# Patient Record
Sex: MALE | Race: WHITE | Employment: OTHER | URBAN - METROPOLITAN AREA
[De-identification: names, ages, dates, MRNs, and addresses within clinical notes are randomized per-mention and may not be internally consistent; named-entity substitution may affect disease eponyms.]

---

## 2018-06-01 ENCOUNTER — HOSPITAL ENCOUNTER (OUTPATIENT)
Dept: PHARMACY | Age: 83
Setting detail: THERAPIES SERIES
Discharge: HOME | End: 2018-06-01
Payer: MEDICARE

## 2018-06-01 DIAGNOSIS — I48.91 ATRIAL FIBRILLATION, UNSPECIFIED TYPE (HCC): ICD-10-CM

## 2018-06-01 LAB
INR BLD: 2.3
PROTIME: 27.7 SECONDS

## 2018-06-01 PROCEDURE — 99211 OFF/OP EST MAY X REQ PHY/QHP: CPT | Performed by: PHARMACIST

## 2018-06-01 PROCEDURE — 85610 PROTHROMBIN TIME: CPT | Performed by: PHARMACIST

## 2018-06-01 RX ORDER — DILTIAZEM HYDROCHLORIDE 120 MG/1
120 CAPSULE, COATED, EXTENDED RELEASE ORAL DAILY
Status: ON HOLD | COMMUNITY
End: 2019-06-28 | Stop reason: HOSPADM

## 2018-06-05 ENCOUNTER — TELEPHONE (OUTPATIENT)
Dept: PHARMACY | Age: 83
End: 2018-06-05

## 2018-06-15 RX ORDER — WARFARIN SODIUM 4 MG/1
TABLET ORAL
Qty: 120 TABLET | Refills: 1 | Status: SHIPPED | OUTPATIENT
Start: 2018-06-15 | End: 2018-12-31 | Stop reason: SDUPTHER

## 2018-07-13 ENCOUNTER — HOSPITAL ENCOUNTER (OUTPATIENT)
Dept: PHARMACY | Age: 83
Setting detail: THERAPIES SERIES
Discharge: HOME | End: 2018-07-13
Payer: MEDICARE

## 2018-07-13 DIAGNOSIS — I48.91 ATRIAL FIBRILLATION, UNSPECIFIED TYPE (HCC): ICD-10-CM

## 2018-07-13 LAB
INR BLD: 2.4
PROTIME: 28.6 SECONDS

## 2018-07-13 PROCEDURE — 85610 PROTHROMBIN TIME: CPT

## 2018-07-13 PROCEDURE — 99211 OFF/OP EST MAY X REQ PHY/QHP: CPT

## 2018-08-24 ENCOUNTER — HOSPITAL ENCOUNTER (OUTPATIENT)
Dept: PHARMACY | Age: 83
Setting detail: THERAPIES SERIES
Discharge: HOME | End: 2018-08-24
Payer: MEDICARE

## 2018-08-24 DIAGNOSIS — I48.91 ATRIAL FIBRILLATION, UNSPECIFIED TYPE (HCC): ICD-10-CM

## 2018-08-24 LAB
INR BLD: 2.5
PROTIME: 30.3 SECONDS

## 2018-08-24 PROCEDURE — 99211 OFF/OP EST MAY X REQ PHY/QHP: CPT

## 2018-08-24 PROCEDURE — 85610 PROTHROMBIN TIME: CPT

## 2018-10-03 ENCOUNTER — HOSPITAL ENCOUNTER (EMERGENCY)
Age: 83
Discharge: HOME | End: 2018-10-03
Attending: STUDENT IN AN ORGANIZED HEALTH CARE EDUCATION/TRAINING PROGRAM
Payer: MEDICARE

## 2018-10-03 ENCOUNTER — APPOINTMENT (OUTPATIENT)
Dept: CT IMAGING | Age: 83
End: 2018-10-03
Payer: MEDICARE

## 2018-10-03 ENCOUNTER — APPOINTMENT (OUTPATIENT)
Dept: GENERAL RADIOLOGY | Age: 83
End: 2018-10-03
Payer: MEDICARE

## 2018-10-03 VITALS
HEART RATE: 62 BPM | TEMPERATURE: 98.3 F | DIASTOLIC BLOOD PRESSURE: 73 MMHG | RESPIRATION RATE: 16 BRPM | OXYGEN SATURATION: 95 % | HEIGHT: 67 IN | BODY MASS INDEX: 31.39 KG/M2 | SYSTOLIC BLOOD PRESSURE: 163 MMHG | WEIGHT: 200 LBS

## 2018-10-03 DIAGNOSIS — R79.1 SUBTHERAPEUTIC INTERNATIONAL NORMALIZED RATIO (INR): ICD-10-CM

## 2018-10-03 DIAGNOSIS — S39.91XA BLUNT ABDOMINAL TRAUMA, INITIAL ENCOUNTER: ICD-10-CM

## 2018-10-03 DIAGNOSIS — S32.010A CLOSED COMPRESSION FRACTURE OF L1 LUMBAR VERTEBRA, INITIAL ENCOUNTER (HCC): Primary | ICD-10-CM
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
## 2018-10-03 LAB
ALBUMIN SERPL-MCNC: 4 G/DL (ref 3.9–4.9)
ALP BLD-CCNC: 49 U/L (ref 35–104)
ALT SERPL-CCNC: 45 U/L (ref 0–41)
ANION GAP SERPL CALCULATED.3IONS-SCNC: 13 MEQ/L (ref 7–13)
AST SERPL-CCNC: 47 U/L (ref 0–40)
BASOPHILS ABSOLUTE: 0.1 K/UL (ref 0–0.2)
BASOPHILS RELATIVE PERCENT: 0.4 %
BILIRUB SERPL-MCNC: <0.2 MG/DL (ref 0–1.2)
BILIRUBIN URINE: NEGATIVE
BLOOD, URINE: NEGATIVE
BUN BLDV-MCNC: 32 MG/DL (ref 8–23)
CALCIUM SERPL-MCNC: 9 MG/DL (ref 8.6–10.2)
CHLORIDE BLD-SCNC: 105 MEQ/L (ref 98–107)
CK MB: 13.8 NG/ML (ref 0–6.7)
CLARITY: CLEAR
CO2: 22 MEQ/L (ref 22–29)
COLOR: YELLOW
CREAT SERPL-MCNC: 0.88 MG/DL (ref 0.7–1.2)
CREATINE KINASE-MB INDEX: 2.3 % (ref 0–3.5)
EKG ATRIAL RATE: 61 BPM
EKG P AXIS: 53 DEGREES
EKG P-R INTERVAL: 160 MS
EKG Q-T INTERVAL: 410 MS
EKG QRS DURATION: 96 MS
EKG QTC CALCULATION (BAZETT): 412 MS
EKG R AXIS: 2 DEGREES
EKG T AXIS: 80 DEGREES
EKG VENTRICULAR RATE: 61 BPM
EOSINOPHILS ABSOLUTE: 0.1 K/UL (ref 0–0.7)
EOSINOPHILS RELATIVE PERCENT: 0.8 %
GFR AFRICAN AMERICAN: >60
GFR NON-AFRICAN AMERICAN: >60
GLOBULIN: 3 G/DL (ref 2.3–3.5)
GLUCOSE BLD-MCNC: 143 MG/DL (ref 74–109)
GLUCOSE URINE: NEGATIVE MG/DL
HCT VFR BLD CALC: 45.3 % (ref 42–52)
HEMOGLOBIN: 15.1 G/DL (ref 14–18)
INR BLD: 1.7
KETONES, URINE: NEGATIVE MG/DL
LACTIC ACID: 1 MMOL/L (ref 0.5–2.2)
LEUKOCYTE ESTERASE, URINE: NEGATIVE
LIPASE: 46 U/L (ref 13–60)
LYMPHOCYTES ABSOLUTE: 1.2 K/UL (ref 1–4.8)
LYMPHOCYTES RELATIVE PERCENT: 9.1 %
MCH RBC QN AUTO: 30 PG (ref 27–31.3)
MCHC RBC AUTO-ENTMCNC: 33.2 % (ref 33–37)
MCV RBC AUTO: 90.1 FL (ref 80–100)
MONOCYTES ABSOLUTE: 0.9 K/UL (ref 0.2–0.8)
MONOCYTES RELATIVE PERCENT: 6.8 %
NEUTROPHILS ABSOLUTE: 10.7 K/UL (ref 1.4–6.5)
NEUTROPHILS RELATIVE PERCENT: 82.9 %
NITRITE, URINE: NEGATIVE
PDW BLD-RTO: 14.1 % (ref 11.5–14.5)
PH UA: 6.5 (ref 5–9)
PLATELET # BLD: 196 K/UL (ref 130–400)
POTASSIUM SERPL-SCNC: 5 MEQ/L (ref 3.5–5.1)
PROTEIN UA: NEGATIVE MG/DL
PROTHROMBIN TIME: 17.4 SEC (ref 9.6–12.3)
RBC # BLD: 5.03 M/UL (ref 4.7–6.1)
SODIUM BLD-SCNC: 140 MEQ/L (ref 132–144)
SPECIFIC GRAVITY UA: 1.02 (ref 1–1.03)
TOTAL CK: 612 U/L (ref 0–190)
TOTAL PROTEIN: 7 G/DL (ref 6.4–8.1)
URINE REFLEX TO CULTURE: NORMAL
UROBILINOGEN, URINE: 1 E.U./DL
WBC # BLD: 12.9 K/UL (ref 4.8–10.8)

## 2018-10-03 PROCEDURE — 6360000002 HC RX W HCPCS: Performed by: STUDENT IN AN ORGANIZED HEALTH CARE EDUCATION/TRAINING PROGRAM

## 2018-10-03 PROCEDURE — 83605 ASSAY OF LACTIC ACID: CPT

## 2018-10-03 PROCEDURE — 96374 THER/PROPH/DIAG INJ IV PUSH: CPT

## 2018-10-03 PROCEDURE — 6370000000 HC RX 637 (ALT 250 FOR IP): Performed by: STUDENT IN AN ORGANIZED HEALTH CARE EDUCATION/TRAINING PROGRAM

## 2018-10-03 PROCEDURE — 72110 X-RAY EXAM L-2 SPINE 4/>VWS: CPT

## 2018-10-03 PROCEDURE — 82550 ASSAY OF CK (CPK): CPT

## 2018-10-03 PROCEDURE — 80053 COMPREHEN METABOLIC PANEL: CPT

## 2018-10-03 PROCEDURE — 93005 ELECTROCARDIOGRAM TRACING: CPT

## 2018-10-03 PROCEDURE — 85610 PROTHROMBIN TIME: CPT

## 2018-10-03 PROCEDURE — 83690 ASSAY OF LIPASE: CPT

## 2018-10-03 PROCEDURE — 74177 CT ABD & PELVIS W/CONTRAST: CPT

## 2018-10-03 PROCEDURE — 82553 CREATINE MB FRACTION: CPT

## 2018-10-03 PROCEDURE — 81003 URINALYSIS AUTO W/O SCOPE: CPT

## 2018-10-03 PROCEDURE — 99284 EMERGENCY DEPT VISIT MOD MDM: CPT

## 2018-10-03 PROCEDURE — 85025 COMPLETE CBC W/AUTO DIFF WBC: CPT

## 2018-10-03 PROCEDURE — 72074 X-RAY EXAM THORAC SPINE4/>VW: CPT

## 2018-10-03 PROCEDURE — 6360000004 HC RX CONTRAST MEDICATION: Performed by: STUDENT IN AN ORGANIZED HEALTH CARE EDUCATION/TRAINING PROGRAM

## 2018-10-03 PROCEDURE — 72170 X-RAY EXAM OF PELVIS: CPT

## 2018-10-03 PROCEDURE — 36415 COLL VENOUS BLD VENIPUNCTURE: CPT

## 2018-10-03 RX ORDER — OXYCODONE HYDROCHLORIDE AND ACETAMINOPHEN 5; 325 MG/1; MG/1
2 TABLET ORAL ONCE
Status: COMPLETED | OUTPATIENT
Start: 2018-10-03 | End: 2018-10-03

## 2018-10-03 RX ORDER — OXYCODONE HYDROCHLORIDE AND ACETAMINOPHEN 5; 325 MG/1; MG/1
1-2 TABLET ORAL EVERY 6 HOURS PRN
Qty: 20 TABLET | Refills: 0 | Status: SHIPPED | OUTPATIENT
Start: 2018-10-03 | End: 2018-10-10

## 2018-10-03 RX ORDER — HYDROCODONE BITARTRATE AND ACETAMINOPHEN 5; 325 MG/1; MG/1
1 TABLET ORAL ONCE
Status: COMPLETED | OUTPATIENT
Start: 2018-10-03 | End: 2018-10-03

## 2018-10-03 RX ADMIN — OXYCODONE HYDROCHLORIDE AND ACETAMINOPHEN 2 TABLET: 5; 325 TABLET ORAL at 22:03

## 2018-10-03 RX ADMIN — IOPAMIDOL 100 ML: 755 INJECTION, SOLUTION INTRAVENOUS at 20:38

## 2018-10-03 RX ADMIN — HYDROMORPHONE HYDROCHLORIDE 0.5 MG: 1 INJECTION, SOLUTION INTRAMUSCULAR; INTRAVENOUS; SUBCUTANEOUS at 18:34

## 2018-10-03 RX ADMIN — HYDROCODONE BITARTRATE AND ACETAMINOPHEN 1 TABLET: 5; 325 TABLET ORAL at 16:59

## 2018-10-03 ASSESSMENT — PAIN SCALES - GENERAL
PAINLEVEL_OUTOF10: 10
PAINLEVEL_OUTOF10: 0
PAINLEVEL_OUTOF10: 8
PAINLEVEL_OUTOF10: 8
PAINLEVEL_OUTOF10: 10
PAINLEVEL_OUTOF10: 10

## 2018-10-03 ASSESSMENT — ENCOUNTER SYMPTOMS
SINUS PRESSURE: 0
VOMITING: 0
CHEST TIGHTNESS: 0
SHORTNESS OF BREATH: 0
TROUBLE SWALLOWING: 0
COUGH: 0
BACK PAIN: 1
DIARRHEA: 0
ABDOMINAL PAIN: 0

## 2018-10-03 ASSESSMENT — PAIN DESCRIPTION - ORIENTATION
ORIENTATION: LOWER

## 2018-10-03 ASSESSMENT — PAIN DESCRIPTION - PAIN TYPE
TYPE: ACUTE PAIN

## 2018-10-03 ASSESSMENT — PAIN DESCRIPTION - LOCATION
LOCATION: BACK

## 2018-10-03 NOTE — ED TRIAGE NOTES
Patient from home with wife. Brought to the ER via squad for a fall at home. Patient was on ladder outside changing a light bulb and fell onto the grass. Patient is on coumadin.   A&Ox4

## 2018-10-03 NOTE — ED NOTES
Bed: 18  Expected date: 10/3/18  Expected time:   Means of arrival:   Comments:  80 male fell 5 feet from a ladder. C/o lower to mid back pain. Not inmobilized.  Pt is unable to speak in full sentence, his normal. 75-/60     Joan Wallace RN  10/03/18 7005

## 2018-10-04 PROCEDURE — 93010 ELECTROCARDIOGRAM REPORT: CPT | Performed by: INTERNAL MEDICINE

## 2018-10-04 NOTE — ED NOTES
Call placed to cat scan. They are ready for pt to come down for testing. Pt and wife updated.       Rehana Wayne, RODOLFO  10/03/18 203

## 2018-10-04 NOTE — ED NOTES
Pt resting in bed. Pt states he has no pain at this time. Wife at bedside. Will continue to monitor.       Felicia Kwan RN  10/03/18 2038

## 2018-10-04 NOTE — ED PROVIDER NOTES
and rash. Neurological: Negative for syncope, weakness and headaches. Hematological: Does not bruise/bleed easily. All other systems reviewed and are negative. Except as noted above the remainder of the review of systems was reviewed and negative. PAST MEDICAL HISTORY     Past Medical History:   Diagnosis Date    Chronic back pain     Hyperlipidemia     Hypertension          SURGICAL HISTORY       Past Surgical History:   Procedure Laterality Date    CARDIAC CATHETERIZATION      3 stents    CORONARY ARTERY BYPASS GRAFT           CURRENT MEDICATIONS       Previous Medications    ASPIRIN 81 MG TABLET    Take 81 mg by mouth daily    CHOLECALCIFEROL (VITAMIN D-3 PO)    Take by mouth    DILTIAZEM (CARDIZEM CD) 120 MG EXTENDED RELEASE CAPSULE    Take 120 mg by mouth daily    LISINOPRIL (PRINIVIL;ZESTRIL) 10 MG TABLET        METOPROLOL (TOPROL-XL) 25 MG XL TABLET        NITROGLYCERIN (NITROSTAT) 0.4 MG SL TABLET    Place 0.4 mg under the tongue every 5 minutes as needed for Chest pain    PRAVASTATIN (PRAVACHOL) 40 MG TABLET        WARFARIN (COUMADIN) 4 MG TABLET    Take as directed by [address] [place] [address] Anticoagulation Management Service. Quantity equals 90 day supply. (currently 34 mg TWD)       ALLERGIES     Honey bee venom [bee venom] and Penicillins    FAMILY HISTORY     History reviewed. No pertinent family history.        SOCIAL HISTORY       Social History     Social History    Marital status: Single     Spouse name: N/A    Number of children: N/A    Years of education: N/A     Social History Main Topics    Smoking status: Former Smoker    Smokeless tobacco: Never Used    Alcohol use 0.0 oz/week      Comment: social    Drug use: No    Sexual activity: Not Currently     Other Topics Concern    None     Social History Narrative    None       SCREENINGS    Kaylynn Coma Scale  Eye Opening: Spontaneous  Best Verbal Response: Oriented  Best Motor Response: Obeys commands  [place] Coma Scale Score: discussed the findings with the patient and his significant other. They verbalize understanding and have no further questions. CONSULTS:  None    PROCEDURES:  Unless otherwise noted below, none     Procedures    FINAL IMPRESSION      1. Closed compression fracture of L1 lumbar vertebra, initial encounter ([facility] [address]    2. Blunt abdominal trauma, initial encounter    3. Fall from ladder, initial encounter    4. Subtherapeutic international normalized ratio (INR)          DISPOSITION/PLAN   DISPOSITION Discharge - Pending Orders Complete 10/03/2018 09:41:38 PM      PATIENT REFERRED TO:  MD Oj Franklin [place] [phone]    Call in 1 day      [place]anda Zarate [address] [facility],[address] [facility]  [phone]    Schedule an appointment as soon as possible for a visit in 1 day        DISCHARGE MEDICATIONS:  New Prescriptions    OXYCODONE-ACETAMINOPHEN (PERCOCET) 5-325 MG PER TABLET    Take 1-2 tablets by mouth every 6 hours as needed for Pain for up to 7 days. WARNING:  May cause drowsiness. May impair ability to operate vehicles or machinery. Do not use in combination with alcohol. .          (Please note that portions of this note were completed with a voice recognition program.  Efforts were made to edit the dictations but occasionally words are mis-transcribed.)    52 Irene Mishra DO (electronically signed)  Attending Emergency Physician          52 Irene Mishra DO  10/03/18 9661

## 2018-10-04 NOTE — ED NOTES
Pt resting in bed. Pt denies any pain at this time. Pt given ice water and tolerated well.       Ana Lilia Moore RN  10/03/18 3264

## 2018-10-05 LAB
GFR AFRICAN AMERICAN: >60
GFR NON-AFRICAN AMERICAN: >60
PERFORMED ON: NORMAL
POC CREATININE: 0.9 MG/DL (ref 0.8–1.3)
POC SAMPLE TYPE: NORMAL

## 2018-10-08 ENCOUNTER — HOSPITAL ENCOUNTER (OUTPATIENT)
Dept: PHARMACY | Age: 83
Setting detail: THERAPIES SERIES
Discharge: HOME | End: 2018-10-08
Payer: MEDICARE

## 2018-10-08 DIAGNOSIS — I48.91 ATRIAL FIBRILLATION, UNSPECIFIED TYPE (HCC): ICD-10-CM

## 2018-10-08 LAB
INR BLD: 2
PROTIME: 24.1 SECONDS

## 2018-10-08 PROCEDURE — 99211 OFF/OP EST MAY X REQ PHY/QHP: CPT

## 2018-10-08 PROCEDURE — 85610 PROTHROMBIN TIME: CPT

## 2018-11-19 ENCOUNTER — HOSPITAL ENCOUNTER (OUTPATIENT)
Dept: PHARMACY | Age: 83
Setting detail: THERAPIES SERIES
Discharge: HOME | End: 2018-11-19
Payer: MEDICARE

## 2018-11-19 DIAGNOSIS — I48.91 ATRIAL FIBRILLATION, UNSPECIFIED TYPE (HCC): ICD-10-CM

## 2018-11-19 PROCEDURE — 85610 PROTHROMBIN TIME: CPT

## 2018-11-19 PROCEDURE — 99211 OFF/OP EST MAY X REQ PHY/QHP: CPT

## 2018-12-31 ENCOUNTER — HOSPITAL ENCOUNTER (OUTPATIENT)
Dept: PHARMACY | Age: 83
Setting detail: THERAPIES SERIES
Discharge: HOME | End: 2018-12-31
Payer: MEDICARE

## 2018-12-31 DIAGNOSIS — I48.91 ATRIAL FIBRILLATION, UNSPECIFIED TYPE (HCC): ICD-10-CM

## 2018-12-31 LAB — INTERNATIONAL NORMALIZATION RATIO, POC: 2.5

## 2018-12-31 PROCEDURE — 85610 PROTHROMBIN TIME: CPT

## 2018-12-31 PROCEDURE — 99211 OFF/OP EST MAY X REQ PHY/QHP: CPT

## 2018-12-31 RX ORDER — WARFARIN SODIUM 4 MG/1
TABLET ORAL
Qty: 125 TABLET | Refills: 1 | Status: ON HOLD | OUTPATIENT
Start: 2018-12-31 | End: 2019-05-28 | Stop reason: HOSPADM

## 2019-02-11 ENCOUNTER — HOSPITAL ENCOUNTER (OUTPATIENT)
Dept: PHARMACY | Age: 84
Setting detail: THERAPIES SERIES
Discharge: HOME | End: 2019-02-11
Payer: MEDICARE

## 2019-02-11 DIAGNOSIS — I48.91 ATRIAL FIBRILLATION, UNSPECIFIED TYPE (HCC): ICD-10-CM

## 2019-02-11 LAB — INTERNATIONAL NORMALIZATION RATIO, POC: 2.2

## 2019-02-11 PROCEDURE — 85610 PROTHROMBIN TIME: CPT | Performed by: PHARMACIST

## 2019-02-11 PROCEDURE — 99211 OFF/OP EST MAY X REQ PHY/QHP: CPT | Performed by: PHARMACIST

## 2019-03-26 ENCOUNTER — HOSPITAL ENCOUNTER (OUTPATIENT)
Dept: PHARMACY | Age: 84
Setting detail: THERAPIES SERIES
Discharge: HOME | End: 2019-03-26
Payer: MEDICARE

## 2019-03-26 DIAGNOSIS — I48.91 ATRIAL FIBRILLATION, UNSPECIFIED TYPE (HCC): ICD-10-CM

## 2019-03-26 LAB — INTERNATIONAL NORMALIZATION RATIO, POC: 1.9

## 2019-03-26 PROCEDURE — 85610 PROTHROMBIN TIME: CPT | Performed by: PHARMACIST

## 2019-03-26 PROCEDURE — 99211 OFF/OP EST MAY X REQ PHY/QHP: CPT | Performed by: PHARMACIST

## 2019-05-07 ENCOUNTER — HOSPITAL ENCOUNTER (OUTPATIENT)
Dept: PHARMACY | Age: 84
Setting detail: THERAPIES SERIES
Discharge: HOME | End: 2019-05-07
Payer: MEDICARE

## 2019-05-07 DIAGNOSIS — I48.91 ATRIAL FIBRILLATION, UNSPECIFIED TYPE (HCC): ICD-10-CM

## 2019-05-07 PROCEDURE — 85610 PROTHROMBIN TIME: CPT

## 2019-05-07 PROCEDURE — 99211 OFF/OP EST MAY X REQ PHY/QHP: CPT

## 2019-05-24 ENCOUNTER — APPOINTMENT (OUTPATIENT)
Dept: CT IMAGING | Age: 84
DRG: 026 | End: 2019-05-24
Payer: MEDICARE

## 2019-05-24 ENCOUNTER — APPOINTMENT (OUTPATIENT)
Dept: GENERAL RADIOLOGY | Age: 84
DRG: 026 | End: 2019-05-24
Payer: MEDICARE

## 2019-05-24 ENCOUNTER — HOSPITAL ENCOUNTER (INPATIENT)
Age: 84
LOS: 7 days | Discharge: NURSING, REHAB OR LONG-TERM CARE | DRG: 026 | End: 2019-05-31
Attending: EMERGENCY MEDICINE | Admitting: NEUROLOGICAL SURGERY
Payer: MEDICARE

## 2019-05-24 DIAGNOSIS — S06.5XAA SUBDURAL HEMATOMA: Primary | ICD-10-CM

## 2019-05-24 LAB
ABO/RH: NORMAL
ALBUMIN SERPL-MCNC: 3.3 G/DL (ref 3.5–4.6)
ALP BLD-CCNC: 53 U/L (ref 35–104)
ALT SERPL-CCNC: 17 U/L (ref 0–41)
ANION GAP SERPL CALCULATED.3IONS-SCNC: 11 MEQ/L (ref 9–15)
ANTIBODY SCREEN: NORMAL
APTT: 30.6 SEC (ref 21.6–35.4)
AST SERPL-CCNC: 24 U/L (ref 0–40)
BASOPHILS ABSOLUTE: 0 K/UL (ref 0–0.2)
BASOPHILS RELATIVE PERCENT: 0.2 %
BILIRUB SERPL-MCNC: 0.7 MG/DL (ref 0.2–0.7)
BUN BLDV-MCNC: 29 MG/DL (ref 8–23)
CALCIUM SERPL-MCNC: 9.3 MG/DL (ref 8.5–9.9)
CHLORIDE BLD-SCNC: 108 MEQ/L (ref 95–107)
CO2: 21 MEQ/L (ref 20–31)
CREAT SERPL-MCNC: 0.69 MG/DL (ref 0.7–1.2)
EOSINOPHILS ABSOLUTE: 0.1 K/UL (ref 0–0.7)
EOSINOPHILS RELATIVE PERCENT: 0.7 %
GFR AFRICAN AMERICAN: >60
GFR NON-AFRICAN AMERICAN: >60
GLOBULIN: 2.9 G/DL (ref 2.3–3.5)
GLUCOSE BLD-MCNC: 184 MG/DL (ref 70–99)
HCT VFR BLD CALC: 42.2 % (ref 42–52)
HEMOGLOBIN: 14.3 G/DL (ref 14–18)
INR BLD: 2.4
LYMPHOCYTES ABSOLUTE: 0.7 K/UL (ref 1–4.8)
LYMPHOCYTES RELATIVE PERCENT: 6.7 %
MAGNESIUM: 2.3 MG/DL (ref 1.7–2.4)
MCH RBC QN AUTO: 31.2 PG (ref 27–31.3)
MCHC RBC AUTO-ENTMCNC: 33.8 % (ref 33–37)
MCV RBC AUTO: 92.2 FL (ref 80–100)
MONOCYTES ABSOLUTE: 0.8 K/UL (ref 0.2–0.8)
MONOCYTES RELATIVE PERCENT: 8.2 %
NEUTROPHILS ABSOLUTE: 8.2 K/UL (ref 1.4–6.5)
NEUTROPHILS RELATIVE PERCENT: 84.2 %
PDW BLD-RTO: 14 % (ref 11.5–14.5)
PLATELET # BLD: 178 K/UL (ref 130–400)
POTASSIUM SERPL-SCNC: 4.2 MEQ/L (ref 3.4–4.9)
PROTHROMBIN TIME: 23.2 SEC (ref 9–11.5)
RBC # BLD: 4.58 M/UL (ref 4.7–6.1)
SODIUM BLD-SCNC: 140 MEQ/L (ref 135–144)
TOTAL PROTEIN: 6.2 G/DL (ref 6.3–8)
TROPONIN: <0.01 NG/ML (ref 0–0.01)
WBC # BLD: 9.8 K/UL (ref 4.8–10.8)

## 2019-05-24 PROCEDURE — 86900 BLOOD TYPING SEROLOGIC ABO: CPT

## 2019-05-24 PROCEDURE — 85610 PROTHROMBIN TIME: CPT

## 2019-05-24 PROCEDURE — 84484 ASSAY OF TROPONIN QUANT: CPT

## 2019-05-24 PROCEDURE — 80053 COMPREHEN METABOLIC PANEL: CPT

## 2019-05-24 PROCEDURE — 99285 EMERGENCY DEPT VISIT HI MDM: CPT

## 2019-05-24 PROCEDURE — 2500000003 HC RX 250 WO HCPCS: Performed by: EMERGENCY MEDICINE

## 2019-05-24 PROCEDURE — 85025 COMPLETE CBC W/AUTO DIFF WBC: CPT

## 2019-05-24 PROCEDURE — 70450 CT HEAD/BRAIN W/O DYE: CPT

## 2019-05-24 PROCEDURE — 72125 CT NECK SPINE W/O DYE: CPT

## 2019-05-24 PROCEDURE — 85730 THROMBOPLASTIN TIME PARTIAL: CPT

## 2019-05-24 PROCEDURE — 2580000003 HC RX 258: Performed by: NEUROLOGICAL SURGERY

## 2019-05-24 PROCEDURE — 6360000002 HC RX W HCPCS: Performed by: EMERGENCY MEDICINE

## 2019-05-24 PROCEDURE — 83735 ASSAY OF MAGNESIUM: CPT

## 2019-05-24 PROCEDURE — 72131 CT LUMBAR SPINE W/O DYE: CPT

## 2019-05-24 PROCEDURE — 86850 RBC ANTIBODY SCREEN: CPT

## 2019-05-24 PROCEDURE — 51702 INSERT TEMP BLADDER CATH: CPT

## 2019-05-24 PROCEDURE — 36430 TRANSFUSION BLD/BLD COMPNT: CPT

## 2019-05-24 PROCEDURE — 86901 BLOOD TYPING SEROLOGIC RH(D): CPT

## 2019-05-24 PROCEDURE — 36415 COLL VENOUS BLD VENIPUNCTURE: CPT

## 2019-05-24 PROCEDURE — 6370000000 HC RX 637 (ALT 250 FOR IP): Performed by: NEUROLOGICAL SURGERY

## 2019-05-24 PROCEDURE — 6360000002 HC RX W HCPCS: Performed by: NEUROLOGICAL SURGERY

## 2019-05-24 PROCEDURE — 2000000000 HC ICU R&B

## 2019-05-24 PROCEDURE — P9059 PLASMA, FRZ BETWEEN 8-24HOUR: HCPCS

## 2019-05-24 PROCEDURE — 71045 X-RAY EXAM CHEST 1 VIEW: CPT

## 2019-05-24 PROCEDURE — 2580000003 HC RX 258: Performed by: EMERGENCY MEDICINE

## 2019-05-24 RX ORDER — DILTIAZEM HYDROCHLORIDE 120 MG/1
120 CAPSULE, COATED, EXTENDED RELEASE ORAL DAILY
Status: DISCONTINUED | OUTPATIENT
Start: 2019-05-24 | End: 2019-05-31 | Stop reason: HOSPADM

## 2019-05-24 RX ORDER — 0.9 % SODIUM CHLORIDE 0.9 %
250 INTRAVENOUS SOLUTION INTRAVENOUS ONCE
Status: COMPLETED | OUTPATIENT
Start: 2019-05-24 | End: 2019-05-25

## 2019-05-24 RX ORDER — ONDANSETRON 2 MG/ML
4 INJECTION INTRAMUSCULAR; INTRAVENOUS ONCE
Status: COMPLETED | OUTPATIENT
Start: 2019-05-24 | End: 2019-05-24

## 2019-05-24 RX ORDER — LABETALOL 20 MG/4 ML (5 MG/ML) INTRAVENOUS SYRINGE
10 ONCE
Status: COMPLETED | OUTPATIENT
Start: 2019-05-24 | End: 2019-05-24

## 2019-05-24 RX ORDER — LISINOPRIL 10 MG/1
10 TABLET ORAL DAILY
Status: DISCONTINUED | OUTPATIENT
Start: 2019-05-24 | End: 2019-05-26

## 2019-05-24 RX ORDER — DEXTROSE, SODIUM CHLORIDE, AND POTASSIUM CHLORIDE 5; .9; .15 G/100ML; G/100ML; G/100ML
INJECTION INTRAVENOUS CONTINUOUS
Status: DISCONTINUED | OUTPATIENT
Start: 2019-05-25 | End: 2019-05-24

## 2019-05-24 RX ORDER — HYDRALAZINE HYDROCHLORIDE 20 MG/ML
10 INJECTION INTRAMUSCULAR; INTRAVENOUS
Status: DISCONTINUED | OUTPATIENT
Start: 2019-05-24 | End: 2019-05-31 | Stop reason: HOSPADM

## 2019-05-24 RX ORDER — MORPHINE SULFATE 4 MG/ML
4 INJECTION, SOLUTION INTRAMUSCULAR; INTRAVENOUS EVERY 4 HOURS PRN
Status: DISCONTINUED | OUTPATIENT
Start: 2019-05-24 | End: 2019-05-25

## 2019-05-24 RX ORDER — PRAVASTATIN SODIUM 40 MG
40 TABLET ORAL NIGHTLY
Status: DISCONTINUED | OUTPATIENT
Start: 2019-05-24 | End: 2019-05-31 | Stop reason: HOSPADM

## 2019-05-24 RX ORDER — 0.9 % SODIUM CHLORIDE 0.9 %
1000 INTRAVENOUS SOLUTION INTRAVENOUS ONCE
Status: COMPLETED | OUTPATIENT
Start: 2019-05-24 | End: 2019-05-24

## 2019-05-24 RX ORDER — LABETALOL 20 MG/4 ML (5 MG/ML) INTRAVENOUS SYRINGE
10
Status: DISCONTINUED | OUTPATIENT
Start: 2019-05-24 | End: 2019-05-31 | Stop reason: HOSPADM

## 2019-05-24 RX ORDER — PHYTONADIONE 10 MG/ML
10 INJECTION, EMULSION INTRAMUSCULAR; INTRAVENOUS; SUBCUTANEOUS DAILY
Status: DISCONTINUED | OUTPATIENT
Start: 2019-05-24 | End: 2019-05-31

## 2019-05-24 RX ORDER — PHYTONADIONE 10 MG/ML
10 INJECTION, EMULSION INTRAMUSCULAR; INTRAVENOUS; SUBCUTANEOUS ONCE
Status: COMPLETED | OUTPATIENT
Start: 2019-05-24 | End: 2019-05-24

## 2019-05-24 RX ADMIN — SODIUM CHLORIDE 250 ML: 9 INJECTION, SOLUTION INTRAVENOUS at 18:00

## 2019-05-24 RX ADMIN — POTASSIUM CHLORIDE: 2 INJECTION, SOLUTION, CONCENTRATE INTRAVENOUS at 22:09

## 2019-05-24 RX ADMIN — DILTIAZEM HYDROCHLORIDE 120 MG: 120 CAPSULE, COATED, EXTENDED RELEASE ORAL at 22:08

## 2019-05-24 RX ADMIN — PRAVASTATIN SODIUM 40 MG: 40 TABLET ORAL at 20:57

## 2019-05-24 RX ADMIN — LABETALOL 20 MG/4 ML (5 MG/ML) INTRAVENOUS SYRINGE 10 MG: at 21:04

## 2019-05-24 RX ADMIN — OYSTER SHELL CALCIUM WITH VITAMIN D 1 TABLET: 500; 200 TABLET, FILM COATED ORAL at 22:09

## 2019-05-24 RX ADMIN — HYDROMORPHONE HYDROCHLORIDE 1 MG: 1 INJECTION, SOLUTION INTRAMUSCULAR; INTRAVENOUS; SUBCUTANEOUS at 17:59

## 2019-05-24 RX ADMIN — PHYTONADIONE 10 MG: 10 INJECTION, EMULSION INTRAMUSCULAR; INTRAVENOUS; SUBCUTANEOUS at 17:57

## 2019-05-24 RX ADMIN — ONDANSETRON 4 MG: 2 INJECTION INTRAMUSCULAR; INTRAVENOUS at 17:57

## 2019-05-24 RX ADMIN — LISINOPRIL 10 MG: 10 TABLET ORAL at 22:08

## 2019-05-24 RX ADMIN — HYDRALAZINE HYDROCHLORIDE 10 MG: 20 INJECTION INTRAMUSCULAR; INTRAVENOUS at 22:08

## 2019-05-24 RX ADMIN — SODIUM CHLORIDE 1000 ML: 9 INJECTION, SOLUTION INTRAVENOUS at 16:47

## 2019-05-24 RX ADMIN — PHYTONADIONE 10 MG: 10 INJECTION, EMULSION INTRAMUSCULAR; INTRAVENOUS; SUBCUTANEOUS at 22:08

## 2019-05-24 ASSESSMENT — PAIN SCALES - GENERAL
PAINLEVEL_OUTOF10: 0
PAINLEVEL_OUTOF10: 0
PAINLEVEL_OUTOF10: 7
PAINLEVEL_OUTOF10: 0

## 2019-05-24 ASSESSMENT — ENCOUNTER SYMPTOMS
DIARRHEA: 0
SORE THROAT: 0
NAUSEA: 0
COUGH: 0
ABDOMINAL PAIN: 0
VOMITING: 0
SHORTNESS OF BREATH: 0
BACK PAIN: 0

## 2019-05-24 NOTE — ED TRIAGE NOTES
PT to ed from home via ems, had fall, unwitnessed in the bathroom. Wife at home with pt, states pt much have fallen and hit head on the toilet getting out of the shower. Per ems, pt was on the ground. Laceration to posterior scalp, bleeding controlled by ems. Pt is alert and oriented to person and place, but not date. Pt sleech is slow and pt only speaks in short phrases. Per with, this is pt's baseline speech pattern. Pt has swelling and bruising noted to right forearm and elbow. Per wife, injury is from a previous fall. Pt resps are even and unlabored. PERRLA. Pt in collar and on a backboard.  Per Ems, , on coumadin for afib, pt also has cardia chx with stents and nuero history with Dr Yo Buckley

## 2019-05-24 NOTE — ED NOTES
Per Dr Darrel Auguste, hold labetolol prn for increased systolic     Kate Guy, [facility]  05/24/19 5199

## 2019-05-24 NOTE — CONSULTS
Consults   Consult Note    Reason for Consult:  Management of subdural hematoma, chronic back pain, HLD, and HTN    Requesting Physician:  Jorge Luis Blancas MD    HISTORY OF PRESENT ILLNESS:    The patient is a 80 y.o. male who is being admitted by Dr. Roxann Verduzco after falling in his bathroom and sustaining a subdural hematoma. Multiple falls in the past      Past Medical History:   Diagnosis Date    Chronic back pain     Hyperlipidemia     Hypertension        Past Surgical History:   Procedure Laterality Date    CARDIAC CATHETERIZATION      3 stents    CORONARY ARTERY BYPASS GRAFT         Prior to Admission medications    Medication Sig Start Date End Date Taking? Authorizing Provider   warfarin (COUMADIN) 4 MG tablet Take as directed by [facility] AT [place] Anticoagulation Management Service. Quantity equals 90 day supply.  (currently 34 mg TWD) 12/31/18  Yes Seth Rose MD   diltiazem (CARDIZEM CD) 120 MG extended release capsule Take 120 mg by mouth daily   Yes Historical Provider, MD   lisinopril (PRINIVIL;ZESTRIL) 10 MG tablet  10/5/15  Yes Historical Provider, MD   pravastatin (PRAVACHOL) 40 MG tablet  9/14/15  Yes Historical Provider, MD   aspirin 81 MG tablet Take 81 mg by mouth daily   Yes Historical Provider, MD   Cholecalciferol (VITAMIN D-3 PO) Take by mouth   Yes Historical Provider, MD   metoprolol (TOPROL-XL) 25 MG XL tablet  9/8/15   Historical Provider, MD   nitroGLYCERIN (NITROSTAT) 0.4 MG SL tablet Place 0.4 mg under the tongue every 5 minutes as needed for Chest pain    Historical Provider, MD       Scheduled Meds:   sodium chloride  1,000 mL Intravenous Once    sodium chloride  250 mL Intravenous Once    labetalol  10 mg Intravenous Once     Continuous Infusions:  PRN Meds:HYDROmorphone    Allergies   Allergen Reactions    Honey Bee Venom [Bee Venom] Swelling    Penicillins Swelling       Social History     Socioeconomic History    Marital status: Single     Spouse name: Not on file    Number of sleep    Physical Exam:  Vitals:    05/24/19 1600 05/24/19 1801 05/24/19 1803 05/24/19 1813   BP: 134/66 (!) 150/100 (!) 150/100 131/61   Pulse:  61 59 63   Resp:  28 23 18   Temp:   97.4 °F (36.3 °C) 97.4 °F (36.3 °C)   TempSrc:  Oral     SpO2:  96%     Weight:       Height:           CONSTITUTIONAL:  awake, alert, cooperative, no apparent distress, and appears stated age  NECK:  Supple, symmetrical, trachea midline, no adenopathy, thyroid symmetric, not enlarged and no tenderness, skin normal  BACK:  Symmetric, no curvature, spinous processes are non-tender on palpation, paraspinous muscles are non-tender on palpation, no costal vertebral tenderness  LUNGS:  No increased work of breathing, good air exchange, clear to auscultation bilaterally, no crackles or wheezing  CARDIOVASCULAR:  Normal apical impulse, regular rate and rhythm, normal S1 and S2, no S3 or S4, and no murmur noted  ABDOMEN:  No scars, normal bowel sounds, soft, non-distended, non-tender, no masses palpated, no hepatosplenomegally  MUSCULOSKELETAL:  There is no redness, warmth, or swelling of the joints. Full range of motion noted. Motor strength is 5 out of 5 all extremities bilaterally.   Tone is normal.  NEUROLOGIC:  Awake, alert, oriented to name, intermittent periods of confusion and not following commands  SKIN:  no bruising or bleeding, no lesions and no jaundice    Labs:  Recent Results (from the past 24 hour(s))   Comprehensive Metabolic Panel    Collection Time: 05/24/19  4:22 PM   Result Value Ref Range    Sodium 140 135 - 144 mEq/L    Potassium 4.2 3.4 - 4.9 mEq/L    Chloride 108 (H) 95 - 107 mEq/L    CO2 21 20 - 31 mEq/L    Anion Gap 11 9 - 15 mEq/L    Glucose 184 (H) 70 - 99 mg/dL    BUN 29 (H) 8 - 23 mg/dL    CREATININE 0.69 (L) 0.70 - 1.20 mg/dL    GFR Non-African American >60.0 >60    GFR  >60.0 >60    Calcium 9.3 8.5 - 9.9 mg/dL    Total Protein 6.2 (L) 6.3 - 8.0 g/dL    Alb 3.3 (L) 3.5 - 4.6 g/dL    Total Bank [id number]     Description Blood Bank Plasma, 5 Day, Thawed     Unit Number [id number]     Dispense Status Blood Bank issued      Assessment/Plan:  1. Subdural hematoma-due to due trauma  S/p vitamin k and FFP  CT back  Admitted under neurosurgery  Admit to ICU  2. HTN-continue antihypertensive meds  3. HLD  4. Chronic back pain  I saw and evaluated the pt.  I personally obtained the key and critical portions of the history and physical exam. I reviewed the mild level documentation and agree with assessment and plan that we come up togethe  Electronically signed by Yaima Wahl MD on 5/24/19 at 6:45 PM      Electronically signed by LUIS M Cárdenas on 5/24/19 at 6:33 PM

## 2019-05-24 NOTE — ED PROVIDER NOTES
[facility] ED  eMERGENCYdEPARTMENT eNCOUnter      Pt Name: David Ding  MRN: [id number]  [place] 1934  Date of evaluation: 5/24/2019  Gustavo Ellis MD    CHIEF COMPLAINT           HPI  David Ding is a 80 y.o. male per chart review has a h/o afib on Coumadin. Pt presents to ED today after a fall in the bathroom while getting out of the shower. Unknown LOC. Pt has laceration to the back of his head. Per wife, pt has had an increasing number of frequent falls x 3 months. Pt denies neck pain. Pt notes moderate, constant, aching, nonradiating, low back pain. ROS  Review of Systems   Constitutional: Negative for activity change, chills and fever. HENT: Negative for ear pain and sore throat. Eyes: Negative for visual disturbance. Respiratory: Negative for cough and shortness of breath. Cardiovascular: Negative for chest pain, palpitations and leg swelling. Gastrointestinal: Negative for abdominal pain, diarrhea, nausea and vomiting. Genitourinary: Negative for dysuria. Musculoskeletal: Negative for back pain. Skin: Positive for wound. Negative for rash. Neurological: Negative for dizziness and weakness. Except as noted above the remainder of the review of systems was reviewed and negative.        PAST MEDICAL HISTORY     Past Medical History:   Diagnosis Date    Chronic back pain     Hyperlipidemia     Hypertension          SURGICAL HISTORY       Past Surgical History:   Procedure Laterality Date    CARDIAC CATHETERIZATION      3 stents    CORONARY ARTERY BYPASS GRAFT           CURRENTMEDICATIONS       Previous Medications    ASPIRIN 81 MG TABLET    Take 81 mg by mouth daily    CHOLECALCIFEROL (VITAMIN D-3 PO)    Take by mouth    DILTIAZEM (CARDIZEM CD) 120 MG EXTENDED RELEASE CAPSULE    Take 120 mg by mouth daily    LISINOPRIL (PRINIVIL;ZESTRIL) 10 MG TABLET        METOPROLOL (TOPROL-XL) 25 MG XL TABLET        NITROGLYCERIN (NITROSTAT) 0.4 MG SL TABLET Place 0.4 mg under the tongue every 5 minutes as needed for Chest pain    PRAVASTATIN (PRAVACHOL) 40 MG TABLET        WARFARIN (COUMADIN) 4 MG TABLET    Take as directed by [facility] EMERGENCY MEDICAL [place] AT [place] Anticoagulation Management Service. Quantity equals 90 day supply. (currently 34 mg TWD)       ALLERGIES     Honey bee venom [bee venom] and Penicillins    FAMILY HISTORY     History reviewed. No pertinent family history. SOCIAL HISTORY       Social History     Socioeconomic History    Marital status: Single     Spouse name: None    Number of children: None    Years of education: None    Highest education level: None   Occupational History    None   Social Needs    Financial resource strain: None    Food insecurity:     Worry: None     Inability: None    Transportation needs:     Medical: None     Non-medical: None   Tobacco Use    Smoking status: Former Smoker    Smokeless tobacco: Never Used   Substance and Sexual Activity    Alcohol use:  Yes     Alcohol/week: 0.0 oz     Comment: social    Drug use: No    Sexual activity: Not Currently   Lifestyle    Physical activity:     Days per week: None     Minutes per session: None    Stress: None   Relationships    Social connections:     Talks on phone: None     Gets together: None     Attends Taoism service: None     Active member of club or organization: None     Attends meetings of clubs or organizations: None     Relationship status: None    Intimate partner violence:     Fear of current or ex partner: None     Emotionally abused: None     Physically abused: None     Forced sexual activity: None   Other Topics Concern    None   Social History Narrative    None         PHYSICAL EXAM       ED Triage Vitals   BP Temp Temp Source Pulse Resp SpO2 Height Weight   05/24/19 1542 05/24/19 1542 05/24/19 1543 05/24/19 1542 05/24/19 1542 05/24/19 1542 05/24/19 1546 05/24/19 1546   (!) 148/88 97.2 °F (36.2 °C) Oral 60 28 94 % 5' 8\" (1.727 m) 211 lb (95.7 kg) Physical Exam   Constitutional: He appears well-developed. HENT:   Head: Normocephalic. Right Ear: External ear normal.   Left Ear: External ear normal.   Mouth/Throat: Oropharynx is clear and moist.   0.5 cm laceration noted on posterior occiput. Eyes: Pupils are equal, round, and reactive to light. Conjunctivae are normal.   Neck: Normal range of motion. Neck supple. No cspine tenderness   Cardiovascular: Intact distal pulses. Pulmonary/Chest: Effort normal and breath sounds normal.   Abdominal: Soft. Bowel sounds are normal. He exhibits no distension. There is no tenderness. Musculoskeletal: Normal range of motion. +L spine tenderness   Neurological: He is alert. Skin: Skin is warm and dry. Psychiatric: He has a normal mood and affect. Nursing note and vitals reviewed. MDM  81 yo male presents to the ED s/p mechanical fall in the shower. Wife states that pt has been falling more frequently for the past 3 months. Pt is afebrile, hemodynamically stable. Pt given 1 L NS, IV dilaudid, IV zofran with moderate relief. EKG shows sinus valeria with HR 57, left axis, RBBB, normal intervals, no ST changes. Labs remarkable for glucose 184. 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 CT cspine negative. Pt's cspine cleared radiographically. CXR negative. Given ICH and pt being on coumadin, case discussed with Dr. Freddie Nolasco who recommended FFP and vitamin K. Pt given 4 units of FFP and subQ vitamin K. Given ataxia, frequent falls, bilateral acute on chronic subdural hematomas, case discussed with Dr. Freddie Nolasco and pt admitted to ICU in serious condition. Pt and wife understand plan. Critical care time: 62 min excluding procedures            FINAL IMPRESSION      1.  Subdural hematoma [facility])          DISPOSITION/PLAN   DISPOSITION Admitted 05/24/2019 05:44:56 PM        DISCHARGE MEDICATIONS:  [unfilled]         Sherri Gonzalez MD(electronically signed)  Attending Emergency Physician            Rosanna Hui MD  05/24/19 7957

## 2019-05-24 NOTE — ED NOTES
Bed: 19  Expected date: 5/24/19  Expected time: 3:16 PM  Means of arrival:   Comments:  85 fall on coumadin     Radha Carvajal RN  05/24/19 5587

## 2019-05-25 ENCOUNTER — APPOINTMENT (OUTPATIENT)
Dept: CT IMAGING | Age: 84
DRG: 026 | End: 2019-05-25
Payer: MEDICARE

## 2019-05-25 ENCOUNTER — ANESTHESIA EVENT (OUTPATIENT)
Dept: OPERATING ROOM | Age: 84
DRG: 026 | End: 2019-05-25
Payer: MEDICARE

## 2019-05-25 ENCOUNTER — ANESTHESIA (OUTPATIENT)
Dept: OPERATING ROOM | Age: 84
DRG: 026 | End: 2019-05-25
Payer: MEDICARE

## 2019-05-25 VITALS — SYSTOLIC BLOOD PRESSURE: 121 MMHG | OXYGEN SATURATION: 96 % | DIASTOLIC BLOOD PRESSURE: 66 MMHG

## 2019-05-25 LAB
ANION GAP SERPL CALCULATED.3IONS-SCNC: 11 MEQ/L (ref 9–15)
APTT: 28.3 SEC (ref 21.6–35.4)
APTT: 29.5 SEC (ref 21.6–35.4)
APTT: 29.8 SEC (ref 21.6–35.4)
BACTERIA: NEGATIVE /HPF
BASOPHILS ABSOLUTE: 0 K/UL (ref 0–0.2)
BASOPHILS RELATIVE PERCENT: 0.3 %
BILIRUBIN URINE: NEGATIVE
BLOOD BANK DISPENSE STATUS: NORMAL
BLOOD BANK PRODUCT CODE: NORMAL
BLOOD, URINE: ABNORMAL
BPU ID: NORMAL
BUN BLDV-MCNC: 21 MG/DL (ref 8–23)
CALCIUM SERPL-MCNC: 9.5 MG/DL (ref 8.5–9.9)
CHLORIDE BLD-SCNC: 107 MEQ/L (ref 95–107)
CLARITY: CLEAR
CO2: 25 MEQ/L (ref 20–31)
COLOR: YELLOW
CREAT SERPL-MCNC: 0.75 MG/DL (ref 0.7–1.2)
DESCRIPTION BLOOD BANK: NORMAL
EOSINOPHILS ABSOLUTE: 0.1 K/UL (ref 0–0.7)
EOSINOPHILS RELATIVE PERCENT: 1.6 %
EPITHELIAL CELLS, UA: ABNORMAL /HPF (ref 0–5)
GFR AFRICAN AMERICAN: >60
GFR NON-AFRICAN AMERICAN: >60
GLUCOSE BLD-MCNC: 107 MG/DL (ref 70–99)
GLUCOSE URINE: NEGATIVE MG/DL
HCT VFR BLD CALC: 33.5 % (ref 42–52)
HEMOGLOBIN: 11.2 G/DL (ref 14–18)
HYALINE CASTS: ABNORMAL /HPF (ref 0–5)
INR BLD: 1.2
INR BLD: 1.3
INR BLD: 1.5
KETONES, URINE: NEGATIVE MG/DL
LEUKOCYTE ESTERASE, URINE: NEGATIVE
LYMPHOCYTES ABSOLUTE: 0.9 K/UL (ref 1–4.8)
LYMPHOCYTES RELATIVE PERCENT: 13.8 %
MCH RBC QN AUTO: 30.7 PG (ref 27–31.3)
MCHC RBC AUTO-ENTMCNC: 33.5 % (ref 33–37)
MCV RBC AUTO: 91.6 FL (ref 80–100)
MONOCYTES ABSOLUTE: 0.8 K/UL (ref 0.2–0.8)
MONOCYTES RELATIVE PERCENT: 11.9 %
NEUTROPHILS ABSOLUTE: 4.8 K/UL (ref 1.4–6.5)
NEUTROPHILS RELATIVE PERCENT: 72.4 %
NITRITE, URINE: NEGATIVE
PDW BLD-RTO: 14 % (ref 11.5–14.5)
PH UA: 7.5 (ref 5–9)
PLATELET # BLD: 143 K/UL (ref 130–400)
POTASSIUM REFLEX MAGNESIUM: 3.9 MEQ/L (ref 3.4–4.9)
PROTEIN UA: ABNORMAL MG/DL
PROTHROMBIN TIME: 12.3 SEC (ref 9–11.5)
PROTHROMBIN TIME: 13.2 SEC (ref 9–11.5)
PROTHROMBIN TIME: 15.1 SEC (ref 9–11.5)
RBC # BLD: 3.66 M/UL (ref 4.7–6.1)
RBC UA: ABNORMAL /HPF (ref 0–5)
SODIUM BLD-SCNC: 143 MEQ/L (ref 135–144)
SPECIFIC GRAVITY UA: 1.02 (ref 1–1.03)
UROBILINOGEN, URINE: 1 E.U./DL
WBC # BLD: 6.7 K/UL (ref 4.8–10.8)
WBC UA: ABNORMAL /HPF (ref 0–5)

## 2019-05-25 PROCEDURE — 2500000003 HC RX 250 WO HCPCS: Performed by: ANESTHESIOLOGY

## 2019-05-25 PROCEDURE — P9059 PLASMA, FRZ BETWEEN 8-24HOUR: HCPCS

## 2019-05-25 PROCEDURE — 36415 COLL VENOUS BLD VENIPUNCTURE: CPT

## 2019-05-25 PROCEDURE — 2580000003 HC RX 258: Performed by: NEUROLOGICAL SURGERY

## 2019-05-25 PROCEDURE — 6360000002 HC RX W HCPCS: Performed by: INTERNAL MEDICINE

## 2019-05-25 PROCEDURE — 3700000000 HC ANESTHESIA ATTENDED CARE: Performed by: NEUROLOGICAL SURGERY

## 2019-05-25 PROCEDURE — C1729 CATH, DRAINAGE: HCPCS | Performed by: NEUROLOGICAL SURGERY

## 2019-05-25 PROCEDURE — 6360000002 HC RX W HCPCS: Performed by: ANESTHESIOLOGY

## 2019-05-25 PROCEDURE — 80048 BASIC METABOLIC PNL TOTAL CA: CPT

## 2019-05-25 PROCEDURE — 2709999900 HC NON-CHARGEABLE SUPPLY: Performed by: NEUROLOGICAL SURGERY

## 2019-05-25 PROCEDURE — 70450 CT HEAD/BRAIN W/O DYE: CPT

## 2019-05-25 PROCEDURE — 6360000002 HC RX W HCPCS: Performed by: PSYCHIATRY & NEUROLOGY

## 2019-05-25 PROCEDURE — 6370000000 HC RX 637 (ALT 250 FOR IP): Performed by: NEUROLOGICAL SURGERY

## 2019-05-25 PROCEDURE — 2580000003 HC RX 258: Performed by: PSYCHIATRY & NEUROLOGY

## 2019-05-25 PROCEDURE — 2720000010 HC SURG SUPPLY STERILE: Performed by: NEUROLOGICAL SURGERY

## 2019-05-25 PROCEDURE — 00C40ZZ EXTIRPATION OF MATTER FROM INTRACRANIAL SUBDURAL SPACE, OPEN APPROACH: ICD-10-PCS | Performed by: NEUROLOGICAL SURGERY

## 2019-05-25 PROCEDURE — 6360000002 HC RX W HCPCS: Performed by: NEUROLOGICAL SURGERY

## 2019-05-25 PROCEDURE — 85610 PROTHROMBIN TIME: CPT

## 2019-05-25 PROCEDURE — 3700000001 HC ADD 15 MINUTES (ANESTHESIA): Performed by: NEUROLOGICAL SURGERY

## 2019-05-25 PROCEDURE — 85025 COMPLETE CBC W/AUTO DIFF WBC: CPT

## 2019-05-25 PROCEDURE — 2500000003 HC RX 250 WO HCPCS: Performed by: NEUROLOGICAL SURGERY

## 2019-05-25 PROCEDURE — 81001 URINALYSIS AUTO W/SCOPE: CPT

## 2019-05-25 PROCEDURE — 3600000004 HC SURGERY LEVEL 4 BASE: Performed by: NEUROLOGICAL SURGERY

## 2019-05-25 PROCEDURE — 2580000003 HC RX 258: Performed by: ANESTHESIOLOGY

## 2019-05-25 PROCEDURE — 2000000000 HC ICU R&B

## 2019-05-25 PROCEDURE — 3600000014 HC SURGERY LEVEL 4 ADDTL 15MIN: Performed by: NEUROLOGICAL SURGERY

## 2019-05-25 PROCEDURE — 85730 THROMBOPLASTIN TIME PARTIAL: CPT

## 2019-05-25 PROCEDURE — 36430 TRANSFUSION BLD/BLD COMPNT: CPT

## 2019-05-25 PROCEDURE — 88304 TISSUE EXAM BY PATHOLOGIST: CPT

## 2019-05-25 PROCEDURE — 6370000000 HC RX 637 (ALT 250 FOR IP)

## 2019-05-25 RX ORDER — DEXMEDETOMIDINE HYDROCHLORIDE 100 UG/ML
INJECTION, SOLUTION INTRAVENOUS PRN
Status: DISCONTINUED | OUTPATIENT
Start: 2019-05-25 | End: 2019-05-25 | Stop reason: SDUPTHER

## 2019-05-25 RX ORDER — METOPROLOL SUCCINATE 25 MG/1
25 TABLET, EXTENDED RELEASE ORAL DAILY
Status: DISCONTINUED | OUTPATIENT
Start: 2019-05-25 | End: 2019-05-26

## 2019-05-25 RX ORDER — MORPHINE SULFATE 4 MG/ML
4 INJECTION, SOLUTION INTRAMUSCULAR; INTRAVENOUS EVERY 4 HOURS PRN
Status: DISCONTINUED | OUTPATIENT
Start: 2019-05-25 | End: 2019-05-25

## 2019-05-25 RX ORDER — FUROSEMIDE 10 MG/ML
20 INJECTION INTRAMUSCULAR; INTRAVENOUS ONCE
Status: COMPLETED | OUTPATIENT
Start: 2019-05-25 | End: 2019-05-25

## 2019-05-25 RX ORDER — FENTANYL CITRATE 50 UG/ML
50 INJECTION, SOLUTION INTRAMUSCULAR; INTRAVENOUS EVERY 10 MIN PRN
Status: DISCONTINUED | OUTPATIENT
Start: 2019-05-25 | End: 2019-05-25

## 2019-05-25 RX ORDER — METOCLOPRAMIDE HYDROCHLORIDE 5 MG/ML
10 INJECTION INTRAMUSCULAR; INTRAVENOUS
Status: DISCONTINUED | OUTPATIENT
Start: 2019-05-25 | End: 2019-05-25

## 2019-05-25 RX ORDER — LIDOCAINE HYDROCHLORIDE 10 MG/ML
INJECTION, SOLUTION EPIDURAL; INFILTRATION; INTRACAUDAL; PERINEURAL PRN
Status: DISCONTINUED | OUTPATIENT
Start: 2019-05-25 | End: 2019-05-25 | Stop reason: ALTCHOICE

## 2019-05-25 RX ORDER — MEPERIDINE HYDROCHLORIDE 25 MG/ML
12.5 INJECTION INTRAMUSCULAR; INTRAVENOUS; SUBCUTANEOUS EVERY 5 MIN PRN
Status: DISCONTINUED | OUTPATIENT
Start: 2019-05-25 | End: 2019-05-25

## 2019-05-25 RX ORDER — DIPHENHYDRAMINE HYDROCHLORIDE 50 MG/ML
12.5 INJECTION INTRAMUSCULAR; INTRAVENOUS
Status: DISCONTINUED | OUTPATIENT
Start: 2019-05-25 | End: 2019-05-25

## 2019-05-25 RX ORDER — HYDROCODONE BITARTRATE AND ACETAMINOPHEN 5; 325 MG/1; MG/1
2 TABLET ORAL PRN
Status: ACTIVE | OUTPATIENT
Start: 2019-05-25 | End: 2019-05-25

## 2019-05-25 RX ORDER — DEXTROSE, SODIUM CHLORIDE, AND POTASSIUM CHLORIDE 5; .45; .15 G/100ML; G/100ML; G/100ML
INJECTION INTRAVENOUS CONTINUOUS
Status: DISCONTINUED | OUTPATIENT
Start: 2019-05-25 | End: 2019-05-28

## 2019-05-25 RX ORDER — NITROGLYCERIN 0.4 MG/1
0.4 TABLET SUBLINGUAL EVERY 5 MIN PRN
Status: DISCONTINUED | OUTPATIENT
Start: 2019-05-25 | End: 2019-05-31 | Stop reason: HOSPADM

## 2019-05-25 RX ORDER — FENTANYL CITRATE 50 UG/ML
INJECTION, SOLUTION INTRAMUSCULAR; INTRAVENOUS PRN
Status: DISCONTINUED | OUTPATIENT
Start: 2019-05-25 | End: 2019-05-25 | Stop reason: SDUPTHER

## 2019-05-25 RX ORDER — SODIUM CHLORIDE, SODIUM LACTATE, POTASSIUM CHLORIDE, CALCIUM CHLORIDE 600; 310; 30; 20 MG/100ML; MG/100ML; MG/100ML; MG/100ML
INJECTION, SOLUTION INTRAVENOUS CONTINUOUS PRN
Status: DISCONTINUED | OUTPATIENT
Start: 2019-05-25 | End: 2019-05-25 | Stop reason: SDUPTHER

## 2019-05-25 RX ORDER — HYDROCODONE BITARTRATE AND ACETAMINOPHEN 5; 325 MG/1; MG/1
1 TABLET ORAL PRN
Status: ACTIVE | OUTPATIENT
Start: 2019-05-25 | End: 2019-05-25

## 2019-05-25 RX ORDER — ONDANSETRON 2 MG/ML
4 INJECTION INTRAMUSCULAR; INTRAVENOUS
Status: DISCONTINUED | OUTPATIENT
Start: 2019-05-25 | End: 2019-05-25

## 2019-05-25 RX ORDER — MORPHINE SULFATE 4 MG/ML
4 INJECTION, SOLUTION INTRAMUSCULAR; INTRAVENOUS
Status: DISCONTINUED | OUTPATIENT
Start: 2019-05-25 | End: 2019-05-31

## 2019-05-25 RX ORDER — VANCOMYCIN HYDROCHLORIDE 500 MG/100ML
500 INJECTION, SOLUTION INTRAVENOUS
Status: COMPLETED | OUTPATIENT
Start: 2019-05-25 | End: 2019-05-25

## 2019-05-25 RX ORDER — 0.9 % SODIUM CHLORIDE 0.9 %
250 INTRAVENOUS SOLUTION INTRAVENOUS ONCE
Status: COMPLETED | OUTPATIENT
Start: 2019-05-25 | End: 2019-05-25

## 2019-05-25 RX ORDER — ONDANSETRON 2 MG/ML
4 INJECTION INTRAMUSCULAR; INTRAVENOUS EVERY 6 HOURS PRN
Status: DISCONTINUED | OUTPATIENT
Start: 2019-05-25 | End: 2019-05-31 | Stop reason: HOSPADM

## 2019-05-25 RX ORDER — MAGNESIUM HYDROXIDE 1200 MG/15ML
LIQUID ORAL CONTINUOUS PRN
Status: COMPLETED | OUTPATIENT
Start: 2019-05-25 | End: 2019-05-25

## 2019-05-25 RX ADMIN — HYDRALAZINE HYDROCHLORIDE 10 MG: 20 INJECTION INTRAMUSCULAR; INTRAVENOUS at 14:49

## 2019-05-25 RX ADMIN — VANCOMYCIN HYDROCHLORIDE 1000 MG: 1 INJECTION, POWDER, LYOPHILIZED, FOR SOLUTION INTRAVENOUS at 10:26

## 2019-05-25 RX ADMIN — FENTANYL CITRATE 25 MCG: 50 INJECTION, SOLUTION INTRAMUSCULAR; INTRAVENOUS at 13:47

## 2019-05-25 RX ADMIN — PHYTONADIONE 10 MG: 10 INJECTION, EMULSION INTRAMUSCULAR; INTRAVENOUS; SUBCUTANEOUS at 08:41

## 2019-05-25 RX ADMIN — SODIUM CHLORIDE, POTASSIUM CHLORIDE, SODIUM LACTATE AND CALCIUM CHLORIDE: 600; 310; 30; 20 INJECTION, SOLUTION INTRAVENOUS at 12:18

## 2019-05-25 RX ADMIN — LEVETIRACETAM 500 MG: 100 INJECTION, SOLUTION INTRAVENOUS at 12:06

## 2019-05-25 RX ADMIN — METOPROLOL SUCCINATE 25 MG: 25 TABLET, EXTENDED RELEASE ORAL at 21:33

## 2019-05-25 RX ADMIN — HYDRALAZINE HYDROCHLORIDE: 20 INJECTION INTRAMUSCULAR; INTRAVENOUS at 11:58

## 2019-05-25 RX ADMIN — PRAVASTATIN SODIUM 40 MG: 40 TABLET ORAL at 21:32

## 2019-05-25 RX ADMIN — SODIUM CHLORIDE 250 ML: 9 INJECTION, SOLUTION INTRAVENOUS at 03:00

## 2019-05-25 RX ADMIN — FENTANYL CITRATE 25 MCG: 50 INJECTION, SOLUTION INTRAMUSCULAR; INTRAVENOUS at 13:41

## 2019-05-25 RX ADMIN — SODIUM CHLORIDE 250 ML: 9 INJECTION, SOLUTION INTRAVENOUS at 16:49

## 2019-05-25 RX ADMIN — SODIUM CHLORIDE 250 ML: 9 INJECTION, SOLUTION INTRAVENOUS at 01:53

## 2019-05-25 RX ADMIN — LISINOPRIL 10 MG: 10 TABLET ORAL at 08:41

## 2019-05-25 RX ADMIN — SODIUM CHLORIDE 0.5 MCG/KG/HR: 9 INJECTION, SOLUTION INTRAVENOUS at 12:34

## 2019-05-25 RX ADMIN — Medication 4 MG: at 18:13

## 2019-05-25 RX ADMIN — HYDRALAZINE HYDROCHLORIDE 10 MG: 20 INJECTION INTRAMUSCULAR; INTRAVENOUS at 08:40

## 2019-05-25 RX ADMIN — HYDRALAZINE HYDROCHLORIDE 10 MG: 20 INJECTION INTRAMUSCULAR; INTRAVENOUS at 23:54

## 2019-05-25 RX ADMIN — HYDRALAZINE HYDROCHLORIDE 10 MG: 20 INJECTION INTRAMUSCULAR; INTRAVENOUS at 18:13

## 2019-05-25 RX ADMIN — FAMOTIDINE 20 MG: 10 INJECTION, SOLUTION INTRAVENOUS at 21:29

## 2019-05-25 RX ADMIN — SODIUM CHLORIDE 250 ML: 9 INJECTION, SOLUTION INTRAVENOUS at 09:52

## 2019-05-25 RX ADMIN — POTASSIUM CHLORIDE, DEXTROSE MONOHYDRATE AND SODIUM CHLORIDE: 150; 5; 450 INJECTION, SOLUTION INTRAVENOUS at 15:16

## 2019-05-25 RX ADMIN — MORPHINE SULFATE 4 MG: 4 INJECTION INTRAVENOUS at 14:49

## 2019-05-25 RX ADMIN — DEXMEDETOMIDINE HCL 80 MCG: 100 INJECTION INTRAVENOUS at 12:20

## 2019-05-25 RX ADMIN — FUROSEMIDE 20 MG: 10 INJECTION, SOLUTION INTRAVENOUS at 10:27

## 2019-05-25 RX ADMIN — VANCOMYCIN HYDROCHLORIDE 500 MG: 500 INJECTION, SOLUTION INTRAVENOUS at 12:04

## 2019-05-25 ASSESSMENT — PAIN SCALES - GENERAL
PAINLEVEL_OUTOF10: 7
PAINLEVEL_OUTOF10: 6
PAINLEVEL_OUTOF10: 0

## 2019-05-25 ASSESSMENT — PULMONARY FUNCTION TESTS
PIF_VALUE: 1
PIF_VALUE: 0
PIF_VALUE: 1
PIF_VALUE: 1
PIF_VALUE: 0
PIF_VALUE: 0
PIF_VALUE: 2
PIF_VALUE: 0
PIF_VALUE: 44
PIF_VALUE: 1
PIF_VALUE: 0
PIF_VALUE: 1
PIF_VALUE: 1
PIF_VALUE: 0
PIF_VALUE: 0
PIF_VALUE: 1
PIF_VALUE: 0
PIF_VALUE: 0
PIF_VALUE: 1
PIF_VALUE: 0
PIF_VALUE: 1
PIF_VALUE: 0
PIF_VALUE: 0
PIF_VALUE: 1
PIF_VALUE: 0
PIF_VALUE: 1
PIF_VALUE: 0
PIF_VALUE: 1
PIF_VALUE: 0
PIF_VALUE: 1
PIF_VALUE: 0
PIF_VALUE: 1
PIF_VALUE: 0
PIF_VALUE: 1
PIF_VALUE: 0
PIF_VALUE: 0
PIF_VALUE: 1
PIF_VALUE: 0
PIF_VALUE: 0
PIF_VALUE: 1
PIF_VALUE: 0
PIF_VALUE: 33
PIF_VALUE: 0
PIF_VALUE: 1
PIF_VALUE: 0
PIF_VALUE: 1
PIF_VALUE: 0
PIF_VALUE: 1
PIF_VALUE: 1
PIF_VALUE: 0
PIF_VALUE: 1
PIF_VALUE: 1
PIF_VALUE: 0
PIF_VALUE: 1
PIF_VALUE: 0
PIF_VALUE: 1
PIF_VALUE: 0
PIF_VALUE: 1
PIF_VALUE: 0
PIF_VALUE: 0
PIF_VALUE: 1
PIF_VALUE: 0
PIF_VALUE: 0
PIF_VALUE: 1
PIF_VALUE: 0
PIF_VALUE: 1
PIF_VALUE: 0
PIF_VALUE: 1
PIF_VALUE: 0
PIF_VALUE: 0
PIF_VALUE: 1
PIF_VALUE: 0
PIF_VALUE: 1
PIF_VALUE: 1
PIF_VALUE: 0
PIF_VALUE: 0

## 2019-05-25 NOTE — CONSULTS
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 [facility]

## 2019-05-25 NOTE — PROGRESS NOTES
Physical Therapy   Referral received. Pt s/p craniotomy this date with bedrest orders in place.  No eval warranted this date

## 2019-05-25 NOTE — ANESTHESIA PRE PROCEDURE
Department of Anesthesiology  Preprocedure Note       Name:  Dayanna Headley   Age:  80 y.o.  :  1934                                          MRN:  [id number]         Date:  2019      Surgeon: Sarita Mcdonald):  Mauricio Cobb MD    Procedure: CRANIOTOMY HEMATOMA EVACUATION (Bilateral )    Medications prior to admission:   Prior to Admission medications    Medication Sig Start Date End Date Taking? Authorizing Provider   warfarin (COUMADIN) 4 MG tablet Take as directed by [facility] AT [place] Anticoagulation Management Service. Quantity equals 90 day supply.  (currently 34 mg TWD) 18  Yes Caroleen Blizzard, MD   diltiazem (CARDIZEM CD) 120 MG extended release capsule Take 120 mg by mouth daily   Yes Historical Provider, MD   lisinopril (PRINIVIL;ZESTRIL) 10 MG tablet  10/5/15  Yes Historical Provider, MD   pravastatin (PRAVACHOL) 40 MG tablet  9/14/15  Yes Historical Provider, MD   aspirin 81 MG tablet Take 81 mg by mouth daily   Yes Historical Provider, MD   Cholecalciferol (VITAMIN D-3 PO) Take by mouth   Yes Historical Provider, MD   metoprolol (TOPROL-XL) 25 MG XL tablet  9/8/15   Historical Provider, MD   nitroGLYCERIN (NITROSTAT) 0.4 MG SL tablet Place 0.4 mg under the tongue every 5 minutes as needed for Chest pain    Historical Provider, MD       Current medications:    Current Facility-Administered Medications   Medication Dose Route Frequency Provider Last Rate Last Dose    phytonadione ADULT (VITAMIN K) inj 10 mg/mL  10 mg Subcutaneous Daily Mauricio Cobb MD   10 mg at 19 0841    pravastatin (PRAVACHOL) tablet 40 mg  40 mg Oral Nightly Mauricio Cobb MD   40 mg at 19    lisinopril (PRINIVIL;ZESTRIL) tablet 10 mg  10 mg Oral Daily Mauricio Cobb MD   10 mg at 19 5918    diltiazem (CARDIZEM CD) extended release capsule 120 mg  120 mg Oral Daily Mauricio Cobb MD   120 mg at 19    calcium-vitamin D 500-200 MG-UNIT per tablet   Oral Daily Mauricio Cobb MD   1 tablet at 19    hydrALAZINE (APRESOLINE) injection 10 mg  10 mg Intravenous Q1H PRN Bhavana Marks MD   10 mg at 05/25/19 0840    labetalol (NORMODYNE;TRANDATE) injection syringe 10 mg  10 mg Intravenous Q1H PRN Bhavana Marks MD        0.9 % sodium chloride bolus  250 mL Intravenous Once Bhavana Marks MD 20 mL/hr at 05/25/19 0300 250 mL at 05/25/19 0300    0.9 % sodium chloride bolus  250 mL Intravenous Once Bhavana Marks MD 20 mL/hr at 05/25/19 0153 250 mL at 05/25/19 0153       Allergies: Allergies   Allergen Reactions    Honey Bee Venom [Bee Venom] Swelling    Penicillins Swelling       Problem List:    Patient Active Problem List   Diagnosis Code    [facility]    Subdural hematoma ([facility] S63.9D3V       Past Medical History:        Diagnosis Date    Chronic back pain     Hyperlipidemia     Hypertension        Past Surgical History:        Procedure Laterality Date    CARDIAC CATHETERIZATION      3 stents    CORONARY ARTERY BYPASS GRAFT         Social History:    Social History     Tobacco Use    Smoking status: Former Smoker    Smokeless tobacco: Never Used   Substance Use Topics    Alcohol use:  Yes     Alcohol/week: 0.0 oz     Comment: social                                Counseling given: Not Answered      Vital Signs (Current):   Vitals:    05/25/19 0957 05/25/19 1012 05/25/19 1052 05/25/19 1055   BP: 121/66 (!) 140/74 (!) 117/58    Pulse: 62 68 65 66   Resp: 23 23 17 27   Temp: 99.9 °F (37.7 °C) 99.8 °F (37.7 °C) 98.9 °F (37.2 °C)    TempSrc: Tympanic Tympanic Tympanic    SpO2: 97% 95%  98%   Weight:       Height:                                                  BP Readings from Last 3 Encounters:   05/25/19 (!) 117/58   10/03/18 (!) 163/73   10/08/15 138/86       NPO Status:                                                                                 BMI:   Wt Readings from Last 3 Encounters:   05/24/19 191 lb 12.8 oz (87 kg)   10/03/18 200 lb (90.7 kg)   10/08/15 201 lb (91.2 kg)     Body mass index is 26.01 kg/m².    CBC:   Lab Results   Component Value Date    WBC 6.7 05/25/2019    RBC 3.66 05/25/2019    HGB 11.2 05/25/2019    HCT 33.5 05/25/2019    MCV 91.6 05/25/2019    RDW 14.0 05/25/2019     05/25/2019       CMP:   Lab Results   Component Value Date     05/25/2019    K 3.9 05/25/2019     05/25/2019    CO2 25 05/25/2019    BUN 21 05/25/2019    CREATININE 0.75 05/25/2019    GFRAA >60.0 05/25/2019    LABGLOM >60.0 05/25/2019    GLUCOSE 107 05/25/2019    PROT 6.2 05/24/2019    CALCIUM 9.5 05/25/2019    BILITOT 0.7 05/24/2019    ALKPHOS 53 05/24/2019    AST 24 05/24/2019    ALT 17 05/24/2019       POC Tests: No results for input(s): POCGLU, POCNA, POCK, POCCL, POCBUN, POCHEMO, POCHCT in the last 72 hours. Coags:   Lab Results   Component Value Date    PROTIME 13.2 05/25/2019    PROTIME 24.1 10/08/2018    INR 1.3 05/25/2019    APTT 29.8 05/25/2019       HCG (If Applicable): No results found for: PREGTESTUR, PREGSERUM, HCG, HCGQUANT     ABGs: No results found for: PHART, PO2ART, GDG6QLT, XIJ6DGS, BEART, B4VEIDPN     Type & Screen (If Applicable):  No results found for: LABABO, LABRH    Anesthesia Evaluation    Airway: Mallampati: II  TM distance: >3 FB   Neck ROM: full  Mouth opening: > = 3 FB Dental:    (+) upper dentures and lower dentures      Pulmonary:Negative Pulmonary ROS breath sounds clear to auscultation                             Cardiovascular:    (+) hypertension:, CAD: no interval change, CABG/stent: no interval change, dysrhythmias: atrial fibrillation,       ECG reviewed  Rhythm: regular                      Neuro/Psych:                ROS comment: Subdural hematoma  Dementia GI/Hepatic/Renal: Neg GI/Hepatic/Renal ROS            Endo/Other:    (+) blood dyscrasia: anticoagulation therapy:., .                  ROS comment: Received FFP and vit K ; INR 1.3 Abdominal:           Vascular: negative vascular ROS.                                      Anesthesia Plan      MAC     ASA 3 -

## 2019-05-25 NOTE — H&P
Patient Name: Kitty Lara Date: 2019  3:39 PM  MR #: [id number]  : 1934    Attending Physician: Paco Yoder MD  Reason for admission: Bilateral subdural hematoma    History of Presenting Illness and Review of Systems     Pedro Edge is a 80 y.o. male on hospital day 1 . History Obtained From:  patient, domestic partner, electronic medical record  Patient is a 80-year-old gentleman whom I was asked to see from ER with a CAT scan finding of bilateral subdural hematoma. Past medical history significant for CAD and CABG on Coumadin with 3 months history of fall and increasing speech problems. Because of his speech problems, history is obtained mostly from domestic partner along without EMR. Reportedly ambulating fine up until 3 months ago. And has fallen and since then there has been gradually increasing of falls including on the day of admission when he fell in the bathtub and hitting back of the head. Unclear events of loss of consciousness. Patient was subsequently brought to the ER on an ambulance and after CAT scan findings neurosurgery at was called. Patient's partner states of his increasing weakness in lower extremities with the balance issues again worse in the lower 3 months. With increasing frequency. There is a associated worsening speech problems over last 3 months as well. He was noted to have some speech difficulty over 6-8 months which was being evaluated by a neurologist.    When he was seen by me, again minimal complaints. Patient denies any complaints of pain in the head or neck at this point. Again because of his significant problem with speech no further complaints can be obtained. No bowel bladder incontinence no seizure-like activities the best partners last 3 months. Because of his cardiac history on Coumadin, patient has been given vitamin K as well as fresh frozen plasma as throughout the day and last night.                       History:      Past Medical History:   Diagnosis Date    Chronic back pain     Hyperlipidemia     Hypertension      Past Surgical History:   Procedure Laterality Date    CARDIAC CATHETERIZATION      3 stents    CORONARY ARTERY BYPASS GRAFT         Family History  History reviewed. No pertinent family history. [] Unable to obtain due to ventilated and/ or neurologic status    Social History     Socioeconomic History    Marital status: Single     Spouse name: Not on file    Number of children: Not on file    Years of education: Not on file    Highest education level: Not on file   Occupational History    Not on file   Social Needs    Financial resource strain: Not on file    Food insecurity:     Worry: Not on file     Inability: Not on file    Transportation needs:     Medical: Not on file     Non-medical: Not on file   Tobacco Use    Smoking status: Former Smoker    Smokeless tobacco: Never Used   Substance and Sexual Activity    Alcohol use:  Yes     Alcohol/week: 0.0 oz     Comment: social    Drug use: No    Sexual activity: Not Currently   Lifestyle    Physical activity:     Days per week: Not on file     Minutes per session: Not on file    Stress: Not on file   Relationships    Social connections:     Talks on phone: Not on file     Gets together: Not on file     Attends Buddhism service: Not on file     Active member of club or organization: Not on file     Attends meetings of clubs or organizations: Not on file     Relationship status: Not on file    Intimate partner violence:     Fear of current or ex partner: Not on file     Emotionally abused: Not on file     Physically abused: Not on file     Forced sexual activity: Not on file   Other Topics Concern    Not on file   Social History Narrative    Not on file      [] Unable to obtain due to ventilated and/ or neurologic status      Home Medications:      Medications Prior to Admission: warfarin (COUMADIN) 4 MG tablet, Take as directed by [facility] [place] [facility]. Quantity equals 90 day supply. (currently 34 mg TWD)  diltiazem (CARDIZEM CD) 120 MG extended release capsule, Take 120 mg by mouth daily  lisinopril (PRINIVIL;ZESTRIL) 10 MG tablet,   pravastatin (PRAVACHOL) 40 MG tablet,   aspirin 81 MG tablet, Take 81 mg by mouth daily  Cholecalciferol (VITAMIN D-3 PO), Take by mouth  metoprolol (TOPROL-XL) 25 MG XL tablet,   nitroGLYCERIN (NITROSTAT) 0.4 MG SL tablet, Place 0.4 mg under the tongue every 5 minutes as needed for Chest pain    Current Hospital Medications:     Scheduled Meds:   phytonadione (ADULT)  10 mg Subcutaneous Daily    pravastatin  40 mg Oral Nightly    lisinopril  10 mg Oral Daily    diltiazem  120 mg Oral Daily    calcium-vitamin D   Oral Daily    sodium chloride  250 mL Intravenous Once    sodium chloride  250 mL Intravenous Once     Continuous Infusions:  PRN Meds:.hydrALAZINE, labetalol  . Allergies: Allergies   Allergen Reactions    Honey Bee Venom [Bee Venom] Swelling    Penicillins Swelling        Physical Exam     Clinically appears to be his stated age. Accompanied by his partner. Patient is afebrile vital signs stable blood pressure control with hydralazine/labetalol. Normocephalic atraumatic other than scalp laceration the back of the head. No raccoon eye or almendarez sign is noted. Patient is awake alert attentive positive tracking. Neck shows decreased range of motion but nonfocal nontender no step-off. Lungs clear. Heart regular rate and rhythm. Abdomen soft nontender extremities no clubbing cyanosis or edema. Neuro exam awake alert attentive. Not oriented. Following simple commands with both upper and lower extremities showing up thumbs and 2 fingers are moving toes. Not able to follow 2 step commands. Intact light touch and pinprick. Decreased reflex throughout positive Babinski bilaterally.     Speech shows mild dysarthria slow in speech answer simple questions. Shows both expressive and receptive aphasia. Able to repeat words. Again able to follow simple commands with repeated questions and visual cues    GCS 4+3+6 total of 13    CAT scans were reviewed. Objective Findings:     Vitals:   Vitals:    05/25/19 0700 05/25/19 0953 05/25/19 0957 05/25/19 1012   BP:  (!) 112/54 121/66 (!) 140/74   Pulse: 50 62 62 68   Resp:  20 23 23   Temp:  99.7 °F (37.6 °C) 99.9 °F (37.7 °C) 99.8 °F (37.7 °C)   TempSrc:   Tympanic Tympanic   SpO2:  98% 97% 95%   Weight:       Height:              Laboratory, Microbiology, Pathology, Radiology, Cardiology, Medications and Transcriptions reviewed  Scheduled Meds:   phytonadione (ADULT)  10 mg Subcutaneous Daily    pravastatin  40 mg Oral Nightly    lisinopril  10 mg Oral Daily    diltiazem  120 mg Oral Daily    calcium-vitamin D   Oral Daily    sodium chloride  250 mL Intravenous Once    sodium chloride  250 mL Intravenous Once     Continuous Infusions:    Recent Labs     05/24/19  1622 05/25/19  0711   WBC 9.8 6.7   HGB 14.3 11.2*   HCT 42.2 33.5*   MCV 92.2 91.6    143     Recent Labs     05/24/19  1622 05/25/19  0711    143   K 4.2 3.9   * 107   CO2 21 25   BUN 29* 21   CREATININE 0.69* 0.75     Recent Labs     05/24/19  1622   AST 24   ALT 17   BILITOT 0.7   ALKPHOS 53     No results for input(s): LIPASE, AMYLASE in the last 72 hours. Recent Labs     05/24/19  1622 05/25/19  0048 05/25/19  0711   PROT 6.2*  --   --    INR 2.4 1.5 1.3     Ct Head Wo Contrast    Result Date: 5/25/2019  CT HEAD WO CONTRAST : 5/25/2019 CLINICAL HISTORY: CEREBRAL HEMORRHAGE. COMPARISON: 5/24/2019. TECHNIQUE: ROUTINE. All CT scans at this facility use dose modulation, iterative reconstruction, and/or weight based dosing when appropriate to reduce radiation dose to as low as reasonably achievable. FINDINGS: Motion artifact is present on the initial scan, which was repeated.  Acute on chronic subdural hematomas overlying both cerebral convexities appear stable, measuring approximately 2.2 cm in thickness on the right, and 1.8 cm in maximum thickness, on the coronal reconstructions. There is no significant midline shift, herniation, or other significant changes identified. STABLE APPROXIMATELY 2.2 CM RIGHT AND 1.8 CM LEFT SUBDURAL HEMATOMA. NO OTHER SIGNIFICANT CHANGE FROM EARLIER YESTERDAY IDENTIFIED. Ct Head Wo Contrast    Result Date: 5/25/2019  CT HEAD WO CONTRAST : 5/24/2019 9:15 PM CLINICAL HISTORY: CEREBRAL HEMORRHAGE. COMPARISON: Earlier 5/24/2019. TECHNIQUE: ROUTINE. All CT scans at this facility use dose modulation, iterative reconstruction, and/or weight based dosing when appropriate to reduce radiation dose to as low as reasonably achievable. FINDINGS: Motion artifact is present on the initial scan, which was repeated. 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 The left subdural hematoma again measures approximately 1.8 cm in maximum thickness, on the coronal reconstructions. There is no significant midline shift, herniation, or other significant changes identified. SLIGHTLY ENLARGING APPROXIMATELY 2.2 CM RIGHT SUBDURAL HEMATOMA AND STABLE APPROXIMATELY 1.8 CM LEFT SUBDURAL HEMATOMA. NO OTHER SIGNIFICANT CHANGE FROM EARLIER 5/24/2019 IDENTIFIED. Ct Head Wo Contrast    Result Date: 5/24/2019  CT HEAD WO CONTRAST : 5/24/2019 CLINICAL HISTORY: head injury s/p fall . COMPARISON: None available. TECHNIQUE: ROUTINE. All CT scans at this facility use dose modulation, iterative reconstruction, and/or weight based dosing when appropriate to reduce radiation dose to as low as reasonably achievable.  FINDINGS: Heterogeneously dense subdural collections overlying the superior aspects of both cerebral hemispheres measuring up to 1.8 cm in 602, image 39). Anterior osteophytes T10 and  T11. Diffuse facet hypertrophy identified. At the L3-L4 level, mild facet hypertrophy, ligamentum flavum hypertrophy, and a disc bulge, causes anterior flattening of thecal sac with possible mild central stenosis. Acute on chronic fracture, L1, with avulsion of the anterior portion of the L1 vertebral body. Findings suspicious for mild to moderate central stenosis at the L3-L4 level. All CT scans at this facility use dose modulation, iterative reconstruction, and/or weight based dosing when appropriate to reduce radiation dose to as low as reasonably achievable. Xr Chest Portable    Result Date: 5/25/2019  XR CHEST PORTABLE : 5/24/2019 CLINICAL HISTORY:  Syncope . COMPARISON: CT abdomen pelvis 10/3/2018. A portable upright AP radiograph of the chest was obtained. FINDINGS: A poor inspiratory volume is present with mild probable bibasilar atelectasis. There is no cardiomegaly, significant pleural effusion, vascular congestion, pneumothorax, or displaced fractures identified. 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 POOR INSPIRATION WITH MILD PROBABLE BIBASILAR ATELECTASIS. Impression:   Status post multiple falls with evidence of bilateral subdural hematoma with severe mass effect from acute and chronic subdural hematoma causing weakness and speech difficulties   Plan:   Continued to reverse coagulopathy with vitamin K significant piece. Discussed with the patient domestic partner in detail findings. Given the CAT scan findings and clinical conditions, patient is to be taken to the OR for bilateral craniotomy for evacuation subdural hematoma. Risk and benefits surgery including bleeding collection of subdural hematoma infection bleeding was also discussed.     Because of family issues with patient not communicating with his children over 30 years and the fact there is no legal documentation stating his domestic partner as of legal power of , legal service was Contacted as well. Efforts to get ahold of these children is being made through his power of  at this point. We'll have a hospitalist along with the cardiology consult as well. Comments:     Patient was seen in ICU 12 time spent over 60 minutes evaluating discussing and taking care of the patient's.     Electronically signed by Nadine Watson MD on 5/25/2019 at 10:36 AM

## 2019-05-25 NOTE — ANESTHESIA POSTPROCEDURE EVALUATION
Department of Anesthesiology  Postprocedure Note    Patient: Frank Rogers  MRN: [id number]  YOB: 1934  Date of evaluation: 5/25/2019  Time:  2:28 PM     Procedure Summary     Date:  05/25/19 Room / Location:  [address] OR    Anesthesia Start:  1218 Anesthesia Stop:  8897    Procedure:  CRANIOTOMY HEMATOMA EVACUATION (Bilateral Head) Diagnosis:  (Subdural Hematoma)    Surgeon:  Boogie Berry MD Responsible Provider:  Omer Palencia MD    Anesthesia Type:  MAC ASA Status:  3 - Emergent          Anesthesia Type: No value filed. Denae Phase I:      Denae Phase II:      Last vitals: Reviewed and per EMR flowsheets.        Anesthesia Post Evaluation    Patient location during evaluation: ICU  Patient participation: complete - patient participated  Level of consciousness: awake  Pain score: 0  Airway patency: patent  Nausea & Vomiting: no vomiting and no nausea  Complications: no  Cardiovascular status: hemodynamically stable  Respiratory status: nasal cannula  Hydration status: stable

## 2019-05-25 NOTE — BRIEF OP NOTE
Brief Postoperative Note  ______________________________________________________________    Patient: Evelyn Calix  YOB: 1934  MRN: [id number]  Date of Procedure: 5/25/2019    Pre-Op Diagnosis: Subdural Hematoma    Post-Op Diagnosis: Same       Procedure(s):  CRANIOTOMY HEMATOMA EVACUATION  bilateral    Anesthesia: Anesthesia type not filed in the log.     Surgeon(s):  Terence Tinajero MD        Estimated Blood Loss (mL): 664     Complications: None    Specimens:   ID Type Source Tests Collected by Time Destination   A : Subdural hematoma Tissue Head SURGICAL PATHOLOGY Terence Tinajero MD 5/25/2019 1256        Implants:  * No implants in log *      Drains:   Closed/Suction Drain Right Scalp Other (Comment) 10 Estonian (Active)       Closed/Suction Drain Left Scalp Other (Comment) 10 Estonian (Active)       Urethral Catheter Non-latex 16 fr (Active)   $ Urethral catheter insertion $ Not inserted for procedure 5/24/2019 10:27 PM   Catheter Indications Need for fluid management in critically ill patients in a critical care setting not able to be managed by other means such as BSC with hat, bedpan, urinal, condom catheter, or short term intermittent urethral catherization 5/25/2019 12:00 PM   Securement Device Date Changed 05/24/19 5/25/2019  4:00 AM   Site Assessment No urethral drainage 5/25/2019 12:00 PM   Urine Color Mee 5/25/2019 12:00 PM   Urine Appearance Clear 5/25/2019 12:00 PM   Output (mL) 900 mL 5/25/2019  5:58 AM         Lissette Helm MD  Date: 5/25/2019  Time: 2:06 PM

## 2019-05-25 NOTE — CONSULTS
Consults                                                                         [facility] HEART CARDIOLOGY CONSULTATION NOTE    PATIENT NAME: Jean Hair  PATIENT MRN: [id number]  SERVICE DATE:  5/25/2019  SERVICE TIME: 9:19 AM    PRIMARY CARE PHYSICIAN: James Holly MD  CONSULTING CARDIOLOGIST : Cipriano Mccray MD [facility]  PRIMARY CARDIOLOGIST: Abril Hong MD [facility]  ================================================================    REASON FOR CONSULTATION:    Dr. Gera Fabian has requested that we see toxicity regarding preoperative cardiac risk assessment prior to craniotomy for evacuation of subdural hematoma. Most of the information is from chart review, patient is difficult historian with expressive dysphasia. HPI:  Per ER notes 5/24/19   Jean Hair is a 80 y.o. male who presented after a fall in the bathroom while getting out of the shower. Unknown LOC. Pt has laceration to the back of his head. Per wife, pt has had an increasing number of frequent falls x 3 months. Pt denies neck pain. Pt notes moderate, constant, aching, nonradiating, low back pain. CARDIAC HISTORY:     Per last OV 11/18 by Jatin Sinha:    PCP: Primary Care Physician is Dr. Banerjee Roselle: Jean Hair return today for cardiac follow-up. He is accompanied by his wife. He has atherosclerotic heart disease with previous coronary artery bypass grafting surgery by Dr. Ruth Tesfaye at [facility] in [address] [place] [address]. Surgery consisted of LIMA to the LAD, saphenous vein graft to the circumflex, and saphenous vein graft to the right coronary artery. He develop recurrent anginal symptoms in June 2012. He underwent repeat cardiac catheterization followed by angioplasty and stenting of the mid and distal portions of the saphenous vein graft to the right coronary artery. He had presented with a mildly elevated troponin level as well as atrial fibrillation with rapid response. He was cardioverted in the emergency department.  He is chronically Once Mauricio Cobb MD        HYDROmorphone (DILAUDID) injection 1 mg  1 mg Intravenous Q15 Min PRN Angela Barron MD   1 mg at 05/24/19 1759    phytonadione ADULT (VITAMIN K) inj 10 mg/mL  10 mg Subcutaneous Daily Mauricio Cobb MD   10 mg at 05/25/19 0841    pravastatin (PRAVACHOL) tablet 40 mg  40 mg Oral Nightly Mauricio Cobb MD   40 mg at 05/24/19 2057    lisinopril (PRINIVIL;ZESTRIL) tablet 10 mg  10 mg Oral Daily Mauricio Cobb MD   10 mg at 05/25/19 5311    diltiazem (CARDIZEM CD) extended release capsule 120 mg  120 mg Oral Daily Mauricio Cobb MD   120 mg at 05/24/19 2208    calcium-vitamin D 500-200 MG-UNIT per tablet   Oral Daily Mauricio Cobb MD   1 tablet at 05/24/19 2209    hydrALAZINE (APRESOLINE) injection 10 mg  10 mg Intravenous Q1H PRN Mauricio Cobb MD   10 mg at 05/25/19 0840    labetalol (NORMODYNE;TRANDATE) injection syringe 10 mg  10 mg Intravenous Q1H PRN Mauricio Cobb MD        morphine injection 4 mg  4 mg Intravenous Q4H PRN Mauricio Cobb MD        0.9 % sodium chloride bolus  250 mL Intravenous Once Mauricio Cobb MD 20 mL/hr at 05/25/19 0300 250 mL at 05/25/19 0300    0.9 % sodium chloride bolus  250 mL Intravenous Once Mauricio Cobb MD 20 mL/hr at 05/25/19 0153 250 mL at 05/25/19 0153         ALLERGIES AND DRUG INTOLERANCES:   Allergies   Allergen Reactions    Honey Bee Venom [Bee Venom] Swelling    Penicillins Swelling       Review of Systems    Cardiovascular: Negative for chest pain, palpitations and leg swelling. Gastrointestinal: Negative for abdominal pain, diarrhea, nausea and vomiting. Frequent falls due to generalized weakness per chart review. Prior stroke with expressive dysphasia. Very limited ROS due to expressive dysphasia,but no active CP/SOB/Cough   Except as noted above the remainder of the review of systems to the best of my ability is negative.       /64   Pulse 50   Temp 98.3 °F (36.8 °C) (Temporal)   Resp 27   Ht 6' (1.829 m)   Wt 191 lb 12.8 oz (87 kg)   SpO2 94%   BMI 26.01 kg/m²     Physical Exam  Constitutional: He appears to be in no distress. HENT:   Head: Normocephalic. Mouth/Throat: Oropharynx is clear and moist.     Neck: Normal range of motion. Neck supple. Cardiovascular: RRR,without gallop or murmur,rub or sternal tenderness. Well healed sternal scar of prior CABG. Pulmonary/Chest: Effort normal and breath sounds normal.   Abdominal: Soft. Bowel sounds are normal. He exhibits no distension. There is no tenderness. Musculoskeletal: Normal range of motion. Neurological: He is alert. Answers simple questions and follows simple commands. Expressive dysphasia. Moves all extremities. Skin: Skin is warm and dry. Psychiatric: Affect appears appropriate. Nursing note and vitals reviewed.           EKG:  EKG: normal sinus rhythm. Intermittent RBBB pattern with new inferior q waves ,last EKG 11/4/18 1451 [place] NSR 62 q in V1  ,no RBBB. ? ? ?ventricular preexcitation,inferior T abnormality     Imaging results:    Ct Head Wo Contrast    Result Date: 5/25/2019  CT HEAD WO CONTRAST : 5/25/2019 CLINICAL HISTORY: CEREBRAL HEMORRHAGE. COMPARISON: 5/24/2019. TECHNIQUE: ROUTINE. All CT scans at this facility use dose modulation, iterative reconstruction, and/or weight based dosing when appropriate to reduce radiation dose to as low as reasonably achievable. FINDINGS: Motion artifact is present on the initial scan, which was repeated. Acute on chronic subdural hematomas overlying both cerebral convexities appear stable, measuring approximately 2.2 cm in thickness on the right, and 1.8 cm in maximum thickness, on the coronal reconstructions. There is no significant midline shift, herniation, or other significant changes identified. STABLE APPROXIMATELY 2.2 CM RIGHT AND 1.8 CM LEFT SUBDURAL HEMATOMA. NO OTHER SIGNIFICANT CHANGE FROM EARLIER YESTERDAY IDENTIFIED.      Ct Head Wo Contrast    Result Date: 5/25/2019  CT HEAD WO CONTRAST : 5/24/2019 9:15 PM CLINICAL HISTORY: CEREBRAL HEMORRHAGE. COMPARISON: Earlier 5/24/2019. TECHNIQUE: ROUTINE. All CT scans at this facility use dose modulation, iterative reconstruction, and/or weight based dosing when appropriate to reduce radiation dose to as low as reasonably achievable. FINDINGS: Motion artifact is present on the initial scan, which was repeated. 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 The left subdural hematoma again measures approximately 1.8 cm in maximum thickness, on the coronal reconstructions. There is no significant midline shift, herniation, or other significant changes identified. SLIGHTLY ENLARGING APPROXIMATELY 2.2 CM RIGHT SUBDURAL HEMATOMA AND STABLE APPROXIMATELY 1.8 CM LEFT SUBDURAL HEMATOMA. NO OTHER SIGNIFICANT CHANGE FROM EARLIER 5/24/2019 IDENTIFIED. Ct Head Wo Contrast    Result Date: 5/24/2019  CT HEAD WO CONTRAST : 5/24/2019 CLINICAL HISTORY: head injury s/p fall . COMPARISON: None available. TECHNIQUE: ROUTINE. All CT scans at this facility use dose modulation, iterative reconstruction, and/or weight based dosing when appropriate to reduce radiation dose to as low as reasonably achievable. FINDINGS: Heterogeneously dense subdural collections overlying the superior aspects of both cerebral hemispheres measuring up to 1.8 cm in thickness, are consistent with acute on chronic subdural hematomas. There is mass effect and mild crowding of the sulci, without midline shift, hydrocephalus or herniation. There is no parenchymal, subarachnoid, or intraventricular hemorrhage, evidence of a recent ischemic cortical infarct, skull fracture, or other complication identified. UP TO 1.8 CM ACUTE CHRONIC SUBDURAL HEMATOMAS OVER BOTH CEREBRAL CONVEXITIES, AS DESCRIBED. The findings were discussed with Dr. Siddhartha Roche shortly following completion of the study. weight based dosing when appropriate to reduce radiation dose to as low as reasonably achievable. LABS:  Recent Labs     05/24/19 1622 05/25/19 0048 05/25/19  0711   WBC 9.8  --  6.7   HCT 42.2  --  33.5*     --  143   INR 2.4 1.5 1.3     --  143   K 4.2  --  3.9   CO2 21  --  25   BUN 29*  --  21   MG 2.3  --   --      CBC:   Recent Labs     05/24/19 1622 05/25/19  0711   WBC 9.8 6.7   HGB 14.3 11.2*   HCT 42.2 33.5*    143     BMP:  Recent Labs     05/24/19 1622 05/25/19 0711    143   K 4.2 3.9   * 107   CO2 21 25   BUN 29* 21   CREATININE 0.69* 0.75   LABGLOM >60.0 >60.0     ABGs: No results found for: PH, PO2, PCO2  INR:   Recent Labs     05/24/19 1622 05/25/19  0048 05/25/19  0711   INR 2.4 1.5 1.3     PRO-BNP: No results found for: PROBNP   TSH: No results found for: TSH   Cardiac Injury Profile:   Recent Labs     05/24/19 1622   TROPONINI <0.010      Lipid Profile: No results found for: TRIG, HDL, LDLCALC, CHOL   Hemoglobin A1C: No components found for: HGBA1C       Intake/Output Summary (Last 24 hours) at 5/25/2019 0919  Last data filed at 5/25/2019 7891  Gross per 24 hour   Intake 3426 ml   Output 1200 ml   Net 2226 ml        IMPRESSIONS:  1. Subacute subdural hematoma related to multiple falls. Dr. jones would like patient patient to go to surgery today. 2. Coronary artery disease with preserved LV systolic function most recent perfusion imaging study November 2017 felt to be low risk, with less than 10% ischemia in the LAD territory unchanged from March 2016, fixed defect in the circumflex distribution. 3. History of remote coronary artery bypass grafting. 4. History of atrial fibrillation, currently sinus rhythm. 5. Fluctuating right bundle branch block pattern and QRS widening and axis change on 12-lead EKG, probable ventricular  preexcitation/WPW? .  6. Expressive dysphasia from prior stroke  7.  Left atrial size 4.7 cm based on echocardiogram in 2017.  8. High  risk medication-warfarin, patient is obviously not a candidate for warfarin given his history. 9. Patient has received more than 2 L of fluid in the last 18-24 hours. RECOMMENDATIONS:  1. Risk-benefit ratio favors proceeding with surgery. 2. Lasix 20 mg IV now, follow electrolytes  3. I may ask for EP consult,but surgery does not need to be held due to that  4. Discussed with  and nursing       ================================================================      Thank you for your consideration for this consultation.     SIGNATURE: Electronically signed by Cipriano Mccray MD on 5/25/2019 at 9:19 AM

## 2019-05-25 NOTE — PROGRESS NOTES
191 lb 12.8 oz (87 kg), SpO2 92 %. Subjective  Objective:  General Appearance:  Ill-appearing and in no acute distress. Vital signs: (most recent): Blood pressure (!) 149/64, pulse 66, temperature 100.1 °F (37.8 °C), temperature source Tympanic, resp. rate 21, height 6' (1.829 m), weight 191 lb 12.8 oz (87 kg), SpO2 92 %. HEENT: Normal HEENT exam.    Lungs:  Normal effort and normal respiratory rate. Breath sounds clear to auscultation. He is not in respiratory distress. Heart: Normal rate. Regular rhythm. S1 normal and S2 normal.    Chest: Symmetric chest wall expansion. No chest wall tenderness. Abdomen: Abdomen is soft. Bowel sounds are normal.   There is no epigastric area or suprapubic area tenderness. Extremities: There is no deformity. Pulses: Distal pulses are intact. Skin:  Warm and dry. No rash, ecchymosis or cyanosis. Ralene Moose last  Assessment & Plan  1) subdural hematoma with shift bc of trama  Needing emergent surgry as per neuro  2) HTn stable  3) DVT ppx  4) AMS    neurosurgery  Michael Souza MD  5/25/2019

## 2019-05-25 NOTE — CONSULTS
[address]                      [address], [address] [facility]                                  CONSULTATION    PATIENT NAME: Aubrye Hoover                        :        1934  MED REC NO:   [id number]                            ROOM:       03  ACCOUNT NO:   [de-identified]                           ADMIT DATE: 2019  PROVIDER:     Angelic Richardson MD    CONSULT DATE:  2019    ATTENDING:  Darvin Pierre M.D. HISTORY OF PRESENT ILLNESS:  This is an 41-year-old right-handed  gentleman well known to me for previous history of primary progressive  aphasia. The patient has been falling more. He fell and hit his head  and has bilateral subdural hematomas. He has difficulty walking now. He is becoming more stiffer. His speech is becoming ____, very  difficult to understand. He is having memory issues as well. This is  part of primary progressive aphasia. He is not able to answer many of  my questions today. He does complain of headache. He nods to that. He  denies any chest pain or shortness of breath. He is not complaining of  any neck difficulty. 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 He has  previous history of coronary artery stents and coronary artery bypass  graft operation. He is on Coumadin for atrial fibrillation. His other  history is not listed. PAST SURGICAL HISTORY:  He has no surgical history listed. MEDICATIONS:  Are those of diltiazem, hydralazine, lisinopril, and  pravastatin. At home, he was on the same medications. PHYSICAL EXAMINATION:  GENERAL:  He is in no acute distress. He smiles. He follows all  commands. He has difficulty with some commands. NECK:  Supple. There are no meningeal signs. CARDIOVASCULAR SYSTEM:  S1 and S2 are normal.  No murmurs appreciated. No carotid bruits.   RESPIRATORY SYSTEM:  Clear to auscultation. CNS EXAMINATION:  He is awake and alert and oriented to self. He is  completely aphasic. He has significant expressive aphasia. He is also  developing some global aphasia. EXTREMITIES:  He has mild tremor with increased tone on the right upper  extremity. This is the first time I have seen the tremor. Strength is  4+/5. Reflexes are 2+. He is hyperreflexic on the right compared to  the left. I did not attempt to walk him. Sensory levels are very  difficult to ascertain. Lab exam is therefore reviewed. His sodium is 143, potassium 3.9, BUN  of 21, creatinine 0.79. WBC count of 6.7, hemoglobin 11.2, hematocrit  33.5, platelets of 735,280. CT scan of the head was reviewed and shows  bilateral hematomas. On the right, there is subdural hematoma, 2.2 cm  and a stable appearance of 1.8 cm left subdural hematoma. IMPRESSION:  Bilateral subdural hematomas with traumatic brain injury  with a fall. The patient was on Coumadin for atrial fibrillation. 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 This is a very ill-defined syndrome that I have been following  for few years. He is still able to eat and has not developed dysphagia,  but memory deficits are reported. The patient is a very high risk for seizures given the bilateral  subdural hematoma and craniotomies. We will empirically start him on  Keppra for now till his surgery is pursued and keep him on this if  needed. We will follow him with you. Thank you, Dr. Erika Miranda, for asking us to assist in the care of the  patient.         Kinjal Leong MD    D: 05/25/2019 11:51:19       T: 05/25/2019 12:26:49     [email or web address]  Job#: [id number]     Doc#: [id number]    CC:

## 2019-05-25 NOTE — PROGRESS NOTES
1910 arrived from ER via cart. Alert expressive aphasia. Hank times 4 moved to bed with multiple assists. flowtrons applied and mepelex to buttock. Patient able to state name and that he is in hospital. One to two word answers. 1945 Dr Eddie Fox called orders received. Significant other at bedside. Consent of ffp received. 2000 lab called to get unit of ffp. Needs to thaw at this time    2015 Dr. Eddie Fox called for additional orders patients bp up, pain meds and order for CT of brain. 2115 to CT scan v ia bed accompanied by 2 transporters an myself     2130 tolerated CT scan well back in bed and backed to ICU room 3.     2200 bp up labetalol 10 mg given     2208 bp remains up hydralzine 10 mg given. Patients po meds given with sips of water. Drinking well took pills whole with no problems. Patient is npo except for meds. 2210 3rd unit of ffp started    2240 Dr. Eddie Fox here. Orders received. Dr not happy ffp is only on 3rd unit. Told lab could only see 2 unit of ffp 4 were ordered. Dr Eddie Fox called Nursing Supervisor and . Wanted head of lab to call him. [address] [facility] Dr Eddie Fox called Patients Significant other Chasidy Arita to give her update. She is not legal medical POA. Krystyna Stephen is to try and get phone number of patients children. Patient has not seen or spoke to children in many years. 0 Dr Eddie Fox wants Risk management notified of what we can do in this matter if we can not get next of kin for consent for surgery. [address] [place] [address] will notify Yaa David if need. Per Angelica Pollock if dr feel surgery is absolutely necessary and emergent  can do surgery without consent. 0000 continue to give ffp as ordered. 0045 lab samuel APTT and pt and INR  0130 pt remains elevated 4 more units of ffp to be given. 0430 3rd unit of ffp going one more to give after this unit. Tolerated well. 2215 late entry patient unable to urinate. Case cath inserted with difficulty. Return mac urine.

## 2019-05-25 NOTE — PROGRESS NOTES
Jean Hair is a 80 y.o. male patient. Pt was seen and evaluated, no overnight events, s/p surgery , on restraints bc pulling strains, aphasic  ROS: could not be obtained bc of acuity of conditions    Current Facility-Administered Medications   Medication Dose Route Frequency Provider Last Rate Last Dose    vancomycin (VANCOCIN) 500 mg in dextrose 5% 100 mL IVPB  500 mg Intravenous On Call to [address],  mL/hr at 05/25/19 1204 500 mg at 05/25/19 1204    levETIRAcetam (KEPPRA) 500 mg in sodium chloride 0.9 % 100 mL IVPB  500 mg Intravenous Q12H Divya Damon MD   500 mg at 05/25/19 1206    phytonadione ADULT (VITAMIN K) inj 10 mg/mL  10 mg Subcutaneous Daily Trina Hickey MD   10 mg at 05/25/19 0841    pravastatin (PRAVACHOL) tablet 40 mg  40 mg Oral Nightly Trina Hickey MD   40 mg at 05/24/19 2057    lisinopril (PRINIVIL;ZESTRIL) tablet 10 mg  10 mg Oral Daily Trina Hickey MD   10 mg at 05/25/19 6433    diltiazem (CARDIZEM CD) extended release capsule 120 mg  120 mg Oral Daily Trina Hickey MD   120 mg at 05/24/19 2208    calcium-vitamin D 500-200 MG-UNIT per tablet   Oral Daily Trina Hickey MD   1 tablet at 05/24/19 2209    hydrALAZINE (APRESOLINE) injection 10 mg  10 mg Intravenous Q1H PRN Trina Hickey MD        labetalol (NORMODYNE;TRANDATE) injection syringe 10 mg  10 mg Intravenous Q1H PRN Trina Hickey MD        0.9 % sodium chloride bolus  250 mL Intravenous Once Trina Hickey MD 20 mL/hr at 05/25/19 0300 250 mL at 05/25/19 0300    0.9 % sodium chloride bolus  250 mL Intravenous Once Trina Hickey MD 20 mL/hr at 05/25/19 0153 250 mL at 05/25/19 0153     Allergies   Allergen Reactions    Honey Bee Venom [Bee Venom] Swelling    Penicillins Swelling     Active Problems:    Subdural hematoma ([address]  Resolved Problems:    * No resolved hospital problems. *    Blood pressure (!) 149/64, pulse 66, temperature 100.1 °F (37.8 °C), temperature source Tympanic, resp.  rate 21, height 6' (1.829 m), weight 191 lb 12.8 oz

## 2019-05-25 NOTE — OP NOTE
Operative Note  ______________________________________________________________    Patient: Luzmaria Saucedo  YOB: 1934  MRN: [id number]  Date of Procedure: 5/25/2019    Pre-Op Diagnosis: Subdural Hematoma bilateral with weakness speech difficulty coagulopathy    Post-Op Diagnosis: Same       Procedure(s):  CRANIOTOMY HEMATOMA EVACUATION  #1 bilateral craniotomy for evacuation of subdural hematoma, #2 placement of bilateral subdural drain    Findings: Chronic and acute subdural hematoma worse on the right than the left    Anesthesia: mac    Surgeon(s):  Jeromy Atkins MD    Staff:    First Assistant: Danial Cosby RN  Scrub Person First: Whit Paniagua    Estimated Blood Loss: Less than 20 mL  Specimens:   ID Type Source Tests Collected by Time Destination   A : Subdural hematoma Tissue Head SURGICAL PATHOLOGY Jeromy Atkins MD 5/25/2019 1256          Drains:   Closed/Suction Drain Right Scalp Other (Comment) 10 [address]ari (Active)       Closed/Suction Drain Left Scalp Other (Comment) 10 [address]ari (Active)       Urethral Catheter Non-latex 16 fr (Active)   $ Urethral catheter insertion $ Not inserted for procedure 5/24/2019 10:27 PM   Catheter Indications Need for fluid management in critically ill patients in a critical care setting not able to be managed by other means such as BSC with hat, bedpan, urinal, condom catheter, or short term intermittent urethral catherization 5/25/2019 12:00 PM   Securement Device Date Changed 05/24/19 5/25/2019  4:00 AM   Site Assessment No urethral drainage 5/25/2019 12:00 PM   Urine Color Mee 5/25/2019 12:00 PM   Urine Appearance Clear 5/25/2019 12:00 PM   Output (mL) 900 mL 5/25/2019  5:58 AM       Procedure:  Patient is a 80-year-old gentleman with bilateral subdural hematoma the significant mass effect causing weakness and frequent falls along with speech difficulties for which patient's taken to the OR after correcting coagulopathy from Coumadin with vitamin K and fresh frozen plasma. Patient was given antibiotics preop. He was positioned supine. Back and hip flexion was a made in an astronaut position. Hair was then clipped and then the head was then cleaned with ChloraPrep and draped sterilely. Local with Mac anesthesia was performed. Initially a 1% lidocaine without epinephrine was used for local injection. A linear incision was made bilaterally along the frontoparietal junction laterally. Heather clips and bipolar was used for scalp hemostasis. Using the pneumatic drills, bilateral craniotomy flap was turned in routine fashion. Significant tension was noted in the dura. At this point the cruciate-type durotomy was then performed and dura was then tacked with 4-0 Nurolon in routine fashion. Multiple and thickened subdural membrane was seen at this point. #15 blades were used for incision of the membrane. Subdural hematoma under pressure was seen shooting out through the incision. More of chronic on the left along with more mixture of acute with chronic subdural hematoma on the right side. Multiple membrane was then carefully ligated. Edges of the membrane was coagulated for hemostasis. Frequent non-antibiotics irrigation was applied into the subdural space was further evacuation of multiple blood clots until fluid become no longer bloody with more of a clear fluid. At this point, subdural drain was placed bilaterally. Dura was then reapproximated with a 4-0 Nurolon. Gelfoam was applied over the operative field. Craniotomy flap was then replaced with  plate and screw systems. Central tack suture was applied to prevent postoperative epidural hematoma. Drain was then exited through separate stab incision. After frequent abutting irrigations, galea was closed with 2-0 Vicryl's with the staples skin. Dressing was applied routinely. Less than 200 mL of EBL was noted.     Patient was taken to the CAT scan in stable condition.     Kin Ormond, MD   on 5/25/19 at 2:27 PM

## 2019-05-26 ENCOUNTER — APPOINTMENT (OUTPATIENT)
Dept: CT IMAGING | Age: 84
DRG: 026 | End: 2019-05-26
Payer: MEDICARE

## 2019-05-26 LAB
ANION GAP SERPL CALCULATED.3IONS-SCNC: 11 MEQ/L (ref 9–15)
APTT: 28.9 SEC (ref 21.6–35.4)
BUN BLDV-MCNC: 23 MG/DL (ref 8–23)
CALCIUM SERPL-MCNC: 9.1 MG/DL (ref 8.5–9.9)
CHLORIDE BLD-SCNC: 105 MEQ/L (ref 95–107)
CO2: 26 MEQ/L (ref 20–31)
CREAT SERPL-MCNC: 0.89 MG/DL (ref 0.7–1.2)
GFR AFRICAN AMERICAN: >60
GFR NON-AFRICAN AMERICAN: >60
GLUCOSE BLD-MCNC: 148 MG/DL (ref 70–99)
INR BLD: 1.1
POTASSIUM REFLEX MAGNESIUM: 3.9 MEQ/L (ref 3.4–4.9)
PROTHROMBIN TIME: 11.4 SEC (ref 9–11.5)
SODIUM BLD-SCNC: 142 MEQ/L (ref 135–144)

## 2019-05-26 PROCEDURE — 2000000000 HC ICU R&B

## 2019-05-26 PROCEDURE — 70450 CT HEAD/BRAIN W/O DYE: CPT

## 2019-05-26 PROCEDURE — 6370000000 HC RX 637 (ALT 250 FOR IP): Performed by: NEUROLOGICAL SURGERY

## 2019-05-26 PROCEDURE — 2500000003 HC RX 250 WO HCPCS: Performed by: NEUROLOGICAL SURGERY

## 2019-05-26 PROCEDURE — 85610 PROTHROMBIN TIME: CPT

## 2019-05-26 PROCEDURE — 85730 THROMBOPLASTIN TIME PARTIAL: CPT

## 2019-05-26 PROCEDURE — 2580000003 HC RX 258: Performed by: NEUROLOGICAL SURGERY

## 2019-05-26 PROCEDURE — 6360000002 HC RX W HCPCS: Performed by: NEUROLOGICAL SURGERY

## 2019-05-26 PROCEDURE — 92610 EVALUATE SWALLOWING FUNCTION: CPT

## 2019-05-26 PROCEDURE — 36415 COLL VENOUS BLD VENIPUNCTURE: CPT

## 2019-05-26 PROCEDURE — 80048 BASIC METABOLIC PNL TOTAL CA: CPT

## 2019-05-26 RX ORDER — LEVETIRACETAM 500 MG/1
500 TABLET ORAL 2 TIMES DAILY
Status: DISCONTINUED | OUTPATIENT
Start: 2019-05-26 | End: 2019-05-31 | Stop reason: HOSPADM

## 2019-05-26 RX ORDER — LISINOPRIL 5 MG/1
5 TABLET ORAL DAILY
Status: DISCONTINUED | OUTPATIENT
Start: 2019-05-27 | End: 2019-05-31

## 2019-05-26 RX ORDER — FAMOTIDINE 20 MG/1
20 TABLET, FILM COATED ORAL 2 TIMES DAILY
Status: DISCONTINUED | OUTPATIENT
Start: 2019-05-26 | End: 2019-05-31 | Stop reason: HOSPADM

## 2019-05-26 RX ADMIN — LEVETIRACETAM 500 MG: 500 TABLET ORAL at 20:24

## 2019-05-26 RX ADMIN — METOPROLOL SUCCINATE 25 MG: 25 TABLET, EXTENDED RELEASE ORAL at 12:50

## 2019-05-26 RX ADMIN — HYDRALAZINE HYDROCHLORIDE 10 MG: 20 INJECTION INTRAMUSCULAR; INTRAVENOUS at 10:19

## 2019-05-26 RX ADMIN — PHYTONADIONE 10 MG: 10 INJECTION, EMULSION INTRAMUSCULAR; INTRAVENOUS; SUBCUTANEOUS at 08:58

## 2019-05-26 RX ADMIN — DILTIAZEM HYDROCHLORIDE 120 MG: 120 CAPSULE, COATED, EXTENDED RELEASE ORAL at 20:24

## 2019-05-26 RX ADMIN — POTASSIUM CHLORIDE, DEXTROSE MONOHYDRATE AND SODIUM CHLORIDE: 150; 5; 450 INJECTION, SOLUTION INTRAVENOUS at 09:22

## 2019-05-26 RX ADMIN — LEVETIRACETAM 500 MG: 500 TABLET ORAL at 08:58

## 2019-05-26 RX ADMIN — FAMOTIDINE 20 MG: 20 TABLET ORAL at 08:58

## 2019-05-26 RX ADMIN — FAMOTIDINE 20 MG: 20 TABLET ORAL at 20:23

## 2019-05-26 RX ADMIN — Medication 4 MG: at 18:54

## 2019-05-26 RX ADMIN — OYSTER SHELL CALCIUM WITH VITAMIN D 1 TABLET: 500; 200 TABLET, FILM COATED ORAL at 08:58

## 2019-05-26 RX ADMIN — PRAVASTATIN SODIUM 40 MG: 40 TABLET ORAL at 20:23

## 2019-05-26 RX ADMIN — VANCOMYCIN HYDROCHLORIDE 1000 MG: 1 INJECTION, POWDER, LYOPHILIZED, FOR SOLUTION INTRAVENOUS at 20:20

## 2019-05-26 RX ADMIN — VANCOMYCIN HYDROCHLORIDE 1000 MG: 1 INJECTION, POWDER, LYOPHILIZED, FOR SOLUTION INTRAVENOUS at 08:49

## 2019-05-26 RX ADMIN — DILTIAZEM HYDROCHLORIDE 120 MG: 120 CAPSULE, COATED, EXTENDED RELEASE ORAL at 00:05

## 2019-05-26 RX ADMIN — Medication 4 MG: at 22:17

## 2019-05-26 RX ADMIN — LEVETIRACETAM 500 MG: 100 INJECTION, SOLUTION INTRAVENOUS at 00:49

## 2019-05-26 RX ADMIN — LISINOPRIL 10 MG: 10 TABLET ORAL at 08:58

## 2019-05-26 ASSESSMENT — PAIN SCALES - GENERAL
PAINLEVEL_OUTOF10: 0
PAINLEVEL_OUTOF10: 5
PAINLEVEL_OUTOF10: 8
PAINLEVEL_OUTOF10: 8
PAINLEVEL_OUTOF10: 0

## 2019-05-26 NOTE — PROGRESS NOTES
Physical Therapy Missed Treatment   Facility/Department: [facility] MED SURG IC03/IC03-01    NAME: Leigh Garay    : 1934 (80 y.o.)  MRN: [id number]    Account: [de-identified]  Gender: male      []  Pt cancelled by nursing for PT due to:  []  Pt refused PT this date due to:  [x]  Pt unavailable at this time due to: continued bedrest orders. Will monitor for lifted restrictions. Will attempt later today or tomorrow, as time allows.      Electronically signed by Janis Mondragon PT on 2019 at 7:31 AM

## 2019-05-26 NOTE — CARE COORDINATION
Call from [facility] that Dr. Jagruti Jaffe and Dr. Freddie Pascal had discussed Makenzie Acunalashay as discharge plan. PMNR consult ordered. Bedrest order now cancelled. PT/OT pending evals pending. Patient on NRB and sleeping when C3 entered room. Spoke to patient's girlfriend @ bedside. Informed I would be calling son as discussed relating to discharge plans. Call to son Ramesh Hines. Ramesh Hines reports that he and his father have been estranged and haven't spoken for a long time and relates to his patient's relationship with Yazmin Vela but also due to patient's multiple marriages and lifestyle. Son expressing frustrations on not being informed of patient's admission and condition until yesterday. States he does not even know where his dad lives other than that he lives in [place]. 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 Ramesh Hines aware that we do not call for decisions that would make Ramesh Hines financially responsible. Ramesh Hines stating that Yazmin Vela can make decisions on discharge planning but wants to be informed of any changes in status or plans. While Ramesh Hines telling about his father's past he stated when the patient left his mother that he was leaving 4 children and one on the way. At the time I did not think to ask about the siblings but will need to clarify if other children are living and if there were any other children from other marriages.

## 2019-05-26 NOTE — PROGRESS NOTES
1100  Hand off of care from [facility]. Repositioned to right side. Girlfriend at bedside. Updated. 1400  Repositioned to back. Restraints removed. Patient is alert and says he is OK. Bilateral ventricular drain present. 1730 Awake and moving in the bed. Repositioned to back for comfort. 1820  Restless, pulling at gown and catheter. Repositioned. Bilateral wrist restraints applied. 1854  Patient remains restless. I asked if he was in pain, and he said pain yes. Medicated with Morphine 4 mg for comfort. No drainage noted from head drains in a few hours.

## 2019-05-26 NOTE — PROGRESS NOTES
Neuro assessment unchanged overnight. Pt to CT at 0500 for ordered CT of head. Pt. More alert this AM. Continues to follow commands. Slight shadowing noted on R side of dressing on head. R side drain draining more than Left.

## 2019-05-26 NOTE — PROGRESS NOTES
[place] [facility] [facility]   CLINICAL BEDSIDE SWALLOW EVALUATION    NAME: Sabina Mclaughlin  : 1934  MRN: [id number]    ADMISSION DATE: 2019  ADMITTING DIAGNOSIS: has Afib ([address] and Subdural hematoma ([facility] [address] on their problem list.    ONSET DATE: 2019    Date of Eval: 2019  Evaluating Therapist: Christoph Chin MA CCC-SLP    Recommended Diet and Intervention  Diet Solids Recommendation: Dysphagia I Pureed  Liquid Consistency Recommendation: Nectar  Recommended Form of Meds: Crushed in puree as able  Recommendations: Dysphagia treatment; Total feed  Therapeutic Interventions: Diet tolerance monitoring, Therapeutic PO trials with SLP, Patient/Family education, Thermal gustatory stimulation, Oral motor exercises, Tongue base strengthening    Compensatory Swallowing Strategies  Compensatory Swallowing Strategies: Small bites/sips; Total feed;Upright as possible for all oral intake;Swallow 2 times per bite/sip;Eat/Feed slowly    Reason for Referral  Sabina Mclaughlin was referred for a bedside swallow evaluation to assess the efficiency of his swallow function, identify signs and symptoms of aspiration and make recommendations regarding safe dietary consistencies, effective compensatory strategies, and safe eating environment. General  Chart Reviewed: Yes  Behavior/Cognition: Lethargic;Requires cueing;Doesn't follow directions  O2 Device: Non-rebreather mask  Communication Observation: Aphasia; Dysarthria(Echolalic with repeating words and phrases instead of providing answers; initiated some spontaneous speech however non-sense words)  Follows Directions: None  Dentition: Dentures bottom; Dentures top(did not place in dentures at this time)  Patient Positioning: Upright in bed  Baseline Vocal Quality: Weak  Prior Dysphagia History: Unknown at this time  Consistencies Administered: Puree;Nectar - straw; Ice Chips    Current Diet level:  Current Diet : NPO  Current Liquid Diet : NPO    Oral Motor Deficits  Oral/Motor  Oral Motor: Exceptions to WFL(did open mouth after tactile and visual cues, however jaw opening limited. No other directions were followed)  Labial ROM: Reduced left; Reduced right  Labial Strength: Reduced  Lingual ROM: Reduced left; Reduced right  Lingual Strength: Reduced    Oral Phase Dysfunction  Oral Phase  Oral Phase: Exceptions  Oral Phase Dysfunction  Decreased Anterior to Posterior Transit: All  Oral Phase  Oral Phase - Comment: Moderate oral dysphagia characterized by slow bolus acceptance from spoon and straw but appropriate with delay. Pt demonstrated impaired bolus formation and propulsion with increased A-P transit. No oral residue post swallow. Indicators of Pharyngeal Phase Dysfunction   Pharyngeal Phase  Pharyngeal Phase: Exceptions  Indicators of Pharyngeal Phase Dysfunction  Delayed Swallow: All  Pharyngeal Phase   Pharyngeal: Suspect pharyngeal dysphagia characterized by moderate swallow delay, however good laryngeal elevation. Suspect pharyngeal weakness due to demonstrating 2 swallows with each trial.  Second swallow was delayed and pt required extra time to perform. No overt s/s of aspiration occurred. Impression  Dysphagia Diagnosis: Moderate oral stage dysphagia;Mild pharyngeal stage dysphagia  Dysphagia Outcome Severity Scale: Level 3: Moderate dysphagia- Total assisstance, supervision or strategies. Two or more diet consistencies restricted     Treatment Plan  Requires SLP Intervention: Yes  Duration/Frequency of Treatment: 2-3x/week during LOS          Treatment/Goals  Short-term Goals  Timeframe for Short-term Goals: 1 week  Goal 1: Pt will tolerate puree diet with nectar thick liquids with no overt s/s of aspiration. Goal 2: Pt will perform oral motor ROM/strength ex with max cues 5x/each. Goal 3: Pt will perform base of tongue ex with max cues 10x/each.   Goal 4: Pt will tolerate therapeutic trials of mechanical soft/thin as able, with no overt s/s of aspiration. Long-term Goals  Timeframe for Long-term Goals: 1 week  Goal 1: Pt will tolerate least restrictive diet with no overt s/s of aspiration. Prognosis  Prognosis  Prognosis for safe diet advancement: good  Individuals consulted  Consulted and agree with results and recommendations: Patient;RN(Mariela REYNOLDS)    Education  Patient Education: Educated pt on results. Patient Education Response: No evidence of learning  Safety Devices in place: Yes  Type of devices: Bed alarm in place;Call light within reach    Pain:  Pain Assessment  Patient Currently in Pain: Denies  Pain Assessment: 0-10  Pain Level: 0       Therapy Time  SLP Individual Minutes  Time In: 0950  Time Out: [facility]  Minutes: 15              Miguel Rubi, Date: 5/26/2019, Time: 10:19 AM

## 2019-05-26 NOTE — PROGRESS NOTES
[facility] Note      Patient: Jean Hair  Unit/Bed: IC03/IC03-01  YOB: 1934  MRN: [id number]  Admit Date:  5/24/2019  HOSPITAL day # 22      Subjective Complaint:   Voices no complaints. Physical Examination:     /73   Pulse 78   Temp 97.4 °F (36.3 °C) (Tympanic)   Resp 18   Ht 6' (1.829 m)   Wt 191 lb 12.8 oz (87 kg)   SpO2 100%   BMI 26.01 kg/m²         Intake/Output Summary (Last 24 hours) at 5/26/2019 1928  Last data filed at 5/26/2019 1815  Gross per 24 hour   Intake 2626 ml   Output 1361 ml   Net 1265 ml     Weights  Wt Readings from Last 3 Encounters:   05/24/19 191 lb 12.8 oz (87 kg)   10/03/18 200 lb (90.7 kg)   10/08/15 201 lb (91.2 kg)       General:  Awake  Heart:  Regular rate Regular rhythm, S1> S2 no murmurs, gallops, or rubs. Lungs: CTA bilaterally, bilat symmetrical expansion, no wheeze, rales, or rhonchi. Abdomen: Bowel sounds present, soft, nontender,  no peritoneal signs  Extremities:  No oedema. Skin: Warm and dry  Mood and affect: Not agitated     Telemetry:      normal sinus          LABS:   CBC:   Recent Labs     05/24/19  1622 05/25/19  0711   WBC 9.8 6.7   HGB 14.3 11.2*    143      BMP:    Recent Labs     05/24/19  1622 05/25/19  0711 05/26/19  0447    143 142   K 4.2 3.9 3.9   * 107 105   CO2 21 25 26   BUN 29* 21 23   CREATININE 0.69* 0.75 0.89   GLUCOSE 184* 107* 148*              Troponin: No results for input(s): TROPONINT in the last 72 hours. BNP: No results for input(s): PROBNP in the last 72 hours. INR:   Recent Labs     05/25/19  0711 05/25/19  1517 05/26/19  0448   INR 1.3 1.2 1.1      Mg:   Recent Labs     05/24/19  1622   MG 2.3       Cardiac Testing:    none    Assessment:  1. S/P drainage of  subdural hematoma related to multiple falls.    2. Coronary artery disease with preserved LV systolic function most recent perfusion imaging study November 2017 felt to be low risk, with less than 10% ischemia in the LAD territory unchanged from March 2016, fixed defect in the circumflex distribution. 3. History of remote coronary artery bypass grafting. 4. History of atrial fibrillation, currently sinus rhythm. 5. Fluctuating right bundle branch block pattern and QRS widening and axis change on 12-lead EKG, probable ventricular  preexcitation/WPW? .  6. Expressive dysphasia from prior stroke  7. Left atrial size 4.7 cm based on echocardiogram in 2017.  8. High  risk medication-warfarin, patient is obviously not a candidate for warfarin given his history. Plan:   Pt on Cardizem,Toprol and Lisinopril  BP borderline,will DC Toprol and decrease Lisinopril   I requested   (EP) to review pts EKG . June Feng He said WPW is possible but not sure   Electronically signed by Goran Valerio MD on 5/26/2019 at 7:28 PM

## 2019-05-26 NOTE — PROGRESS NOTES
reconstruction, and/or weight based dosing when appropriate to reduce radiation dose to as low as reasonably achievable. FINDINGS: Since the prior study, bilateral superior parietal craniotomies and subdural drainage catheters have been placed, which are otherwise unremarkable. There is pneumocephaly and significantly decreasing-sized heterogeneous subdural hematomas bilaterally, which are overall mildly decreased in size and mass effect from the earlier study. There is approximately 2 to 3 mm of left-to-right midline shift, without herniation, hydrocephalus, or other significant changes identified. INTERVAL BILATERAL CRANIOTOMIES AND SUBDURAL HEMATOMA DRAINAGE CATHETER PLACEMENT SURGERY. PNEUMOCEPHALY AND SIGNIFICANTLY DECREASING-SIZED HETEROGENEOUS SUBDURAL HEMATOMAS. APPROXIMATELY 2 TO 3 MM OF LEFT-TO-RIGHT MIDLINE SHIFT WITHOUT HERNIATION, HYDROCEPHALUS, OR OTHER SIGNIFICANT CHANGES IDENTIFIED. Ct Head Wo Contrast    Result Date: 5/25/2019  CT HEAD WO CONTRAST : 5/25/2019 CLINICAL HISTORY: CEREBRAL HEMORRHAGE. COMPARISON: 5/24/2019. TECHNIQUE: ROUTINE. All CT scans at this facility use dose modulation, iterative reconstruction, and/or weight based dosing when appropriate to reduce radiation dose to as low as reasonably achievable. FINDINGS: Motion artifact is present on the initial scan, which was repeated. Acute on chronic subdural hematomas overlying both cerebral convexities appear stable, measuring approximately 2.2 cm in thickness on the right, and 1.8 cm in maximum thickness, on the coronal reconstructions. There is no significant midline shift, herniation, or other significant changes identified. STABLE APPROXIMATELY 2.2 CM RIGHT AND 1.8 CM LEFT SUBDURAL HEMATOMA. NO OTHER SIGNIFICANT CHANGE FROM EARLIER YESTERDAY IDENTIFIED. Ct Head Wo Contrast    Result Date: 5/25/2019  CT HEAD WO CONTRAST : 5/24/2019 9:15 PM CLINICAL HISTORY: CEREBRAL HEMORRHAGE. COMPARISON: Earlier 5/24/2019. TECHNIQUE: ROUTINE. All CT scans at this facility use dose modulation, iterative reconstruction, and/or weight based dosing when appropriate to reduce radiation dose to as low as reasonably achievable. FINDINGS: Motion artifact is present on the initial scan, which was repeated. 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 The left subdural hematoma again measures approximately 1.8 cm in maximum thickness, on the coronal reconstructions. There is no significant midline shift, herniation, or other significant changes identified. SLIGHTLY ENLARGING APPROXIMATELY 2.2 CM RIGHT SUBDURAL HEMATOMA AND STABLE APPROXIMATELY 1.8 CM LEFT SUBDURAL HEMATOMA. NO OTHER SIGNIFICANT CHANGE FROM EARLIER 5/24/2019 IDENTIFIED. Ct Head Wo Contrast    Result Date: 5/24/2019  CT HEAD WO CONTRAST : 5/24/2019 CLINICAL HISTORY: head injury s/p fall . COMPARISON: None available. TECHNIQUE: ROUTINE. All CT scans at this facility use dose modulation, iterative reconstruction, and/or weight based dosing when appropriate to reduce radiation dose to as low as reasonably achievable. FINDINGS: Heterogeneously dense subdural collections overlying the superior aspects of both cerebral hemispheres measuring up to 1.8 cm in thickness, are consistent with acute on chronic subdural hematomas. There is mass effect and mild crowding of the sulci, without midline shift, hydrocephalus or herniation. There is no parenchymal, subarachnoid, or intraventricular hemorrhage, evidence of a recent ischemic cortical infarct, skull fracture, or other complication identified. UP TO 1.8 CM ACUTE CHRONIC SUBDURAL HEMATOMAS OVER BOTH CEREBRAL CONVEXITIES, AS DESCRIBED. The findings were discussed with Dr. Brittany Willson shortly following completion of the study.      Ct Cervical Spine Wo Contrast    Result Date: 5/24/2019  CT CERVICAL SPINE WO CONTRAST: 5/24/2019 CLINICAL HISTORY:  Fall, neck pain . COMPARISON: None available. TECHNIQUE: ROUTINE All CT scans at this facility use dose modulation, iterative reconstruction, and/or weight based dosing when appropriate to reduce radiation dose to as low as reasonably achievable. FINDINGS: The spine is visualized from the craniovertebral junction through the T1-2 level. Mild to moderate hypertrophic facet changes are present, predominantly on the right at C3-4, and on the left at C2-C3 and C5-6. There is no fracture, significant subluxation, or acute paraspinous soft tissue abnormalities identified. NO FRACTURE OR EVIDENCE OF CERVICAL SPINE INJURY IDENTIFIED. CERVICAL SPONDYLOSIS. Ct Lumbar Spine Wo Contrast    Result Date: 5/24/2019  CT lumbar spine without intravenous contrast medium. HISTORY: Back pain. Technical factors: CT imaging lumbar spine obtained and formatted as 2.5 mm contiguous axial images. Sagittal and coronal reconstruction obtained during postprocessing. No intravenous contrast medium utilized. Study done in comparison with plain film imaging lumbar spine, October 3, 2018. FINDINGS: Compression fracture L1 is again identified, and is progressed since the prior study. Addition, multiple the anterior margin of L1 and fracture of the superior articular surface of L1 is now identified (series 602, image 39). Anterior osteophytes T10 and  T11. Diffuse facet hypertrophy identified. At the L3-L4 level, mild facet hypertrophy, ligamentum flavum hypertrophy, and a disc bulge, causes anterior flattening of thecal sac with possible mild central stenosis. Acute on chronic fracture, L1, with avulsion of the anterior portion of the L1 vertebral body. Findings suspicious for mild to moderate central stenosis at the L3-L4 level.  All CT scans at this facility use dose modulation, iterative reconstruction, and/or weight based dosing when appropriate to reduce radiation dose to as low as reasonably achievable. Xr Chest Portable    Result Date: 5/25/2019  XR CHEST PORTABLE : 5/24/2019 CLINICAL HISTORY:  Syncope . COMPARISON: CT abdomen pelvis 10/3/2018. A portable upright AP radiograph of the chest was obtained. FINDINGS: A poor inspiratory volume is present with mild probable bibasilar atelectasis. There is no cardiomegaly, significant pleural effusion, vascular congestion, pneumothorax, or displaced fractures identified. 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 POOR INSPIRATION WITH MILD PROBABLE BIBASILAR ATELECTASIS. Recent Labs     05/24/19  1622 05/25/19  0711   WBC 9.8 6.7   HGB 14.3 11.2*    143     Recent Labs     05/24/19  1622 05/25/19  0711 05/26/19  0447    143 142   K 4.2 3.9 3.9   * 107 105   CO2 21 25 26   BUN 29* 21 23   CREATININE 0.69* 0.75 0.89   GLUCOSE 184* 107* 148*     Recent Labs     05/24/19  1622   BILITOT 0.7   ALKPHOS 53   AST 24   ALT 17     Lab Results   Component Value Date    PROTIME 11.4 05/26/2019    PROTIME 24.1 10/08/2018    INR 1.1 05/26/2019     No results found for: LITHIUM, DILFRTOT, VALPROATE    ASSESSMENT AND PLAN    Bilateral Subdural hematoma,  Evacuated  Awake,  All commands  Non focal  No seizures  Keep on keppra  Draining bilateral, right more the left    Capo DEAN Rosa MD, Angela Whittaker, American Board of Psychiatry & Neurology  Board Certified in Vascular Neurology  Board Certified in Neuromuscular Medicine  Certified in . [place] [address]

## 2019-05-26 NOTE — PROGRESS NOTES
[facility] Occupational Therapy      Date: 2019  Patient Name: Liliana Pitts        MRN: [id number]  Account: [de-identified]   : 1934  (80 y.o.)  Room: [place] [id number]    Chart reviewed, attempted OT at  for OT eval. Patient not seen 2° to:    [x] Hold per nsg request- bed rest orders continue    [] Pt declined     [] Pt. off floor for test/procedure. Spoke to [place]. Will attempt again when able.     Electronically signed by GAVIN Wheatley on 2019 at 2:41 PM

## 2019-05-27 PROBLEM — M43.16 SPONDYLOLISTHESIS OF LUMBAR REGION: Status: ACTIVE | Noted: 2018-11-01

## 2019-05-27 PROBLEM — N40.1 BENIGN PROSTATIC HYPERPLASIA WITH URINARY FREQUENCY: Status: ACTIVE | Noted: 2017-03-07

## 2019-05-27 PROBLEM — R47.01 APHASIA: Status: ACTIVE | Noted: 2019-05-27

## 2019-05-27 PROBLEM — S32.010A CLOSED COMPRESSION FRACTURE OF L1 LUMBAR VERTEBRA: Status: ACTIVE | Noted: 2018-11-01

## 2019-05-27 PROBLEM — R41.3 MEMORY LOSS: Status: ACTIVE | Noted: 2018-06-26

## 2019-05-27 PROBLEM — R13.12 DYSPHAGIA, OROPHARYNGEAL PHASE: Status: ACTIVE | Noted: 2019-05-27

## 2019-05-27 PROBLEM — I45.10 RBBB: Status: ACTIVE | Noted: 2019-05-27

## 2019-05-27 PROBLEM — R35.0 BENIGN PROSTATIC HYPERPLASIA WITH URINARY FREQUENCY: Status: ACTIVE | Noted: 2017-03-07

## 2019-05-27 PROBLEM — Z79.82 LONG-TERM USE OF ASPIRIN THERAPY: Status: ACTIVE | Noted: 2017-09-22

## 2019-05-27 PROBLEM — R73.03 PREDIABETES: Status: ACTIVE | Noted: 2018-04-12

## 2019-05-27 PROBLEM — C61 PROSTATE CANCER (HCC): Status: ACTIVE | Noted: 2019-05-27

## 2019-05-27 PROBLEM — N52.9 VASCULOGENIC ERECTILE DYSFUNCTION: Status: ACTIVE | Noted: 2017-03-03

## 2019-05-27 PROBLEM — H40.033 ANATOMICAL NARROW ANGLE, BILATERAL: Status: ACTIVE | Noted: 2019-03-05

## 2019-05-27 PROBLEM — I20.9 ANGINA PECTORIS (HCC): Status: ACTIVE | Noted: 2017-09-14

## 2019-05-27 PROBLEM — R31.0 GROSS HEMATURIA: Status: ACTIVE | Noted: 2017-03-07

## 2019-05-27 PROBLEM — M47.817 FACET ARTHROPATHY, LUMBOSACRAL: Status: ACTIVE | Noted: 2018-11-01

## 2019-05-27 PROBLEM — R26.9 GAIT ABNORMALITY: Status: ACTIVE | Noted: 2019-05-27

## 2019-05-27 LAB
ANION GAP SERPL CALCULATED.3IONS-SCNC: 9 MEQ/L (ref 9–15)
APTT: 31.6 SEC (ref 21.6–35.4)
BUN BLDV-MCNC: 25 MG/DL (ref 8–23)
CALCIUM SERPL-MCNC: 9 MG/DL (ref 8.5–9.9)
CHLORIDE BLD-SCNC: 104 MEQ/L (ref 95–107)
CO2: 26 MEQ/L (ref 20–31)
CREAT SERPL-MCNC: 0.78 MG/DL (ref 0.7–1.2)
GFR AFRICAN AMERICAN: >60
GFR NON-AFRICAN AMERICAN: >60
GLUCOSE BLD-MCNC: 120 MG/DL (ref 70–99)
INR BLD: 1.2
POTASSIUM REFLEX MAGNESIUM: 4.1 MEQ/L (ref 3.4–4.9)
PROTHROMBIN TIME: 12.2 SEC (ref 9–11.5)
SODIUM BLD-SCNC: 139 MEQ/L (ref 135–144)

## 2019-05-27 PROCEDURE — 2000000000 HC ICU R&B

## 2019-05-27 PROCEDURE — 97163 PT EVAL HIGH COMPLEX 45 MIN: CPT

## 2019-05-27 PROCEDURE — 6370000000 HC RX 637 (ALT 250 FOR IP): Performed by: NEUROLOGICAL SURGERY

## 2019-05-27 PROCEDURE — 36415 COLL VENOUS BLD VENIPUNCTURE: CPT

## 2019-05-27 PROCEDURE — 85610 PROTHROMBIN TIME: CPT

## 2019-05-27 PROCEDURE — 6370000000 HC RX 637 (ALT 250 FOR IP): Performed by: INTERNAL MEDICINE

## 2019-05-27 PROCEDURE — 85730 THROMBOPLASTIN TIME PARTIAL: CPT

## 2019-05-27 PROCEDURE — 6360000002 HC RX W HCPCS: Performed by: NEUROLOGICAL SURGERY

## 2019-05-27 PROCEDURE — 2500000003 HC RX 250 WO HCPCS: Performed by: NEUROLOGICAL SURGERY

## 2019-05-27 PROCEDURE — 99222 1ST HOSP IP/OBS MODERATE 55: CPT | Performed by: PHYSICAL MEDICINE & REHABILITATION

## 2019-05-27 PROCEDURE — 80048 BASIC METABOLIC PNL TOTAL CA: CPT

## 2019-05-27 PROCEDURE — 2700000000 HC OXYGEN THERAPY PER DAY

## 2019-05-27 PROCEDURE — 2580000003 HC RX 258: Performed by: NEUROLOGICAL SURGERY

## 2019-05-27 RX ADMIN — VANCOMYCIN HYDROCHLORIDE 1000 MG: 1 INJECTION, POWDER, LYOPHILIZED, FOR SOLUTION INTRAVENOUS at 08:42

## 2019-05-27 RX ADMIN — PRAVASTATIN SODIUM 40 MG: 40 TABLET ORAL at 22:12

## 2019-05-27 RX ADMIN — Medication 4 MG: at 18:28

## 2019-05-27 RX ADMIN — VANCOMYCIN HYDROCHLORIDE 1000 MG: 1 INJECTION, POWDER, LYOPHILIZED, FOR SOLUTION INTRAVENOUS at 20:19

## 2019-05-27 RX ADMIN — Medication 4 MG: at 23:19

## 2019-05-27 RX ADMIN — LABETALOL 20 MG/4 ML (5 MG/ML) INTRAVENOUS SYRINGE 10 MG: at 18:31

## 2019-05-27 RX ADMIN — LISINOPRIL 5 MG: 5 TABLET ORAL at 08:41

## 2019-05-27 RX ADMIN — Medication 4 MG: at 08:41

## 2019-05-27 RX ADMIN — POTASSIUM CHLORIDE, DEXTROSE MONOHYDRATE AND SODIUM CHLORIDE: 150; 5; 450 INJECTION, SOLUTION INTRAVENOUS at 12:56

## 2019-05-27 RX ADMIN — LEVETIRACETAM 500 MG: 500 TABLET ORAL at 22:11

## 2019-05-27 RX ADMIN — FAMOTIDINE 20 MG: 20 TABLET ORAL at 08:41

## 2019-05-27 RX ADMIN — OYSTER SHELL CALCIUM WITH VITAMIN D 1 TABLET: 500; 200 TABLET, FILM COATED ORAL at 08:41

## 2019-05-27 RX ADMIN — FAMOTIDINE 20 MG: 20 TABLET ORAL at 22:11

## 2019-05-27 RX ADMIN — LEVETIRACETAM 500 MG: 500 TABLET ORAL at 08:41

## 2019-05-27 RX ADMIN — DILTIAZEM HYDROCHLORIDE 120 MG: 120 CAPSULE, COATED, EXTENDED RELEASE ORAL at 22:11

## 2019-05-27 RX ADMIN — PHYTONADIONE 10 MG: 10 INJECTION, EMULSION INTRAMUSCULAR; INTRAVENOUS; SUBCUTANEOUS at 08:42

## 2019-05-27 SDOH — SOCIAL STABILITY: SOCIAL NETWORK
IN A TYPICAL WEEK, HOW MANY TIMES DO YOU TALK ON THE PHONE WITH FAMILY, FRIENDS, OR NEIGHBORS?: MORE THAN THREE TIMES A WEEK

## 2019-05-27 SDOH — SOCIAL STABILITY: SOCIAL NETWORK: HOW OFTEN DO YOU GET TOGETHER WITH FRIENDS OR RELATIVES?: NEVER

## 2019-05-27 SDOH — SOCIAL STABILITY: SOCIAL NETWORK: HOW OFTEN DO YOU ATTENT MEETINGS OF THE CLUB OR ORGANIZATION YOU BELONG TO?: NEVER

## 2019-05-27 SDOH — SOCIAL STABILITY: SOCIAL NETWORK
DO YOU BELONG TO ANY CLUBS OR ORGANIZATIONS SUCH AS CHURCH GROUPS UNIONS, FRATERNAL OR ATHLETIC GROUPS, OR SCHOOL GROUPS?: NO

## 2019-05-27 SDOH — SOCIAL STABILITY: SOCIAL INSECURITY
WITHIN THE LAST YEAR, HAVE YOU BEEN KICKED, HIT, SLAPPED, OR OTHERWISE PHYSICALLY HURT BY YOUR PARTNER OR EX-PARTNER?: NO

## 2019-05-27 SDOH — SOCIAL STABILITY: SOCIAL INSECURITY: WITHIN THE LAST YEAR, HAVE YOU BEEN HUMILIATED OR EMOTIONALLY ABUSED IN OTHER WAYS BY YOUR PARTNER OR EX-PARTNER?: NO

## 2019-05-27 SDOH — SOCIAL STABILITY: SOCIAL NETWORK: HOW OFTEN DO YOU ATTEND CHURCH OR RELIGIOUS SERVICES?: NEVER

## 2019-05-27 SDOH — SOCIAL STABILITY: SOCIAL INSECURITY
WITHIN THE LAST YEAR, HAVE TO BEEN RAPED OR FORCED TO HAVE ANY KIND OF SEXUAL ACTIVITY BY YOUR PARTNER OR EX-PARTNER?: NO

## 2019-05-27 SDOH — SOCIAL STABILITY: SOCIAL INSECURITY: WITHIN THE LAST YEAR, HAVE YOU BEEN AFRAID OF YOUR PARTNER OR EX-PARTNER?: NO

## 2019-05-27 SDOH — SOCIAL STABILITY: SOCIAL NETWORK: ARE YOU MARRIED, WIDOWED, DIVORCED, SEPARATED, NEVER MARRIED, OR LIVING WITH A PARTNER?: DIVORCED

## 2019-05-27 ASSESSMENT — PAIN SCALES - GENERAL
PAINLEVEL_OUTOF10: 0
PAINLEVEL_OUTOF10: 4
PAINLEVEL_OUTOF10: 7
PAINLEVEL_OUTOF10: 0
PAINLEVEL_OUTOF10: 5
PAINLEVEL_OUTOF10: 0
PAINLEVEL_OUTOF10: 3
PAINLEVEL_OUTOF10: 0
PAINLEVEL_OUTOF10: 6

## 2019-05-27 ASSESSMENT — ENCOUNTER SYMPTOMS
DIARRHEA: 0
NAUSEA: 0
PHOTOPHOBIA: 0
SORE THROAT: 0
BACK PAIN: 1
VOMITING: 0
EYE PAIN: 0
CONSTIPATION: 1
WHEEZING: 0
EYE REDNESS: 0
SHORTNESS OF BREATH: 1
BLOOD IN STOOL: 0
STRIDOR: 0
COUGH: 0
ABDOMINAL PAIN: 0

## 2019-05-27 NOTE — CONSULTS
is unchanged. Associated symptoms include back pain, confusion, dizziness, fatigue, headaches, light-headedness, palpitations and shortness of breath. Pertinent negatives include no abdominal pain, chest pain, diaphoresis, fever, nausea, neck pain or vomiting. Treatments tried: SDH evacuation. The treatment provided moderate relief. His past medical history is significant for a bleeding disorder and a clotting disorder. There is no history of a CVA, dementia, head trauma or liver disease. (Coumadin for a fib)           Their inpatient work up has included:    Imaging:    Imaging and other studies reviewed and discussed with patient and staff    Ct Head Wo   5/26/2019  EXAMINATION: CT BRAIN WITHOUT CONTRAST 5/26/2019 at 0511 hours CLINICAL HISTORY:  postop  S06. [id number] Subdural hematoma (HCC) ICD10 COMPARISONS: 5/25/2019 at 1407 hours TECHNIQUE: Spiral scans without contrast. Multiplanar 2-D reconstructions. All CT scans at this facility use dose modulation, iterative reconstruction, and/or weight based dosing when appropriate to reduce radiation dose to as low as reasonably achievable. FINDINGS: Since the prior postoperative examination, the volume of gas and blood in the bilateral subdural collections have decreased. The right subdural collection has a maximal width of 10 mm. The left subdural collection has a maximal width of 18 mm. There is still a considerable amount of gas in the left subdural collection. There is only trace residual gas in the right subdural collection. There is 4 mm left to right midline shift. There is no transtentorial herniation. Bilateral craniotomies and subdural drains are again noted. POSTOPERATIVE BILATERAL GAS-CONTAINING SUBDURAL HEMATOMAS, LEFT LARGER THAN RIGHT, WITH LEFT TO RIGHT MIDLINE SHIFT OF 4 MM, IMPROVED FROM PRIOR EXAMINATION OF 5/25/19 AT 1417 HOURS. Ct Head Wo  5/25/2019  CT HEAD WO CONTRAST : 5/25/2019 CLINICAL HISTORY:  INTRACRANIAL HEMORRHAGE .  Recent subdural hematoma drainage surgery. COMPARISON: Earlier 5/25/2019. TECHNIQUE: Spiral unenhanced images were obtained of the head, with routine reconstructions performed. All CT scans at this facility use dose modulation, iterative reconstruction, and/or weight based dosing when appropriate to reduce radiation dose to as low as reasonably achievable. FINDINGS: Since the prior study, bilateral superior parietal craniotomies and subdural drainage catheters have been placed, which are otherwise unremarkable. There is pneumocephaly and significantly decreasing-sized heterogeneous subdural hematomas bilaterally, which are overall mildly decreased in size and mass effect from the earlier study. There is approximately 2 to 3 mm of left-to-right midline shift, without herniation, hydrocephalus, or other significant changes identified. INTERVAL BILATERAL CRANIOTOMIES AND SUBDURAL HEMATOMA DRAINAGE CATHETER PLACEMENT SURGERY. PNEUMOCEPHALY AND SIGNIFICANTLY DECREASING-SIZED HETEROGENEOUS SUBDURAL HEMATOMAS. APPROXIMATELY 2 TO 3 MM OF LEFT-TO-RIGHT MIDLINE SHIFT WITHOUT HERNIATION, HYDROCEPHALUS, OR OTHER SIGNIFICANT CHANGES IDENTIFIED. Ct Head Wo Contrast    Result Date: 5/25/2019  CT HEAD WO CONTRAST : 5/25/2019 CLINICAL HISTORY: CEREBRAL HEMORRHAGE. COMPARISON: 5/24/2019. TECHNIQUE: ROUTINE. All CT scans at this facility use dose modulation, iterative reconstruction, and/or weight based dosing when appropriate to reduce radiation dose to as low as reasonably achievable. FINDINGS: Motion artifact is present on the initial scan, which was repeated. Acute on chronic subdural hematomas overlying both cerebral convexities appear stable, measuring approximately 2.2 cm in thickness on the right, and 1.8 cm in maximum thickness, on the coronal reconstructions. There is no significant midline shift, herniation, or other significant changes identified. STABLE APPROXIMATELY 2.2 CM RIGHT AND 1.8 CM LEFT SUBDURAL HEMATOMA.  NO OTHER SIGNIFICANT CHANGE FROM EARLIER YESTERDAY IDENTIFIED. Ct Head Wo 5/25/2019  CT HEAD WO CONTRAST : 5/24/2019 9:15 PM CLINICAL HISTORY: CEREBRAL HEMORRHAGE. COMPARISON: Earlier 5/24/2019. TECHNIQUE: ROUTINE. All CT scans at this facility use dose modulation, iterative reconstruction, and/or weight based dosing when appropriate to reduce radiation dose to as low as reasonably achievable. FINDINGS: Motion artifact is present on the initial scan, which was repeated. 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 The left subdural hematoma again measures approximately 1.8 cm in maximum thickness, on the coronal reconstructions. There is no significant midline shift, herniation, or other significant changes identified. SLIGHTLY ENLARGING APPROXIMATELY 2.2 CM RIGHT SUBDURAL HEMATOMA AND STABLE APPROXIMATELY 1.8 CM LEFT SUBDURAL HEMATOMA. NO OTHER SIGNIFICANT CHANGE FROM EARLIER 5/24/2019 IDENTIFIED. Ct Head Wo 5/24/2019  CT HEAD WO CONTRAST : 5/24/2019 CLINICAL HISTORY: head injury s/p fall . COMPARISON: None available. TECHNIQUE: ROUTINE. All CT scans at this facility use dose modulation, iterative reconstruction, and/or weight based dosing when appropriate to reduce radiation dose to as low as reasonably achievable. FINDINGS: Heterogeneously dense subdural collections overlying the superior aspects of both cerebral hemispheres measuring up to 1.8 cm in thickness, are consistent with acute on chronic subdural hematomas. There is mass effect and mild crowding of the sulci, without midline shift, hydrocephalus or herniation. There is no parenchymal, subarachnoid, or intraventricular hemorrhage, evidence of a recent ischemic cortical infarct, skull fracture, or other complication identified.      UP TO 1.8 CM ACUTE CHRONIC SUBDURAL HEMATOMAS OVER BOTH CEREBRAL CONVEXITIES, AS DESCRIBED. The findings were discussed with Dr. Bains  shortly following completion of the study. Ct Cervical Spine Wo Contrast    Result Date: 5/24/2019  CT CERVICAL SPINE WO CONTRAST: 5/24/2019 CLINICAL HISTORY:  Fall, neck pain . COMPARISON: None available. TECHNIQUE: ROUTINE All CT scans at this facility use dose modulation, iterative reconstruction, and/or weight based dosing when appropriate to reduce radiation dose to as low as reasonably achievable. FINDINGS: The spine is visualized from the craniovertebral junction through the T1-2 level. Mild to moderate hypertrophic facet changes are present, predominantly on the right at C3-4, and on the left at C2-C3 and C5-6. There is no fracture, significant subluxation, or acute paraspinous soft tissue abnormalities identified. NO FRACTURE OR EVIDENCE OF CERVICAL SPINE INJURY IDENTIFIED. CERVICAL SPONDYLOSIS. Ct Lumbar Spine   5/24/2019  CT lumbar spine without intravenous contrast medium. HISTORY: Back pain. Technical factors: CT imaging lumbar spine obtained and formatted as 2.5 mm contiguous axial images. Sagittal and coronal reconstruction obtained during postprocessing. No intravenous contrast medium utilized. Study done in comparison with plain film imaging lumbar spine, October 3, 2018. FINDINGS: Compression fracture L1 is again identified, and is progressed since the prior study. Addition, multiple the anterior margin of L1 and fracture of the superior articular surface of L1 is now identified (series 602, image 39). Anterior osteophytes T10 and  T11. Diffuse facet hypertrophy identified. At the L3-L4 level, mild facet hypertrophy, ligamentum flavum hypertrophy, and a disc bulge, causes anterior flattening of thecal sac with possible mild central stenosis. Acute on chronic fracture, L1, with avulsion of the anterior portion of the L1 vertebral body.  Findings suspicious for mild to moderate central stenosis at the L3-L4 level. All CT scans at this facility use dose modulation, iterative reconstruction, and/or weight based dosing when appropriate to reduce radiation dose to as low as reasonably achievable. Xr Chest   5/25/2019  XR CHEST PORTABLE : 5/24/2019 CLINICAL HISTORY:  Syncope . COMPARISON: CT abdomen pelvis 10/3/2018. A portable upright AP radiograph of the chest was obtained. FINDINGS: A poor inspiratory volume is present with mild probable bibasilar atelectasis. There is no cardiomegaly, significant pleural effusion, vascular congestion, pneumothorax, or displaced fractures identified. 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 POOR INSPIRATION WITH MILD PROBABLE BIBASILAR ATELECTASIS. Labs:     labs reviewed and discussed with patient and staff    No results found for: POCGLU  Lab Results   Component Value Date     05/27/2019    K 4.1 05/27/2019     05/27/2019    CO2 26 05/27/2019    BUN 25 05/27/2019    CREATININE 0.78 05/27/2019    CALCIUM 9.0 05/27/2019    LABALBU 3.3 05/24/2019    BILITOT 0.7 05/24/2019    ALKPHOS 53 05/24/2019    AST 24 05/24/2019    ALT 17 05/24/2019     Lab Results   Component Value Date    WBC 6.7 05/25/2019    RBC 3.66 05/25/2019    HGB 11.2 05/25/2019    HCT 33.5 05/25/2019    MCV 91.6 05/25/2019    MCH 30.7 05/25/2019    MCHC 33.5 05/25/2019    RDW 14.0 05/25/2019     05/25/2019     No results found for: VITD25  Lab Results   Component Value Date    COLORU Yellow 05/25/2019    NITRU Negative 05/25/2019    GLUCOSEU Negative 05/25/2019    KETUA Negative 05/25/2019    UROBILINOGEN 1.0 05/25/2019    BILIRUBINUR Negative 05/25/2019    BILIRUBINUR neg 10/08/2015     Lab Results   Component Value Date    PROTIME 12.2 05/27/2019    PROTIME 24.1 10/08/2018     Lab Results   Component Value Date    INR 1.2 05/27/2019         I discussed results with patient.     Current Rehabilitation Assessments:    Rehabilitation:  Physical therapy: FIMS:  BedMobility:      Transfers:  ,  ,      FIMS: ,  ,        Occupational therapy: FIMS:   ,  ,             LTG:                 Speech therapy: FIMS:          Past Medical History:          Diagnosis Date    Chronic back pain     Hyperlipidemia     Hypertension          PastSurgical History:          Procedure Laterality Date    CARDIAC CATHETERIZATION      3 stents    CORONARY ARTERY BYPASS GRAFT           Allergies:      Allergies   Allergen Reactions    Honey Bee Venom [Bee Venom] Swelling    Penicillins Swelling        CurrentMedications:     Current Facility-Administered Medications: levETIRAcetam (KEPPRA) tablet 500 mg, 500 mg, Oral, BID  famotidine (PEPCID) tablet 20 mg, 20 mg, Oral, BID  vancomycin 1000 mg IVPB in 250 mL D5W addavial, 1,000 mg, Intravenous, Q12H  lisinopril (PRINIVIL;ZESTRIL) tablet 5 mg, 5 mg, Oral, Daily  nitroGLYCERIN (NITROSTAT) SL tablet 0.4 mg, 0.4 mg, Sublingual, Q5 Min PRN  dextrose 5 % and 0.45 % NaCl with KCl 20 mEq infusion, , Intravenous, Continuous  ondansetron (ZOFRAN) injection 4 mg, 4 mg, Intravenous, Q6H PRN  morphine (PF) injection 4 mg, 4 mg, Intravenous, Q3H PRN  phytonadione ADULT (VITAMIN K) inj 10 mg/mL, 10 mg, Subcutaneous, Daily  pravastatin (PRAVACHOL) tablet 40 mg, 40 mg, Oral, Nightly  diltiazem (CARDIZEM CD) extended release capsule 120 mg, 120 mg, Oral, Daily  calcium-vitamin D 500-200 MG-UNIT per tablet, , Oral, Daily  hydrALAZINE (APRESOLINE) injection 10 mg, 10 mg, Intravenous, Q1H PRN  labetalol (NORMODYNE;TRANDATE) injection syringe 10 mg, 10 mg, Intravenous, Q1H PRN      Social History:    Social History     Socioeconomic History    Marital status: Single     Spouse name: Not on file    Number of children: Not on file    Years of education: Not on file    Highest education level: Not on file   Occupational History    Not on file   Social Needs    Financial resource strain: Not on file    Food insecurity:     Worry: Not on file Inability: Not on file    Transportation needs:     Medical: Not on file     Non-medical: Not on file   Tobacco Use    Smoking status: Former Smoker    Smokeless tobacco: Never Used   Substance and Sexual Activity    Alcohol use: Yes     Alcohol/week: 0.0 oz     Comment: social    Drug use: No    Sexual activity: Not Currently   Lifestyle    Physical activity:     Days per week: Not on file     Minutes per session: Not on file    Stress: Not on file   Relationships    Social connections:     Talks on phone: More than three times a week     Gets together: Never     Attends Scientologist service: Never     Active member of club or organization: No     Attends meetings of clubs or organizations: Never     Relationship status:     Intimate partner violence:     Fear of current or ex partner: No     Emotionally abused: No     Physically abused: No     Forced sexual activity: No   Other Topics Concern    Not on file   Social History Narrative    Social/Functional History     girlfriend Kamilah    Multiple prior marriages-multiple prior children but does not have a relationship with them. Has a son Elliot--Elliot reports that he and his father have been estranged and haven't spoken for a long time            Family History:     History reviewed. No pertinent family history. Review of Systems:    Review of Systems   Constitutional: Positive for fatigue. Negative for chills, diaphoresis and fever. HENT: Negative for congestion, ear discharge, ear pain, hearing loss, nosebleeds, sore throat and tinnitus. Eyes: Negative for photophobia, pain and redness. Respiratory: Positive for shortness of breath. Negative for cough, wheezing and stridor. Shortness of breath on exertion   Cardiovascular: Positive for palpitations. Negative for chest pain and leg swelling. Gastrointestinal: Positive for constipation. Negative for abdominal pain, blood in stool, diarrhea, nausea and vomiting. Endocrine: Negative for polydipsia. Genitourinary: Negative for dysuria, flank pain, frequency, hematuria and urgency. Musculoskeletal: Positive for back pain and myalgias. Negative for neck pain. Skin: Positive for wound. Negative for rash. Allergic/Immunologic: Negative for environmental allergies. Neurological: Positive for dizziness, weakness, light-headedness, headaches and loss of balance. Negative for tremors and seizures. Hematological: Does not bruise/bleed easily. Psychiatric/Behavioral: Positive for confusion, decreased concentration, dysphoric mood and memory loss. Negative for hallucinations and suicidal ideas. The patient is not nervous/anxious. Physical Exam:    /73   Pulse 67   Temp 99 °F (37.2 °C) (Tympanic)   Resp 17   Ht 6' (1.829 m)   Wt 191 lb 12.8 oz (87 kg)   SpO2 100%   BMI 26.01 kg/m²      Physical Exam   Constitutional: He appears well-developed and well-nourished. He appears listless. Non-toxic appearance. He does not have a sickly appearance. He does not appear ill. No distress. HENT:   Head: Normocephalic. Not macrocephalic and not microcephalic. Head is without raccoon's eyes and without Santos's sign. Mouth/Throat: Oropharyngeal exudate present. Eyes: Pupils are equal, round, and reactive to light. Conjunctivae and EOM are normal. Right eye exhibits no discharge. Left eye exhibits no discharge. No scleral icterus. Neck: Normal range of motion. Normal carotid pulses, no hepatojugular reflux and no JVD present. Spinous process tenderness and muscular tenderness present. Carotid bruit is not present. No tracheal deviation present. No thyromegaly present. Cardiovascular: Exam reveals distant heart sounds. Pulmonary/Chest: Effort normal. No stridor. He has decreased breath sounds. Abdominal: Soft. He exhibits no distension. Bowel sounds are decreased. There is no tenderness. There is no rebound and no guarding.    Musculoskeletal: Right elbow: He exhibits decreased range of motion, swelling and effusion. Cervical back: He exhibits decreased range of motion and tenderness. Thoracic back: He exhibits decreased range of motion and tenderness. Lumbar back: He exhibits decreased range of motion and tenderness. Lymphadenopathy:        Head (right side): No submental and no submandibular adenopathy present. Head (left side): No submental and no submandibular adenopathy present. He has no cervical adenopathy. He has no axillary adenopathy. Neurological: He appears listless. A sensory deficit is present. Coordination and gait abnormal.   Reflex Scores:       Tricep reflexes are 1+ on the right side and 1+ on the left side. Bicep reflexes are 1+ on the right side and 1+ on the left side. Brachioradialis reflexes are 1+ on the right side and 1+ on the left side. Patellar reflexes are 1+ on the right side. Achilles reflexes are 0 on the right side and 0 on the left side. Skin: Skin is warm and dry. He is not diaphoretic. There is pallor. Psychiatric: He has a normal mood and affect. Thought content normal. His mood appears not anxious. His affect is not angry. His speech is delayed and slurred. His speech is not rapid and/or pressured. He is slowed. He is not withdrawn and not actively hallucinating. Cognition and memory are not impaired. He does not express impulsivity or inappropriate judgment. He does not exhibit a depressed mood. He exhibits abnormal recent memory. He exhibits normal remote memory. He is attentive. Back Exam     Muscle Strength   Right Quadriceps:  4/5   Left Quadriceps:  4/5   Right Hamstrings:  4/5   Left Hamstrings:  4/5           Neurologic Exam     Mental Status   Attention: decreased. Concentration: decreased. Speech: slurred   Unable to name object. Unable to read. Able to repeat. Unable to write. Abnormal comprehension.      Cranial Nerves     CN III, IV, VI   Pupils are equal, round, and reactive to light.   Extraocular motions are normal.     Motor Exam   Muscle bulk: decreased  Overall muscle tone: normal    Strength   Right neck flexion: 4/5  Left neck flexion: 4/5  Right neck extension: 4/5  Left neck extension: 4/5  Right deltoid: 4/5  Left deltoid: 4/5  Right biceps: 4/5  Left biceps: 4/5  Right triceps: 4/5  Left triceps: 4/5  Right wrist flexion: 4/5  Left wrist flexion: 4/5  Right wrist extension: 4/5  Left wrist extension: 4/5  Right interossei: 4/5  Left interossei: 4/5  Right abdominals: 4/5  Left abdominals: 4/5  Right iliopsoas: 4/5  Left iliopsoas: 4/5  Right quadriceps: 4/5  Left quadriceps: 4/5  Right hamstrin/5  Left hamstrin/5  Right glutei: 4/5  Left glutei: 4/5  Right anterior tibial: 4/5  Left anterior tibial: 4/5  Right posterior tibial: 4/5  Left posterior tibial: 4/5  Right peroneal: 4/5  Left peroneal: 4/5  Right gastroc: 4/5  Left gastroc: 4/5    Sensory Exam   Right arm light touch: decreased from fingers  Left arm light touch: decreased from fingers  Right leg light touch: decreased from knee  Left leg light touch: decreased from knee    Gait, Coordination, and Reflexes     Reflexes   Right brachioradialis: 1+  Left brachioradialis: 1+  Right biceps: 1+  Left biceps: 1+  Right triceps: 1+  Left triceps: 1+  Right patellar: 1+  Right achilles: 0  Left achilles: 0            Diagnostics:    Recent Results (from the past 24 hour(s))   Protime-INR    Collection Time: 19  4:56 AM   Result Value Ref Range    Protime 12.2 (H) 9.0 - 11.5 sec    INR 1.2    APTT    Collection Time: 19  4:56 AM   Result Value Ref Range    aPTT 31.6 21.6 - 35.4 sec   Basic Metabolic Panel w/ Reflex to MG    Collection Time: 19  4:56 AM   Result Value Ref Range    Sodium 139 135 - 144 mEq/L    Potassium reflex Magnesium 4.1 3.4 - 4.9 mEq/L    Chloride 104 95 - 107 mEq/L    CO2 26 20 - 31 mEq/L    Anion Gap 9 9 - 15 mEq/L    Glucose 120 (H) 70 - 99 mg/dL    BUN 25 (H) 8 - 23 mg/dL    CREATININE 0.78 0.70 - 1.20 mg/dL    GFR Non-African American >60.0 >60    GFR  >60.0 >60    Calcium 9.0 8.5 - 9.9 mg/dL              Impression:    1. Impaired mobility and ADLs due to acute brain injury subdural hematoma  2. Severe Fatigue and Malaise  3. Therapeutic eval's pending once his drains are removed from his subdural sites. 4. Severe distal Right upper extremity swelling likely secondary to the tourniquet effect of his blood pressure cuff which I have discussed with nursing and moved to his left lower extremity. 5. Complex social scenario that he seems to be estranged from his children- but he has a devoted girlfriend name Guera who is present at his bedside      Complex Active General Medical Issues thatcomplicate care Assess & Plan:     1. Active Problems:    Afib (HCC)    Subdural hematoma (HCC)    Coronary atherosclerosis    Essential hypertension    Hyperlipidemia    Long term current use of anticoagulant    SHAYY on CPAP    Spondylolisthesis of lumbar region    Gait abnormality    Dysphagia, oropharyngeal phase    RBBB    Aphasia  Resolved Problems:    * No resolved hospital problems. *            Recommendations:    1. Considering all of the factors aboveincluding the patient's current medical status, social status/home envirnment, their functional needs and their ability to participate in a therapy program, I feel that they would best be served at a acute subdural hematoma   Rehabilitationlevel of care. I reviewed the varous local options re these levels of care with the patient and family. 2. Vitamin B12 shot times one  3. Improve hydration and Nutrition by adding Proteinex and push PO fluids, continue nectar thick liquids add speech-language pathology consult for cognition and swallow and aphasia  4.  Continue to monitor swelling left upper extremity I feel that it will resolve his blood pressure cuff was removed and placed him on his left leg. It was my pleasure toevaluate Ressie Herbie today. Please call [phone] with questions.     Kevin Castaneda, DO

## 2019-05-27 NOTE — PROGRESS NOTES
Patient: Eduardo [facility]  Unit/Bed: IC03/IC03-01  YOB: 1934  MRN: [id number] Acct: [de-identified]   Admitting Diagnosis: Subdural hematoma ([address] [Z85.1A4F]  Admit Date:  5/24/2019  Hospital Day: 3    Current Medications:    Scheduled Meds:   levETIRAcetam  500 mg Oral BID    famotidine  20 mg Oral BID    vancomycin  1,000 mg Intravenous Q12H    lisinopril  5 mg Oral Daily    phytonadione (ADULT)  10 mg Subcutaneous Daily    pravastatin  40 mg Oral Nightly    diltiazem  120 mg Oral Daily    calcium-vitamin D   Oral Daily     Continuous Infusions:   dextrose 5% and 0.45% NaCl with KCl 20 mEq 75 mL/hr at 05/26/19 0922     PRN Meds:.nitroGLYCERIN, ondansetron, morphine (PF), hydrALAZINE, labetalol  .  dextrose 5% and 0.45% NaCl with KCl 20 mEq 75 mL/hr at 05/26/19 0922        Recent Labs     05/24/19  1622 05/25/19  0711   WBC 9.8 6.7   HGB 14.3 11.2*   HCT 42.2 33.5*   MCV 92.2 91.6    143     Recent Labs     05/25/19  0711 05/26/19  0447 05/27/19  0456    142 139   K 3.9 3.9 4.1    105 104   CO2 25 26 26   BUN 21 23 25*   CREATININE 0.75 0.89 0.78     Recent Labs     05/24/19  1622   AST 24   ALT 17   BILITOT 0.7   ALKPHOS 53     No results for input(s): LIPASE, AMYLASE in the last 72 hours. Recent Labs     05/24/19  1622  05/25/19  1517 05/26/19  0448 05/27/19  0456   PROT 6.2*  --   --   --   --    INR 2.4   < > 1.2 1.1 1.2    < > = values in this interval not displayed. Imaging Results:    Ct Head Wo Contrast    Result Date: 5/26/2019  EXAMINATION: CT BRAIN WITHOUT CONTRAST 5/26/2019 at 0511 hours CLINICAL HISTORY:  postop  S06. 5X9A Subdural hematoma (HCC) ICD10 COMPARISONS: 5/25/2019 at 1407 hours TECHNIQUE: Spiral scans without contrast. Multiplanar 2-D reconstructions. All CT scans at this facility use dose modulation, iterative reconstruction, and/or weight based dosing when appropriate to reduce radiation dose to as low as reasonably achievable.  FINDINGS: Since the prior postoperative examination, the volume of gas and blood in the bilateral subdural collections have decreased. The right subdural collection has a maximal width of 10 mm. The left subdural collection has a maximal width of 18 mm. There is still a considerable amount of gas in the left subdural collection. There is only trace residual gas in the right subdural collection. There is 4 mm left to right midline shift. There is no transtentorial herniation. Bilateral craniotomies and subdural drains are again noted. POSTOPERATIVE BILATERAL GAS-CONTAINING SUBDURAL HEMATOMAS, LEFT LARGER THAN RIGHT, WITH LEFT TO RIGHT MIDLINE SHIFT OF 4 MM, IMPROVED FROM PRIOR EXAMINATION OF 5/25/19 AT 1417 HOURS. Ct Head Wo Contrast    Result Date: 5/25/2019  CT HEAD WO CONTRAST : 5/25/2019 CLINICAL HISTORY:  INTRACRANIAL HEMORRHAGE . Recent subdural hematoma drainage surgery. COMPARISON: Earlier 5/25/2019. TECHNIQUE: Spiral unenhanced images were obtained of the head, with routine reconstructions performed. All CT scans at this facility use dose modulation, iterative reconstruction, and/or weight based dosing when appropriate to reduce radiation dose to as low as reasonably achievable. FINDINGS: Since the prior study, bilateral superior parietal craniotomies and subdural drainage catheters have been placed, which are otherwise unremarkable. There is pneumocephaly and significantly decreasing-sized heterogeneous subdural hematomas bilaterally, which are overall mildly decreased in size and mass effect from the earlier study. There is approximately 2 to 3 mm of left-to-right midline shift, without herniation, hydrocephalus, or other significant changes identified. INTERVAL BILATERAL CRANIOTOMIES AND SUBDURAL HEMATOMA DRAINAGE CATHETER PLACEMENT SURGERY. PNEUMOCEPHALY AND SIGNIFICANTLY DECREASING-SIZED HETEROGENEOUS SUBDURAL HEMATOMAS.  APPROXIMATELY 2 TO 3 MM OF LEFT-TO-RIGHT MIDLINE SHIFT WITHOUT HERNIATION, HYDROCEPHALUS, OR OTHER SIGNIFICANT CHANGES IDENTIFIED. Ct Head Wo Contrast    Result Date: 5/25/2019  CT HEAD WO CONTRAST : 5/25/2019 CLINICAL HISTORY: CEREBRAL HEMORRHAGE. COMPARISON: 5/24/2019. TECHNIQUE: ROUTINE. All CT scans at this facility use dose modulation, iterative reconstruction, and/or weight based dosing when appropriate to reduce radiation dose to as low as reasonably achievable. FINDINGS: Motion artifact is present on the initial scan, which was repeated. Acute on chronic subdural hematomas overlying both cerebral convexities appear stable, measuring approximately 2.2 cm in thickness on the right, and 1.8 cm in maximum thickness, on the coronal reconstructions. There is no significant midline shift, herniation, or other significant changes identified. STABLE APPROXIMATELY 2.2 CM RIGHT AND 1.8 CM LEFT SUBDURAL HEMATOMA. NO OTHER SIGNIFICANT CHANGE FROM EARLIER YESTERDAY IDENTIFIED. Ct Head Wo Contrast    Result Date: 5/25/2019  CT HEAD WO CONTRAST : 5/24/2019 9:15 PM CLINICAL HISTORY: CEREBRAL HEMORRHAGE. COMPARISON: Earlier 5/24/2019. TECHNIQUE: ROUTINE. All CT scans at this facility use dose modulation, iterative reconstruction, and/or weight based dosing when appropriate to reduce radiation dose to as low as reasonably achievable. FINDINGS: Motion artifact is present on the initial scan, which was repeated. 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 The left subdural hematoma again measures approximately 1.8 cm in maximum thickness, on the coronal reconstructions. There is no significant midline shift, herniation, or other significant changes identified. SLIGHTLY ENLARGING APPROXIMATELY 2.2 CM RIGHT SUBDURAL HEMATOMA AND STABLE APPROXIMATELY 1.8 CM LEFT SUBDURAL HEMATOMA.  NO OTHER SIGNIFICANT CHANGE FROM EARLIER 5/24/2019 reviewed. CAT scan was stable. Status post bilateral subdural hematoma evacuation. Patient is to continue with drain out of bed PTOT and ambulate.   Swallowing studies for diet continue with antibiotics

## 2019-05-27 NOTE — CARE COORDINATION
Dr. Mathis Sunni in briefly to see pt and girlfriend. Drowsy, difficulty understanding pt. awake. Girlfriend states \"we are\" leaning toward [facility]. Subdural is strill draining to gravity, PT/OT on hold ofr getting pt up, they report they are performing passive ROM. Will reassess tommorrow.   Electronically signed by Kalpana Ferro RN on 5/27/2019 at 5:32 PM

## 2019-05-27 NOTE — PROGRESS NOTES
Patient: Frank Rogers  Unit/Bed: IC03/IC03-01  YOB: 1934  MRN: [id number] Acct: [de-identified]   Admitting Diagnosis: Subdural hematoma ([address] [I53.0Z7L]  Admit Date:  5/24/2019  Hospital Day: 3    Current Medications:    Scheduled Meds:   levETIRAcetam  500 mg Oral BID    famotidine  20 mg Oral BID    vancomycin  1,000 mg Intravenous Q12H    lisinopril  5 mg Oral Daily    phytonadione (ADULT)  10 mg Subcutaneous Daily    pravastatin  40 mg Oral Nightly    diltiazem  120 mg Oral Daily    calcium-vitamin D   Oral Daily     Continuous Infusions:   dextrose 5% and 0.45% NaCl with KCl 20 mEq 75 mL/hr at 05/26/19 0922     PRN Meds:.nitroGLYCERIN, ondansetron, morphine (PF), hydrALAZINE, labetalol  .  dextrose 5% and 0.45% NaCl with KCl 20 mEq 75 mL/hr at 05/26/19 0922        Recent Labs     05/24/19  1622 05/25/19  0711   WBC 9.8 6.7   HGB 14.3 11.2*   HCT 42.2 33.5*   MCV 92.2 91.6    143     Recent Labs     05/25/19  0711 05/26/19  0447 05/27/19  0456    142 139   K 3.9 3.9 4.1    105 104   CO2 25 26 26   BUN 21 23 25*   CREATININE 0.75 0.89 0.78     Recent Labs     05/24/19  1622   AST 24   ALT 17   BILITOT 0.7   ALKPHOS 53     No results for input(s): LIPASE, AMYLASE in the last 72 hours. Recent Labs     05/24/19  1622  05/25/19  1517 05/26/19  0448 05/27/19  0456   PROT 6.2*  --   --   --   --    INR 2.4   < > 1.2 1.1 1.2    < > = values in this interval not displayed. Imaging Results:    Ct Head Wo Contrast    Result Date: 5/26/2019  EXAMINATION: CT BRAIN WITHOUT CONTRAST 5/26/2019 at 0511 hours CLINICAL HISTORY:  postop  S06. 5X9A Subdural hematoma (HCC) ICD10 COMPARISONS: 5/25/2019 at 1407 hours TECHNIQUE: Spiral scans without contrast. Multiplanar 2-D reconstructions. All CT scans at this facility use dose modulation, iterative reconstruction, and/or weight based dosing when appropriate to reduce radiation dose to as low as reasonably achievable.  FINDINGS: Since the prior postoperative examination, the volume of gas and blood in the bilateral subdural collections have decreased. The right subdural collection has a maximal width of 10 mm. The left subdural collection has a maximal width of 18 mm. There is still a considerable amount of gas in the left subdural collection. There is only trace residual gas in the right subdural collection. There is 4 mm left to right midline shift. There is no transtentorial herniation. Bilateral craniotomies and subdural drains are again noted. POSTOPERATIVE BILATERAL GAS-CONTAINING SUBDURAL HEMATOMAS, LEFT LARGER THAN RIGHT, WITH LEFT TO RIGHT MIDLINE SHIFT OF 4 MM, IMPROVED FROM PRIOR EXAMINATION OF 5/25/19 AT 1417 HOURS. Ct Head Wo Contrast    Result Date: 5/25/2019  CT HEAD WO CONTRAST : 5/25/2019 CLINICAL HISTORY:  INTRACRANIAL HEMORRHAGE . Recent subdural hematoma drainage surgery. COMPARISON: Earlier 5/25/2019. TECHNIQUE: Spiral unenhanced images were obtained of the head, with routine reconstructions performed. All CT scans at this facility use dose modulation, iterative reconstruction, and/or weight based dosing when appropriate to reduce radiation dose to as low as reasonably achievable. FINDINGS: Since the prior study, bilateral superior parietal craniotomies and subdural drainage catheters have been placed, which are otherwise unremarkable. There is pneumocephaly and significantly decreasing-sized heterogeneous subdural hematomas bilaterally, which are overall mildly decreased in size and mass effect from the earlier study. There is approximately 2 to 3 mm of left-to-right midline shift, without herniation, hydrocephalus, or other significant changes identified. INTERVAL BILATERAL CRANIOTOMIES AND SUBDURAL HEMATOMA DRAINAGE CATHETER PLACEMENT SURGERY. PNEUMOCEPHALY AND SIGNIFICANTLY DECREASING-SIZED HETEROGENEOUS SUBDURAL HEMATOMAS.  APPROXIMATELY 2 TO 3 MM OF LEFT-TO-RIGHT MIDLINE SHIFT WITHOUT HERNIATION, HYDROCEPHALUS, OR OTHER SIGNIFICANT CHANGES IDENTIFIED. Ct Head Wo Contrast    Result Date: 5/25/2019  CT HEAD WO CONTRAST : 5/25/2019 CLINICAL HISTORY: CEREBRAL HEMORRHAGE. COMPARISON: 5/24/2019. TECHNIQUE: ROUTINE. All CT scans at this facility use dose modulation, iterative reconstruction, and/or weight based dosing when appropriate to reduce radiation dose to as low as reasonably achievable. FINDINGS: Motion artifact is present on the initial scan, which was repeated. Acute on chronic subdural hematomas overlying both cerebral convexities appear stable, measuring approximately 2.2 cm in thickness on the right, and 1.8 cm in maximum thickness, on the coronal reconstructions. There is no significant midline shift, herniation, or other significant changes identified. STABLE APPROXIMATELY 2.2 CM RIGHT AND 1.8 CM LEFT SUBDURAL HEMATOMA. NO OTHER SIGNIFICANT CHANGE FROM EARLIER YESTERDAY IDENTIFIED. Ct Head Wo Contrast    Result Date: 5/25/2019  CT HEAD WO CONTRAST : 5/24/2019 9:15 PM CLINICAL HISTORY: CEREBRAL HEMORRHAGE. COMPARISON: Earlier 5/24/2019. TECHNIQUE: ROUTINE. All CT scans at this facility use dose modulation, iterative reconstruction, and/or weight based dosing when appropriate to reduce radiation dose to as low as reasonably achievable. FINDINGS: Motion artifact is present on the initial scan, which was repeated. 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 The left subdural hematoma again measures approximately 1.8 cm in maximum thickness, on the coronal reconstructions. There is no significant midline shift, herniation, or other significant changes identified. SLIGHTLY ENLARGING APPROXIMATELY 2.2 CM RIGHT SUBDURAL HEMATOMA AND STABLE APPROXIMATELY 1.8 CM LEFT SUBDURAL HEMATOMA.  NO OTHER SIGNIFICANT CHANGE FROM EARLIER 5/24/2019 IDENTIFIED. Ct Head Wo Contrast    Result Date: 5/24/2019  CT HEAD WO CONTRAST : 5/24/2019 CLINICAL HISTORY: head injury s/p fall . COMPARISON: None available. TECHNIQUE: ROUTINE. All CT scans at this facility use dose modulation, iterative reconstruction, and/or weight based dosing when appropriate to reduce radiation dose to as low as reasonably achievable. FINDINGS: Heterogeneously dense subdural collections overlying the superior aspects of both cerebral hemispheres measuring up to 1.8 cm in thickness, are consistent with acute on chronic subdural hematomas. There is mass effect and mild crowding of the sulci, without midline shift, hydrocephalus or herniation. There is no parenchymal, subarachnoid, or intraventricular hemorrhage, evidence of a recent ischemic cortical infarct, skull fracture, or other complication identified. UP TO 1.8 CM ACUTE CHRONIC SUBDURAL HEMATOMAS OVER BOTH CEREBRAL CONVEXITIES, AS DESCRIBED. The findings were discussed with Dr. Laisha Saba shortly following completion of the study. Ct Cervical Spine Wo Contrast    Result Date: 5/24/2019  CT CERVICAL SPINE WO CONTRAST: 5/24/2019 CLINICAL HISTORY:  Fall, neck pain . COMPARISON: None available. TECHNIQUE: ROUTINE All CT scans at this facility use dose modulation, iterative reconstruction, and/or weight based dosing when appropriate to reduce radiation dose to as low as reasonably achievable. FINDINGS: The spine is visualized from the craniovertebral junction through the T1-2 level. Mild to moderate hypertrophic facet changes are present, predominantly on the right at C3-4, and on the left at C2-C3 and C5-6. There is no fracture, significant subluxation, or acute paraspinous soft tissue abnormalities identified. NO FRACTURE OR EVIDENCE OF CERVICAL SPINE INJURY IDENTIFIED. CERVICAL SPONDYLOSIS. Ct Lumbar Spine Wo Contrast    Result Date: 5/24/2019  CT lumbar spine without intravenous contrast medium. HISTORY: Back pain. post bilateral craniotomy with subdural hematoma evacuation in subdural drain placement. Drains are removed. Dressing was applied.     Encourage diet    CT in a.m. DC antibiotics

## 2019-05-27 NOTE — PROGRESS NOTES
Subdural hematoma (HCC)    Coronary atherosclerosis    Essential hypertension    Hyperlipidemia    Long term current use of anticoagulant    SHAYY on CPAP    Spondylolisthesis of lumbar region    Gait abnormality    Dysphagia, oropharyngeal phase    RBBB    Aphasia  Resolved Problems:    * No resolved hospital problems. *    Blood pressure 131/63, pulse 61, temperature 98.1 °F (36.7 °C), temperature source Oral, resp. rate 19, height 6' (1.829 m), weight 191 lb 12.8 oz (87 kg), SpO2 100 %. Subjective  Objective:  General Appearance:  Ill-appearing. Vital signs: (most recent): Blood pressure 131/63, pulse 61, temperature 98.1 °F (36.7 °C), temperature source Oral, resp. rate 19, height 6' (1.829 m), weight 191 lb 12.8 oz (87 kg), SpO2 100 %. HEENT: Normal HEENT exam.    Lungs:  Normal effort and normal respiratory rate. Breath sounds clear to auscultation. Heart: Normal rate. Regular rhythm. S1 normal.    Abdomen: Abdomen is soft. Bowel sounds are normal.   There is no mass. Pulses: Distal pulses are intact. Pupils:  Pupils are equal, round, and reactive to light. Skin:  Warm and dry.       Lab Results   Component Value Date    WBC 6.7 05/25/2019    HGB 11.2 (L) 05/25/2019    HCT 33.5 (L) 05/25/2019    MCV 91.6 05/25/2019     05/25/2019     Lab Results   Component Value Date     05/27/2019    K 4.1 05/27/2019     05/27/2019    CO2 26 05/27/2019    BUN 25 05/27/2019    CREATININE 0.78 05/27/2019    GLUCOSE 120 05/27/2019    CALCIUM 9.0 05/27/2019        Assessment & Plan  1) 1 bilateral craniotomy for evacuation of subdural hematoma, #2 placement of bilateral subdural drain  FU surgery  2) AMS secondary to above  Low threshold for seizures  On keppra  3) HTN stable  4) DVT ppx  Will follow the pt with you      Lyudmila Zapata MD  5/27/2019

## 2019-05-27 NOTE — PROGRESS NOTES
[facility] OF THE [facility] [place] Note      Patient: Liliana Pitts  Unit/Bed: IC03/IC03-01  YOB: 1934  MRN: [id number]  Admit Date:  5/24/2019        Subjective Complaint:   Voices no complaints. Sleepy    Physical Examination:     /73   Pulse 67   Temp 99 °F (37.2 °C) (Tympanic)   Resp 17   Ht 6' (1.829 m)   Wt 191 lb 12.8 oz (87 kg)   SpO2 100%   BMI 26.01 kg/m²         Intake/Output Summary (Last 24 hours) at 5/27/2019 1046  Last data filed at 5/27/2019 0529  Gross per 24 hour   Intake 2451 ml   Output 1170 ml   Net 1281 ml     Weights  Wt Readings from Last 3 Encounters:   05/24/19 191 lb 12.8 oz (87 kg)   10/03/18 200 lb (90.7 kg)   10/08/15 201 lb (91.2 kg)       General:  Awake  Heart:  Regular rate Regular rhythm, S1> S2 no murmurs, gallops, or rubs. Lungs: CTA bilaterally, bilat symmetrical expansion, no wheeze, rales, or rhonchi. Abdomen: Bowel sounds present, soft, nontender,  no peritoneal signs  Extremities:  No oedema. Skin: Warm and dry  Mood and affect: Not agitated     Telemetry:      normal sinus      Frequent SVPBs and VPBs ,occasional couplets,??abberancy. LABS:   CBC:   Recent Labs     05/24/19  1622 05/25/19  0711   WBC 9.8 6.7   HGB 14.3 11.2*    143      BMP:    Recent Labs     05/25/19  0711 05/26/19  0447 05/27/19  0456    142 139   K 3.9 3.9 4.1    105 104   CO2 25 26 26   BUN 21 23 25*   CREATININE 0.75 0.89 0.78   GLUCOSE 107* 148* 120*              Troponin: No results for input(s): TROPONINT in the last 72 hours. BNP: No results for input(s): PROBNP in the last 72 hours. INR:   Recent Labs     05/25/19  1517 05/26/19  0448 05/27/19  0456   INR 1.2 1.1 1.2      Mg:   Recent Labs     05/24/19  1622   MG 2.3       Cardiac Testing:    none    Assessment:  1. S/P drainage of  subdural hematoma related to multiple falls.    2. Coronary artery disease with preserved LV systolic function most recent perfusion imaging study November 2017 felt to be low risk, with less than 10% ischemia in the LAD territory unchanged from March 2016, fixed defect in the circumflex distribution. 3. History of remote coronary artery bypass grafting. 4. History of atrial fibrillation, currently sinus rhythm. 5. Fluctuating right bundle branch block pattern and QRS widening and axis change on 12-lead EKG, probable ventricular  preexcitation/WPW? .  6. Expressive dysphasia from prior stroke  7. Left atrial size 4.7 cm based on echocardiogram in 2017.  8. High  risk medication-warfarin, patient is obviously not a candidate for warfarin given his history. Plan:  BP has increased  Continue current Rx. I requested   (EP) to review pts EKG . Citlalli Carrizales He said WPW is possible but not sure   Electronically signed by Florence Jerez MD on 5/27/2019 at 10:46 AM

## 2019-05-27 NOTE — PROGRESS NOTES
2843  Medicated with Morphine 4 mg for complaints of pain. Patient restless and pulling at the gown and amaro. Repositioned for comfort. 0910  Pain better and resting comfortable. 1110 Bilateral ventricular Drains removed by Dr. Roxann Verduzco. He appled 2 staples to area then dry dressing. Incision to top of head is open to the air  Well approximated with staples present. Restraints removed  1700  Ate 75% of dinner, he was feed and swallowed well. 1828  Attempting to sit up. Oxygen removed. Pulling at amaro catheter. Complaints of pain Medicated with Morphine 4 mg IV  1831   with frequent pac and pvc's. /80  Medicated with Labetelol IV.

## 2019-05-28 ENCOUNTER — APPOINTMENT (OUTPATIENT)
Dept: CT IMAGING | Age: 84
DRG: 026 | End: 2019-05-28
Payer: MEDICARE

## 2019-05-28 ENCOUNTER — APPOINTMENT (OUTPATIENT)
Dept: GENERAL RADIOLOGY | Age: 84
DRG: 026 | End: 2019-05-28
Payer: MEDICARE

## 2019-05-28 LAB
ANION GAP SERPL CALCULATED.3IONS-SCNC: 10 MEQ/L (ref 9–15)
APTT: 30.1 SEC (ref 21.6–35.4)
BACTERIA: NEGATIVE /HPF
BASE EXCESS ARTERIAL: 2 (ref -3–3)
BILIRUBIN URINE: NEGATIVE
BLOOD, URINE: ABNORMAL
BUN BLDV-MCNC: 29 MG/DL (ref 8–23)
CALCIUM IONIZED: 1.28 MMOL/L (ref 1.12–1.32)
CALCIUM SERPL-MCNC: 9.1 MG/DL (ref 8.5–9.9)
CHLORIDE BLD-SCNC: 104 MEQ/L (ref 95–107)
CLARITY: ABNORMAL
CO2: 22 MEQ/L (ref 20–31)
COLOR: ABNORMAL
CREAT SERPL-MCNC: 0.79 MG/DL (ref 0.7–1.2)
EKG ATRIAL RATE: 57 BPM
EKG ATRIAL RATE: 85 BPM
EKG P AXIS: 33 DEGREES
EKG P AXIS: 71 DEGREES
EKG P-R INTERVAL: 146 MS
EKG P-R INTERVAL: 158 MS
EKG Q-T INTERVAL: 394 MS
EKG Q-T INTERVAL: 460 MS
EKG QRS DURATION: 142 MS
EKG QRS DURATION: 146 MS
EKG QTC CALCULATION (BAZETT): 447 MS
EKG QTC CALCULATION (BAZETT): 468 MS
EKG R AXIS: -19 DEGREES
EKG R AXIS: -49 DEGREES
EKG T AXIS: -41 DEGREES
EKG T AXIS: 4 DEGREES
EKG VENTRICULAR RATE: 57 BPM
EKG VENTRICULAR RATE: 85 BPM
EPITHELIAL CELLS, UA: ABNORMAL /HPF (ref 0–5)
GFR AFRICAN AMERICAN: >60
GFR AFRICAN AMERICAN: >60
GFR NON-AFRICAN AMERICAN: >60
GFR NON-AFRICAN AMERICAN: >60
GLUCOSE BLD-MCNC: 147 MG/DL (ref 70–99)
GLUCOSE BLD-MCNC: 148 MG/DL (ref 60–115)
GLUCOSE URINE: NEGATIVE MG/DL
HCO3 ARTERIAL: 25.4 MMOL/L (ref 21–29)
HCT VFR BLD CALC: 32.2 % (ref 42–52)
HEMOGLOBIN: 10.8 G/DL (ref 14–18)
HEMOGLOBIN: 11 GM/DL (ref 13.5–17.5)
INR BLD: 1.1
KETONES, URINE: NEGATIVE MG/DL
LACTATE: 0.78 MMOL/L (ref 0.4–2)
LEUKOCYTE ESTERASE, URINE: ABNORMAL
MCH RBC QN AUTO: 30.5 PG (ref 27–31.3)
MCHC RBC AUTO-ENTMCNC: 33.6 % (ref 33–37)
MCV RBC AUTO: 90.7 FL (ref 80–100)
NITRITE, URINE: NEGATIVE
O2 SAT, ARTERIAL: 88 % (ref 93–100)
PCO2 ARTERIAL: 36 MM HG (ref 35–45)
PDW BLD-RTO: 13.6 % (ref 11.5–14.5)
PERFORMED ON: ABNORMAL
PH ARTERIAL: 7.46 (ref 7.35–7.45)
PH UA: 7 (ref 5–9)
PLATELET # BLD: 172 K/UL (ref 130–400)
PO2 ARTERIAL: 51 MM HG (ref 75–108)
POC CHLORIDE: 105 MEQ/L (ref 99–110)
POC CREATININE: 1 MG/DL (ref 0.8–1.3)
POC FIO2: 3
POC HEMATOCRIT: 32 % (ref 41–53)
POC POTASSIUM: 3.8 MEQ/L (ref 3.5–5.1)
POC SAMPLE TYPE: ABNORMAL
POC SODIUM: 141 MEQ/L (ref 136–145)
POTASSIUM SERPL-SCNC: 3.9 MEQ/L (ref 3.4–4.9)
PROTEIN UA: 100 MG/DL
PROTHROMBIN TIME: 11.5 SEC (ref 9–11.5)
RBC # BLD: 3.55 M/UL (ref 4.7–6.1)
RBC UA: >100 /HPF (ref 0–5)
RENAL EPITHELIAL, UA: ABNORMAL /HPF
SODIUM BLD-SCNC: 136 MEQ/L (ref 135–144)
SPECIFIC GRAVITY UA: 1.02 (ref 1–1.03)
TCO2 ARTERIAL: 27 (ref 22–29)
UROBILINOGEN, URINE: 1 E.U./DL
WBC # BLD: 8.4 K/UL (ref 4.8–10.8)
WBC UA: ABNORMAL /HPF (ref 0–5)

## 2019-05-28 PROCEDURE — 6360000002 HC RX W HCPCS: Performed by: NEUROLOGICAL SURGERY

## 2019-05-28 PROCEDURE — 80048 BASIC METABOLIC PNL TOTAL CA: CPT

## 2019-05-28 PROCEDURE — 99231 SBSQ HOSP IP/OBS SF/LOW 25: CPT | Performed by: PHYSICAL MEDICINE & REHABILITATION

## 2019-05-28 PROCEDURE — 2700000000 HC OXYGEN THERAPY PER DAY

## 2019-05-28 PROCEDURE — 6370000000 HC RX 637 (ALT 250 FOR IP): Performed by: NEUROLOGICAL SURGERY

## 2019-05-28 PROCEDURE — 82435 ASSAY OF BLOOD CHLORIDE: CPT

## 2019-05-28 PROCEDURE — 92523 SPEECH SOUND LANG COMPREHEN: CPT

## 2019-05-28 PROCEDURE — 94640 AIRWAY INHALATION TREATMENT: CPT

## 2019-05-28 PROCEDURE — 97112 NEUROMUSCULAR REEDUCATION: CPT

## 2019-05-28 PROCEDURE — 84295 ASSAY OF SERUM SODIUM: CPT

## 2019-05-28 PROCEDURE — 6370000000 HC RX 637 (ALT 250 FOR IP): Performed by: INTERNAL MEDICINE

## 2019-05-28 PROCEDURE — 87086 URINE CULTURE/COLONY COUNT: CPT

## 2019-05-28 PROCEDURE — 82330 ASSAY OF CALCIUM: CPT

## 2019-05-28 PROCEDURE — 6360000002 HC RX W HCPCS

## 2019-05-28 PROCEDURE — 70450 CT HEAD/BRAIN W/O DYE: CPT

## 2019-05-28 PROCEDURE — 82803 BLOOD GASES ANY COMBINATION: CPT

## 2019-05-28 PROCEDURE — 85014 HEMATOCRIT: CPT

## 2019-05-28 PROCEDURE — 85610 PROTHROMBIN TIME: CPT

## 2019-05-28 PROCEDURE — 97167 OT EVAL HIGH COMPLEX 60 MIN: CPT

## 2019-05-28 PROCEDURE — 2580000003 HC RX 258: Performed by: NEUROLOGICAL SURGERY

## 2019-05-28 PROCEDURE — 93005 ELECTROCARDIOGRAM TRACING: CPT | Performed by: INTERNAL MEDICINE

## 2019-05-28 PROCEDURE — 85730 THROMBOPLASTIN TIME PARTIAL: CPT

## 2019-05-28 PROCEDURE — 92526 ORAL FUNCTION THERAPY: CPT

## 2019-05-28 PROCEDURE — 81001 URINALYSIS AUTO W/SCOPE: CPT

## 2019-05-28 PROCEDURE — 84132 ASSAY OF SERUM POTASSIUM: CPT

## 2019-05-28 PROCEDURE — 71045 X-RAY EXAM CHEST 1 VIEW: CPT

## 2019-05-28 PROCEDURE — 36600 WITHDRAWAL OF ARTERIAL BLOOD: CPT

## 2019-05-28 PROCEDURE — 2500000003 HC RX 250 WO HCPCS: Performed by: NEUROLOGICAL SURGERY

## 2019-05-28 PROCEDURE — 2000000000 HC ICU R&B

## 2019-05-28 PROCEDURE — 82565 ASSAY OF CREATININE: CPT

## 2019-05-28 PROCEDURE — 36415 COLL VENOUS BLD VENIPUNCTURE: CPT

## 2019-05-28 PROCEDURE — 85027 COMPLETE CBC AUTOMATED: CPT

## 2019-05-28 PROCEDURE — 83605 ASSAY OF LACTIC ACID: CPT

## 2019-05-28 PROCEDURE — 97535 SELF CARE MNGMENT TRAINING: CPT

## 2019-05-28 PROCEDURE — 81003 URINALYSIS AUTO W/O SCOPE: CPT

## 2019-05-28 RX ORDER — IPRATROPIUM BROMIDE AND ALBUTEROL SULFATE 2.5; .5 MG/3ML; MG/3ML
1 SOLUTION RESPIRATORY (INHALATION) 4 TIMES DAILY
Status: DISCONTINUED | OUTPATIENT
Start: 2019-05-28 | End: 2019-05-29

## 2019-05-28 RX ORDER — PRAVASTATIN SODIUM 40 MG
40 TABLET ORAL NIGHTLY
Qty: 30 TABLET | Refills: 3 | Status: SHIPPED | OUTPATIENT
Start: 2019-05-28

## 2019-05-28 RX ORDER — SODIUM CHLORIDE 9 MG/ML
INJECTION, SOLUTION INTRAVENOUS
Status: DISPENSED
Start: 2019-05-28 | End: 2019-05-29

## 2019-05-28 RX ORDER — DIAPER,BRIEF,INFANT-TODD,DISP
EACH MISCELLANEOUS 3 TIMES DAILY
Status: DISCONTINUED | OUTPATIENT
Start: 2019-05-28 | End: 2019-05-31 | Stop reason: HOSPADM

## 2019-05-28 RX ORDER — FUROSEMIDE 10 MG/ML
20 INJECTION INTRAMUSCULAR; INTRAVENOUS ONCE
Status: COMPLETED | OUTPATIENT
Start: 2019-05-28 | End: 2019-05-28

## 2019-05-28 RX ORDER — FUROSEMIDE 10 MG/ML
INJECTION INTRAMUSCULAR; INTRAVENOUS
Status: COMPLETED
Start: 2019-05-28 | End: 2019-05-28

## 2019-05-28 RX ADMIN — Medication 4 MG: at 05:44

## 2019-05-28 RX ADMIN — LISINOPRIL 5 MG: 5 TABLET ORAL at 08:18

## 2019-05-28 RX ADMIN — LEVETIRACETAM 500 MG: 500 TABLET ORAL at 21:35

## 2019-05-28 RX ADMIN — IPRATROPIUM BROMIDE AND ALBUTEROL SULFATE 1 AMPULE: .5; 3 SOLUTION RESPIRATORY (INHALATION) at 20:46

## 2019-05-28 RX ADMIN — VANCOMYCIN HYDROCHLORIDE 1000 MG: 1 INJECTION, POWDER, LYOPHILIZED, FOR SOLUTION INTRAVENOUS at 08:19

## 2019-05-28 RX ADMIN — FAMOTIDINE 20 MG: 20 TABLET ORAL at 21:35

## 2019-05-28 RX ADMIN — LEVETIRACETAM 500 MG: 500 TABLET ORAL at 08:18

## 2019-05-28 RX ADMIN — FUROSEMIDE 20 MG: 10 INJECTION INTRAMUSCULAR; INTRAVENOUS at 17:14

## 2019-05-28 RX ADMIN — BACITRACIN ZINC 1 G: 500 OINTMENT TOPICAL at 21:35

## 2019-05-28 RX ADMIN — POTASSIUM CHLORIDE, DEXTROSE MONOHYDRATE AND SODIUM CHLORIDE 75 ML/HR: 150; 5; 450 INJECTION, SOLUTION INTRAVENOUS at 01:54

## 2019-05-28 RX ADMIN — FUROSEMIDE 20 MG: 10 INJECTION, SOLUTION INTRAVENOUS at 17:14

## 2019-05-28 RX ADMIN — PRAVASTATIN SODIUM 40 MG: 40 TABLET ORAL at 21:35

## 2019-05-28 RX ADMIN — DILTIAZEM HYDROCHLORIDE 120 MG: 120 CAPSULE, COATED, EXTENDED RELEASE ORAL at 21:35

## 2019-05-28 RX ADMIN — LABETALOL 20 MG/4 ML (5 MG/ML) INTRAVENOUS SYRINGE 10 MG: at 21:36

## 2019-05-28 RX ADMIN — PHYTONADIONE 10 MG: 10 INJECTION, EMULSION INTRAMUSCULAR; INTRAVENOUS; SUBCUTANEOUS at 08:28

## 2019-05-28 RX ADMIN — BACITRACIN ZINC 1 G: 500 OINTMENT TOPICAL at 12:26

## 2019-05-28 RX ADMIN — VANCOMYCIN HYDROCHLORIDE 1000 MG: 1 INJECTION, POWDER, LYOPHILIZED, FOR SOLUTION INTRAVENOUS at 20:23

## 2019-05-28 RX ADMIN — FAMOTIDINE 20 MG: 20 TABLET ORAL at 08:18

## 2019-05-28 RX ADMIN — OYSTER SHELL CALCIUM WITH VITAMIN D 1 TABLET: 500; 200 TABLET, FILM COATED ORAL at 08:18

## 2019-05-28 RX ADMIN — LABETALOL 20 MG/4 ML (5 MG/ML) INTRAVENOUS SYRINGE 10 MG: at 18:03

## 2019-05-28 ASSESSMENT — ENCOUNTER SYMPTOMS
DIARRHEA: 0
VOMITING: 0
COUGH: 0
SHORTNESS OF BREATH: 1
NAUSEA: 0

## 2019-05-28 ASSESSMENT — PAIN SCALES - GENERAL
PAINLEVEL_OUTOF10: 0
PAINLEVEL_OUTOF10: 3
PAINLEVEL_OUTOF10: 0
PAINLEVEL_OUTOF10: 6
PAINLEVEL_OUTOF10: 0
PAINLEVEL_OUTOF10: 6
PAINLEVEL_OUTOF10: 0

## 2019-05-28 NOTE — FLOWSHEET NOTE
Pt repositioned in bed after bed pad and top linens changed. Set up / HOB elevated to eat lunch. Fed lunch. Tolerated well. No coughing / signs of aspiration. Pureed diet with nectar thick. Ate just slightly over 50% and drank 120cc of thickened juice.  Electronically signed by Lawana Krabbe, RN on 5/28/2019 at 1:41 PM

## 2019-05-28 NOTE — PROGRESS NOTES
Neurology Follow up    SUBJECTIVE:  NO Headache, all commands  But today having some difficulty  Severe aphasia  PHYSICAL EXAM:    /87   Pulse 73   Temp 98.6 °F (37 °C) (Oral)   Resp 27   Ht 6' (1.829 m)   Wt 194 lb 0.1 oz (88 kg)   SpO2 95%   BMI 26.31 kg/m²   General Appearance:      Mental Status Exam:             Level of Alertness:   awake            Orientation:   None as cannot speak wellFunduscopic Exam:     Cranial Nerves            Cranial nerve III           Pupils:  equal, round, reactive to light      Cranial nerves III, IV, VI           Extraocular Movements: intact      Cranial nerve V           Facial sensation:  intact        Motor:    Drift:  absent  Motor exam is symmetrical 5 out of 5 all extremities bilaterally  Tone:  normal  Abnormal Movements:  absent            Sensory:        Pinprick             Right Upper Extremity:  normal             Left Upper Extremity:  normal             Right Lower Extremity:  normal             Left Lower Extremity:  normal           Vibration                         Touch            Proprioception                 Coordination:           Finger/Nose   Right:  normal              Left:  normal          Heel-Knee-Shin                Right:  normal              Left:  normal          Rapid Alternating Movements              Right:  normal              Left:  normal          Gait:                       Casual:   Not tested                       Romberg: Reflexes:             Deep Tendon Reflexes:             Reflexes are 2 +             Plantar response:                Right:  downgoing               Left:  downgoing    Vascular:  Cardiac Exam:  normal         Ct Head Wo Contrast    Result Date: 5/25/2019  CT HEAD WO CONTRAST : 5/25/2019 CLINICAL HISTORY:  INTRACRANIAL HEMORRHAGE . Recent subdural hematoma drainage surgery. COMPARISON: Earlier 5/25/2019.  TECHNIQUE: Spiral unenhanced images were obtained of the head, with routine reconstructions performed. All CT scans at this facility use dose modulation, iterative reconstruction, and/or weight based dosing when appropriate to reduce radiation dose to as low as reasonably achievable. FINDINGS: Since the prior study, bilateral superior parietal craniotomies and subdural drainage catheters have been placed, which are otherwise unremarkable. There is pneumocephaly and significantly decreasing-sized heterogeneous subdural hematomas bilaterally, which are overall mildly decreased in size and mass effect from the earlier study. There is approximately 2 to 3 mm of left-to-right midline shift, without herniation, hydrocephalus, or other significant changes identified. INTERVAL BILATERAL CRANIOTOMIES AND SUBDURAL HEMATOMA DRAINAGE CATHETER PLACEMENT SURGERY. PNEUMOCEPHALY AND SIGNIFICANTLY DECREASING-SIZED HETEROGENEOUS SUBDURAL HEMATOMAS. APPROXIMATELY 2 TO 3 MM OF LEFT-TO-RIGHT MIDLINE SHIFT WITHOUT HERNIATION, HYDROCEPHALUS, OR OTHER SIGNIFICANT CHANGES IDENTIFIED. Ct Head Wo Contrast    Result Date: 5/25/2019  CT HEAD WO CONTRAST : 5/25/2019 CLINICAL HISTORY: CEREBRAL HEMORRHAGE. COMPARISON: 5/24/2019. TECHNIQUE: ROUTINE. All CT scans at this facility use dose modulation, iterative reconstruction, and/or weight based dosing when appropriate to reduce radiation dose to as low as reasonably achievable. FINDINGS: Motion artifact is present on the initial scan, which was repeated. Acute on chronic subdural hematomas overlying both cerebral convexities appear stable, measuring approximately 2.2 cm in thickness on the right, and 1.8 cm in maximum thickness, on the coronal reconstructions. There is no significant midline shift, herniation, or other significant changes identified. STABLE APPROXIMATELY 2.2 CM RIGHT AND 1.8 CM LEFT SUBDURAL HEMATOMA. NO OTHER SIGNIFICANT CHANGE FROM EARLIER YESTERDAY IDENTIFIED.      Ct Head Wo Contrast    Result Date: 5/25/2019  CT HEAD WO CONTRAST : 5/24/2019 9:15 PM CLINICAL HISTORY: CEREBRAL HEMORRHAGE. COMPARISON: Earlier 5/24/2019. TECHNIQUE: ROUTINE. All CT scans at this facility use dose modulation, iterative reconstruction, and/or weight based dosing when appropriate to reduce radiation dose to as low as reasonably achievable. FINDINGS: Motion artifact is present on the initial scan, which was repeated. 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 The left subdural hematoma again measures approximately 1.8 cm in maximum thickness, on the coronal reconstructions. There is no significant midline shift, herniation, or other significant changes identified. SLIGHTLY ENLARGING APPROXIMATELY 2.2 CM RIGHT SUBDURAL HEMATOMA AND STABLE APPROXIMATELY 1.8 CM LEFT SUBDURAL HEMATOMA. NO OTHER SIGNIFICANT CHANGE FROM EARLIER 5/24/2019 IDENTIFIED. Ct Head Wo Contrast    Result Date: 5/24/2019  CT HEAD WO CONTRAST : 5/24/2019 CLINICAL HISTORY: head injury s/p fall . COMPARISON: None available. TECHNIQUE: ROUTINE. All CT scans at this facility use dose modulation, iterative reconstruction, and/or weight based dosing when appropriate to reduce radiation dose to as low as reasonably achievable. FINDINGS: Heterogeneously dense subdural collections overlying the superior aspects of both cerebral hemispheres measuring up to 1.8 cm in thickness, are consistent with acute on chronic subdural hematomas. There is mass effect and mild crowding of the sulci, without midline shift, hydrocephalus or herniation. There is no parenchymal, subarachnoid, or intraventricular hemorrhage, evidence of a recent ischemic cortical infarct, skull fracture, or other complication identified. UP TO 1.8 CM ACUTE CHRONIC SUBDURAL HEMATOMAS OVER BOTH CEREBRAL CONVEXITIES, AS DESCRIBED.   The findings were discussed with Dr. Celi Herndon shortly following completion of the study. Ct Cervical Spine Wo Contrast    Result Date: 5/24/2019  CT CERVICAL SPINE WO CONTRAST: 5/24/2019 CLINICAL HISTORY:  Fall, neck pain . COMPARISON: None available. TECHNIQUE: ROUTINE All CT scans at this facility use dose modulation, iterative reconstruction, and/or weight based dosing when appropriate to reduce radiation dose to as low as reasonably achievable. FINDINGS: The spine is visualized from the craniovertebral junction through the T1-2 level. Mild to moderate hypertrophic facet changes are present, predominantly on the right at C3-4, and on the left at C2-C3 and C5-6. There is no fracture, significant subluxation, or acute paraspinous soft tissue abnormalities identified. NO FRACTURE OR EVIDENCE OF CERVICAL SPINE INJURY IDENTIFIED. CERVICAL SPONDYLOSIS. Ct Lumbar Spine Wo Contrast    Result Date: 5/24/2019  CT lumbar spine without intravenous contrast medium. HISTORY: Back pain. Technical factors: CT imaging lumbar spine obtained and formatted as 2.5 mm contiguous axial images. Sagittal and coronal reconstruction obtained during postprocessing. No intravenous contrast medium utilized. Study done in comparison with plain film imaging lumbar spine, October 3, 2018. FINDINGS: Compression fracture L1 is again identified, and is progressed since the prior study. Addition, multiple the anterior margin of L1 and fracture of the superior articular surface of L1 is now identified (series 602, image 39). Anterior osteophytes T10 and  T11. Diffuse facet hypertrophy identified. At the L3-L4 level, mild facet hypertrophy, ligamentum flavum hypertrophy, and a disc bulge, causes anterior flattening of thecal sac with possible mild central stenosis. Acute on chronic fracture, L1, with avulsion of the anterior portion of the L1 vertebral body. Findings suspicious for mild to moderate central stenosis at the L3-L4 level.  All CT scans at this facility use dose modulation,

## 2019-05-28 NOTE — PROGRESS NOTES
Patient: Judi Davis  Unit/Bed: IC03/IC03-01  YOB: 1934  MRN: [id number] Acct: [de-identified]   Admitting Diagnosis: Subdural hematoma ([address] [N25.6S5M]  Admit Date:  5/24/2019  Hospital Day: 4    Current Medications:    Scheduled Meds:   bacitracin zinc   Topical TID    levETIRAcetam  500 mg Oral BID    famotidine  20 mg Oral BID    vancomycin  1,000 mg Intravenous Q12H    lisinopril  5 mg Oral Daily    phytonadione (ADULT)  10 mg Subcutaneous Daily    pravastatin  40 mg Oral Nightly    diltiazem  120 mg Oral Daily    calcium-vitamin D   Oral Daily     Continuous Infusions:  PRN Meds:.nitroGLYCERIN, ondansetron, morphine (PF), hydrALAZINE, labetalol  . No results for input(s): WBC, HGB, HCT, MCV, PLT in the last 72 hours. Recent Labs     05/26/19  0447 05/27/19  0456    139   K 3.9 4.1    104   CO2 26 26   BUN 23 25*   CREATININE 0.89 0.78     No results for input(s): AST, ALT, ALB, BILIDIR, BILITOT, ALKPHOS in the last 72 hours. No results for input(s): LIPASE, AMYLASE in the last 72 hours. Recent Labs     05/26/19  0448 05/27/19  0456 05/28/19  0502   INR 1.1 1.2 1.1       Imaging Results:    Ct Head Wo Contrast    Result Date: 5/28/2019  CT HEAD WO CONTRAST : 5/28/2019 CLINICAL HISTORY:SDH. COMPARISON: 5/26/2019. TECHNIQUE: ROUTINE. All CT scans at this facility use dose modulation, iterative reconstruction, and/or weight based dosing when appropriate to reduce radiation dose to as low as reasonably achievable. FINDINGS: Since the prior study, bilateral subdural drainage catheters have been removed. There is significantly decreasing pneumocephaly within the mildly decreasing-sized residual cerebral convexity subdural hematomas, measuring up to approximately 1.5 cm on the left and 1 cm on the right. Approximately 4 mm of left-to-right midline shift appears unchanged. There is no hydrocephalus, herniation, or other significant changes identified.      DECREASING-SIZED BILATERAL CEREBRAL CONVEXITY SUBDURAL HEMATOMAS AFTER INTERVAL REMOVAL OF BOTH DRAINAGE CATHETERS. Ct Head Wo Contrast    Result Date: 5/26/2019  EXAMINATION: CT BRAIN WITHOUT CONTRAST 5/26/2019 at 0511 hours CLINICAL HISTORY:  postop  S06. 5X9A Subdural hematoma (HCC) ICD10 COMPARISONS: 5/25/2019 at 1407 hours TECHNIQUE: Spiral scans without contrast. Multiplanar 2-D reconstructions. All CT scans at this facility use dose modulation, iterative reconstruction, and/or weight based dosing when appropriate to reduce radiation dose to as low as reasonably achievable. FINDINGS: Since the prior postoperative examination, the volume of gas and blood in the bilateral subdural collections have decreased. The right subdural collection has a maximal width of 10 mm. The left subdural collection has a maximal width of 18 mm. There is still a considerable amount of gas in the left subdural collection. There is only trace residual gas in the right subdural collection. There is 4 mm left to right midline shift. There is no transtentorial herniation. Bilateral craniotomies and subdural drains are again noted. POSTOPERATIVE BILATERAL GAS-CONTAINING SUBDURAL HEMATOMAS, LEFT LARGER THAN RIGHT, WITH LEFT TO RIGHT MIDLINE SHIFT OF 4 MM, IMPROVED FROM PRIOR EXAMINATION OF 5/25/19 AT 1417 HOURS. Ct Head Wo Contrast    Result Date: 5/25/2019  CT HEAD WO CONTRAST : 5/25/2019 CLINICAL HISTORY:  INTRACRANIAL HEMORRHAGE . Recent subdural hematoma drainage surgery. COMPARISON: Earlier 5/25/2019. TECHNIQUE: Spiral unenhanced images were obtained of the head, with routine reconstructions performed. All CT scans at this facility use dose modulation, iterative reconstruction, and/or weight based dosing when appropriate to reduce radiation dose to as low as reasonably achievable. FINDINGS: Since the prior study, bilateral superior parietal craniotomies and subdural drainage catheters have been placed, which are otherwise unremarkable.  There is pneumocephaly and significantly decreasing-sized heterogeneous subdural hematomas bilaterally, which are overall mildly decreased in size and mass effect from the earlier study. There is approximately 2 to 3 mm of left-to-right midline shift, without herniation, hydrocephalus, or other significant changes identified. INTERVAL BILATERAL CRANIOTOMIES AND SUBDURAL HEMATOMA DRAINAGE CATHETER PLACEMENT SURGERY. PNEUMOCEPHALY AND SIGNIFICANTLY DECREASING-SIZED HETEROGENEOUS SUBDURAL HEMATOMAS. APPROXIMATELY 2 TO 3 MM OF LEFT-TO-RIGHT MIDLINE SHIFT WITHOUT HERNIATION, HYDROCEPHALUS, OR OTHER SIGNIFICANT CHANGES IDENTIFIED. Ct Head Wo Contrast    Result Date: 5/25/2019  CT HEAD WO CONTRAST : 5/25/2019 CLINICAL HISTORY: CEREBRAL HEMORRHAGE. COMPARISON: 5/24/2019. TECHNIQUE: ROUTINE. All CT scans at this facility use dose modulation, iterative reconstruction, and/or weight based dosing when appropriate to reduce radiation dose to as low as reasonably achievable. FINDINGS: Motion artifact is present on the initial scan, which was repeated. Acute on chronic subdural hematomas overlying both cerebral convexities appear stable, measuring approximately 2.2 cm in thickness on the right, and 1.8 cm in maximum thickness, on the coronal reconstructions. There is no significant midline shift, herniation, or other significant changes identified. STABLE APPROXIMATELY 2.2 CM RIGHT AND 1.8 CM LEFT SUBDURAL HEMATOMA. NO OTHER SIGNIFICANT CHANGE FROM EARLIER YESTERDAY IDENTIFIED. Ct Head Wo Contrast    Result Date: 5/25/2019  CT HEAD WO CONTRAST : 5/24/2019 9:15 PM CLINICAL HISTORY: CEREBRAL HEMORRHAGE. COMPARISON: Earlier 5/24/2019. TECHNIQUE: ROUTINE. All CT scans at this facility use dose modulation, iterative reconstruction, and/or weight based dosing when appropriate to reduce radiation dose to as low as reasonably achievable. FINDINGS: Motion artifact is present on the initial scan, which was repeated.  Acute on to as low as reasonably achievable. FINDINGS: The spine is visualized from the craniovertebral junction through the T1-2 level. Mild to moderate hypertrophic facet changes are present, predominantly on the right at C3-4, and on the left at C2-C3 and C5-6. There is no fracture, significant subluxation, or acute paraspinous soft tissue abnormalities identified. NO FRACTURE OR EVIDENCE OF CERVICAL SPINE INJURY IDENTIFIED. CERVICAL SPONDYLOSIS. Ct Lumbar Spine Wo Contrast    Result Date: 5/24/2019  CT lumbar spine without intravenous contrast medium. HISTORY: Back pain. Technical factors: CT imaging lumbar spine obtained and formatted as 2.5 mm contiguous axial images. Sagittal and coronal reconstruction obtained during postprocessing. No intravenous contrast medium utilized. Study done in comparison with plain film imaging lumbar spine, October 3, 2018. FINDINGS: Compression fracture L1 is again identified, and is progressed since the prior study. Addition, multiple the anterior margin of L1 and fracture of the superior articular surface of L1 is now identified (series 602, image 39). Anterior osteophytes T10 and  T11. Diffuse facet hypertrophy identified. At the L3-L4 level, mild facet hypertrophy, ligamentum flavum hypertrophy, and a disc bulge, causes anterior flattening of thecal sac with possible mild central stenosis. Acute on chronic fracture, L1, with avulsion of the anterior portion of the L1 vertebral body. Findings suspicious for mild to moderate central stenosis at the L3-L4 level. All CT scans at this facility use dose modulation, iterative reconstruction, and/or weight based dosing when appropriate to reduce radiation dose to as low as reasonably achievable. Xr Chest Portable    Result Date: 5/25/2019  XR CHEST PORTABLE : 5/24/2019 CLINICAL HISTORY:  Syncope . COMPARISON: CT abdomen pelvis 10/3/2018. A portable upright AP radiograph of the chest was obtained.  FINDINGS: A poor inspiratory volume is present with mild probable bibasilar atelectasis. There is no cardiomegaly, significant pleural effusion, vascular congestion, pneumothorax, or displaced fractures identified. 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 POOR INSPIRATION WITH MILD PROBABLE BIBASILAR ATELECTASIS. /64   Pulse 64   Temp 98.1 °F (36.7 °C) (Oral)   Resp 21   Ht 6' (1.829 m)   Wt 194 lb 0.1 oz (88 kg)   SpO2 96%   BMI 26.31 kg/m²     No new complaints. Again mild confusion but following simple commands with all 4 Holland's. Incision clean and dry. Slow in speech. GCS 4+3+6 of 13    CAT scan was reviewed. Status post bilateral craniotomy for subdural hematoma evacuation.   Patient is to transfer for floor    Encourage diet out of bed: Placement

## 2019-05-28 NOTE — FLOWSHEET NOTE
2100:  Assessment initiated. Sutures on head clean, dry, and intact. Denies pain. Encouraged to use I.S. Slight scleral edema noted. Right arm is edematous and Left hip is more ecchymotic than right. 2330: Medicated for c/o generalized pain. 0130:  Pleasant. Tolerating fluids well. 0430: To CT.   0544:  Medicated for c/o pain.

## 2019-05-28 NOTE — FLOWSHEET NOTE
Assumed care of pt from [address] [place] [address] at 1100 - floor assignments adjusted. Pt in bed. Alert. No distress. Repositioned for comfort. Bed alarm on for safety. Pt's significant other at bedside. Asking to speak with  re: discharge planning.  Electronically signed by Chantelle Pritchett RN on 5/28/2019 at 11:40 AM

## 2019-05-28 NOTE — PROGRESS NOTES
progress    Goals:  Short term goals  Short term goal 1: min assist with bed mobility  Short term goal 2: mod assist sit to stand  Short term goal 3:  mod assist ait 15 feet with ww +2  Short term goal 4: pt able to sit EOB with SBA +1 5 min while completing LE ex program    PLAN:   Plan  Times per week: 3-6  Current Treatment Recommendations: Strengthening, Transfer Training, Endurance Training, Neuromuscular Re-education, Patient/Caregiver Education & Training, Equipment Evaluation, Education, & procurement, Gait Training, Balance Training, Safety Education & Training, Functional Mobility Training  Plan Comment: Cont.  POC  Safety Devices  Type of devices: Bed alarm in place, Call light within reach, Left in bed, All fall risk precautions in place     AMPAC (6 CLICK) BASIC MOBILITY  AM-PAC Inpatient Mobility Raw Score : 10      Therapy Time   Individual   Time In 1518   Time Out 1541   Minutes 23      bm/Trsf - 15 mins  NMR - 8 mins       Paige Bryant PTA, 05/28/19 at 4:17 PM

## 2019-05-28 NOTE — PROGRESS NOTES
Garcia Pak is a 80 y.o. male patient. I was asked by RN to evaluated the pt for decrease oxygen saturation and hypoxia, Pt just was evalauted by pulmonary and and had ABG done.  Pt mental status has changed compared to before will get CT head w/o contrast  Current Facility-Administered Medications   Medication Dose Route Frequency Provider Last Rate Last Dose    bacitracin zinc ointment   Topical TID Rubén Li MD   1 g at 05/28/19 1226    levETIRAcetam (KEPPRA) tablet 500 mg  500 mg Oral BID Rubén Li MD   500 mg at 05/28/19 0818    famotidine (PEPCID) tablet 20 mg  20 mg Oral BID Rubén Li MD   20 mg at 05/28/19 0818    vancomycin 1000 mg IVPB in 250 mL D5W addavial  1,000 mg Intravenous Q12H Rubén Li MD   Stopped at 05/28/19 0909    lisinopril (PRINIVIL;ZESTRIL) tablet 5 mg  5 mg Oral Daily Rhiannon Peña MD   5 mg at 05/28/19 0818    nitroGLYCERIN (NITROSTAT) SL tablet 0.4 mg  0.4 mg Sublingual Q5 Min PRN Rubén Li MD        ondansetron [facility]) injection 4 mg  4 mg Intravenous Q6H PRN Rubén Li MD        morphine (PF) injection 4 mg  4 mg Intravenous Q3H PRN Rubén Li MD   4 mg at 05/28/19 0544    phytonadione ADULT (VITAMIN K) inj 10 mg/mL  10 mg Subcutaneous Daily Rubén Li MD   10 mg at 05/28/19 1485    pravastatin (PRAVACHOL) tablet 40 mg  40 mg Oral Nightly Rubén Li MD   40 mg at 05/27/19 2212    diltiazem (CARDIZEM CD) extended release capsule 120 mg  120 mg Oral Daily Rubén Li MD   120 mg at 05/27/19 2211    calcium-vitamin D 500-200 MG-UNIT per tablet   Oral Daily Rubén Li MD   1 tablet at 05/28/19 0818    hydrALAZINE (APRESOLINE) injection 10 mg  10 mg Intravenous Q1H PRN Rubén Li MD   10 mg at 05/26/19 1019    labetalol (NORMODYNE;TRANDATE) injection syringe 10 mg  10 mg Intravenous Q1H PRN Rubén Li MD   10 mg at 05/27/19 1831     Allergies   Allergen Reactions    Honey Bee Venom [Bee Venom] Swelling    Penicillins Swelling     Active Problems:    Afib ([address]

## 2019-05-28 NOTE — PROGRESS NOTES
Judi Davis is a 80 y.o. male patient.  Pt was seen and evaluated, no overnight events, s/p surgery , no drains , more alert orientated x 1, mental status is better compare to yesterday    Current Facility-Administered Medications   Medication Dose Route Frequency Provider Last Rate Last Dose    bacitracin zinc ointment   Topical TID Margarito Franco MD   1 g at 05/28/19 1226    levETIRAcetam (KEPPRA) tablet 500 mg  500 mg Oral BID Margarito Franco MD   500 mg at 05/28/19 0818    famotidine (PEPCID) tablet 20 mg  20 mg Oral BID Margarito Frnaco MD   20 mg at 05/28/19 0818    vancomycin 1000 mg IVPB in 250 mL D5W addavial  1,000 mg Intravenous Q12H Margarito Franco MD   Stopped at 05/28/19 0909    lisinopril (PRINIVIL;ZESTRIL) tablet 5 mg  5 mg Oral Daily Marielos Rainey MD   5 mg at 05/28/19 0818    nitroGLYCERIN (NITROSTAT) SL tablet 0.4 mg  0.4 mg Sublingual Q5 Min PRN Margarito Franco MD        ondansetron [facility]) injection 4 mg  4 mg Intravenous Q6H PRN Margarito Franco MD        morphine (PF) injection 4 mg  4 mg Intravenous Q3H PRN Margarito Franco MD   4 mg at 05/28/19 0544    phytonadione ADULT (VITAMIN K) inj 10 mg/mL  10 mg Subcutaneous Daily Margarito Franco MD   10 mg at 05/28/19 1034    pravastatin (PRAVACHOL) tablet 40 mg  40 mg Oral Nightly Margarito Franco MD   40 mg at 05/27/19 2212    diltiazem (CARDIZEM CD) extended release capsule 120 mg  120 mg Oral Daily Margarito Franco MD   120 mg at 05/27/19 2211    calcium-vitamin D 500-200 MG-UNIT per tablet   Oral Daily Margarito Franco MD   1 tablet at 05/28/19 0818    hydrALAZINE (APRESOLINE) injection 10 mg  10 mg Intravenous Q1H PRN Margarito Franco MD   10 mg at 05/26/19 1019    labetalol (NORMODYNE;TRANDATE) injection syringe 10 mg  10 mg Intravenous Q1H PRN Margarito Franco MD   10 mg at 05/27/19 1838     Allergies   Allergen Reactions    Honey Bee Venom [Bee Venom] Swelling    Penicillins Swelling     Active Problems:    Afib ([address]    Subdural hematoma (HCC)    Coronary atherosclerosis    Essential hypertension    Hyperlipidemia    Long term current use of anticoagulant    SHAYY on CPAP    Spondylolisthesis of lumbar region    Gait abnormality    Dysphagia, oropharyngeal phase    RBBB    Aphasia  Resolved Problems:    * No resolved hospital problems. *    Blood pressure (!) 150/77, pulse 81, temperature 98.1 °F (36.7 °C), temperature source Oral, resp. rate 19, height 6' (1.829 m), weight 194 lb 0.1 oz (88 kg), SpO2 97 %. Subjective:  Symptoms:  He reports shortness of breath, malaise and weakness. No cough, chest pain, headache, chest pressure, anorexia, diarrhea or anxiety. Diet:  No nausea or vomiting. Objective:  General Appearance:  Ill-appearing. Vital signs: (most recent): Blood pressure (!) 150/77, pulse 81, temperature 98.1 °F (36.7 °C), temperature source Oral, resp. rate 19, height 6' (1.829 m), weight 194 lb 0.1 oz (88 kg), SpO2 97 %. HEENT: Normal HEENT exam.    Lungs:  Normal effort and normal respiratory rate. Breath sounds clear to auscultation. Heart: Normal rate. Regular rhythm. S1 normal.    Abdomen: Abdomen is soft. Bowel sounds are normal.   There is no mass. Pulses: Distal pulses are intact. Pupils:  Pupils are equal, round, and reactive to light. Skin:  Warm and dry.       Lab Results   Component Value Date    WBC 6.7 05/25/2019    HGB 11.2 (L) 05/25/2019    HCT 33.5 (L) 05/25/2019    MCV 91.6 05/25/2019     05/25/2019     Lab Results   Component Value Date     05/27/2019    K 4.1 05/27/2019     05/27/2019    CO2 26 05/27/2019    BUN 25 05/27/2019    CREATININE 0.78 05/27/2019    GLUCOSE 120 05/27/2019    CALCIUM 9.0 05/27/2019        Assessment & Plan  1) 1 bilateral craniotomy for evacuation of subdural hematoma,   FU surgery  2) AMS better  Low threshold for seizures  On keppra  3) HTN stable  4) DVT ppx  Will follow the pt with robert Zuniga MD  5/28/2019

## 2019-05-28 NOTE — PROGRESS NOTES
[facility] OCCUPATIONAL THERAPY EVALUATION - ACUTE     Date: 2019  Patient Name: Laure Montelongo        MRN: [id number]  Account: [de-identified]   : 1934  (80 y.o.)  Room: [place] [address]    Chart Review:  Diagnosis:  The encounter diagnosis was Subdural hematoma ([address] Past Medical History:   Diagnosis Date    Chronic back pain     Hyperlipidemia     Hypertension      Past Surgical History:   Procedure Laterality Date    CARDIAC CATHETERIZATION      3 stents    CORONARY ARTERY BYPASS GRAFT      CRANIOTOMY Bilateral 2019    CRANIOTOMY HEMATOMA EVACUATION performed by Marlo Roman MD at [facility]       Restrictions  Restrictions/Precautions: Swallowing - Thickened Liquids, Seizure(nectar)     Safety Devices: Safety Devices  Safety Devices in place: Yes  Type of devices: All fall risk precautions in place    Subjective       Pain Reassessment:        no pain reported or pain signs noted  Orientation  Orientation  Overall Orientation Status: Impaired  Orientation Level: Oriented to person    Prior Level of Function:  Social/Functional History  Lives With: Alone  Home Layout: One level  Home Access: Stairs to enter with rails  Entrance Stairs - Number of Steps: 4  Bathroom Shower/Tub: Tub/Shower unit  ADL Assistance: Independent  Homemaking Assistance: Independent  Ambulation Assistance: Independent  Transfer Assistance: Independent  Active : Yes  Occupation: Retired  Type of occupation:     OBJECTIVE:     Orientation Status:  Orientation  Overall Orientation Status: Impaired  Orientation Level: Oriented to person    Observation:  Observation/Palpation  Observation: alert, cooperative    Cognition Status:  Cognition  Overall Cognitive Status: Exceptions  Arousal/Alertness: Delayed responses to stimuli  Following Commands:  Follows one step commands with increased time  Attention Span: Attends with cues to redirect  Memory: Decreased short term memory  Safety Judgement: Decreased awareness of need for safety, Decreased awareness of need for assistance  Problem Solving: Decreased awareness of errors, Assistance required to generate solutions  Insights: Not aware of deficits  Initiation: Requires cues for some  Sequencing: Requires cues for all  Cognition Comment: impulsive    Perception Status:  Perception  Overall Perceptual Status: [facility]    Sensation Status:  Sensation  Overall Sensation Status: [facility]    Vision and Hearing Status:  Vision  Vision: Impaired  Vision Exceptions: Wears glasses at all times  Hearing  Hearing: Within functional limits     ROM:   LUE AROM (degrees)  LUE General AROM: ~90 deg shld flex  Left Hand AROM (degrees)  Left Hand AROM: WFL  RUE AROM (degrees)  RUE AROM : WFL  RUE General AROM: ~70 deg shld flex  Right Hand AROM (degrees)  Right Hand AROM: WFL    Strength:  LUE Strength  LUE Strength Comment: 2/5  RUE Strength  RUE Strength Comment: 2/5    Coordination, Tone, Quality of Movement:    Tone RUE  RUE Tone: Normotonic  Tone LUE  LUE Tone: Normotonic  Coordination  Movements Are Fluid And Coordinated: No  Coordination and Movement description: Decreased accuracy, Left UE, Right UE, Decreased speed    Hand Dominance:  Hand Dominance  Hand Dominance: Right    ADL Status:  ADL  Feeding: Other (Comment)(able to bring hand to mouth)  Grooming: Maximum assistance  UE Bathing: Maximum assistance  LE Bathing: Maximum assistance  UE Dressing: Maximum assistance  LE Dressing: Maximum assistance  Toileting: Dependent/Total  Additional Comments: Pt has decreased processing which is impeding his performance          Functional Mobility:     Transfers  Sit to stand: Unable to assess    Bed Mobility  Bed mobility  Rolling to Left: Maximum assistance  Rolling to Right: Maximum assistance  Supine to Sit: Maximum assistance  Sit to Supine: Maximum assistance    Seated and Standing Balance:  Balance  Sitting Balance: Minimal assistance  Standing Balance: Unable to hand fine motor coordination to [facility] in order to manage clothing fasteners/self-care containers in a timely manner  - Improve B UE Function (AROM, strength, motor control, tone normalization) to complete ADLs as projected. - Improve B UE strength and endurance to [facility] in order to participate in self-care activities as projected. - Access appropriate D/C site with as few architectural barriers as possible.   - Sequence self-care tasks with min verb cues    Patient Goal:    None stated   Discussed and agreed upon: Yes Comments:     Therapy Time:   OT Individual Minutes  Time In: 1300  Time Out: [address]  Minutes: 15    Electronically signed by:    GAVIN Bo  5/28/2019, 1:48 PM

## 2019-05-28 NOTE — PROGRESS NOTES
[place]ility/Department[place] [facility]  Initial Speech/Language/Cognitive Assessment    NAME: David Ding  : 1934   MRN: [id number]  ADMISSION DATE: 2019  ADMITTING DIAGNOSIS: has Afib ([facility] Subdural hematoma ([facility] Anatomical narrow angle, bilateral; Angina pectoris ([address] Benign prostatic hyperplasia with urinary frequency; Closed compression fracture of L1 lumbar vertebra ([facility] Coronary atherosclerosis; Essential hypertension; Facet arthropathy, lumbosacral; Gross hematuria; Hyperlipidemia; Long term current use of anticoagulant; Long-term use of aspirin therapy; Memory loss; SHAYY on CPAP; Prediabetes; Prostate cancer ([facility] S/P CABG (coronary artery bypass graft); S/P PTCA (percutaneous transluminal coronary angioplasty); Spondylolisthesis of lumbar region; Vasculogenic erectile dysfunction; Vitamin D deficiency; Gait abnormality; Dysphagia, oropharyngeal phase; RBBB; and Aphasia on their problem list.  DATE ONSET: 2019    Date of Eval: 2019   Evaluating Therapist: Rolo Freitas. Sandy Figueroa, SLP    Assessment:      Diagnosis: Pt presents with severe receptive and expressive aphasia characterized by impaired abilities to answer yes/no questions, follow simple 1 step directions, answer simple questions, speech automatics, naming, and initiation. Pt requires much repetition and extra processing time, however is able to produce some verbal expression with phonemic cues and carrier phrases. Pt demonstrates frequent echolalia with repeating words last heard x 2-4x, however this has decreased since last seen by this SLP. Pt able to produce intermittent phrases, \"I had an operation. \", \"downstairs\", and \"nurses work in. ..hospital\". Pt easily distracted but able to be redirected. Pt did demonstrate perseverations and paraphasias, however primarily giving no responses.      Recommendations:  Requires SLP Intervention: Yes  Duration/Frequency of Treatment: 3-5x/week during LOS  D/C Recommendations: Inpatient Yes     Vision  Vision: Impaired  Vision Exceptions: Wears glasses at all times  Hearing  Hearing: Exceptions to [facility]  Hearing Exceptions: Bilateral hearing aid;Hard of hearing/hearing concerns(family reported he has bilateral hearing aids at home)           Objective:     Oral/Motor  Oral Motor: Exceptions to [facility]  Labial ROM: Reduced left; Reduced right  Labial Strength: Reduced  Labial Coordination: Reduced  Lingual Strength: Reduced  Lingual Coordination: Reduced    Auditory Comprehension  Comprehension: Exceptions  Yes/No Questions: Moderate(60% accuracy simple/personal y/n questions)  Basic Questions: Severe(20% accuracy simple wh questions; responding good to carrier phrase and phonemic cues)  One Step Basic Commands: Severe(Followed simple 1 step 1/6x; Imitated gestures 4/6x)  Common Objects: Severe(environmental objects around room 0/3x)  Pictures: Severe(did not initiate task)  Conversation: Severe  Interfering Components: Attention - sustained;Processing speed  Effective Techniques: Repetition;Visual/Gestural cues; Extra processing time    Reading Comprehension  Reading Status: Exceptions to WFL(Assess reading comprehension in therapy)  Words Impairment Severity: (Read single written words 3/3x)  Phrases Impairment Severity: (Read simple written phrases 2/2x.)    Expression  Primary Mode of Expression: Verbal    Verbal Expression  Verbal Expression: Exceptions to functional limits  Initiation: Severe  Repetition: Mild(Repeated words and phrases with repetition 4/5x.)  Automatic Speech: Moderate(counted 1-10 after initial cue; repeated numbers x 2; unable to state OMAR)  Confrontation: Severe  Responsive: Moderate(Simple sentence completion 4/5x, with repetition)  Interfering Components: Perseverations; Impaired thought organization;Paraphasia  Effective Techniques: Word retrived strategies; Phrasing;Provide extra time         Motor Speech  Motor Speech: Exceptions to [facility]  Dysarthria : Mild    Pragmatics/Social Functioning  Pragmatics: [facility]  Affect: Moderate    Cognition:      Attention  Attention: Exceptions to [facility]  Selective Attention: Moderate  Sustained Attention: Mild  Memory  Memory: Unable to assess  Problem Solving  Problem Solving: Unable to assess  Numeric Reasoning  Numeric Reasoning: Unable to assess  Abstract Reasoning  Abstract Reasoning: Unable to assess  Safety/Judgement  Safety/Judgement: Unable to assess      Prognosis:  Speech Therapy Prognosis  Prognosis: Fair  Prognosis Considerations: Severity of Impairments  Individuals consulted  Consulted and agree with results and recommendations: Patient    Education:  Patient Education: Educated pt on results. Patient Education Response: Needs reinforcement  Safety Devices in place: Yes  Type of devices: Bed alarm in place;Call light within reach    Pain:  Pain Assessment  Patient Currently in Pain: Denies  Pain Assessment: Faces  Pain Level: 0             Therapy Time:   Individual Concurrent Group Co-treatment   Time In 1320         Time Out 1345         Minutes 25                   Miguel Pena, Date: 5/28/2019, Time: 2:42 PM

## 2019-05-28 NOTE — CARE COORDINATION
FREDW spoke with sister, she stated she is agreeable for her brother to go to [facility]. She did mentioned that the son called her and asked that she helps with plans because he can not do it at this time. Sister stated son has a drinking problem but she does not want anyone to talk to him about this. .Electronically signed by JAQUAN Hill on 5/28/2019 at 11:26 AM

## 2019-05-28 NOTE — CARE COORDINATION
JAQUAN did call sister back and she stated that Pt does have 3 dtrs but she does not have the phone no. Or their addresses. Sister asked that we not contact them because they have been estranged from Pt. JAQUAN stated it will be up to floor mgt but we will look into Rehab. .Electronically signed by JAQUAN Paez on 5/28/2019 at 11:31 AM

## 2019-05-28 NOTE — PROGRESS NOTES
Subjective: The patient complains of severe  acute on chronic cranial pain as well as cognitive deficits and balance deficits secondary to acute subdural hematoma relieved by recent decompressive surgery as well as Tylenol and exacerbated by bright light and loud sound. I am concerned about patients lack of social supports except for his girlfriend Kamilah. His intracranial drains were recently taken out and he has had his therapeutic eval with physical therapy but we await occupational therapy and speech. ROS x10: The patient also complains of severely impaired mobility and activities of daily living. Otherwise no new problems with vision, hearing, nose, mouth, throat, dermal, cardiovascular, GI, , pulmonary, musculoskeletal, psychiatric or neurological. See Rehab H&P on Rehab chart dated . Vital signs:  BP (!) 150/77   Pulse 81   Temp 98.1 °F (36.7 °C) (Oral)   Resp 19   Ht 6' (1.829 m)   Wt 194 lb 0.1 oz (88 kg)   SpO2 97%   BMI 26.31 kg/m²   I/O:   PO/Intake:    fair PO intake, no problems observed or reported. Bowel/Bladder:  continent, no problems noted. General:  Patient is well developed, adequately nourished, non-obese and     well kempt. HEENT:    PERRLA, hearing intact to loud voice, external inspection of ear     and nose benign. Inspection of lips, tongue and gums benign  Musculoskeletal: No significant change in strength or tone. All joints stable. Inspection and palpation of digits and nails show no clubbing,       cyanosis or inflammatory conditions. Neuro/Psychiatric: Affect: flat but pleasant. Alert and oriented to person, place and     situation. No significant change in deep tendon reflexes or     sensation  Lungs:  Diminished, CTA-B. Respiration effort is normal at rest.     Heart:   S1 = S2,   irreg. No loud murmurs. Abdomen:  Soft, non-tender, no enlargement of liver or spleen.   Extremities:  No significant lower extremity edema or tenderness. Skin:    BUE bruises dt blood draws, no visualized or palpated problems. Rehabilitation:  Physical therapy: FIMS:  Bed Mobility:      Transfers: Sit to Stand: Maximum Assistance, 2 Person Assistance  Stand to sit: Maximum Assistance, 2 Person Assistance,  ,      FIMS:  ,  , Assessment: Pt presents with s/p craniotomy with deficits as above. He requires assistance with all mobility at this time. Pt reports he was previously indep and living alone. Pt is in need of PT prior to discharge to home    Occupational therapy: FIMS:   ,  ,      Speech therapy: FIMS:        Lab/X-ray studies reviewed, analyzed and discussed with patient and staff:   Recent Results (from the past 24 hour(s))   Protime-INR    Collection Time: 05/28/19  5:02 AM   Result Value Ref Range    Protime 11.5 9.0 - 11.5 sec    INR 1.1    APTT    Collection Time: 05/28/19  5:02 AM   Result Value Ref Range    aPTT 30.1 21.6 - 35.4 sec       Previous extensive, complex labs, notes and diagnostics reviewed and analyzed. ALLERGIES:    Allergies as of 05/24/2019 - Review Complete 05/24/2019   Allergen Reaction Noted    Honey bee venom [bee venom] Swelling 10/08/2015    Penicillins Swelling 10/08/2015      (please also verify by checking [facility])     Complex Physical Medicine & Rehab Issues Assess & Plan:   1. Severe abnormality of gait and mobility and impaired self-care and ADL's secondary to progressive  SDH . Functional and medical status reassessed regarding patients ability to participate in therapies and patient found to be able to participate in  acute intensive comprehensive inpatient rehabilitation program including PT/OT to improve balance, ambulation, ADLs, and to improve the P/AROM. 2. Bowel and Bladder dysfunction:  frequent toileting, ambulate to bathroom with assistance, check post void residuals.   Check for C.difficile x1 if >2 loose stools in 24 hours, continue bowel & bladder program.   3. Severe postsurgery headache pain generalized OA pain: reassess pain every shift and prior to and after each therapy session, give prn morphine IV Tylenol, modalities prn in therapy, consider Lidoderm, K-pad prn.   4. Skin healing craniotomy and drain site incisions breakdown risk:  continue pressure relief program.  Daily skin exams and reports from nursing. 5. Complex discharge planning:    Await full therapeutic evaluation and medical stability and patient and family per for acute rehab at [address] [place] [address] that complicate care Assess & Plan:     1. Active Problems:    Afib (HCC)    Subdural hematoma (HCC)    Coronary atherosclerosis    Essential hypertension    Hyperlipidemia    Long term current use of anticoagulant    SHAYY on CPAP    Spondylolisthesis of lumbar region    Gait abnormality    Dysphagia, oropharyngeal phase    RBBB    Aphasia  Resolved Problems:    * No resolved hospital problems.  Rosalie Mcgrath D.O., PM&R     Attending    [facility] Kendra Redd

## 2019-05-28 NOTE — PROGRESS NOTES
[facility]  Speech Language/Pathology  Dysphagia Therapy Note    Tiffanie Connelly  5/28/2019    Medical Dx: Subdural hematoma ([address] [A30.6K7B]      Time in: 1336    Time out: 1355      Pt being seen for : [] Dysphagia exercises  [] Oral Motor Exercises             [x] Therapeutic meal monitor  [] Other:    Subjective:  Behavior: [x] Alert  [x] Cooperative  []  Pleasant  [] Confused  [] Agitated                                          [] Uncooperative  [] Distractible [] Motivated  [] Self-Limiting [] Anxious         [] Other:    Endurance:  [] Adequate for participation in SLP sessions  [x] Reduced overall              [] Lethargic  [] Other:      Objective/Assessment:   Pt seen for dysphagia treatment following speech evaluation. Nursing reported no overt s/s of aspiration with lunch this date. Pt is a total feed. Trialed ice chips x 2 with patient with good automatic bolus manipulation and mastication. Trialed thin water from spoon and cup with decreased labial seal, anterior spillage from right side, and delayed A-P transit. Swallow delay with fair-good laryngeal elevation. SpO2 decreased to 91 following 1 trial (from 95). Clear vocal quality following swallow. Pt followed direction to take sip of water from cup himself x 1 trial with good control. Rec: continue puree diet with nectar thick liquids, with assist for feeding as needed, with encouragement for pt to feed self. [x] Pt demonstrated no s/s of pain during this visit. Safety Devices:  [x] Call light within reach [] Chair alarm activated         [x] Bed alarm activated    Plan:  [x] Continue as per plan of care  [] Prepare for Discharge   [] Discharge      Patient/Caregiver Education:  Treatment goals discussed with [x]  Patient  []  family. The []  Patient  []  family understand the diagnosis, prognosis and plan of care. Signature:  Lang Chars. Darra Holter, Date: 5/28/2019, Time: 2:44 PM

## 2019-05-28 NOTE — PROGRESS NOTES
Neurology Follow up    SUBJECTIVE:  NO Headache, all commands  PHYSICAL EXAM:    /66   Pulse 74   Temp 98.7 °F (37.1 °C) (Oral)   Resp 17   Ht 6' (1.829 m)   Wt 191 lb 12.8 oz (87 kg)   SpO2 95%   BMI 26.01 kg/m²   General Appearance:      Mental Status Exam:             Level of Alertness:   awake            Orientation:   person,  Funduscopic Exam:     Cranial Nerves            Cranial nerve III           Pupils:  equal, round, reactive to light      Cranial nerves III, IV, VI           Extraocular Movements: intact      Cranial nerve V           Facial sensation:  intact        Motor:    Drift:  absent  Motor exam is symmetrical 5 out of 5 all extremities bilaterally  Tone:  normal  Abnormal Movements:  absent            Sensory:        Pinprick             Right Upper Extremity:  normal             Left Upper Extremity:  normal             Right Lower Extremity:  normal             Left Lower Extremity:  normal           Vibration                         Touch            Proprioception                 Coordination:           Finger/Nose   Right:  normal              Left:  normal          Heel-Knee-Shin                Right:  normal              Left:  normal          Rapid Alternating Movements              Right:  normal              Left:  normal          Gait:                       Casual:   Not tested                       Romberg: Reflexes:             Deep Tendon Reflexes:             Reflexes are 2 +             Plantar response:                Right:  downgoing               Left:  downgoing    Vascular:  Cardiac Exam:  normal         Ct Head Wo Contrast    Result Date: 5/25/2019  CT HEAD WO CONTRAST : 5/25/2019 CLINICAL HISTORY:  INTRACRANIAL HEMORRHAGE . Recent subdural hematoma drainage surgery. COMPARISON: Earlier 5/25/2019. TECHNIQUE: Spiral unenhanced images were obtained of the head, with routine reconstructions performed.  All CT scans at this facility use dose modulation, iterative reconstruction, and/or weight based dosing when appropriate to reduce radiation dose to as low as reasonably achievable. FINDINGS: Since the prior study, bilateral superior parietal craniotomies and subdural drainage catheters have been placed, which are otherwise unremarkable. There is pneumocephaly and significantly decreasing-sized heterogeneous subdural hematomas bilaterally, which are overall mildly decreased in size and mass effect from the earlier study. There is approximately 2 to 3 mm of left-to-right midline shift, without herniation, hydrocephalus, or other significant changes identified. INTERVAL BILATERAL CRANIOTOMIES AND SUBDURAL HEMATOMA DRAINAGE CATHETER PLACEMENT SURGERY. PNEUMOCEPHALY AND SIGNIFICANTLY DECREASING-SIZED HETEROGENEOUS SUBDURAL HEMATOMAS. APPROXIMATELY 2 TO 3 MM OF LEFT-TO-RIGHT MIDLINE SHIFT WITHOUT HERNIATION, HYDROCEPHALUS, OR OTHER SIGNIFICANT CHANGES IDENTIFIED. Ct Head Wo Contrast    Result Date: 5/25/2019  CT HEAD WO CONTRAST : 5/25/2019 CLINICAL HISTORY: CEREBRAL HEMORRHAGE. COMPARISON: 5/24/2019. TECHNIQUE: ROUTINE. All CT scans at this facility use dose modulation, iterative reconstruction, and/or weight based dosing when appropriate to reduce radiation dose to as low as reasonably achievable. FINDINGS: Motion artifact is present on the initial scan, which was repeated. Acute on chronic subdural hematomas overlying both cerebral convexities appear stable, measuring approximately 2.2 cm in thickness on the right, and 1.8 cm in maximum thickness, on the coronal reconstructions. There is no significant midline shift, herniation, or other significant changes identified. STABLE APPROXIMATELY 2.2 CM RIGHT AND 1.8 CM LEFT SUBDURAL HEMATOMA. NO OTHER SIGNIFICANT CHANGE FROM EARLIER YESTERDAY IDENTIFIED. Ct Head Wo Contrast    Result Date: 5/25/2019  CT HEAD WO CONTRAST : 5/24/2019 9:15 PM CLINICAL HISTORY: CEREBRAL HEMORRHAGE. COMPARISON: Earlier 5/24/2019. Contrast    Result Date: 5/24/2019  CT CERVICAL SPINE WO CONTRAST: 5/24/2019 CLINICAL HISTORY:  Fall, neck pain . COMPARISON: None available. TECHNIQUE: ROUTINE All CT scans at this facility use dose modulation, iterative reconstruction, and/or weight based dosing when appropriate to reduce radiation dose to as low as reasonably achievable. FINDINGS: The spine is visualized from the craniovertebral junction through the T1-2 level. Mild to moderate hypertrophic facet changes are present, predominantly on the right at C3-4, and on the left at C2-C3 and C5-6. There is no fracture, significant subluxation, or acute paraspinous soft tissue abnormalities identified. NO FRACTURE OR EVIDENCE OF CERVICAL SPINE INJURY IDENTIFIED. CERVICAL SPONDYLOSIS. Ct Lumbar Spine Wo Contrast    Result Date: 5/24/2019  CT lumbar spine without intravenous contrast medium. HISTORY: Back pain. Technical factors: CT imaging lumbar spine obtained and formatted as 2.5 mm contiguous axial images. Sagittal and coronal reconstruction obtained during postprocessing. No intravenous contrast medium utilized. Study done in comparison with plain film imaging lumbar spine, October 3, 2018. FINDINGS: Compression fracture L1 is again identified, and is progressed since the prior study. Addition, multiple the anterior margin of L1 and fracture of the superior articular surface of L1 is now identified (series 602, image 39). Anterior osteophytes T10 and  T11. Diffuse facet hypertrophy identified. At the L3-L4 level, mild facet hypertrophy, ligamentum flavum hypertrophy, and a disc bulge, causes anterior flattening of thecal sac with possible mild central stenosis. Acute on chronic fracture, L1, with avulsion of the anterior portion of the L1 vertebral body. Findings suspicious for mild to moderate central stenosis at the L3-L4 level.  All CT scans at this facility use dose modulation, iterative reconstruction, and/or weight based dosing when appropriate to reduce radiation dose to as low as reasonably achievable. Xr Chest Portable    Result Date: 5/25/2019  XR CHEST PORTABLE : 5/24/2019 CLINICAL HISTORY:  Syncope . COMPARISON: CT abdomen pelvis 10/3/2018. A portable upright AP radiograph of the chest was obtained. FINDINGS: A poor inspiratory volume is present with mild probable bibasilar atelectasis. There is no cardiomegaly, significant pleural effusion, vascular congestion, pneumothorax, or displaced fractures identified. 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 POOR INSPIRATION WITH MILD PROBABLE BIBASILAR ATELECTASIS. Recent Labs     05/24/19  1622 05/25/19  0711   WBC 9.8 6.7   HGB 14.3 11.2*    143     Recent Labs     05/24/19  1622 05/25/19  0711 05/26/19  0447    143 142   K 4.2 3.9 3.9   * 107 105   CO2 21 25 26   BUN 29* 21 23   CREATININE 0.69* 0.75 0.89   GLUCOSE 184* 107* 148*     Recent Labs     05/24/19  1622   BILITOT 0.7   ALKPHOS 53   AST 24   ALT 17     Lab Results   Component Value Date    PROTIME 11.4 05/26/2019    PROTIME 24.1 10/08/2018    INR 1.1 05/26/2019     No results found for: LITHIUM, DILFRTOT, VALPROATE    ASSESSMENT AND PLAN    Bilateral Subdural hematoma,  Evacuated/ drains out  Awake,  All commands/ still expressive aphasia baseline  Non focal  No seizures  Keep on keppra  Draining bilateral, right more the left    Capo DEAN Rosa MD, Angela Whittaker, American Board of Psychiatry & Neurology  Board Certified in Vascular Neurology  Board Certified in Neuromuscular Medicine  Certified in . [address]

## 2019-05-28 NOTE — FLOWSHEET NOTE
PT in a short time ago to work with patient. Per their report - able to stand pt at bedside and then back in bed. At the end of their treatment - PT aide reports pt is trying to get out of bed. I came into room to find pt throwing legs over the side of the bed, and yanking at the amaro catheter. Pt appears labored. 92% on 2 liters. Oxygen bumped up to 3 liters. Resp 24-26/min. Repositioned up in bed. Pt continues to try to pull amaro cath out. Pt asked if he needs to have BM and he shook head yes. On bedpan. No results. Bedpan seems to agitate pt even more. He was trying to scoot off and pull it out, while also still pulling at amaro cath. Remains labored in breathing, restless and not following commands. Dr. Neymar Banuelos paged and returned phone call. See orders. UA/CS obtained and amaro cath d/c'd. Pt with bright red blood and some clots after catheter removal.     Dr. Neymar Banuelos requested hospitalist evaluate pt. I spoke with Dr. Chai Schmitz who requested Dr. Reshma Rosenthal come to the bedside as the ICU intensivist. Dr. Reshma Rosenthal to bedside - orders for ABG's received. He requested Dr. Chai Schmitz see the pt to determine change in status vs his known baseline. Dr. Chai Schmitz to floor after perfect serve message. Additional order obtained for stat CT of brain. Dr. Reshma Rosenthal called with ABG results - new orders received.  Electronically signed by Naomi Garcia RN on 5/28/2019 at 5:27 PM

## 2019-05-28 NOTE — PROGRESS NOTES
[facility] OF THE [facility] Note      Patient: Jean Hair  Unit/Bed: IC03/IC03-01  YOB: 1934  MRN: [id number]  Admit Date:  5/24/2019        Subjective Complaint:   Voices no complaints. Significant other at bedside. Physical Examination:     BP (!) 150/77   Pulse 81   Temp 98.1 °F (36.7 °C) (Oral)   Resp 19   Ht 6' (1.829 m)   Wt 194 lb 0.1 oz (88 kg)   SpO2 97%   BMI 26.31 kg/m²         Intake/Output Summary (Last 24 hours) at 5/28/2019 1223  Last data filed at 5/28/2019 0600  Gross per 24 hour   Intake 2506 ml   Output 1300 ml   Net 1206 ml     Weights  Wt Readings from Last 3 Encounters:   05/28/19 194 lb 0.1 oz (88 kg)   10/03/18 200 lb (90.7 kg)   10/08/15 201 lb (91.2 kg)       General:  Awake  Heart:  Regular rate Regular rhythm, S1, S2 no murmurs, gallops, or rubs. Lungs: CTA bilaterally, bilat symmetrical expansion, no wheeze, rales, or rhonchi. Abdomen: Bowel sounds present, soft, nontender,  no peritoneal signs  Extremities:  No oedema. Skin: Warm and dry  Mood and affect: Not agitated     Telemetry:      normal sinus      Frequent SVPBs and VPBs ,occasional couplets,??abberancy. LABS:   CBC:   No results for input(s): WBC, HGB, PLT in the last 72 hours. BMP:    Recent Labs     05/26/19 0447 05/27/19  0456    139   K 3.9 4.1    104   CO2 26 26   BUN 23 25*   CREATININE 0.89 0.78   GLUCOSE 148* 120*              Troponin: No results for input(s): TROPONINT in the last 72 hours. BNP: No results for input(s): PROBNP in the last 72 hours. INR:   Recent Labs     05/26/19  0448 05/27/19  0456 05/28/19  0502   INR 1.1 1.2 1.1      Mg:   No results for input(s): MG in the last 72 hours. Cardiac Testing:    none    Assessment:  1. S/P drainage of  subdural hematoma related to multiple falls.    2. Coronary artery disease with preserved LV systolic function most recent perfusion imaging study November 2017 felt to be low risk, with less than 10% ischemia in the LAD territory unchanged from March 2016, fixed defect in the circumflex distribution. 3. History of remote coronary artery bypass grafting. 4. History of atrial fibrillation, currently sinus rhythm. 5. Fluctuating right bundle branch block pattern and QRS widening and axis change on 12-lead EKG, probable ventricular  preexcitation/WPW? .  6. Expressive dysphasia from prior stroke  7. Left atrial size 4.7 cm based on echocardiogram in 2017.  8. High  risk medication-warfarin, patient is obviously not a candidate for warfarin given his history. Plan:    BP is controlled on current Rx  Obviously not a candidate for anticoagulation in the current setting. Okay to transfer to medical floor   Cardiology signing off, please call if questions arise. I reconciled cardiac medications. I requested   (EP) to review pts EKG . Aggiea January He said WPW is possible but not sure   Electronically signed by Rosario Adler MD on 5/28/2019 at 12:23 PM

## 2019-05-29 ENCOUNTER — APPOINTMENT (OUTPATIENT)
Dept: ULTRASOUND IMAGING | Age: 84
DRG: 026 | End: 2019-05-29
Payer: MEDICARE

## 2019-05-29 LAB
APTT: 30.3 SEC (ref 21.6–35.4)
INR BLD: 1.1
PROTHROMBIN TIME: 11.5 SEC (ref 9–11.5)

## 2019-05-29 PROCEDURE — 6370000000 HC RX 637 (ALT 250 FOR IP): Performed by: INTERNAL MEDICINE

## 2019-05-29 PROCEDURE — 93970 EXTREMITY STUDY: CPT

## 2019-05-29 PROCEDURE — 1210000000 HC MED SURG R&B

## 2019-05-29 PROCEDURE — 92526 ORAL FUNCTION THERAPY: CPT

## 2019-05-29 PROCEDURE — 94664 DEMO&/EVAL PT USE INHALER: CPT

## 2019-05-29 PROCEDURE — 99232 SBSQ HOSP IP/OBS MODERATE 35: CPT | Performed by: PHYSICAL MEDICINE & REHABILITATION

## 2019-05-29 PROCEDURE — 36415 COLL VENOUS BLD VENIPUNCTURE: CPT

## 2019-05-29 PROCEDURE — 97110 THERAPEUTIC EXERCISES: CPT

## 2019-05-29 PROCEDURE — 97112 NEUROMUSCULAR REEDUCATION: CPT

## 2019-05-29 PROCEDURE — 6370000000 HC RX 637 (ALT 250 FOR IP): Performed by: NEUROLOGICAL SURGERY

## 2019-05-29 PROCEDURE — 2700000000 HC OXYGEN THERAPY PER DAY

## 2019-05-29 PROCEDURE — 85610 PROTHROMBIN TIME: CPT

## 2019-05-29 PROCEDURE — 97535 SELF CARE MNGMENT TRAINING: CPT

## 2019-05-29 PROCEDURE — 85730 THROMBOPLASTIN TIME PARTIAL: CPT

## 2019-05-29 PROCEDURE — 6360000002 HC RX W HCPCS: Performed by: NEUROLOGICAL SURGERY

## 2019-05-29 PROCEDURE — 92507 TX SP LANG VOICE COMM INDIV: CPT

## 2019-05-29 PROCEDURE — 93010 ELECTROCARDIOGRAM REPORT: CPT | Performed by: INTERNAL MEDICINE

## 2019-05-29 RX ORDER — IPRATROPIUM BROMIDE AND ALBUTEROL SULFATE 2.5; .5 MG/3ML; MG/3ML
1 SOLUTION RESPIRATORY (INHALATION) EVERY 4 HOURS PRN
Status: DISCONTINUED | OUTPATIENT
Start: 2019-05-29 | End: 2019-05-31 | Stop reason: HOSPADM

## 2019-05-29 RX ADMIN — BACITRACIN ZINC 1 G: 500 OINTMENT TOPICAL at 21:54

## 2019-05-29 RX ADMIN — PHYTONADIONE 10 MG: 10 INJECTION, EMULSION INTRAMUSCULAR; INTRAVENOUS; SUBCUTANEOUS at 10:12

## 2019-05-29 RX ADMIN — FAMOTIDINE 20 MG: 20 TABLET ORAL at 21:54

## 2019-05-29 RX ADMIN — LEVETIRACETAM 500 MG: 500 TABLET ORAL at 10:07

## 2019-05-29 RX ADMIN — BACITRACIN ZINC 1 G: 500 OINTMENT TOPICAL at 10:07

## 2019-05-29 RX ADMIN — FAMOTIDINE 20 MG: 20 TABLET ORAL at 10:08

## 2019-05-29 RX ADMIN — LEVETIRACETAM 500 MG: 500 TABLET ORAL at 21:54

## 2019-05-29 RX ADMIN — OYSTER SHELL CALCIUM WITH VITAMIN D 1 TABLET: 500; 200 TABLET, FILM COATED ORAL at 10:08

## 2019-05-29 RX ADMIN — PRAVASTATIN SODIUM 40 MG: 40 TABLET ORAL at 21:55

## 2019-05-29 RX ADMIN — DILTIAZEM HYDROCHLORIDE 120 MG: 120 CAPSULE, COATED, EXTENDED RELEASE ORAL at 21:54

## 2019-05-29 RX ADMIN — LISINOPRIL 5 MG: 5 TABLET ORAL at 10:08

## 2019-05-29 ASSESSMENT — ENCOUNTER SYMPTOMS
VOMITING: 0
CONSTIPATION: 0
ABDOMINAL DISTENTION: 0
COLOR CHANGE: 0
SHORTNESS OF BREATH: 0
DIARRHEA: 0
COUGH: 0
TROUBLE SWALLOWING: 1
WHEEZING: 0

## 2019-05-29 ASSESSMENT — PAIN SCALES - GENERAL
PAINLEVEL_OUTOF10: 0
PAINLEVEL_OUTOF10: 0

## 2019-05-29 NOTE — PROGRESS NOTES
[facility] AT [place] Respiratory Therapy Evaluation   Current Order:  QID DUONEB      Home Regimen: NONE       Ordering Physician: DR Lauren Valdez   Re-evaluation Date:  NA     Diagnosis: SUBDURAL HEMATOMA      Patient Status: Stable     The following MDI Criteria must be met in order to convert aerosol to MDI with spacer. If unable to meet, MDI will be converted to aerosol:  []  Patient able to demonstrate the ability to use MDI effectively  []  Patient alert and cooperative  []  Patient able to take deep breath with 5-10 second hold  []  Medication(s) available in this delivery method   []  Peak flow greater than or equal to 200 ml/min            Current Order Substituted To  (same drug, same frequency)   Aerosol to MDI [] Albuterol Sulfate 0.083% unit dose by aerosol Albuterol Sulfate MDI 2 puffs by inhalation with spacer    [] Levalbuterol 1.25 mg unit dose by aerosol Levalbuterol MDI 2 puffs by inhalation with spacer    [] Levalbuterol 0.63 mg unit dose by aerosol Levalbuterol MDI 2 puffs by inhalation with spacer    [] Ipratropium Bromide 0.02% unit dose by aerosol Ipratropium Bromide MDI 2 puffs by inhalation with spacer    [] Duoneb (Ipratropium + Albuterol) unit dose by aerosol Ipratropium MDI + Albuterol MDI 2 puffs by inhalation w/spacer   MDI to Aerosol [] Albuterol Sulfate MDI Albuterol Sulfate 0.083% unit dose by aerosol    [] Levalbuterol MDI 2 puffs by inhalation Levalbuterol 1.25 mg unit dose by aerosol    [] Ipratropium Bromide MDI by inhalation Ipratropium Bromide 0.02% unit dose by aerosol    [] Combivent (Ipratropium + Albuterol) MDI by inhalation Duoneb (Ipratropium + Albuterol) unit dose by aerosol   Treatment Assessment [Frequency/Schedule]:  Change frequency to: ________Q4PRN DUONEB_______________per Protocol, P&T, [facility]      Points 0 1 2 3 4   Pulmonary Status  Non-Smoker  [x]   Smoking history   < 20 pack years  []   Smoking history  ?  20 pack years  []   Pulmonary Disorder  (acute or chronic)  [] Severe or Chronic w/ Exacerbation  []     Surgical Status No [x]   Surgeries     General []   Surgery Lower []   Abdominal Thoracic or []   Upper Abdominal Thoracic with  PulmonaryDisorder  []     Chest X-ray Clear/Not  Ordered     []  Chronic Changes  Results Pending  [x]  Infiltrates, atelectasis, pleural effusion, or edema  []  Infiltrates in more than one lobe []  Infiltrate + Atelectasis, &/or pleural effusion  []    Respiratory Pattern Regular,  RR = 12-20 [x]  Increased,  RR = 21-25 []  [place], irregular,  or RR = 26-30 []  Decreased FEV1  or RR = 31-35 []  Severe SOB, use  of accessory muscles, or RR ? 35  []    Mental Status Alert, oriented,  Cooperative [x]  Confused but Follows commands []  Lethargic or unable to follow commands []  Obtunded  []  Comatose  []    Breath Sounds Clear to  auscultation  []  Decreased unilaterally or  in bases only [x]  Decreased  bilaterally  []  Crackles or intermittent wheezes []  Wheezes []    Cough Strong, Spontan., & nonproductive [x]  Strong,  spontaneous, &  productive []  Weak,  Nonproductive []  Weak, productive or  with wheezes []  No spontaneous  cough or may require suctioning []    Level of Activity Ambulatory []  Ambulatory w/ Assist  [x]  Non-ambulatory []  Paraplegic []  Quadriplegic []    Total    Score:__3_____     Triage Score:____5____      Tri       Triage:     1. (>20) Freq: Q3    2. (16-20) Freq: Q4   3. (11-15) Freq: QID & Albuterol Q2 PRN    4. (6-10) Freq: TID & Albuterol Q2 PRN    5. (0-5) Freq Q4prn

## 2019-05-29 NOTE — CONSULTS
Department of Internal Medicine  General Internal Medicine  Attending Progress Note      SUBJECTIVE:    Has difficulty with words.   Alert,     OBJECTIVE      Medications    Current Facility-Administered Medications: ipratropium-albuterol (DUONEB) nebulizer solution 1 ampule, 1 ampule, Inhalation, Q4H PRN  bacitracin zinc ointment, , Topical, TID  levETIRAcetam (KEPPRA) tablet 500 mg, 500 mg, Oral, BID  famotidine (PEPCID) tablet 20 mg, 20 mg, Oral, BID  lisinopril (PRINIVIL;ZESTRIL) tablet 5 mg, 5 mg, Oral, Daily  nitroGLYCERIN (NITROSTAT) SL tablet 0.4 mg, 0.4 mg, Sublingual, Q5 Min PRN  ondansetron (ZOFRAN) injection 4 mg, 4 mg, Intravenous, Q6H PRN  morphine (PF) injection 4 mg, 4 mg, Intravenous, Q3H PRN  phytonadione ADULT (VITAMIN K) inj 10 mg/mL, 10 mg, Subcutaneous, Daily  pravastatin (PRAVACHOL) tablet 40 mg, 40 mg, Oral, Nightly  diltiazem (CARDIZEM CD) extended release capsule 120 mg, 120 mg, Oral, Daily  calcium-vitamin D 500-200 MG-UNIT per tablet, , Oral, Daily  hydrALAZINE (APRESOLINE) injection 10 mg, 10 mg, Intravenous, Q1H PRN  labetalol (NORMODYNE;TRANDATE) injection syringe 10 mg, 10 mg, Intravenous, Q1H PRN  Physical    VITALS:  BP (!) 145/72   Pulse 73   Temp 98.1 °F (36.7 °C) (Oral)   Resp 18   Ht 6' (1.829 m)   Wt 194 lb 0.1 oz (88 kg)   SpO2 98%   BMI 26.31 kg/m²   CONSTITUTIONAL:  awake, alert, cooperative, no apparent distress, and appears stated age  EYES:  Lids and lashes normal, pupils equal, round and reactive to light, extra ocular muscles intact, sclera clear, conjunctiva normal  ENT:  Surgical scars in place  NECK:  Supple, symmetrical, trachea midline, no adenopathy, thyroid symmetric, not enlarged and no tenderness, skin normal  BACK:  Symmetric, no curvature, spinous processes are non-tender on palpation, paraspinous muscles are non-tender on palpation, no costal vertebral tenderness  LUNGS:  No increased work of breathing, good air exchange, clear to auscultation

## 2019-05-29 NOTE — PROGRESS NOTES
Patient: Joan Weaver  Unit/Bed: IC03/IC03-01  YOB: 1934  MRN: [id number] Acct: [de-identified]   Admitting Diagnosis: Subdural hematoma ([address] [G49.5G0Y]  Admit Date:  5/24/2019  Hospital Day: 5    Current Medications:    Scheduled Meds:   bacitracin zinc   Topical TID    ipratropium-albuterol  1 ampule Inhalation 4x Daily    levETIRAcetam  500 mg Oral BID    famotidine  20 mg Oral BID    lisinopril  5 mg Oral Daily    phytonadione (ADULT)  10 mg Subcutaneous Daily    pravastatin  40 mg Oral Nightly    diltiazem  120 mg Oral Daily    calcium-vitamin D   Oral Daily     Continuous Infusions:   sodium chloride       PRN Meds:.nitroGLYCERIN, ondansetron, morphine (PF), hydrALAZINE, labetalol  .  sodium chloride          Recent Labs     05/28/19  1630 05/28/19  1645   WBC 8.4  --    HGB 10.8* 11.0*   HCT 32.2*  --    MCV 90.7  --      --      Recent Labs     05/27/19  0456 05/28/19  1630 05/28/19  1645    136  --    K 4.1 3.9  --     104  --    CO2 26 22  --    BUN 25* 29*  --    CREATININE 0.78 0.79 1.0     No results for input(s): AST, ALT, ALB, BILIDIR, BILITOT, ALKPHOS in the last 72 hours. No results for input(s): LIPASE, AMYLASE in the last 72 hours. Recent Labs     05/27/19  0456 05/28/19  0502 05/29/19  0453   INR 1.2 1.1 1.1       Imaging Results:    Ct Head Wo Contrast    Result Date: 5/28/2019  CT Brain Contrast medium:  Not utilized. History: Altered mental status Comparison:  CT brain, earlier today, 0438 hours, May 26, 2019, May 25, 2019. Findings: Bilateral frontal craniotomies and apex, again identified. Again identified are extra-axial fluid collections bilaterally in the frontal, and parietal regions. The focal area of increased attenuation within the extra-axial fluid found in the right posterior parietal region, and left posterior frontal region, remains unchanged.  Likewise, the strand-like area of increased attenuation within the extra-axial fluid found anteriorly, and bilaterally in left right frontal region is also unchanged. The amount of air now identified within the extra-axial space is decreased since the prior study. No midline shift. No mass effect. No new areas of abnormal increased attenuation are identified. Basal ganglia and cerebellum are without anomaly. Mastoid air cells well aerated. Orbits without anomaly. Facial sinuses intact. Impression: Bilateral frontal and parietal subdural hematomas. The amount of extra-axial air has slightly decreased since the prior study. No new areas of hemorrhage are identified. All CT scans at this facility use dose modulation, iterative reconstruction, and/or weight based dosing when appropriate to reduce radiation dose to as low as reasonably achievable. Ct Head Wo Contrast    Result Date: 5/28/2019  CT HEAD WO CONTRAST : 5/28/2019 CLINICAL HISTORY:SDH. COMPARISON: 5/26/2019. TECHNIQUE: ROUTINE. All CT scans at this facility use dose modulation, iterative reconstruction, and/or weight based dosing when appropriate to reduce radiation dose to as low as reasonably achievable. FINDINGS: Since the prior study, bilateral subdural drainage catheters have been removed. There is significantly decreasing pneumocephaly within the mildly decreasing-sized residual cerebral convexity subdural hematomas, measuring up to approximately 1.5 cm on the left and 1 cm on the right. Approximately 4 mm of left-to-right midline shift appears unchanged. There is no hydrocephalus, herniation, or other significant changes identified. DECREASING-SIZED BILATERAL CEREBRAL CONVEXITY SUBDURAL HEMATOMAS AFTER INTERVAL REMOVAL OF BOTH DRAINAGE CATHETERS. Ct Head Wo Contrast    Result Date: 5/26/2019  EXAMINATION: CT BRAIN WITHOUT CONTRAST 5/26/2019 at 0511 hours CLINICAL HISTORY:  postop  S06. [id number] Subdural hematoma (HCC) ICD10 COMPARISONS: 5/25/2019 at 1407 hours TECHNIQUE: Spiral scans without contrast. Multiplanar 2-D other significant changes identified. SLIGHTLY ENLARGING APPROXIMATELY 2.2 CM RIGHT SUBDURAL HEMATOMA AND STABLE APPROXIMATELY 1.8 CM LEFT SUBDURAL HEMATOMA. NO OTHER SIGNIFICANT CHANGE FROM EARLIER 5/24/2019 IDENTIFIED. Ct Head Wo Contrast    Result Date: 5/24/2019  CT HEAD WO CONTRAST : 5/24/2019 CLINICAL HISTORY: head injury s/p fall . COMPARISON: None available. TECHNIQUE: ROUTINE. All CT scans at this facility use dose modulation, iterative reconstruction, and/or weight based dosing when appropriate to reduce radiation dose to as low as reasonably achievable. FINDINGS: Heterogeneously dense subdural collections overlying the superior aspects of both cerebral hemispheres measuring up to 1.8 cm in thickness, are consistent with acute on chronic subdural hematomas. There is mass effect and mild crowding of the sulci, without midline shift, hydrocephalus or herniation. There is no parenchymal, subarachnoid, or intraventricular hemorrhage, evidence of a recent ischemic cortical infarct, skull fracture, or other complication identified. UP TO 1.8 CM ACUTE CHRONIC SUBDURAL HEMATOMAS OVER BOTH CEREBRAL CONVEXITIES, AS DESCRIBED. The findings were discussed with Dr. Wilmer Wayne shortly following completion of the study. Ct Cervical Spine Wo Contrast    Result Date: 5/24/2019  CT CERVICAL SPINE WO CONTRAST: 5/24/2019 CLINICAL HISTORY:  Fall, neck pain . COMPARISON: None available. TECHNIQUE: ROUTINE All CT scans at this facility use dose modulation, iterative reconstruction, and/or weight based dosing when appropriate to reduce radiation dose to as low as reasonably achievable. FINDINGS: The spine is visualized from the craniovertebral junction through the T1-2 level. Mild to moderate hypertrophic facet changes are present, predominantly on the right at C3-4, and on the left at C2-C3 and C5-6. There is no fracture, significant subluxation, or acute paraspinous soft tissue abnormalities identified.      NO Resp 20   Ht 6' (1.829 m)   Wt 194 lb 0.1 oz (88 kg)   SpO2 96%   BMI 26.31 kg/m²     Events yesterday noted   Awake  Alert attentive   Slower in verbal response   Following commands   gcs 4 3 6 13   Ct repeat  Noted     Sp sdh   Some reaccumulation sdh as expected   Pending urine c ns   oob pt ot   Transfer to floor

## 2019-05-29 NOTE — PROGRESS NOTES
Neurology Daily Progress Note  Name: Roger Florez  Age: 80 y.o. Gender: male  CodeStatus: Full Code  Allergies: Honey Bee Venom [Bee Venom]  Penicillins    Chief Complaint:Fall (on coumadin) and Head Laceration (posterior)    Primary Care Provider: Vidal Adams MD  InpatientTreatment Team: Treatment Team: Attending Provider: Forrest Squires MD; Consulting Physician: Philip Saunders MD; Surgeon: Forrest Squires MD; Consulting Physician: Solo Yung MD; Consulting Physician: Jessica Leo DO; Care Coordinator: Vel Nayak RN; Patient Care Tech: Laurie Solorzano; Hospitalist: Rashida Arshad MD; Registered Nurse: Lauren Solorio RN  Admission Date: 5/24/2019      HPI   Pt seen and examined for neuro follow up for bilat subdural hematoma s/p craniotomy with evacuation. Pt alert, mostly nonverbal. Not able to follow commands. Pt with dysphagia on puree diet and thickened liquids. Taking PO well. Incontinent of bowel and bladder. Scalp incision well approx and without drainage or erythema. Vitals:    05/29/19 0931   BP: (!) 140/77   Pulse: 62   Resp: 18   Temp: 98.6 °F (37 °C)   SpO2: 97%        Physical Exam   Constitutional: He appears well-nourished. No distress. HENT:   Head: Normocephalic and atraumatic. Eyes: Pupils are equal, round, and reactive to light. EOM are normal.   Neck: Neck supple. No JVD present. Cardiovascular: Normal rate and regular rhythm. Pulmonary/Chest: Effort normal and breath sounds normal. No respiratory distress. Abdominal: Soft. Bowel sounds are normal. He exhibits no distension. There is no tenderness. Musculoskeletal: He exhibits no edema. Neurological: He is alert. Aphasia  Dysphasia  Generalized weakness  Does not follow commands   Skin: Skin is warm and dry. He is not diaphoretic. Nursing note and vitals reviewed. Review of Systems   Unable to perform ROS: Patient nonverbal   Constitutional: Negative for appetite change and fever.    HENT: Positive for trouble swallowing. Negative for hearing loss. Respiratory: Negative for cough, shortness of breath and wheezing. Cardiovascular: Negative for leg swelling. Gastrointestinal: Negative for abdominal distention, constipation, diarrhea and vomiting. Genitourinary: Positive for difficulty urinating (incontinent). Musculoskeletal: Positive for gait problem. Skin: Negative for color change and rash. Neurological: Positive for speech difficulty and weakness (generalized). Negative for tremors, seizures, syncope and facial asymmetry. Psychiatric/Behavioral: Positive for confusion. Negative for agitation. The patient is not nervous/anxious. Medications:  Reviewed    Infusion Medications:   Scheduled Medications:    bacitracin zinc   Topical TID    ipratropium-albuterol  1 ampule Inhalation 4x Daily    levETIRAcetam  500 mg Oral BID    famotidine  20 mg Oral BID    lisinopril  5 mg Oral Daily    phytonadione (ADULT)  10 mg Subcutaneous Daily    pravastatin  40 mg Oral Nightly    diltiazem  120 mg Oral Daily    calcium-vitamin D   Oral Daily     PRN Meds: nitroGLYCERIN, ondansetron, morphine (PF), hydrALAZINE, labetalol    Labs:   Recent Labs     05/28/19  1630 05/28/19  1645   WBC 8.4  --    HGB 10.8* 11.0*   HCT 32.2*  --      --      Recent Labs     05/27/19  0456 05/28/19  1630 05/28/19  1645    136  --    K 4.1 3.9  --     104  --    CO2 26 22  --    BUN 25* 29*  --    CREATININE 0.78 0.79 1.0   CALCIUM 9.0 9.1  --      No results for input(s): AST, ALT, BILIDIR, BILITOT, ALKPHOS in the last 72 hours. Recent Labs     05/27/19  0456 05/28/19  0502 05/29/19  0453   INR 1.2 1.1 1.1     No results for input(s): CKTOTAL, TROPONINI in the last 72 hours.     Urinalysis:   Lab Results   Component Value Date    NITRU Negative 05/28/2019    WBCUA 20-50 05/28/2019    BACTERIA Negative 05/28/2019    RBCUA >100 05/28/2019    BLOODU LARGE 05/28/2019    SPECGRAV 1.019 05/28/2019    GLUCOSEU Negative 05/28/2019       Radiology:   Most recent    Chest CT      WITH CONTRAST:No results found for this or any previous visit. WITHOUT CONTRAST: No results found for this or any previous visit. CXR      2-view: No results found for this or any previous visit. Portable:   Results for orders placed during the hospital encounter of 05/24/19   XR CHEST PORTABLE    Narrative XR CHEST PORTABLE : 5/28/2019    CLINICAL HISTORY:  increasing SOB / restless . COMPARISON: 5/24/2019. A portable upright AP radiograph of the chest was obtained. FINDINGS:    A poor inspiratory volume is present with mild patchy infiltrates suggestive of edema. Atypical pneumonia should be excluded clinically. The heart is mildly enlarged with mild vascular congestion. 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 There is no dilated significant pleural effusion, pneumothorax, or displaced fractures identified. Impression POOR INSPIRATION WITH PROBABLE MILD CARDIAC DECOMPENSATION. ATYPICAL PNEUMONIA SHOULD BE EXCLUDED CLINICALLY. Echo No results found for this or any previous visit. Assessment/Plan:  Bilateral Subdural hematoma,  Evacuated/ drains out  Having more difficulty with speech  Has primary progressive aphasia  Awake,  still expressive aphasia baseline  Non focal  No seizures  Keep on keppra 500mg BID  Capo Livingston MD, Zoe Giang, American Board of Psychiatry & Neurology  Board Certified in Vascular Neurology  Board Certified in Neuromuscular Medicine  Certified in Neurorehabilitation       Collaborating physicians: Dr Little Livingston, Dr GIANCARLO Diaz, Dr Kamaljit Burkett    Electronically signed by KIMBERLY Petersen CNP on 5/29/2019 at 10:52 AM

## 2019-05-29 NOTE — FLOWSHEET NOTE
2011:  Assessment initiated. Incontinent of large amount of clear urine. Repositioned. Agitated and attempting to get out of bed. 2030:  Continues to attempt to get out of bed despite redirection .

## 2019-05-29 NOTE — PROGRESS NOTES
Physical Therapy Med Surg Daily Treatment Note  Facility/Department: [facility]  Room: Y244/S841-54       NAME: Dayanna Headley  : 1934 (80 y.o.)  MRN: [id number]  CODE STATUS: Full Code    Date of Service: 2019    Patient Diagnosis(es): Subdural hematoma ([address] [R81.6Z8J]  Subdural hematoma [facility]) [V52.8Y6G]   Chief Complaint   Patient presents with    Fall     on coumadin    Head Laceration     posterior     Patient Active Problem List    Diagnosis Date Noted    Prostate cancer ([address] 2019    Gait abnormality 2019    Dysphagia, oropharyngeal phase 2019    RBBB 2019    Aphasia 2019    Subdural hematoma ([address] 2019    Anatomical narrow angle, bilateral 2019    Closed compression fracture of L1 lumbar vertebra ([address] 2018    Facet arthropathy, lumbosacral 2018    Spondylolisthesis of lumbar region 2018    Memory loss 2018    Afib ([address] 2018    Prediabetes 2018    Long-term use of aspirin therapy 2017    Angina pectoris ([address] 2017    Benign prostatic hyperplasia with urinary frequency 2017    Gross hematuria 2017    Vasculogenic erectile dysfunction 2017    Long term current use of anticoagulant 10/01/2015    Vitamin D deficiency 2014    Essential hypertension 2012    Hyperlipidemia 2012    S/P CABG (coronary artery bypass graft) 2012    S/P PTCA (percutaneous transluminal coronary angioplasty) 2012    Coronary atherosclerosis 2012    SHAYY on CPAP 2012        Past Medical History:   Diagnosis Date    Chronic back pain     Hyperlipidemia     Hypertension      Past Surgical History:   Procedure Laterality Date    CARDIAC CATHETERIZATION      3 stents    CORONARY ARTERY BYPASS GRAFT      CRANIOTOMY Bilateral 2019    CRANIOTOMY HEMATOMA EVACUATION performed by Mauricio Cobb MD at [facility] Restrictions:  Restrictions/Precautions: Swallowing - Thickened Liquids, Seizure, Fall Risk(nectar)    SUBJECTIVE:  Subjective  Subjective: Pt able to answer mostly yes/no questions. Agreeable to tx. General Comment  Comments: R side weakness UE and LE; Occasionally Pt will repeat your last words said. Pre Pain Assessment:  Pre Treatment Pain Screening  Pain at present: 0  Intervention List: Patient able to continue with treatment          Post Pain Assessment:   Pain Assessment  Pain Level: 0       OBJECTIVE:         Bed mobility  Rolling to Left: Maximum assistance  Rolling to Right: Moderate assistance  Supine to Sit: Maximum assistance  Sit to Supine: Maximum assistance  Comment: Simple commands work best. \"sit up\"; hand over hand assist for task initiation and sequencing; Assist with R UE and LE    Transfers  Sit to Stand: Moderate Assistance  Stand to sit: Moderate Assistance  Comment: vc's for technique and hand placement; poor follow through; 2 hands on [employer] works best. FF posture at [employer], vc's and tactile cues required to correct; Slightly impulsive with decreased safety awareness, attempts to stand without device    Ambulation  Ambulation?: No(Pt unable to advance R LE; )    Neuromuscular Education  Neuromuscular Comments: Standing x 2 ~2 mins each; Standing with weight shifting; standing with fwd toe tap - Constant cueing required - pt attempts to lift leg; seated balance activity - fwd/bwd trunk bending, push/pulls with R UE; Shoulder rotation with B UE; lateral side bends     Exercises  Hip Flexion: x10  Knee Long Arc Quad: x10  Comments: AAROM with all therex - pt able to count to 10 without prompting x1                     ASSESSMENT:  Body structures, Functions, Activity limitations: Decreased functional mobility ; Decreased safe awareness;Decreased balance;Decreased coordination;Decreased vision/visual deficit; Decreased endurance;Decreased strength    Assessment: Slightly impulsive with standing. Wants to stand before [employer] in place. R sided weakness and neglect; Simple commands work best.     Activity Tolerance  Activity Tolerance: Patient Tolerated treatment well;Patient limited by endurance       Discharge Recommendations:  Continue to assess pending progress    Goals:  Short term goals  Short term goal 1: min assist with bed mobility  Short term goal 2: mod assist sit to stand  Short term goal 3:  mod assist ait 15 feet with ww +2  Short term goal 4: pt able to sit EOB with SBA +1 5 min while completing LE ex program    PLAN:   Plan  Times per week: 3-6  Current Treatment Recommendations: Strengthening, Transfer Training, Endurance Training, Neuromuscular Re-education, Patient/Caregiver Education & Training, Equipment Evaluation, Education, & procurement, Gait Training, Balance Training, Safety Education & Training, Functional Mobility Training  Plan Comment: Cont. POC  Safety Devices  Type of devices:  All fall risk precautions in place, Call light within reach, Left in bed, Sitter present     [facility] (6 CLICK) [address] Inpatient Mobility Raw Score : 9      Therapy Time   Individual   Time In 1512   Time Out 1555   Minutes 43      bm/Trsf - 20 mins  NMR - 15 mins  THerex 8 mins       Allan Parra PTA, 05/29/19 at 4:15 PM

## 2019-05-29 NOTE — PROGRESS NOTES
[facility]  Speech Language/Pathology  Dysphagia Therapy Note    Nikki Rose  5/29/2019    Medical Dx: Subdural hematoma ([facility] [address] [facility] [C50.8O0E]  Subdural hematoma ([facility] [address] [S06.5X9A]      Time: 7458-9470 Speech            3895-8262 Swallow      Pt being seen for : [] Dysphagia exercises  [] Oral Motor Exercises             [x] Therapeutic meal monitor  [x] Other: Receptive/Expressive Aphasia  Subjective:  Behavior: [x] Alert  [x] Cooperative  []  Pleasant  [] Confused  [] Agitated                                          [] Uncooperative  [x] Distractible [] Motivated  [] Self-Limiting [] Anxious         [] Other:    Endurance:  [] Adequate for participation in SLP sessions  [x] Reduced overall              [] Lethargic  [] Other:      Objective/Assessment:   Arrived to room, 1:1 present. Reported lunch went well, total feed, tolerated well. Pt ID pictures choice of 2 with 90% accuracy. Named pictures of common items with 40% accuracy; increased to 90% accuracy given carrier phrase. Counted 1-10 with initial cue 100% accuracy. Counted 11-20 with mod cues throughout. Stated OMAR with initial cue 100% accuracy, occasional repetition of days. After SLP left room to get communication board, 1:1 reported pt asked \"Can you bring her back? \"  Trialed simple picture communication board: ID picture named 1/5x. Named pictures with written cue 2/5x. To leave in room to trial.  1:1 reported pt verbally requested \"water\" earlier. Pt took small sip from cup independently with no overt s/s of aspiration on 3 oz. Rec: MBS to further assess swallow. Informed Gricelda REYNOLDS. Per neurology note, pt has primary progressive aphasia. [] Pt demonstrated no s/s of pain during this visit. Pt unable to indicate if in pain however appears in discomfort.     Safety Devices:  [x] Call light within reach [x] 1:1 in room       [x] Bed alarm activated    Plan:  [x] Continue as per plan of care  [] Prepare for Discharge

## 2019-05-29 NOTE — PROGRESS NOTES
Subjective: The patient complains of severe  acute on chronic cranial pain as well as cognitive deficits and balance deficits secondary to acute subdural hematoma relieved by recent decompressive surgery as well as Tylenol and exacerbated by bright light and loud sound. I am concerned about patients lack of social supports except for his girlfriend Kamilah. I am also concerned about his occasional agitation especially at night. His intracranial drains were recently taken out and he has had his therapeutic eval with physical therapy but we await occupational therapy and speech. I'm concerned about his slightly elevated temperature 99.5. Patient is on IV vancomycin. ROS x10: The patient also complains of severely impaired mobility and activities of daily living. Otherwise no new problems with vision, hearing, nose, mouth, throat, dermal, cardiovascular, GI, , pulmonary, musculoskeletal, psychiatric or neurological. See Rehab H&P on Rehab chart dated . Vital signs:  BP (!) 155/73   Pulse 71   Temp 99.5 °F (37.5 °C) (Oral)   Resp 20   Ht 6' (1.829 m)   Wt 194 lb 0.1 oz (88 kg)   SpO2 96%   BMI 26.31 kg/m²   I/O:   PO/Intake:    fair PO intake,  puréed with nectar thick liquids    Bowel/Bladder:  incontinent,  acute UTI-he is on IV vancomycin  General:  Patient is well developed, adequately nourished, non-obese and     well kempt. HEENT:    PERRLA, hearing intact to loud voice, external inspection of ear     and nose benign. Inspection of lips, tongue and gums benign  Musculoskeletal: No significant change in strength or tone. All joints stable. Inspection and palpation of digits and nails show no clubbing,       cyanosis or inflammatory conditions. Neuro/Psychiatric: Affect: flat but pleasant. Alert and oriented to person, place and     situation. No significant change in deep tendon reflexes or     sensation  Lungs:  Diminished, CTA-B.  Respiration effort is normal at rest.     Heart:   S1 = S2,   irreg. No loud murmurs. Abdomen:  Soft, non-tender, no enlargement of liver or spleen. Extremities:  No significant lower extremity edema or tenderness. Skin:    BUE bruises dt blood draws, no visualized or palpated problems. Rehabilitation:  Physical therapy: FIMS:  Bed Mobility:      Transfers: Sit to Stand: Moderate Assistance, Maximum Assistance, 2 Person Assistance  Stand to sit: Moderate Assistance, 2 Person Assistance, Maximum Assistance,  ,      FIMS:  ,  , Assessment: Pt attempting to initiate movement; repeat simple commands and hand over hand assist required for all mobility; slightly impulsive with decreased safety awareness.,    Occupational therapy: FIMS:   ,  , Assessment: pt is an 79 y/o M admitted with a subdural hematoma that presents with above deficits.   Pt will benefit from OT    Speech therapy: FIMS:        Lab/X-ray studies reviewed, analyzed and discussed with patient and staff:   Recent Results (from the past 24 hour(s))   CBC    Collection Time: 05/28/19  4:30 PM   Result Value Ref Range    WBC 8.4 4.8 - 10.8 K/uL    RBC 3.55 (L) 4.70 - 6.10 M/uL    Hemoglobin 10.8 (L) 14.0 - 18.0 g/dL    Hematocrit 32.2 (L) 42.0 - 52.0 %    MCV 90.7 80.0 - 100.0 fL    MCH 30.5 27.0 - 31.3 pg    MCHC 33.6 33.0 - 37.0 %    RDW 13.6 11.5 - 14.5 %    Platelets 335 036 - 724 K/uL   Basic Metabolic Panel    Collection Time: 05/28/19  4:30 PM   Result Value Ref Range    Sodium 136 135 - 144 mEq/L    Potassium 3.9 3.4 - 4.9 mEq/L    Chloride 104 95 - 107 mEq/L    CO2 22 20 - 31 mEq/L    Anion Gap 10 9 - 15 mEq/L    Glucose 147 (H) 70 - 99 mg/dL    BUN 29 (H) 8 - 23 mg/dL    CREATININE 0.79 0.70 - 1.20 mg/dL    GFR Non-African American >60.0 >60    GFR  >60.0 >60    Calcium 9.1 8.5 - 9.9 mg/dL   EKG 12 Lead    Collection Time: 05/28/19  4:35 PM   Result Value Ref Range    Ventricular Rate 85 BPM    Atrial Rate 85 BPM    P-R Interval 146 ms    QRS Duration 146 ms    Q-T Interval 394 ms    QTc Calculation (Bazett) 468 ms    P Axis 33 degrees    R Axis -19 degrees    T Axis 4 degrees   POCT Arterial    Collection Time: 05/28/19  4:45 PM   Result Value Ref Range    POC Sodium 141 136 - 145 mEq/L    POC Potassium 3.8 3.5 - 5.1 mEq/L    POC Chloride 105 99 - 110 mEq/L    POC Glucose 148 (H) 60 - 115 mg/dl    POC Creatinine 1.0 0.8 - 1.3 mg/dL    GFR Non-African American >60 >60    GFR African American >60 >60    Calcium, Ion 1.28 1.12 - 1.32 mmol/L    pH, Arterial 7.459 (H) 7.350 - 7.450    pCO2, Arterial 36 35 - 45 mm Hg    pO2, Arterial 51 (HH) 75 - 108 mm Hg    HCO3, Arterial 25.4 21.0 - 29.0 mmol/L    Base Excess, Arterial 2 -3 - 3    O2 Sat, Arterial 88 (LL) 93 - 100 %    TCO2, Arterial 27 22 - 29    Lactate 0.78 0.40 - 2.00 mmol/L    POC Hematocrit 32 (L) 41 - 53 %    Hemoglobin 11.0 (L) 13.5 - 17.5 gm/dL    FIO2 3.000     Sample Type ART     Performed on SEE BELOW    Urinalysis    Collection Time: 05/28/19  4:52 PM   Result Value Ref Range    Color, UA RED (A) Straw/Yellow    Clarity, UA CLOUDY (A) Clear    Glucose, Ur Negative Negative mg/dL    Bilirubin Urine Negative Negative    Ketones, Urine Negative Negative mg/dL    Specific Gravity, UA 1.019 1.005 - 1.030    Blood, Urine LARGE (A) Negative    pH, UA 7.0 5.0 - 9.0    Protein,  (A) Negative mg/dL    Urobilinogen, Urine 1.0 <2.0 E.U./dL    Nitrite, Urine Negative Negative    Leukocyte Esterase, Urine MODERATE (A) Negative   Microscopic Urinalysis    Collection Time: 05/28/19  4:52 PM   Result Value Ref Range    Renal Epithelial, Urine 0-2 (A) /HPF    Bacteria, UA Negative /HPF    WBC, UA 20-50 (A) 0 - 5 /HPF    RBC, UA >100 (H) 0 - 5 /HPF    Epi Cells 6-10 0 - 5 /HPF   Protime-INR    Collection Time: 05/29/19  4:53 AM   Result Value Ref Range    Protime 11.5 9.0 - 11.5 sec    INR 1.1    APTT    Collection Time: 05/29/19  4:53 AM   Result Value Ref Range    aPTT 30.3 21.6 - 35.4 sec       Previous extensive, complex labs, notes and diagnostics reviewed and analyzed. ALLERGIES:    Allergies as of 05/24/2019 - Review Complete 05/24/2019   Allergen Reaction Noted    Honey bee venom [bee venom] Swelling 10/08/2015    Penicillins Swelling 10/08/2015      (please also verify by checking [facility])     Complex Physical Medicine & Rehab Issues Assess & Plan:   1. Severe abnormality of gait and mobility and impaired self-care and ADL's secondary to progressive  SDH . Functional and medical status reassessed regarding patients ability to participate in therapies and patient found to be able to participate in  acute intensive comprehensive inpatient rehabilitation program including PT/OT to improve balance, ambulation, ADLs, and to improve the P/AROM. 2. Bowel and Bladder dysfunction: Incontinent of bladder check post void residual treat UTI frequent toileting, ambulate to bathroom with assistance, check post void residuals. Check for C.difficile x1 if >2 loose stools in 24 hours, continue bowel & bladder program.     3. Severe postsurgery headache pain generalized OA pain: reassess pain every shift and prior to and after each therapy session, give prn morphine IV Tylenol, modalities prn in therapy, consider Lidoderm, K-pad prn.   4. Skin healing craniotomy and drain site incisions breakdown risk:  continue pressure relief program.  Daily skin exams and reports from nursing. 5. Oral pharyngeal dysphasia-consult speech-language pathology continue puréed foods with nectar thick liquids  6. Complex discharge planning:    Await full therapeutic evaluation and medical stability and patient and family per for acute rehab at [facility].  Patient is noted to have an recent acute UTI and urinary incontinence I am suggesting that he have a postvoid residual check. Patient is on vancomycin IV. Complex Active General Medical Issues that complicate care Assess & Plan:     1.  Active Problems:    Afib (HCC)    Subdural hematoma [facility])    Coronary atherosclerosis    Essential hypertension    Hyperlipidemia    Long term current use of anticoagulant    SHAYY on CPAP    Spondylolisthesis of lumbar region    Gait abnormality    Dysphagia, oropharyngeal phase    RBBB    Aphasia  Resolved Problems:    * No resolved hospital problems.  Haylee Dominguez D.O., PM&R     Attending    [address]

## 2019-05-30 ENCOUNTER — APPOINTMENT (OUTPATIENT)
Dept: GENERAL RADIOLOGY | Age: 84
DRG: 026 | End: 2019-05-30
Payer: MEDICARE

## 2019-05-30 PROBLEM — M54.9 CHRONIC BACK PAIN: Status: ACTIVE | Noted: 2019-05-30

## 2019-05-30 PROBLEM — I25.10 CAD (CORONARY ARTERY DISEASE): Status: ACTIVE | Noted: 2019-05-30

## 2019-05-30 PROBLEM — G89.29 CHRONIC BACK PAIN: Status: ACTIVE | Noted: 2019-05-30

## 2019-05-30 LAB
ANION GAP SERPL CALCULATED.3IONS-SCNC: 12 MEQ/L (ref 9–15)
APTT: 21.3 SEC (ref 21.6–35.4)
BASOPHILS ABSOLUTE: 0 K/UL (ref 0–0.2)
BASOPHILS RELATIVE PERCENT: 0.3 %
BUN BLDV-MCNC: 26 MG/DL (ref 8–23)
CALCIUM SERPL-MCNC: 9.3 MG/DL (ref 8.5–9.9)
CHLORIDE BLD-SCNC: 105 MEQ/L (ref 95–107)
CO2: 23 MEQ/L (ref 20–31)
CREAT SERPL-MCNC: 0.85 MG/DL (ref 0.7–1.2)
EOSINOPHILS ABSOLUTE: 0.1 K/UL (ref 0–0.7)
EOSINOPHILS RELATIVE PERCENT: 2.1 %
GFR AFRICAN AMERICAN: >60
GFR NON-AFRICAN AMERICAN: >60
GLUCOSE BLD-MCNC: 116 MG/DL (ref 70–99)
HCT VFR BLD CALC: 34.7 % (ref 42–52)
HEMOGLOBIN: 11.7 G/DL (ref 14–18)
INR BLD: 1.1
LYMPHOCYTES ABSOLUTE: 0.9 K/UL (ref 1–4.8)
LYMPHOCYTES RELATIVE PERCENT: 12.7 %
MCH RBC QN AUTO: 30.4 PG (ref 27–31.3)
MCHC RBC AUTO-ENTMCNC: 33.6 % (ref 33–37)
MCV RBC AUTO: 90.6 FL (ref 80–100)
MONOCYTES ABSOLUTE: 1.2 K/UL (ref 0.2–0.8)
MONOCYTES RELATIVE PERCENT: 17.5 %
NEUTROPHILS ABSOLUTE: 4.7 K/UL (ref 1.4–6.5)
NEUTROPHILS RELATIVE PERCENT: 67.4 %
PDW BLD-RTO: 13.7 % (ref 11.5–14.5)
PLATELET # BLD: 236 K/UL (ref 130–400)
POTASSIUM REFLEX MAGNESIUM: 4 MEQ/L (ref 3.4–4.9)
PROTHROMBIN TIME: 11.2 SEC (ref 9–11.5)
RBC # BLD: 3.83 M/UL (ref 4.7–6.1)
SODIUM BLD-SCNC: 140 MEQ/L (ref 135–144)
URINE CULTURE, ROUTINE: NORMAL
WBC # BLD: 7 K/UL (ref 4.8–10.8)

## 2019-05-30 PROCEDURE — 92611 MOTION FLUOROSCOPY/SWALLOW: CPT

## 2019-05-30 PROCEDURE — 80048 BASIC METABOLIC PNL TOTAL CA: CPT

## 2019-05-30 PROCEDURE — 1210000000 HC MED SURG R&B

## 2019-05-30 PROCEDURE — 85025 COMPLETE CBC W/AUTO DIFF WBC: CPT

## 2019-05-30 PROCEDURE — 6370000000 HC RX 637 (ALT 250 FOR IP): Performed by: NEUROLOGICAL SURGERY

## 2019-05-30 PROCEDURE — 2500000003 HC RX 250 WO HCPCS: Performed by: NURSE PRACTITIONER

## 2019-05-30 PROCEDURE — 2700000000 HC OXYGEN THERAPY PER DAY

## 2019-05-30 PROCEDURE — 71046 X-RAY EXAM CHEST 2 VIEWS: CPT

## 2019-05-30 PROCEDURE — 85610 PROTHROMBIN TIME: CPT

## 2019-05-30 PROCEDURE — 99232 SBSQ HOSP IP/OBS MODERATE 35: CPT | Performed by: PHYSICAL MEDICINE & REHABILITATION

## 2019-05-30 PROCEDURE — 74230 X-RAY XM SWLNG FUNCJ C+: CPT

## 2019-05-30 PROCEDURE — 6370000000 HC RX 637 (ALT 250 FOR IP): Performed by: INTERNAL MEDICINE

## 2019-05-30 PROCEDURE — 36415 COLL VENOUS BLD VENIPUNCTURE: CPT

## 2019-05-30 PROCEDURE — 85730 THROMBOPLASTIN TIME PARTIAL: CPT

## 2019-05-30 RX ADMIN — FAMOTIDINE 20 MG: 20 TABLET ORAL at 09:58

## 2019-05-30 RX ADMIN — BARIUM SULFATE 80 ML: 400 SUSPENSION ORAL at 15:08

## 2019-05-30 RX ADMIN — LISINOPRIL 5 MG: 5 TABLET ORAL at 09:58

## 2019-05-30 RX ADMIN — OYSTER SHELL CALCIUM WITH VITAMIN D 1 TABLET: 500; 200 TABLET, FILM COATED ORAL at 09:58

## 2019-05-30 RX ADMIN — FAMOTIDINE 20 MG: 20 TABLET ORAL at 22:01

## 2019-05-30 RX ADMIN — PRAVASTATIN SODIUM 40 MG: 40 TABLET ORAL at 22:01

## 2019-05-30 RX ADMIN — DILTIAZEM HYDROCHLORIDE 120 MG: 120 CAPSULE, COATED, EXTENDED RELEASE ORAL at 22:01

## 2019-05-30 RX ADMIN — BARIUM SULFATE 50 G: 0.81 POWDER, FOR SUSPENSION ORAL at 15:09

## 2019-05-30 RX ADMIN — BACITRACIN ZINC 1 G: 500 OINTMENT TOPICAL at 09:58

## 2019-05-30 RX ADMIN — BACITRACIN ZINC 1 G: 500 OINTMENT TOPICAL at 22:01

## 2019-05-30 RX ADMIN — LEVETIRACETAM 500 MG: 500 TABLET ORAL at 09:58

## 2019-05-30 RX ADMIN — LEVETIRACETAM 500 MG: 500 TABLET ORAL at 22:01

## 2019-05-30 ASSESSMENT — ENCOUNTER SYMPTOMS
NAUSEA: 0
TROUBLE SWALLOWING: 1
COUGH: 0
WHEEZING: 0
COLOR CHANGE: 0
VOMITING: 0
SHORTNESS OF BREATH: 1

## 2019-05-30 NOTE — CARE COORDINATION
5/30/19 HELEN LOPRESTI RN GAVE ME A REHAB ROOM NUMBER . I CALLED HER BACK AND LEFT HER A VM THAT THE DOCTORS ARE STILL TWEEKING BP MEDS AND WATCHING LOW GRADE TEMP 99'S AND ORDERED LAB WORK AND CXR.

## 2019-05-30 NOTE — PROGRESS NOTES
Neurology Follow up    SUBJECTIVE:  NO Headache, all commands  But today having some difficulty  Severe aphasia  PHYSICAL EXAM:    BP (!) 152/78   Pulse 72   Temp 98.1 °F (36.7 °C) (Oral)   Resp 18   Ht 6' (1.829 m)   Wt 194 lb 0.1 oz (88 kg)   SpO2 98%   BMI 26.31 kg/m²     General Appearance:      Mental Status Exam:             Level of Alertness:   awake            Orientation:   None as cannot speak wellFunduscopic Exam:     Cranial Nerves            Cranial nerve III           Pupils:  equal, round, reactive to light      Cranial nerves III, IV, VI           Extraocular Movements: intact      Cranial nerve V           Facial sensation:  intact        Motor:    Drift:  absent  Motor exam is symmetrical 5 out of 5 all extremities bilaterally  Tone:  normal  Abnormal Movements:  absent            Sensory:        Pinprick             Right Upper Extremity:  normal             Left Upper Extremity:  normal             Right Lower Extremity:  normal             Left Lower Extremity:  normal           Vibration                         Touch            Proprioception                 Coordination:           Finger/Nose   Right:  normal              Left:  normal          Heel-Knee-Shin                Right:  normal              Left:  normal          Rapid Alternating Movements              Right:  normal              Left:  normal          Gait:                       Casual:   Not tested                       Romberg: Reflexes:             Deep Tendon Reflexes:             Reflexes are 2 +             Plantar response:                Right:  downgoing               Left:  downgoing    Vascular:  Cardiac Exam:  normal         Ct Head Wo Contrast    Result Date: 5/25/2019  CT HEAD WO CONTRAST : 5/25/2019 CLINICAL HISTORY:  INTRACRANIAL HEMORRHAGE . Recent subdural hematoma drainage surgery. COMPARISON: Earlier 5/25/2019.  TECHNIQUE: Spiral unenhanced images were obtained of the head, with routine reconstructions performed. All CT scans at this facility use dose modulation, iterative reconstruction, and/or weight based dosing when appropriate to reduce radiation dose to as low as reasonably achievable. FINDINGS: Since the prior study, bilateral superior parietal craniotomies and subdural drainage catheters have been placed, which are otherwise unremarkable. There is pneumocephaly and significantly decreasing-sized heterogeneous subdural hematomas bilaterally, which are overall mildly decreased in size and mass effect from the earlier study. There is approximately 2 to 3 mm of left-to-right midline shift, without herniation, hydrocephalus, or other significant changes identified. INTERVAL BILATERAL CRANIOTOMIES AND SUBDURAL HEMATOMA DRAINAGE CATHETER PLACEMENT SURGERY. PNEUMOCEPHALY AND SIGNIFICANTLY DECREASING-SIZED HETEROGENEOUS SUBDURAL HEMATOMAS. APPROXIMATELY 2 TO 3 MM OF LEFT-TO-RIGHT MIDLINE SHIFT WITHOUT HERNIATION, HYDROCEPHALUS, OR OTHER SIGNIFICANT CHANGES IDENTIFIED. Ct Head Wo Contrast    Result Date: 5/25/2019  CT HEAD WO CONTRAST : 5/25/2019 CLINICAL HISTORY: CEREBRAL HEMORRHAGE. COMPARISON: 5/24/2019. TECHNIQUE: ROUTINE. All CT scans at this facility use dose modulation, iterative reconstruction, and/or weight based dosing when appropriate to reduce radiation dose to as low as reasonably achievable. FINDINGS: Motion artifact is present on the initial scan, which was repeated. Acute on chronic subdural hematomas overlying both cerebral convexities appear stable, measuring approximately 2.2 cm in thickness on the right, and 1.8 cm in maximum thickness, on the coronal reconstructions. There is no significant midline shift, herniation, or other significant changes identified. STABLE APPROXIMATELY 2.2 CM RIGHT AND 1.8 CM LEFT SUBDURAL HEMATOMA. NO OTHER SIGNIFICANT CHANGE FROM EARLIER YESTERDAY IDENTIFIED.      Ct Head Wo Contrast    Result Date: 5/25/2019  CT HEAD WO CONTRAST : 5/24/2019 9:15 PM CLINICAL HISTORY: CEREBRAL HEMORRHAGE. COMPARISON: Earlier 5/24/2019. TECHNIQUE: ROUTINE. All CT scans at this facility use dose modulation, iterative reconstruction, and/or weight based dosing when appropriate to reduce radiation dose to as low as reasonably achievable. FINDINGS: Motion artifact is present on the initial scan, which was repeated. 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 The left subdural hematoma again measures approximately 1.8 cm in maximum thickness, on the coronal reconstructions. There is no significant midline shift, herniation, or other significant changes identified. SLIGHTLY ENLARGING APPROXIMATELY 2.2 CM RIGHT SUBDURAL HEMATOMA AND STABLE APPROXIMATELY 1.8 CM LEFT SUBDURAL HEMATOMA. NO OTHER SIGNIFICANT CHANGE FROM EARLIER 5/24/2019 IDENTIFIED. Ct Head Wo Contrast    Result Date: 5/24/2019  CT HEAD WO CONTRAST : 5/24/2019 CLINICAL HISTORY: head injury s/p fall . COMPARISON: None available. TECHNIQUE: ROUTINE. All CT scans at this facility use dose modulation, iterative reconstruction, and/or weight based dosing when appropriate to reduce radiation dose to as low as reasonably achievable. FINDINGS: Heterogeneously dense subdural collections overlying the superior aspects of both cerebral hemispheres measuring up to 1.8 cm in thickness, are consistent with acute on chronic subdural hematomas. There is mass effect and mild crowding of the sulci, without midline shift, hydrocephalus or herniation. There is no parenchymal, subarachnoid, or intraventricular hemorrhage, evidence of a recent ischemic cortical infarct, skull fracture, or other complication identified. UP TO 1.8 CM ACUTE CHRONIC SUBDURAL HEMATOMAS OVER BOTH CEREBRAL CONVEXITIES, AS DESCRIBED.   The findings were discussed with Dr. La Nena Ramirez shortly iterative reconstruction, and/or weight based dosing when appropriate to reduce radiation dose to as low as reasonably achievable. Xr Chest Portable    Result Date: 5/25/2019  XR CHEST PORTABLE : 5/24/2019 CLINICAL HISTORY:  Syncope . COMPARISON: CT abdomen pelvis 10/3/2018. A portable upright AP radiograph of the chest was obtained. FINDINGS: A poor inspiratory volume is present with mild probable bibasilar atelectasis. There is no cardiomegaly, significant pleural effusion, vascular congestion, pneumothorax, or displaced fractures identified. 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 POOR INSPIRATION WITH MILD PROBABLE BIBASILAR ATELECTASIS. Recent Labs     05/24/19  1622 05/25/19  0711   WBC 9.8 6.7   HGB 14.3 11.2*    143     Recent Labs     05/24/19  1622 05/25/19  0711 05/26/19  0447    143 142   K 4.2 3.9 3.9   * 107 105   CO2 21 25 26   BUN 29* 21 23   CREATININE 0.69* 0.75 0.89   GLUCOSE 184* 107* 148*     Recent Labs     05/24/19  1622   BILITOT 0.7   ALKPHOS 53   AST 24   ALT 17     Lab Results   Component Value Date    PROTIME 11.4 05/26/2019    PROTIME 24.1 10/08/2018    INR 1.1 05/26/2019     No results found for: LITHIUM, DILFRTOT, VALPROATE    ASSESSMENT AND PLAN    Bilateral Subdural hematoma,  Evacuated/ drains out  Having more difficulty with speech  No clinical change  Has primary progressive aphasia  Awake,  All commands/ still expressive aphasia baseline  Non focal  No seizures  Keep on keppra  Draining bilateral, right more the left    Capohector Acosta MD, Melba Nichols, American Board of Psychiatry & Neurology  Board Certified in Vascular Neurology  Board Certified in Neuromuscular Medicine  Certified in . [place] [address]

## 2019-05-30 NOTE — PROGRESS NOTES
Neurology Daily Progress Note  Name: Sandra Santos  Age: 80 y.o. Gender: male  CodeStatus: Full Code  Allergies: Honey Bee Venom [Bee Venom]  Penicillins    Chief Complaint:Fall (on coumadin) and Head Laceration (posterior)    Primary Care Provider: Lissa Smallwood MD  InpatientTreatment Team: Treatment Team: Attending Provider: Kenneth Strauss MD; Consulting Physician: Zaheer Ta MD; Surgeon: Kenneth Strauss MD; Consulting Physician: Sloane Faith DO; Hospitalist: Hardik Lopez MD; : Rina Nunn, RN; Registered Nurse: Eileen Junior RN  Admission Date: 5/24/2019      HPI   Pt seen and examined for neuro follow up for bilat SDH s/p craniotomy with evacuation. Pt with dysarthria at baseline. Alert, mostly nonverbal. Pt with low grade temp of  x2 days. He is slightly tachypneic today with RR in the 30s. No new foal neuro deficits. Scalp incision well approx, no erythema or drainage. Vitals:    05/30/19 0643   BP: (!) 163/88   Pulse: 76   Resp:    Temp: 98.2 °F (36.8 °C)   SpO2: 98%        Physical Exam   Constitutional: No distress. HENT:   Mouth/Throat: Oropharynx is clear and moist.   Scalp incision, well approx, no erythema, no drainage   Eyes: Pupils are equal, round, and reactive to light. Neck: Neck supple. No JVD present. Cardiovascular: Normal rate and regular rhythm. Pulmonary/Chest: Breath sounds normal. No respiratory distress. Tachypneic, 30s   Abdominal: Soft. Bowel sounds are normal. He exhibits no distension. Musculoskeletal: He exhibits no edema. Neurological: He is alert. Pt with aphasia, mostly nonverbal, does not follow commands   Skin: Skin is warm and dry. He is not diaphoretic. Review of Systems   Unable to perform ROS: Patient nonverbal   Constitutional: Positive for fever (low grade). HENT: Positive for trouble swallowing. Negative for hearing loss. Respiratory: Positive for shortness of breath. Negative for cough and wheezing. Cardiovascular: Negative for leg swelling. Gastrointestinal: Negative for nausea and vomiting. Musculoskeletal: Positive for gait problem. Skin: Negative for color change and rash. Neurological: Positive for speech difficulty and weakness (generalized). Negative for tremors, seizures and facial asymmetry. Psychiatric/Behavioral: Positive for confusion. Negative for agitation. The patient is not nervous/anxious. Medications:  Reviewed    Infusion Medications:   Scheduled Medications:    bacitracin zinc   Topical TID    levETIRAcetam  500 mg Oral BID    famotidine  20 mg Oral BID    lisinopril  5 mg Oral Daily    phytonadione (ADULT)  10 mg Subcutaneous Daily    pravastatin  40 mg Oral Nightly    diltiazem  120 mg Oral Daily    calcium-vitamin D   Oral Daily     PRN Meds: ipratropium-albuterol, nitroGLYCERIN, ondansetron, morphine (PF), hydrALAZINE, labetalol    Labs:   Recent Labs     05/28/19  1630 05/28/19  1645   WBC 8.4  --    HGB 10.8* 11.0*   HCT 32.2*  --      --      Recent Labs     05/28/19  1630 05/28/19  1645     --    K 3.9  --      --    CO2 22  --    BUN 29*  --    CREATININE 0.79 1.0   CALCIUM 9.1  --      No results for input(s): AST, ALT, BILIDIR, BILITOT, ALKPHOS in the last 72 hours. Recent Labs     05/28/19  0502 05/29/19  0453 05/30/19  0520   INR 1.1 1.1 1.1     No results for input(s): Susan Fall in the last 72 hours. Urinalysis:   Lab Results   Component Value Date    NITRU Negative 05/28/2019    WBCUA 20-50 05/28/2019    BACTERIA Negative 05/28/2019    RBCUA >100 05/28/2019    BLOODU LARGE 05/28/2019    SPECGRAV 1.019 05/28/2019    GLUCOSEU Negative 05/28/2019       Radiology:   Most recent    Chest CT      WITH CONTRAST:No results found for this or any previous visit. WITHOUT CONTRAST: No results found for this or any previous visit. CXR      2-view: No results found for this or any previous visit.      Portable:   Results for orders placed during the hospital encounter of 05/24/19   XR CHEST PORTABLE    Narrative XR CHEST PORTABLE : 5/28/2019    CLINICAL HISTORY:  increasing SOB / restless . COMPARISON: 5/24/2019. A portable upright AP radiograph of the chest was obtained. FINDINGS:    A poor inspiratory volume is present with mild patchy infiltrates suggestive of edema. Atypical pneumonia should be excluded clinically. The heart is mildly enlarged with mild vascular congestion. 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 There is no dilated significant pleural effusion, pneumothorax, or displaced fractures identified. Impression POOR INSPIRATION WITH PROBABLE MILD CARDIAC DECOMPENSATION. ATYPICAL PNEUMONIA SHOULD BE EXCLUDED CLINICALLY. Echo No results found for this or any previous visit. Assessment/Plan:    Bilateral Subdural hematoma,  Evacuated/ drains out  Having more difficulty with speech  No clinical change  Has primary progressive aphasia  Awake,  All commands/ still expressive aphasia baseline  Non focal  No seizures  Keep on keppra  Dysphagia, puree with nectar thick, ST, MBS today  Low grade temp x 2 days. CBC, BMP, repeat CXR    [facility] DEAN Raya MD, Vivek Perez American Board of Psychiatry & Neurology  Board Certified in Vascular Neurology  Board Certified in Neuromuscular Medicine  Certified in Neurorehabilitation           Collaborating physicians: Dr Purvi Raya, Dr GIANCARLO Alarcon, Dr Jayme Adams    Electronically signed by KIMBERLY Price CNP on 5/30/2019 at 9:28 AM

## 2019-05-30 NOTE — CONSULTS
Department of Internal Medicine  General Internal Medicine  Attending Progress Note      SUBJECTIVE:    Patient able to engage in simple conversation. Denied fever and chills. OBJECTIVE      Medications    Current Facility-Administered Medications: ipratropium-albuterol (DUONEB) nebulizer solution 1 ampule, 1 ampule, Inhalation, Q4H PRN  bacitracin zinc ointment, , Topical, TID  levETIRAcetam (KEPPRA) tablet 500 mg, 500 mg, Oral, BID  famotidine (PEPCID) tablet 20 mg, 20 mg, Oral, BID  lisinopril (PRINIVIL;ZESTRIL) tablet 5 mg, 5 mg, Oral, Daily  nitroGLYCERIN (NITROSTAT) SL tablet 0.4 mg, 0.4 mg, Sublingual, Q5 Min PRN  ondansetron (ZOFRAN) injection 4 mg, 4 mg, Intravenous, Q6H PRN  morphine (PF) injection 4 mg, 4 mg, Intravenous, Q3H PRN  phytonadione ADULT (VITAMIN K) inj 10 mg/mL, 10 mg, Subcutaneous, Daily  pravastatin (PRAVACHOL) tablet 40 mg, 40 mg, Oral, Nightly  diltiazem (CARDIZEM CD) extended release capsule 120 mg, 120 mg, Oral, Daily  calcium-vitamin D 500-200 MG-UNIT per tablet, , Oral, Daily  hydrALAZINE (APRESOLINE) injection 10 mg, 10 mg, Intravenous, Q1H PRN  labetalol (NORMODYNE;TRANDATE) injection syringe 10 mg, 10 mg, Intravenous, Q1H PRN  Physical    VITALS:  BP (!) 163/88   Pulse 76   Temp 98.2 °F (36.8 °C) (Oral)   Resp 18   Ht 6' (1.829 m)   Wt 194 lb 0.1 oz (88 kg)   SpO2 98%   BMI 26.31 kg/m²   CONSTITUTIONAL:  awake, alert, cooperative, no apparent distress, and appears stated age  EYES:  Lids and lashes normal, pupils equal, round and reactive to light, extra ocular muscles intact, sclera clear, conjunctiva normal  ENT:  Normocephalic, without obvious abnormality, atraumatic, sinuses nontender on palpation, external ears without lesions, oral pharynx with moist mucus membranes, tonsils without erythema or exudates, gums normal and good dentition.   NECK:  Supple, symmetrical, trachea midline, no adenopathy, thyroid symmetric, not enlarged and no tenderness, skin normal  LUNGS:  No increased work of breathing, good air exchange, clear to auscultation bilaterally, no crackles or wheezing  CARDIOVASCULAR:  Normal apical impulse, regular rate and rhythm, normal S1 and S2, no S3 or S4, and no murmur noted  ABDOMEN:  No scars, normal bowel sounds, soft, non-distended, non-tender, no masses palpated, no hepatosplenomegally  MUSCULOSKELETAL:  There is no redness, warmth, or swelling of the joints. Full range of motion noted. Motor strength is 5 out of 5 all extremities bilaterally. Tone is normal.  SKIN:  Surgical incision Healing well and no bruising or bleeding  Data    CBC:   Lab Results   Component Value Date    WBC 7.0 05/30/2019    RBC 3.83 05/30/2019    HGB 11.7 05/30/2019    HCT 34.7 05/30/2019    MCV 90.6 05/30/2019    MCH 30.4 05/30/2019    MCHC 33.6 05/30/2019    RDW 13.7 05/30/2019     05/30/2019     BMP:    Lab Results   Component Value Date     05/30/2019    K 4.0 05/30/2019     05/30/2019    CO2 23 05/30/2019    BUN 26 05/30/2019    LABALBU 3.3 05/24/2019    CREATININE 0.85 05/30/2019    CALCIUM 9.3 05/30/2019    GFRAA >60.0 05/30/2019    LABGLOM >60.0 05/30/2019    GLUCOSE 116 05/30/2019       ASSESSMENT AND PLAN         Subdural hematoma ([facility] [address] (5/24/2019)  Per Neuro surgery    2. Hypertension Essential:  Not well controlled  Will like to increase BP(lisinopri) Medication dose if okay with primary  Continue to monitor    3. Hyperlipidemia (11/7/2012)  Statin       Dysphagia, oropharyngeal phase (5/27/2019)  Diet per speech recommendation  Aspiration precautions     Aphasia (5/27/2019)  Speech therapy at discharge    Discharge planning, but will target better BP control.

## 2019-05-30 NOTE — FLOWSHEET NOTE
Pt has been calm and cooperative throughout the night. Call light is within reach and will continue to monitor behavior.

## 2019-05-30 NOTE — PROCEDURES
SPEECH/LANGUAGE PATHOLOGY  VIDEOFLUOROSCOPIC STUDY OF SWALLOWING (MBS)      PATIENT NAME:  Cristo Jensen      :  1934    [id number]   TODAY'S DATE:  2019    Time in: 1450    Time out: 1505    [x]The admitting diagnosis and active problem list, as listed below have been reviewed prior to initiation of this evaluation.      ADMITTING DIAGNOSIS: Subdural hematoma (HCC) [S06.5X9A]  Subdural hematoma ([address] [S06.5X9A]     ACTIVE PROBLEM LIST:   Patient Active Problem List   Diagnosis    Afib ([address]    Subdural hematoma (HCC)    Anatomical narrow angle, bilateral    Angina pectoris (HCC)    Benign prostatic hyperplasia with urinary frequency    Closed compression fracture of L1 lumbar vertebra (HCC)    Coronary atherosclerosis    Essential hypertension    Facet arthropathy, lumbosacral    Gross hematuria    Hyperlipidemia    Long term current use of anticoagulant    Long-term use of aspirin therapy    Memory loss    SHAYY on CPAP    Prediabetes    Prostate cancer (HCC)    S/P CABG (coronary artery bypass graft)    S/P PTCA (percutaneous transluminal coronary angioplasty)    Spondylolisthesis of lumbar region    Vasculogenic erectile dysfunction    Vitamin D deficiency    Gait abnormality    Dysphagia, oropharyngeal phase    RBBB    Aphasia       Allergies   Allergen Reactions    Honey Bee Venom [Bee Venom] Swelling    Penicillins Swelling       SUMMARY OF EVALUATION  DYSPHAGIA DIAGNOSIS:  Moderate oral dysphagia, mild pharyngeal dysphagia    DIET RECOMMENDATIONS: Puree/Dysphagia I with Thin liquids      FEEDING RECOMMENDATIONS:     [x] Set up and assist required     [x] Supervision with all PO intake             [x] Feed in upright position   [x] Small bites/sips   [x]  Straw ok with small sip        [x] Administer meds ([x] whole   [] crushed) with: [] Water [x] Applesauce      THERAPY RECOMMENDATIONS:   []  Therapy is not recommended     [x]  Therapy is recommended to:      [x] Assess tolerance of recommended diet   [x]  Improve oral motor strength and range of motion    [x]  Improve tongue base retraction     []  Improve laryngeal strength and range of motion      []  Address cricopharyngeal dysfunction (Shaker Exercises)                               []  Mealtime assessment of patient's tolerance of prescribed diet     [x] Grace Guerrier RN notified     OBJECTIVE ASSESSMENT    Current respiratory status:    [x] Room Air   [] Nasal cannula [] O2 mask           []  Non-rebreather mask  []  Tracheostomy  []  Vent dependent    Vocal quality prior to PO intake:  []  WFL  [x]  Weak  []  Wet    Cognitive status:    []  Alert    [x]  Lethargic  [] Functional   [x] Confused  [x] Aphasic   [x] Dysarthric   [] Impulsive   [x] Cognitive Deficits                 DIET LEVEL PRIOR TO THIS EVALUATION/PRIOR LEVEL OF FUNCTION :     DIET GENERAL; Dysphagia I Pureed; Nectar Thick    PROCEDURE   Patient positioning: Seated 70-90°  Viewing Plane(s): LATERAL ONLY  Contrast: commercially prepared, non-standardized barium viscosities; graduated from thin liquid to pudding consistency. Administered via tsp (5 ml boluses), by cup or straw in single and sequentially swallowed boluses as tolerated. Solid evaluated with 1/2 kaity cracker/3 ml barium pudding coated as tolerated. Consistencies Administered During the Evaluation   Liquids:     [x]Thin      [x]Nectar        []Honey   Solids:        [x]Pureed/Pudding           [x]Soft Solid/mixed fruit       [x]Cookie  Other:       Method of Intake:     [x]Self Fed       [x]Fed by Clinician     [x]Cup      [x]Spoon     [x]Straw                 RESULTS   ORAL STAGE: []WFL  [x]  Exceptions        Oral Stage Impression:  Moderate oral dysphagia characterized by impaired bolus formation/cohesion and propulsion which resulted in premature entry to valleculae with thin and nectar.   Pt demonstrated impaired mastication of mechanical soft and solid, requiring extra time to masticate and completely clear residue. PHARYNGEAL STAGE:     []WFL  [x]  Exceptions    ONSET TIME:  [] Onset time of the pharyngeal swallow was adequate    [x] Delayed initiation of the pharyngeal swallow:    [x]mild []moderate  []severe []absent     [x] Swallow reflex was triggered at:   []tongue base [x]valleculae []pyriform sinus     PHARYNGEAL RESIDUALS        Vallecula/Pharyngeal Wall  []No significant residuals were noted in the vallecula    [x]Reduced tongue base retraction resulting in:    [x]residuals in vallecula [x]residual on posterior pharyngeal wall    These residuals were noted for: [x]thin [x]nectar[] honey []pureed []solid  Which: [x]cleared  []did not clear  []partially cleared []mostly cleared  With:    []cued [x]spontaneous [x]double swallow []multiple swallow (  times)  []liquid chaser        Pyriform Sinuses  [x]No significant residuals were noted in the pyriform sinuses    LARYNGEAL PENETRATION/ASPIRATION  []Laryngeal penetration was not present during this evaluation    [x]Aspiration was not present during this evaluation        Aspiration Scale  Puree  Thin  Nectar 1 Material does not enter the airway   Solid 2 Material enters the airway, remains above the vocal folds, and is ejected from the airway    3 Material enters the airway, remains above the vocal folds, and is not ejected from the airway    4 Material enters the airway, contacts the vocal folds, an is ejected from the airway    5 Material enters the airway, contacts the vocal folds, and is not ejected from the airway    6 Material enters the airway, passes below the vocal folds and is ejected into the larynx or out of the airway    7 Material enters the airway, passes below the vocal folds, and is not ejected from the trachea despite effort    8 Material enters the airway, passes below the vocal folds, and no effort is made to eject.        Pharyngeal Stage Impression:  Mild pharyngeal dysphagia characterized by mild swallow delay of 2-3 seconds and mild decreased pharyngeal contraction which resulted in high penetration of solid, which immediately cleared;  mild vallecular residue and intermittent posterior pharyngeal wall residue, which completely cleared with double swallow. CERVICAL ESOPHAGEAL STAGE : [x]WFL   []Impaired   []Could not assess          Long Term Goals:  [x] Will tolerate least restrictive diet safely      []Goals to be determined after MBS      [] No Treatment indicated at this time      Short Term Goals:   Pt to be seen 2-3x/week    [x] Pt will complete oral motor ROM and strengthening exercises with 90% accuracy, given cues as needed, in order to strengthen lingual/labial/buccal musculature to promote safety and efficiency of oral phase of swallow and decrease risk for pocketing. [x]  Pt will complete lingual exercises that promote anterior to posterior propulsion of bolus and improve tongue base retraction with 80% accuracy, given cues as needed, in order to strengthen the muscles of the s  [x] Pt will tolerate the recommended diet level with no s/s of aspiration. [x]  Pt will tolerate therapeutic trials of mechanical soft; advance as tolerated. Plan of Care:    [x] Oral/motor exercises  [] Adduction/voice/pitch exercises   [] Dysphagia exercies   [x] Mastication exercises   [x] Therapeutic meal monitoring [] Repeat MBS Recommended in _ days    [x]  Prognosis for improvements is: Fair,  inability to follow commands and decreased safety awareness    Pain:0 /10, N/A   []Nursing notified      Radiologist:  Available on Consult as needed, Radiology Tech present    Treatment goals were discussed with the: [x] patient  [] family. The []  patient []  family understand the diagnosis, prognosis and plan of care. [x]  Reinforcement is needed    Thank you for your referral.  Please direct any questions or concerns to Speech Pathology. Images are available through [facility]/PACS.  Please see radiologist report for additional details. Therapist:  Electronically signed by Erik Gamez.  CIERRA Peoples on 5/30/2019 at 4:36 PM

## 2019-05-30 NOTE — DISCHARGE INSTR - COC
on CPAP G47.33, Z99.89    Prediabetes R73.03    Prostate cancer ([facility] C61    S/P CABG (coronary artery bypass graft) Z95.1    S/P PTCA (percutaneous transluminal coronary angioplasty) Z98.61    Spondylolisthesis of lumbar region M43.16    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 deficiency E55.9    Gait abnormality R26.9    Dysphagia, oropharyngeal phase R13.12    RBBB I45.10    Aphasia R47.01       Isolation/Infection:   Isolation          No Isolation            Nurse Assessment:  Last Vital Signs: BP (!) 163/88   Pulse 76   Temp 98.2 °F (36.8 °C) (Oral)   Resp 18   Ht 6' (1.829 m)   Wt 194 lb 0.1 oz (88 kg)   SpO2 98%   BMI 26.31 kg/m²     Last documented pain score (0-10 scale): Pain Level: 0  Last Weight:   Wt Readings from Last 1 Encounters:   05/28/19 194 lb 0.1 oz (88 kg)     Mental Status:  AWAKE AT INTERVALS  SPEECH HARD TO UNDERSTAND ORIENTATION HARD TO EVALUATE    IV Access:  SALINE LOCK  X 2 LEFT A.C. #20    Nursing Mobility/ADLs:  Walking  BEDREST   Transfer  ASSIST  Bathing  COMPLETE  Dressing  COMPLETE  Toileting  ASSIST  INCONTINENT  INVOLUNTARY  Feeding  COMPLETE  Med Admin  CRUSHED IN APPLESAUCE  Med Delivery     PER MOUTH    Wound Care Documentation and Therapy: NO WOUNDS HAS RED SWOLLEN JOINTS BILATERAL GREAT TOES AND RIGHT INDEX FINGER        Elimination:  Continence:   · Bowel: { / NO  · Bladder: {/ NO  Urinary Catheter: NO   Colostomy/Ileostomy/Ileal Conduit: {no}       Date of Last BM: 5/[id number]    Intake/Output Summary (Last 24 hours) at 5/30/2019 1210  Last data filed at 5/29/2019 1745  Gross per 24 hour   Intake 360 ml   Output --   Net 360 ml     I/O last 3 completed shifts:   In: 18 [P.O.:480]  Out: -     Safety Concerns:   FALLS   POSSIBLE ASPIRATION CONCERNS      Impairments/Disabilities:   APHASIC, GENERALIZED WEAKNESS  LITTLE MOVEMENT RIGHT ARM       Nutrition Therapy:  Current Nutrition Therapy:  Dysphagia 1 pureed, nector thick can advance liquids to thin       Routes of Feeding: per mouth  Is total feed  Liquids: {nector advance to thin  Daily Fluid Restriction: none  Last Modified Barium Swallow with Video (Video Swallowing Test): 5/30/2019    Treatments at the Time of Hospital Discharge:   Respiratory Treatments: duonebs  Every 4 hour as needed                                                        Oxygen Therapy:  2 liter per nasal cannula  Ventilator: no       Rehab Therapies: {THERAPEUTIC INTERVENTION:8823883929}  Weight Bearing Status/Restrictions: full as tolerates  Other Medical Equipment (for information only, NOT a DME order): Wheelchair, walker, oxygen  Other Treatments: none    Patient's personal belongings (please select all that are sent with patient):  none    RN SIGNATURE:  Electronically signed by Shukri Garrett RN on 5/31/19 at 4:47 PM      CASE MANAGEMENT/SOCIAL WORK SECTION    Inpatient Status Date: 05/24/2019    Readmission Risk Assessment Score:  Readmission Risk              Risk of Unplanned Readmission:        15           Discharging to Facility/ Agency   · Name: [facility] rehab  · Address:  · Phone:  · Fax:    Dialysis Facility (if applicable)   · Name:  · Address:  · Dialysis Schedule:  · Phone:  · Fax:    / signature: Electronically signed by JAQUAN Christianson on 5/30/19 at 12:10 PM    PHYSICIAN SECTION    Prognosis: {Prognosis:7581013431}    Condition at Discharge: 8 Yumiko Tan Patient Condition:[id number]}    Rehab Potential (if transferring to Rehab): {Prognosis:1707738343}    Recommended Labs or Other Treatments After Discharge: ***    Physician Certification: I certify the above information and transfer of Sandra Santos  is necessary for the continuing treatment of the diagnosis listed and that he requires {Admit to Appropriate Level of Care:35312} for {GREATER/LESS:263557054} 30 days.      Update Admission H&P: {CHP DME Changes in ANGUQ:698127936}    PHYSICIAN SIGNATURE:  {Esignature:[id number]}

## 2019-05-30 NOTE — PROGRESS NOTES
Subjective: The patient complains of severe  acute on chronic cranial pain as well as cognitive deficits and balance deficits secondary to acute subdural hematoma relieved by recent decompressive surgery as well as Tylenol and exacerbated by bright light and loud sound. I am concerned about patients lack of social supports except for his girlfriend Kamilah. I am also concerned about his occasional agitation especially at night. His intracranial drains were recently taken out and he has had his therapeutic eval with physical therapy but we await occupational therapy and speech. I'm concerned about his slightly elevated temperature 99.5 yesterday. Patient is on IV vancomycin. Is now afebrile. Check modified barium swallow    ROS x10: The patient also complains of severely impaired mobility and activities of daily living. Otherwise no new problems with vision, hearing, nose, mouth, throat, dermal, cardiovascular, GI, , pulmonary, musculoskeletal, psychiatric or neurological. See Rehab H&P on Rehab chart dated . Vital signs:  BP (!) 163/88   Pulse 76   Temp 98.2 °F (36.8 °C) (Oral)   Resp 18   Ht 6' (1.829 m)   Wt 194 lb 0.1 oz (88 kg)   SpO2 98%   BMI 26.31 kg/m²   I/O:   PO/Intake:    fair PO intake,  puréed with nectar thick liquids    Bowel/Bladder:  incontinent,  acute UTI-he is on IV vancomycin  General:  Patient is well developed, adequately nourished, non-obese and     well kempt. HEENT:    PERRLA, hearing intact to loud voice, external inspection of ear     and nose benign. Inspection of lips, tongue and gums benign  Musculoskeletal: No significant change in strength or tone. All joints stable. Inspection and palpation of digits and nails show no clubbing,       cyanosis or inflammatory conditions. Neuro/Psychiatric: Affect: flat but pleasant. Alert and oriented to person, place and     situation.   No significant change in deep tendon reflexes or     sensation  Lungs:  Diminished, CTA-B. Respiration effort is normal at rest.     Heart:   S1 = S2,   irreg. No loud murmurs. Abdomen:  Soft, non-tender, no enlargement of liver or spleen. Extremities:  No significant lower extremity edema or tenderness. Skin:    BUE bruises dt blood draws, no visualized or palpated problems. Rehabilitation:  Physical therapy: FIMS:  Bed Mobility:      Transfers: Sit to Stand: Moderate Assistance  Stand to sit: Moderate Assistance,  ,      FIMS:  ,  , Assessment: Slightly impulsive with standing. Wants to stand before [employer] in place. R sided weakness and neglect; Simple commands work best.     Occupational therapy: FIMS:   ,  , Assessment: pt is an 79 y/o M admitted with a subdural hematoma that presents with above deficits. Pt will benefit from OT    Speech therapy: FIMS:        Lab/X-ray studies reviewed, analyzed and discussed with patient and staff:   Recent Results (from the past 24 hour(s))   Protime-INR    Collection Time: 05/30/19  5:20 AM   Result Value Ref Range    Protime 11.2 9.0 - 11.5 sec    INR 1.1    APTT    Collection Time: 05/30/19  5:20 AM   Result Value Ref Range    aPTT 21.3 (L) 21.6 - 35.4 sec       Previous extensive, complex labs, notes and diagnostics reviewed and analyzed. ALLERGIES:    Allergies as of 05/24/2019 - Review Complete 05/24/2019   Allergen Reaction Noted    Honey bee venom [bee venom] Swelling 10/08/2015    Penicillins Swelling 10/08/2015      (please also verify by checking [facility])     Complex Physical Medicine & Rehab Issues Assess & Plan:   1. Severe abnormality of gait and mobility and impaired self-care and ADL's secondary to progressive  SDH .   Functional and medical status reassessed regarding patients ability to participate in therapies and patient found to be able to participate in  acute intensive comprehensive inpatient rehabilitation program including PT/OT to improve balance, ambulation, ADLs, and to improve the

## 2019-05-30 NOTE — PROGRESS NOTES
Per Dr. Joanna Trejo, patient will be AK'ed to rehab tomorrow. Patient is asymptomatic of distress. Sutures intact. Incision without drainage. Patient has expressive aphasia. MBS done today.

## 2019-05-30 NOTE — CARE COORDINATION
I SPOKE TO PEGGY LOBATO. SHE ASKED HOW SHE CAN GET HIS FINANCES IN PLACE NOW. I TOLD HER SHE MAY HAVE TO GO THROUGH THE COURT SYSTEM.

## 2019-05-31 ENCOUNTER — HOSPITAL ENCOUNTER (INPATIENT)
Age: 84
LOS: 28 days | Discharge: NURSING, REHAB OR LONG-TERM CARE | DRG: 949 | End: 2019-06-28
Attending: PHYSICAL MEDICINE & REHABILITATION | Admitting: PHYSICAL MEDICINE & REHABILITATION
Payer: MEDICARE

## 2019-05-31 ENCOUNTER — APPOINTMENT (OUTPATIENT)
Dept: ULTRASOUND IMAGING | Age: 84
DRG: 026 | End: 2019-05-31
Payer: MEDICARE

## 2019-05-31 VITALS
WEIGHT: 194 LBS | HEIGHT: 72 IN | RESPIRATION RATE: 18 BRPM | DIASTOLIC BLOOD PRESSURE: 83 MMHG | BODY MASS INDEX: 26.28 KG/M2 | OXYGEN SATURATION: 97 % | HEART RATE: 83 BPM | TEMPERATURE: 99.1 F | SYSTOLIC BLOOD PRESSURE: 161 MMHG

## 2019-05-31 DIAGNOSIS — G89.29 INSOMNIA SECONDARY TO CHRONIC PAIN: ICD-10-CM

## 2019-05-31 DIAGNOSIS — S06.5XAA SUBDURAL HEMATOMA: Primary | ICD-10-CM

## 2019-05-31 DIAGNOSIS — G47.01 INSOMNIA SECONDARY TO CHRONIC PAIN: ICD-10-CM

## 2019-05-31 DIAGNOSIS — S32.010A CLOSED COMPRESSION FRACTURE OF FIRST LUMBAR VERTEBRA, INITIAL ENCOUNTER: ICD-10-CM

## 2019-05-31 LAB — URIC ACID, SERUM: 5.1 MG/DL (ref 3.4–7)

## 2019-05-31 PROCEDURE — 2500000003 HC RX 250 WO HCPCS: Performed by: INTERNAL MEDICINE

## 2019-05-31 PROCEDURE — 94640 AIRWAY INHALATION TREATMENT: CPT

## 2019-05-31 PROCEDURE — 36415 COLL VENOUS BLD VENIPUNCTURE: CPT

## 2019-05-31 PROCEDURE — 6370000000 HC RX 637 (ALT 250 FOR IP): Performed by: NEUROLOGICAL SURGERY

## 2019-05-31 PROCEDURE — 94664 DEMO&/EVAL PT USE INHALER: CPT

## 2019-05-31 PROCEDURE — 94150 VITAL CAPACITY TEST: CPT

## 2019-05-31 PROCEDURE — 2580000003 HC RX 258: Performed by: INTERNAL MEDICINE

## 2019-05-31 PROCEDURE — 84550 ASSAY OF BLOOD/URIC ACID: CPT

## 2019-05-31 PROCEDURE — 92507 TX SP LANG VOICE COMM INDIV: CPT

## 2019-05-31 PROCEDURE — 6370000000 HC RX 637 (ALT 250 FOR IP): Performed by: INTERNAL MEDICINE

## 2019-05-31 PROCEDURE — 2700000000 HC OXYGEN THERAPY PER DAY

## 2019-05-31 PROCEDURE — 6360000002 HC RX W HCPCS: Performed by: NEUROLOGICAL SURGERY

## 2019-05-31 PROCEDURE — 1180000000 HC REHAB R&B

## 2019-05-31 PROCEDURE — 92526 ORAL FUNCTION THERAPY: CPT

## 2019-05-31 PROCEDURE — 93971 EXTREMITY STUDY: CPT

## 2019-05-31 RX ORDER — SULFAMETHOXAZOLE AND TRIMETHOPRIM 800; 160 MG/1; MG/1
1 TABLET ORAL EVERY 12 HOURS SCHEDULED
Status: DISCONTINUED | OUTPATIENT
Start: 2019-05-31 | End: 2019-05-31 | Stop reason: HOSPADM

## 2019-05-31 RX ORDER — FAMOTIDINE 20 MG/1
20 TABLET, FILM COATED ORAL 2 TIMES DAILY
Status: CANCELLED | OUTPATIENT
Start: 2019-05-31

## 2019-05-31 RX ORDER — PRAVASTATIN SODIUM 40 MG
40 TABLET ORAL NIGHTLY
Status: DISCONTINUED | OUTPATIENT
Start: 2019-05-31 | End: 2019-06-28 | Stop reason: HOSPADM

## 2019-05-31 RX ORDER — DILTIAZEM HYDROCHLORIDE 120 MG/1
120 CAPSULE, COATED, EXTENDED RELEASE ORAL DAILY
Status: CANCELLED | OUTPATIENT
Start: 2019-05-31

## 2019-05-31 RX ORDER — LISINOPRIL 10 MG/1
10 TABLET ORAL DAILY
Status: DISCONTINUED | OUTPATIENT
Start: 2019-06-01 | End: 2019-05-31

## 2019-05-31 RX ORDER — IPRATROPIUM BROMIDE AND ALBUTEROL SULFATE 2.5; .5 MG/3ML; MG/3ML
1 SOLUTION RESPIRATORY (INHALATION) EVERY 4 HOURS PRN
Status: CANCELLED | OUTPATIENT
Start: 2019-05-31

## 2019-05-31 RX ORDER — DIAPER,BRIEF,INFANT-TODD,DISP
EACH MISCELLANEOUS 3 TIMES DAILY
Status: DISCONTINUED | OUTPATIENT
Start: 2019-05-31 | End: 2019-06-14

## 2019-05-31 RX ORDER — NITROGLYCERIN 0.4 MG/1
0.4 TABLET SUBLINGUAL EVERY 5 MIN PRN
Status: DISCONTINUED | OUTPATIENT
Start: 2019-05-31 | End: 2019-06-28 | Stop reason: HOSPADM

## 2019-05-31 RX ORDER — ALBUTEROL SULFATE 2.5 MG/3ML
2.5 SOLUTION RESPIRATORY (INHALATION)
Status: DISCONTINUED | OUTPATIENT
Start: 2019-05-31 | End: 2019-06-03

## 2019-05-31 RX ORDER — LEVETIRACETAM 500 MG/1
500 TABLET ORAL 2 TIMES DAILY
Status: CANCELLED | OUTPATIENT
Start: 2019-05-31

## 2019-05-31 RX ORDER — IPRATROPIUM BROMIDE AND ALBUTEROL SULFATE 2.5; .5 MG/3ML; MG/3ML
1 SOLUTION RESPIRATORY (INHALATION) 3 TIMES DAILY
Status: DISCONTINUED | OUTPATIENT
Start: 2019-06-01 | End: 2019-06-03

## 2019-05-31 RX ORDER — ONDANSETRON 2 MG/ML
4 INJECTION INTRAMUSCULAR; INTRAVENOUS EVERY 6 HOURS PRN
Status: DISCONTINUED | OUTPATIENT
Start: 2019-05-31 | End: 2019-06-28 | Stop reason: HOSPADM

## 2019-05-31 RX ORDER — PREDNISONE 20 MG/1
40 TABLET ORAL DAILY
Status: DISCONTINUED | OUTPATIENT
Start: 2019-05-31 | End: 2019-05-31 | Stop reason: HOSPADM

## 2019-05-31 RX ORDER — LISINOPRIL 5 MG/1
5 TABLET ORAL DAILY
Status: CANCELLED | OUTPATIENT
Start: 2019-05-31

## 2019-05-31 RX ORDER — LEVETIRACETAM 500 MG/1
500 TABLET ORAL 2 TIMES DAILY
Status: DISCONTINUED | OUTPATIENT
Start: 2019-05-31 | End: 2019-06-03

## 2019-05-31 RX ORDER — LISINOPRIL 10 MG/1
10 TABLET ORAL DAILY
Status: DISCONTINUED | OUTPATIENT
Start: 2019-06-01 | End: 2019-05-31 | Stop reason: HOSPADM

## 2019-05-31 RX ORDER — ONDANSETRON 2 MG/ML
4 INJECTION INTRAMUSCULAR; INTRAVENOUS EVERY 6 HOURS PRN
Status: CANCELLED | OUTPATIENT
Start: 2019-05-31

## 2019-05-31 RX ORDER — FAMOTIDINE 20 MG/1
20 TABLET, FILM COATED ORAL 2 TIMES DAILY
Status: DISCONTINUED | OUTPATIENT
Start: 2019-05-31 | End: 2019-06-28 | Stop reason: HOSPADM

## 2019-05-31 RX ORDER — IPRATROPIUM BROMIDE AND ALBUTEROL SULFATE 2.5; .5 MG/3ML; MG/3ML
1 SOLUTION RESPIRATORY (INHALATION) EVERY 4 HOURS PRN
Status: DISCONTINUED | OUTPATIENT
Start: 2019-05-31 | End: 2019-05-31

## 2019-05-31 RX ORDER — ALBUTEROL SULFATE 2.5 MG/3ML
2.5 SOLUTION RESPIRATORY (INHALATION) EVERY 6 HOURS PRN
Status: DISCONTINUED | OUTPATIENT
Start: 2019-05-31 | End: 2019-05-31

## 2019-05-31 RX ORDER — METOPROLOL SUCCINATE 25 MG/1
25 TABLET, EXTENDED RELEASE ORAL DAILY
Status: DISCONTINUED | OUTPATIENT
Start: 2019-05-31 | End: 2019-05-31 | Stop reason: HOSPADM

## 2019-05-31 RX ORDER — DILTIAZEM HYDROCHLORIDE 120 MG/1
120 CAPSULE, COATED, EXTENDED RELEASE ORAL DAILY
Status: DISCONTINUED | OUTPATIENT
Start: 2019-05-31 | End: 2019-06-08

## 2019-05-31 RX ORDER — NITROGLYCERIN 0.4 MG/1
0.4 TABLET SUBLINGUAL EVERY 5 MIN PRN
Status: CANCELLED | OUTPATIENT
Start: 2019-05-31

## 2019-05-31 RX ORDER — DIAPER,BRIEF,INFANT-TODD,DISP
EACH MISCELLANEOUS 3 TIMES DAILY
Status: CANCELLED | OUTPATIENT
Start: 2019-05-31

## 2019-05-31 RX ORDER — PREDNISONE 10 MG/1
40 TABLET ORAL DAILY
Status: DISCONTINUED | OUTPATIENT
Start: 2019-06-01 | End: 2019-06-02

## 2019-05-31 RX ORDER — PRAVASTATIN SODIUM 40 MG
40 TABLET ORAL NIGHTLY
Status: CANCELLED | OUTPATIENT
Start: 2019-05-31

## 2019-05-31 RX ADMIN — AZTREONAM 1 G: 1 INJECTION, POWDER, LYOPHILIZED, FOR SOLUTION INTRAMUSCULAR; INTRAVENOUS at 10:39

## 2019-05-31 RX ADMIN — OYSTER SHELL CALCIUM WITH VITAMIN D 1 TABLET: 500; 200 TABLET, FILM COATED ORAL at 08:34

## 2019-05-31 RX ADMIN — IPRATROPIUM BROMIDE AND ALBUTEROL SULFATE 1 AMPULE: .5; 3 SOLUTION RESPIRATORY (INHALATION) at 21:35

## 2019-05-31 RX ADMIN — PRAVASTATIN SODIUM 40 MG: 40 TABLET ORAL at 22:28

## 2019-05-31 RX ADMIN — LEVETIRACETAM 500 MG: 500 TABLET ORAL at 22:28

## 2019-05-31 RX ADMIN — FAMOTIDINE 20 MG: 20 TABLET ORAL at 08:34

## 2019-05-31 RX ADMIN — HYDRALAZINE HYDROCHLORIDE 10 MG: 20 INJECTION INTRAMUSCULAR; INTRAVENOUS at 13:33

## 2019-05-31 RX ADMIN — BACITRACIN ZINC 1 G: 500 OINTMENT TOPICAL at 13:35

## 2019-05-31 RX ADMIN — DILTIAZEM HYDROCHLORIDE 120 MG: 120 CAPSULE, COATED, EXTENDED RELEASE ORAL at 22:28

## 2019-05-31 RX ADMIN — LEVETIRACETAM 500 MG: 500 TABLET ORAL at 08:34

## 2019-05-31 RX ADMIN — SULFAMETHOXAZOLE AND TRIMETHOPRIM 1 TABLET: 800; 160 TABLET ORAL at 08:34

## 2019-05-31 RX ADMIN — HYDRALAZINE HYDROCHLORIDE 10 MG: 20 INJECTION INTRAMUSCULAR; INTRAVENOUS at 06:10

## 2019-05-31 RX ADMIN — FAMOTIDINE 20 MG: 20 TABLET ORAL at 22:28

## 2019-05-31 RX ADMIN — LISINOPRIL 5 MG: 5 TABLET ORAL at 08:34

## 2019-05-31 RX ADMIN — METOPROLOL SUCCINATE 25 MG: 25 TABLET, EXTENDED RELEASE ORAL at 08:34

## 2019-05-31 RX ADMIN — BACITRACIN ZINC 1 G: 500 OINTMENT TOPICAL at 22:29

## 2019-05-31 RX ADMIN — PREDNISONE 40 MG: 20 TABLET ORAL at 13:33

## 2019-05-31 RX ADMIN — BACITRACIN ZINC 1 G: 500 OINTMENT TOPICAL at 08:34

## 2019-05-31 ASSESSMENT — ENCOUNTER SYMPTOMS
NAUSEA: 0
COUGH: 0
DIARRHEA: 0
TROUBLE SWALLOWING: 1
VOMITING: 0
ABDOMINAL PAIN: 0
COLOR CHANGE: 1
ABDOMINAL DISTENTION: 0
CONSTIPATION: 0

## 2019-05-31 NOTE — PROGRESS NOTES
or     sensation  Lungs:  Diminished, CTA-B. Respiration effort is normal at rest.     Heart:   S1 = S2,   irreg. No loud murmurs. Abdomen:  Soft, non-tender, no enlargement of liver or spleen. Extremities:  No significant lower extremity edema or tenderness. Skin:    BUE bruises dt blood draws, no visualized or palpated problems. Rehabilitation:  Physical therapy: FIMS:  Bed Mobility:      Transfers: Sit to Stand: Moderate Assistance  Stand to sit: Moderate Assistance,  ,      FIMS:  ,  , Assessment: Slightly impulsive with standing. Wants to stand before [employer] in place. R sided weakness and neglect; Simple commands work best.     Occupational therapy: FIMS:   ,  , Assessment: pt is an 81 y/o M admitted with a subdural hematoma that presents with above deficits.   Pt will benefit from OT    Speech therapy: FIMS:        Lab/X-ray studies reviewed, analyzed and discussed with patient and staff:   Recent Results (from the past 24 hour(s))   CBC Auto Differential    Collection Time: 05/30/19 10:05 AM   Result Value Ref Range    WBC 7.0 4.8 - 10.8 K/uL    RBC 3.83 (L) 4.70 - 6.10 M/uL    Hemoglobin 11.7 (L) 14.0 - 18.0 g/dL    Hematocrit 34.7 (L) 42.0 - 52.0 %    MCV 90.6 80.0 - 100.0 fL    MCH 30.4 27.0 - 31.3 pg    MCHC 33.6 33.0 - 37.0 %    RDW 13.7 11.5 - 14.5 %    Platelets 431 567 - 595 K/uL    Neutrophils % 67.4 %    Lymphocytes % 12.7 %    Monocytes % 17.5 %    Eosinophils % 2.1 %    Basophils % 0.3 %    Neutrophils # 4.7 1.4 - 6.5 K/uL    Lymphocytes # 0.9 (L) 1.0 - 4.8 K/uL    Monocytes # 1.2 (H) 0.2 - 0.8 K/uL    Eosinophils # 0.1 0.0 - 0.7 K/uL    Basophils # 0.0 0.0 - 0.2 K/uL   Basic Metabolic Panel w/ Reflex to MG    Collection Time: 05/30/19 10:05 AM   Result Value Ref Range    Sodium 140 135 - 144 mEq/L    Potassium reflex Magnesium 4.0 3.4 - 4.9 mEq/L    Chloride 105 95 - 107 mEq/L    CO2 23 20 - 31 mEq/L    Anion Gap 12 9 - 15 mEq/L    Glucose 116 (H) 70 - 99 mg/dL    BUN 26 (H) 8 - 23 mg/dL CREATININE 0.85 0.70 - 1.20 mg/dL    GFR Non-African American >60.0 >60    GFR  >60.0 >60    Calcium 9.3 8.5 - 9.9 mg/dL       Previous extensive, complex labs, notes and diagnostics reviewed and analyzed. ALLERGIES:    Allergies as of 05/24/2019 - Review Complete 05/24/2019   Allergen Reaction Noted    Honey bee venom [bee venom] Swelling 10/08/2015    Penicillins Swelling 10/08/2015      (please also verify by checking [facility])     Complex Physical Medicine & Rehab Issues Assess & Plan:   1. Severe abnormality of gait and mobility and impaired self-care and ADL's secondary to progressive  SDH . Functional and medical status reassessed regarding patients ability to participate in therapies and patient found to be able to participate in  acute intensive comprehensive inpatient rehabilitation program including PT/OT to improve balance, ambulation, ADLs, and to improve the P/AROM. 2. Bowel and Bladder dysfunction: Incontinent of bladder check post void residual treat UTI frequent toileting, ambulate to bathroom with assistance, check post void residuals. Check for C.difficile x1 if >2 loose stools in 24 hours, continue bowel & bladder program.     3. Severe postsurgery headache pain generalized OA pain: reassess pain every shift and prior to and after each therapy session, give prn morphine IV Tylenol, modalities prn in therapy, consider Lidoderm, K-pad prn.   4. Skin healing craniotomy and drain site incisions breakdown risk:  continue pressure relief program.  Daily skin exams and reports from nursing. 5. Oral pharyngeal dysphasia-consult speech-language pathology continue puréed foods with nectar thick liquids  6. Complex discharge planning:    Await full therapeutic evaluation and medical stability and patient and family per for acute rehab at [address] [place] [address].  Patient is noted to have an recent acute UTI and urinary incontinence I am suggesting that he have a postvoid residual check. Patient is on vancomycin IV. He is now afebrile he'll have modified barium swallow prior to coming to [id number] [facility] rehab I'm hoping to bring him over today I will  completee his rehabilitation discharge orders. Complex Active General Medical Issues that complicate care Assess & Plan:     1. 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 [facility] admit 05/30/19. Dysphagia, oropharyngeal phase    RBBB    Aphasia    CAD (coronary artery disease)    BMI 26.0-26.9,adult    Chronic back pain  Resolved Problems:    * No resolved hospital problems.  Misael Hitchcock D.O., PM&R     Attending    [address] [place], [place] [address]en [facility]

## 2019-05-31 NOTE — PROGRESS NOTES
Department of Internal Medicine  General Internal Medicine  Attending Progress Note      SUBJECTIVE:    Still with expressive aphagia.      OBJECTIVE      Medications    Current Facility-Administered Medications: metoprolol succinate (TOPROL XL) extended release tablet 25 mg, 25 mg, Oral, Daily  sulfamethoxazole-trimethoprim (BACTRIM DS;SEPTRA DS) 800-160 MG per tablet 1 tablet, 1 tablet, Oral, 2 times per day  [START ON 6/1/2019] lisinopril (PRINIVIL;ZESTRIL) tablet 10 mg, 10 mg, Oral, Daily  ipratropium-albuterol (DUONEB) nebulizer solution 1 ampule, 1 ampule, Inhalation, Q4H PRN  bacitracin zinc ointment, , Topical, TID  levETIRAcetam (KEPPRA) tablet 500 mg, 500 mg, Oral, BID  famotidine (PEPCID) tablet 20 mg, 20 mg, Oral, BID  nitroGLYCERIN (NITROSTAT) SL tablet 0.4 mg, 0.4 mg, Sublingual, Q5 Min PRN  ondansetron (ZOFRAN) injection 4 mg, 4 mg, Intravenous, Q6H PRN  pravastatin (PRAVACHOL) tablet 40 mg, 40 mg, Oral, Nightly  diltiazem (CARDIZEM CD) extended release capsule 120 mg, 120 mg, Oral, Daily  calcium-vitamin D 500-200 MG-UNIT per tablet, , Oral, Daily  hydrALAZINE (APRESOLINE) injection 10 mg, 10 mg, Intravenous, Q1H PRN  labetalol (NORMODYNE;TRANDATE) injection syringe 10 mg, 10 mg, Intravenous, Q1H PRN  Physical    VITALS:  BP (!) 161/83   Pulse 79   Temp 99.1 °F (37.3 °C) (Oral)   Resp 18   Ht 6' (1.829 m)   Wt 194 lb 0.1 oz (88 kg)   SpO2 97%   BMI 26.31 kg/m²   CONSTITUTIONAL:  Alert and awake  EYES:  EOMI,  ENT:  Staples on patient's head  BACK:  symmetric  LUNGS:  clear to auscultation  CARDIOVASCULAR:  Normal apical impulse, regular rate and rhythm, normal S1 and S2, no S3 or S4, and no murmur noted  ABDOMEN:  No scars, normal bowel sounds, soft, non-distended, non-tender, no masses palpated, no hepatosplenomegally  NEUROLOGIC:  Right side paralysis  SKIN:  normal  Data    CBC:   Lab Results   Component Value Date    WBC 7.0 05/30/2019    RBC 3.83 05/30/2019    HGB 11.7 05/30/2019    HCT 34.7 05/30/2019    MCV 90.6 05/30/2019    MCH 30.4 05/30/2019    MCHC 33.6 05/30/2019    RDW 13.7 05/30/2019     05/30/2019     BMP:    Lab Results   Component Value Date     05/30/2019    K 4.0 05/30/2019     05/30/2019    CO2 23 05/30/2019    BUN 26 05/30/2019    LABALBU 3.3 05/24/2019    CREATININE 0.85 05/30/2019    CALCIUM 9.3 05/30/2019    GFRAA >60.0 05/30/2019    LABGLOM >60.0 05/30/2019    GLUCOSE 116 05/30/2019       ASSESSMENT AND PLAN           Subdural hematoma (HCC) (5/24/2019)  Management per Neuro surg   Continue to monitor    2. Essential hypertension (11/7/2012)  BP is poorly controlled  Will double lisinopril from 5 mg to 10. Can give 5 mg today  Then 10 mg tomorrow  Will gradually titrate medication to avoid sudden drop in BP  Will target SBP less than 140 mm Hg     Hyperlipidemia (11/7/2012)  On statin       Aphasia (5/27/2019)  Speech therapy following    Right sided weakness:  PT/OT  Continue to monitor    No documentation of fever,  But patient appears lethargic this morning  Will continue to monitor    Right arm appears more swollen than left. Suspects that this is due to immobility as this is the side affected by stroke. Went ahead and ordered US of RUE. He will not be a candidate for anticoagulation. Bilateral Hallux redness with blanching. Concern for gout. Ordered URIC acid. Patient not wincing or withdrawing with pain. Will give empiric prednisone 40 mg for 4 days for concern for acute gout    Ground glass infiltrate on chest X ray. Will start Azetreonam. He is already on Bactrim per neurosurg.

## 2019-05-31 NOTE — PROGRESS NOTES
[facility]  Speech Language/Pathology  Dysphagia/Speech/Language Therapy Note    Kaden Carpio  5/31/2019    Medical Dx: Subdural hematoma ([address] [P23.5S8X]  Subdural hematoma ([address] [J41.6Y5C]    Time in: 1011  Time out: 1030  Swallow time in: 1011  Swallow time out: 1019  Speech/Language time in: 1019  Speech/Language time out: 1030    Pt being seen for : [] Dysphagia exercises  [x] Oral Motor Exercises             [] Therapeutic meal monitor  [x] Other: Language intervention    Subjective: Pt required frequent cues to remain alert during treatment this date. Behavior: [] Alert  [x] Cooperative  []  Pleasant  [] Confused  [] Agitated         [] Uncooperative  [] Distractible [] Motivated  [] Self-Limiting [] Anxious         [] Other:    Endurance:  [] Adequate for participation in SLP sessions  [x] Reduced overall              [] Lethargic  [] Other:    Objective/Assessment:     Dysphagia goals:  1. Pt will complete oral motor ROM and strengthening exercises with 90% accuracy, given cues as needed, in order to strengthen lingual/labial/buccal musculature to promote safety and efficiency of oral phase of swallow and decrease risk for pocketing. Pt attempted to completed lingual ROM exercise x2, however minimal lingual movement was observed following a model and tactile cue. Pt did not attempt to complete more than 2 of the exercises despite continuing models and tactile cues. 2. Pt will complete lingual exercises that promote anterior to posterior propulsion of bolus and improve tongue base retraction with 80% accuracy, given cues as needed, in order to strengthen the muscles of the swallow. Not addressed as the pt was unable to follow directions to complete despite multiple models. 3. Pt will tolerate the recommended diet level with no s/s of aspiration. MBS completed yesterday recommended an upgrade to thin liquids, however pt's current diet order still has nectar thick liquids.  SLP notified RODOLFO Andrade took ice chip from spoon. Slow lingual manipulation of ice chip observed. Ice chip appeared to increase pt's alertness. Pt tolerated small sips of thin liquid via cup x4 without overt s/s of aspiration. 4. Pt will tolerate therapeutic trials of mechanical soft; advance as tolerated. Not addressed at this time secondary to pt's fluctuating alertness. Continue with pt's recommended diet of puree (dysphagia I)/thin only when pt is fully alert. Speech/Language goals:  1. Pt will follow 1 step directions given orally with 75% accuracy with mod cues to increase the pt's ability to follow directions provided by caregivers for safe follow through with ADLs. Pt followed 1-step directions with 12% accuracy following a model with max prompting. 2. Pt will identify objects/pictures within a field of 2-3 with 75% accuracy with mod cues in order to increase his understanding of objects in his environment for safer and more independent completion of ADLs. Pt made no attempt to identify any objects. 3. Pt will answer low level yes/no questions with 85% accuracy with mod cues to assist the caregiver in obtaining important information regarding the patient's personal, medical, and safety needs. Pt made no attempt to answer any yes/no questions despite models and visual aids provided. 4. Pt will answer low level Wh- questions with 75% accuracy with mod cues to assist the caregiver in obtaining important information regarding the patient's personal, medical, and safety needs. Not addressed  5. Pt will demonstrate reading comprehension at the word level to 60% acc with mod cues to promote pt's ability to utilize reading/writing. Not addressed  6. Pt will complete speech automatics and simple sentence completion with mod cues 75% accuracy.   During rote counting task, the pt appeared to move his mouth in the correct position to produce the number 1 in all opportunities but did not verbalize it.  Pt responded with unintelligible speech when asked his name. [x] Pt demonstrated no s/s of pain during this visit. none  N/A  [] Nursing notified    Safety Devices:  [x] Call light within reach [] Chair alarm activated         [x] Bed alarm activated    Plan:  [x] Continue as per plan of care  [] Prepare for Discharge   [] Discharge      Patient/Caregiver Education:  Treatment goals discussed with [x]  Patient  []  family. The []  Patient  []  family understand the diagnosis, prognosis and plan of care.     Signature: Electronically signed by CIERRA Damon on 5/31/2019 at 11:02 AM

## 2019-05-31 NOTE — PLAN OF CARE
Nutrition Problem: Inadequate oral intake  Intervention: Food and/or Nutrient Delivery: Continue current diet, Start ONS(Frozen ONS BID)  Nutritional Goals: PO intake >75% of meals/ONS

## 2019-05-31 NOTE — FLOWSHEET NOTE
Pt ate 75% of dinner. Pt able to state first name however response was delayed. No drainage noted to incision, staples intact.

## 2019-05-31 NOTE — FLOWSHEET NOTE
0830- In bed eyes open, is trying to say few words, most are not understandable. Patients right and left great toe joints are red, warm, swollen, as is right index finger joint. Right arm is edematous. Staples are intact to head., no drainage noted. 0930- Took medications crushed in applesauce and took 15% of breakfast, was fed.

## 2019-05-31 NOTE — PROGRESS NOTES
Neurology Daily Progress Note  Name: Evelyn Calix  Age: 80 y.o. Gender: male  CodeStatus: Full Code  Allergies: Honey Bee Venom [Bee Venom]  Penicillins    Chief Complaint:Fall (on coumadin) and Head Laceration (posterior)    Primary Care Provider: Clotilde Bosworth, MD  InpatientTreatment Team: Treatment Team: Attending Provider: Terence Tinajero MD; Consulting Physician: John De Paz MD; Surgeon: Terence Tinajero MD; Consulting Physician: Kaylin Umaña DO; Hospitalist: Radha Hercules MD; Patient Care Tech: Clarissa Erickson; : Ky Lundberg, RN; Registered Nurse: Emerald Kaufman, RODOLFO; Patient Care Tech: [facility]  Admission Date: 5/24/2019      HPI Pt seen and examined for neuro follow up for bilat SDH s/p craniotomy with evacuation. Pt with dysarthria at baseline. Alert, mostly nonverbal. Pt with low grade temp of  x2 days. He is slightly tachypneic today with RR in the 30s. No new focal neuro deficits. Scalp incision well approx, no erythema or drainage. Pt noted to have edea to RUE and erythema to right hand and bilat 1st MTP joints. Vitals:    05/31/19 0610   BP: (!) 161/83   Pulse:    Resp:    Temp:    SpO2:         Physical Exam   Constitutional: No distress. HENT:   Scalp incision well approx, no erythema or drainage   Eyes: Pupils are equal, round, and reactive to light. Neck: Neck supple. No JVD present. Cardiovascular: Normal rate and regular rhythm. Pulmonary/Chest: Breath sounds normal. No respiratory distress. RR 30s   Abdominal: Soft. Bowel sounds are normal. He exhibits no distension. Musculoskeletal: He exhibits edema (RUE). Neurological: He is alert. Nonverbal  Veronica snot follow commands   Skin: Skin is warm and dry. He is not diaphoretic. There is erythema (right hand and Bilat 1st MTP). Review of Systems   Unable to perform ROS: Patient nonverbal   Constitutional: Negative for fever. HENT: Positive for trouble swallowing. Negative for hearing loss. Respiratory: Negative for cough. Cardiovascular: Negative for chest pain, palpitations and leg swelling. Gastrointestinal: Negative for abdominal distention, abdominal pain, constipation, diarrhea, nausea and vomiting. Genitourinary: Negative for difficulty urinating, dysuria and frequency. Musculoskeletal: Positive for gait problem. Skin: Positive for color change (right hand and bilat MTP erythema). Neurological: Positive for speech difficulty and weakness (generalized). Negative for tremors, seizures, syncope and facial asymmetry. Psychiatric/Behavioral: Negative for agitation. The patient is not nervous/anxious. Medications:  Reviewed    Infusion Medications:   Scheduled Medications:    metoprolol succinate  25 mg Oral Daily    sulfamethoxazole-trimethoprim  1 tablet Oral 2 times per day    bacitracin zinc   Topical TID    levETIRAcetam  500 mg Oral BID    famotidine  20 mg Oral BID    lisinopril  5 mg Oral Daily    pravastatin  40 mg Oral Nightly    diltiazem  120 mg Oral Daily    calcium-vitamin D   Oral Daily     PRN Meds: ipratropium-albuterol, nitroGLYCERIN, ondansetron, hydrALAZINE, labetalol    Labs:   Recent Labs     05/28/19  1630 05/28/19  1645 05/30/19  1005   WBC 8.4  --  7.0   HGB 10.8* 11.0* 11.7*   HCT 32.2*  --  34.7*     --  236     Recent Labs     05/28/19  1630 05/28/19  1645 05/30/19  1005     --  140   K 3.9  --  4.0     --  105   CO2 22  --  23   BUN 29*  --  26*   CREATININE 0.79 1.0 0.85   CALCIUM 9.1  --  9.3     No results for input(s): AST, ALT, BILIDIR, BILITOT, ALKPHOS in the last 72 hours. Recent Labs     05/29/19  0453 05/30/19  0520   INR 1.1 1.1     No results for input(s): Gatito Grace in the last 72 hours.     Urinalysis:   Lab Results   Component Value Date    NITRU Negative 05/28/2019    WBCUA 20-50 05/28/2019    BACTERIA Negative 05/28/2019    RBCUA >100 05/28/2019    BLOODU LARGE 05/28/2019    SPECGRAV 1.019 05/28/2019    GLUCOSEU Negative 05/28/2019       Radiology:   Most recent    Chest CT      WITH CONTRAST:No results found for this or any previous visit. WITHOUT CONTRAST: No results found for this or any previous visit. CXR      2-view:   Results for orders placed during the hospital encounter of 05/24/19   XR CHEST STANDARD (2 VW)    Narrative EXAMINATION: XR CHEST (2 VW)     CLINICAL HISTORY:  low grade temp, tachypnea, dysphagia     COMPARISONS: May 28, 2018     FINDINGS:    3 views of the chest are submitted. There are multiple median sternotomy wires. Limited exam due to low lung volume. The cardiac silhouette is enlarged. Unchanged. .  The mediastinum is unremarkable. Pulmonary vascular unremarkable. Right sided trachea. Areas of atelectasis, patchy groundglass infiltrate in the bases. Right greater than left Trace small right pleural effusion. .  No Pneumothoraces. Impression Areas of atelectasis, patchy groundglass infiltrate in the bases. Right greater than left . Trace/ small right pleural effusion        Portable:   Results for orders placed during the hospital encounter of 05/24/19   XR CHEST PORTABLE    Narrative XR CHEST PORTABLE : 5/28/2019    CLINICAL HISTORY:  increasing SOB / restless . COMPARISON: 5/24/2019. A portable upright AP radiograph of the chest was obtained. FINDINGS:    A poor inspiratory volume is present with mild patchy infiltrates suggestive of edema. Atypical pneumonia should be excluded clinically. The heart is mildly enlarged with mild vascular congestion. 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 There is no dilated significant pleural effusion, pneumothorax, or displaced fractures identified. Impression POOR INSPIRATION WITH PROBABLE MILD CARDIAC DECOMPENSATION. ATYPICAL PNEUMONIA SHOULD BE EXCLUDED CLINICALLY.                    Echo No results found for this or any previous visit. Assessment/Plan:    Bilateral Subdural hematoma,  Evacuated/ drains out  Having more difficulty with speech  No clinical change  Has primary progressive aphasia  Awake,  All commands/ still expressive aphasia baseline  Non focal  No seizures  Keep on keppra  Dysphagia, puree with nectar thick, ST, MBS 5/30 mod oral and pharyngeal dysfunction without aspiration  Low grade temp, Pt started on Bactrim DS per neurosurgery  Pt with swelling of entire right arm and erythema to right hand and bilat 1st MTP joints. CXR with BLL patchy infiltrates. Discussed with internal medicine    Capohector Rosa MD, Angela Whittaker, American Board of Psychiatry & Neurology  Board Certified in Vascular Neurology  Board Certified in Neuromuscular Medicine  Certified in Neurorehabilitation         Collaborating physicians: Dr Isamar Rosa, Dr GIANCARLO Membreno, Dr Jane Caruso    Electronically signed by KIMBERLY Resendiz CNP on 5/31/2019 at 9:37 AM

## 2019-05-31 NOTE — PROGRESS NOTES
Nutrition Assessment    Type and Reason for Visit: Initial, RD Nutrition Re-Screen(LOS day 7)    Nutrition Recommendations: Continue dysphagia I nectar thick liquid diet, Start ONS(Frozen ONS BID)    Nutrition Assessment: Pt reported stable weight and good appetite on admission screen; unable to comfirm nutrition status pta due to pt being taken off floor for testing. Pt now at nutrition risk due to poor po intake since admission and dysphagia with modified consistency diet. Will start ONS. Malnutrition Assessment:  · Malnutrition Status: At risk for malnutrition  · Context: Acute illness or injury  · Findings of the 6 clinical characteristics of malnutrition (Minimum of 2 out of 6 clinical characteristics is required to make the diagnosis of moderate or severe Protein Calorie Malnutrition based on AND/ASPEN Guidelines):  1. Energy Intake-Less than or equal to 75% of estimated energy requirement, Greater than or equal to 7 days    2. Weight Loss-(4.5%), (2.5 months)  3. Fat Loss-Unable to assess,    4. Muscle Loss-Unable to assess,    5. Fluid Accumulation-No significant fluid accumulation,    6.  Strength-Not measured    Nutrition Risk Level: Moderate    Nutrient Needs:  · Estimated Daily Total Kcal: 1141-1277 (22-24 kcals/kg)  · Estimated Daily Protein (g): 105-113 (1.3-1.4 g/kg)  · Estimated Daily Total Fluid (ml/day): 2000 mL (1 mL/kcal)    Nutrition Diagnosis:   · Problem: Inadequate oral intake  · Etiology: related to Difficulty swallowing     Signs and symptoms:  as evidenced by Intake 50-75%, Swallow study results    Objective Information:  · Nutrition-Focused Physical Findings: +2 non-pitting RUE edema per nsg. BM 5/30. Asphasia.   · Wound Type: None  · Current Nutrition Therapies:  · Oral Diet Orders: Dysphagia 1 (Pureed), Nectar Thick   · Oral Nutrition Supplement (ONS) Orders: None  · ONS intake: 1-25%, 26-50%, 51-75%(Records vary)  · Anthropometric Measures:  · Ht: 6' (182.9 cm)   · Current Body Wt: 194 lb (88 kg)(5/28)  · Admission Body Wt: 191 lb (86.6 kg)(5/24)  · Usual Body Wt: 200 lb (90.7 kg)(3/15/19 @ [facility]; 200 lbs. 1/2018 Stated)  · % Weight Change:  ,  4.5% in 2.5 months  · Ideal Body Wt: 178 lb (80.7 kg), % Ideal Body >100%  · BMI Classification: BMI 25.0 - 29.9 Overweight    Nutrition Interventions:   Continue current diet, Start ONS(Frozen ONS BID)  Continued Inpatient Monitoring, Education Not Indicated    Nutrition Evaluation:   · Evaluation: Goals set   · Goals: PO intake >75% of meals/ONS    · Monitoring: Meal Intake, Supplement Intake, Weight, Pertinent Labs      Electronically signed by Angela Galvan RD, LD on 5/31/19 at 12:54 PM

## 2019-05-31 NOTE — CARE COORDINATION
STAFF RN AWARE [facility] STATED PT DISCHARGE TO REHAB ROOM 252.  ALSO AWARE.

## 2019-06-01 ENCOUNTER — APPOINTMENT (OUTPATIENT)
Dept: GENERAL RADIOLOGY | Age: 84
DRG: 949 | End: 2019-06-01
Attending: PHYSICAL MEDICINE & REHABILITATION
Payer: MEDICARE

## 2019-06-01 ENCOUNTER — APPOINTMENT (OUTPATIENT)
Dept: CT IMAGING | Age: 84
DRG: 949 | End: 2019-06-01
Attending: PHYSICAL MEDICINE & REHABILITATION
Payer: MEDICARE

## 2019-06-01 PROBLEM — R26.9 ABNORMALITY OF GAIT AND MOBILITY: Status: ACTIVE | Noted: 2019-06-01

## 2019-06-01 LAB
ANION GAP SERPL CALCULATED.3IONS-SCNC: 17 MEQ/L (ref 9–15)
BASOPHILS ABSOLUTE: 0 K/UL (ref 0–0.2)
BASOPHILS RELATIVE PERCENT: 0.2 %
BUN BLDV-MCNC: 49 MG/DL (ref 8–23)
CALCIUM SERPL-MCNC: 9 MG/DL (ref 8.5–9.9)
CHLORIDE BLD-SCNC: 105 MEQ/L (ref 95–107)
CO2: 20 MEQ/L (ref 20–31)
CREAT SERPL-MCNC: 1.07 MG/DL (ref 0.7–1.2)
EOSINOPHILS ABSOLUTE: 0 K/UL (ref 0–0.7)
EOSINOPHILS RELATIVE PERCENT: 0 %
GFR AFRICAN AMERICAN: >60
GFR NON-AFRICAN AMERICAN: >60
GLUCOSE BLD-MCNC: 303 MG/DL (ref 70–99)
GLUCOSE BLD-MCNC: 316 MG/DL (ref 60–115)
HBA1C MFR BLD: 5.8 % (ref 4.8–5.9)
HCT VFR BLD CALC: 34.7 % (ref 42–52)
HEMOGLOBIN: 11.7 G/DL (ref 14–18)
LYMPHOCYTES ABSOLUTE: 0.2 K/UL (ref 1–4.8)
LYMPHOCYTES RELATIVE PERCENT: 2.3 %
MAGNESIUM: 2.5 MG/DL (ref 1.7–2.4)
MCH RBC QN AUTO: 30.5 PG (ref 27–31.3)
MCHC RBC AUTO-ENTMCNC: 33.5 % (ref 33–37)
MCV RBC AUTO: 91.1 FL (ref 80–100)
MONOCYTES ABSOLUTE: 0.4 K/UL (ref 0.2–0.8)
MONOCYTES RELATIVE PERCENT: 4.6 %
NEUTROPHILS ABSOLUTE: 8.5 K/UL (ref 1.4–6.5)
NEUTROPHILS RELATIVE PERCENT: 92.9 %
PDW BLD-RTO: 14.2 % (ref 11.5–14.5)
PERFORMED ON: ABNORMAL
PLATELET # BLD: 244 K/UL (ref 130–400)
POTASSIUM SERPL-SCNC: 4.2 MEQ/L (ref 3.4–4.9)
RBC # BLD: 3.82 M/UL (ref 4.7–6.1)
SODIUM BLD-SCNC: 142 MEQ/L (ref 135–144)
WBC # BLD: 9.1 K/UL (ref 4.8–10.8)

## 2019-06-01 PROCEDURE — 99223 1ST HOSP IP/OBS HIGH 75: CPT | Performed by: PHYSICAL MEDICINE & REHABILITATION

## 2019-06-01 PROCEDURE — 83735 ASSAY OF MAGNESIUM: CPT

## 2019-06-01 PROCEDURE — 94640 AIRWAY INHALATION TREATMENT: CPT

## 2019-06-01 PROCEDURE — 92523 SPEECH SOUND LANG COMPREHEN: CPT

## 2019-06-01 PROCEDURE — 6370000000 HC RX 637 (ALT 250 FOR IP): Performed by: INTERNAL MEDICINE

## 2019-06-01 PROCEDURE — 1180000000 HC REHAB R&B

## 2019-06-01 PROCEDURE — 83036 HEMOGLOBIN GLYCOSYLATED A1C: CPT

## 2019-06-01 PROCEDURE — 97167 OT EVAL HIGH COMPLEX 60 MIN: CPT

## 2019-06-01 PROCEDURE — 80048 BASIC METABOLIC PNL TOTAL CA: CPT

## 2019-06-01 PROCEDURE — 97163 PT EVAL HIGH COMPLEX 45 MIN: CPT

## 2019-06-01 PROCEDURE — 2700000000 HC OXYGEN THERAPY PER DAY

## 2019-06-01 PROCEDURE — 6370000000 HC RX 637 (ALT 250 FOR IP): Performed by: NEUROLOGICAL SURGERY

## 2019-06-01 PROCEDURE — 92610 EVALUATE SWALLOWING FUNCTION: CPT

## 2019-06-01 PROCEDURE — 36415 COLL VENOUS BLD VENIPUNCTURE: CPT

## 2019-06-01 PROCEDURE — 97112 NEUROMUSCULAR REEDUCATION: CPT

## 2019-06-01 PROCEDURE — 97535 SELF CARE MNGMENT TRAINING: CPT

## 2019-06-01 PROCEDURE — 6370000000 HC RX 637 (ALT 250 FOR IP): Performed by: PHYSICAL MEDICINE & REHABILITATION

## 2019-06-01 PROCEDURE — 83880 ASSAY OF NATRIURETIC PEPTIDE: CPT

## 2019-06-01 PROCEDURE — 70450 CT HEAD/BRAIN W/O DYE: CPT

## 2019-06-01 PROCEDURE — 85025 COMPLETE CBC W/AUTO DIFF WBC: CPT

## 2019-06-01 PROCEDURE — 71045 X-RAY EXAM CHEST 1 VIEW: CPT

## 2019-06-01 PROCEDURE — 6360000002 HC RX W HCPCS: Performed by: PHYSICAL MEDICINE & REHABILITATION

## 2019-06-01 RX ORDER — NICOTINE POLACRILEX 4 MG
15 LOZENGE BUCCAL PRN
Status: DISCONTINUED | OUTPATIENT
Start: 2019-06-01 | End: 2019-06-28 | Stop reason: HOSPADM

## 2019-06-01 RX ORDER — ACETAMINOPHEN 325 MG/1
650 TABLET ORAL EVERY 4 HOURS PRN
Status: DISCONTINUED | OUTPATIENT
Start: 2019-06-01 | End: 2019-06-28 | Stop reason: HOSPADM

## 2019-06-01 RX ORDER — DEXTROSE MONOHYDRATE 50 MG/ML
100 INJECTION, SOLUTION INTRAVENOUS PRN
Status: DISCONTINUED | OUTPATIENT
Start: 2019-06-01 | End: 2019-06-12

## 2019-06-01 RX ORDER — DEXTROSE MONOHYDRATE 25 G/50ML
12.5 INJECTION, SOLUTION INTRAVENOUS PRN
Status: DISCONTINUED | OUTPATIENT
Start: 2019-06-01 | End: 2019-06-28 | Stop reason: HOSPADM

## 2019-06-01 RX ADMIN — LEVETIRACETAM 500 MG: 500 TABLET ORAL at 21:01

## 2019-06-01 RX ADMIN — LEVETIRACETAM 500 MG: 500 TABLET ORAL at 08:34

## 2019-06-01 RX ADMIN — DILTIAZEM HYDROCHLORIDE 120 MG: 120 CAPSULE, COATED, EXTENDED RELEASE ORAL at 21:00

## 2019-06-01 RX ADMIN — BACITRACIN ZINC 1 G: 500 OINTMENT TOPICAL at 08:34

## 2019-06-01 RX ADMIN — IPRATROPIUM BROMIDE AND ALBUTEROL SULFATE 1 AMPULE: .5; 3 SOLUTION RESPIRATORY (INHALATION) at 16:12

## 2019-06-01 RX ADMIN — PRAVASTATIN SODIUM 40 MG: 40 TABLET ORAL at 21:00

## 2019-06-01 RX ADMIN — BACITRACIN ZINC 1 G: 500 OINTMENT TOPICAL at 20:58

## 2019-06-01 RX ADMIN — PREDNISONE 40 MG: 10 TABLET ORAL at 12:47

## 2019-06-01 RX ADMIN — FAMOTIDINE 20 MG: 20 TABLET ORAL at 08:34

## 2019-06-01 RX ADMIN — FAMOTIDINE 20 MG: 20 TABLET ORAL at 21:01

## 2019-06-01 RX ADMIN — ALBUTEROL SULFATE 2.5 MG: 2.5 SOLUTION RESPIRATORY (INHALATION) at 05:10

## 2019-06-01 RX ADMIN — BACITRACIN ZINC 1 G: 500 OINTMENT TOPICAL at 14:26

## 2019-06-01 ASSESSMENT — ENCOUNTER SYMPTOMS
BOWEL INCONTINENCE: 0
VISUAL CHANGE: 0
DIARRHEA: 0
ABDOMINAL PAIN: 0
FACIAL SWEATING: 0
COUGH: 0
CONSTIPATION: 1
VOMITING: 0
SCALP TENDERNESS: 1
EYE PAIN: 0
STRIDOR: 0
SINUS PRESSURE: 1
SORE THROAT: 0
EYE REDNESS: 0
SHORTNESS OF BREATH: 0
SHORTNESS OF BREATH: 1
BLOOD IN STOOL: 0
WHEEZING: 0
BLURRED VISION: 1
SWOLLEN GLANDS: 0
PHOTOPHOBIA: 0
NAUSEA: 0
BACK PAIN: 1

## 2019-06-01 NOTE — H&P
HISTORY & PHYSICAL       DATE OF ADMISSION:  5/31/2019    DATE OF SERVICE:  6/1/19    Subjective:    Damaris Stoner, 80 y.o. male presents today with:     CHIEF COMPLAINT:   80year old male who presented to the ER S/P mechanical fall in the shower. Friend states he had been falling frequently for the past 3 months. In the ER EKG showed sinus bradycardia with heart rate of 57, left axis, Right BBB. Labs remarkable for glucose 184. CT Head shows acute on chronic bilateral subdural hematoma. CT Spine negative. Chest Xray negative. CT Lumbar 05/24/19 revealed acute on chronic L1 with avulsion of anterior portion of L1 vertebral body. Dr. Duyen Wilson consulted and recommended FFP and Vitamin K given the [address] and he had been on coumadin. Admitted to ICU. On 05/25/19 he underwent Bilateral Craniotomy's with evacuation of hematomas. CT Head 05/26/19 revealed post operative bilateral gas containing subdural hematomas left larger than right with left ot right midline shift of 4 mm improved from prior exam 05/25/19. US Bilateral Lower Extremities 05/29/19 showed no DVT's. Chest XR 05/30/19 revealed areas of atelectasis, patchy ground glass infiltrate in the bases, right greater than left. Trace small right pleural effusion. US Right Upper Extremity 05/31/19 showed no DVT's. 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     Transcribed from pre-admission information sheet completed by Robinson Stephens, RODOLFO/mdl as directed by Dr. Kevin Castaneda. Neurologic Problem   The patient's primary symptoms include an altered mental status, clumsiness, a loss of balance, memory loss, slurred speech and weakness.  The patient's pertinent negatives include no focal sensory loss, focal weakness, syncope or visual the family, obesity, pseudotumor cerebri, sinus disease or TMJ. Fatigue   This is a recurrent problem. The current episode started more than 1 year ago. The problem occurs constantly. The problem has been gradually improving. Associated symptoms include anorexia, arthralgias, fatigue, a fever, headaches, joint swelling, myalgias, numbness and weakness. Pertinent negatives include no abdominal pain, chest pain, chills, congestion, coughing, diaphoresis, nausea, neck pain, rash, sore throat, swollen glands, visual change or vomiting. The symptoms are aggravated by walking. He has tried rest, heat and acetaminophen for the symptoms. The treatment provided mild relief. The patient has stabilized medically andis able to participate at acute level rehab but is too medically complex for SNF due to need for therapy at the acute level with at least 15 hours a week of PT OT and cognitive and recreational therapy at an acute level with daily medical monitoring. Imaging:    Imaging and other studies reviewed and discussed with patient and staff    Xr Chest Standard  5/30/2019  EXAMINATION: XR CHEST (2 VW)  CLINICAL HISTORY:  low grade temp, tachypnea, dysphagia COMPARISONS: May 28, 2018  FINDINGS: 3 views of the chest are submitted. There are multiple median sternotomy wires. Limited exam due to low lung volume. The cardiac silhouette is enlarged. Unchanged. .  The mediastinum is unremarkable. Pulmonary vascular unremarkable. Right sided trachea. Areas of atelectasis, patchy groundglass infiltrate in the bases. Right greater than left Trace small right pleural effusion. .  No Pneumothoraces. Areas of atelectasis, patchy groundglass infiltrate in the bases. Right greater than left . Trace/ small right pleural effusion    Ct Head Wo  5/28/2019  CT Brain Contrast medium:  Not utilized. History:   Altered mental status Comparison:  CT brain, earlier today, 0438 hours, May 26, 2019, May 25, 2019. Findings: Bilateral frontal craniotomies and apex, again identified. Again identified are extra-axial fluid collections bilaterally in the frontal, and parietal regions. The focal area of increased attenuation within the extra-axial fluid found in the right posterior parietal region, and left posterior frontal region, remains unchanged. Likewise, the strand-like area of increased attenuation within the extra-axial fluid found anteriorly, and bilaterally in left right frontal region is also unchanged. The amount of air now identified within the extra-axial space is decreased since the prior study. No midline shift. No mass effect. No new areas of abnormal increased attenuation are identified. Basal ganglia and cerebellum are without anomaly. Mastoid air cells well aerated. Orbits without anomaly. Facial sinuses intact. Impression: Bilateral frontal and parietal subdural hematomas. The amount of extra-axial air has slightly decreased since the prior study. No new areas of hemorrhage are identified. Ct Head Wo  5/28/2019  CT HEAD WO CONTRAST : 5/28/2019 CLINICAL HISTORY:SDH. COMPARISON: 5/26/2019. TECHNIQUE: ROUTINE. All CT scans at this facility use dose modulation, iterative reconstruction, and/or weight based dosing when appropriate to reduce radiation dose to as low as reasonably achievable. FINDINGS: Since the prior study, bilateral subdural drainage catheters have been removed. There is significantly decreasing pneumocephaly within the mildly decreasing-sized residual cerebral convexity subdural hematomas, measuring up to approximately 1.5 cm on the left and 1 cm on the right. Approximately 4 mm of left-to-right midline shift appears unchanged. There is no hydrocephalus, herniation, or other significant changes identified.      DECREASING-SIZED BILATERAL CEREBRAL CONVEXITY SUBDURAL HEMATOMAS AFTER INTERVAL REMOVAL OF BOTH DRAINAGE CATHETERS. Ct Head Wo   5/26/2019  EXAMINATION: CT BRAIN WITHOUT CONTRAST 5/26/2019 at 0511 hours CLINICAL HISTORY:  postop  S06. 5X9A Subdural hematoma (HCC) ICD10 COMPARISONS: 5/25/2019 at 1407 hours TECHNIQUE: Spiral scans without contrast. Multiplanar 2-D reconstructions. All CT scans at this facility use dose modulation, iterative reconstruction, and/or weight based dosing when appropriate to reduce radiation dose to as low as reasonably achievable. FINDINGS: Since the prior postoperative examination, the volume of gas and blood in the bilateral subdural collections have decreased. The right subdural collection has a maximal width of 10 mm. The left subdural collection has a maximal width of 18 mm. There is still a considerable amount of gas in the left subdural collection. There is only trace residual gas in the right subdural collection. There is 4 mm left to right midline shift. There is no transtentorial herniation. Bilateral craniotomies and subdural drains are again noted. POSTOPERATIVE BILATERAL GAS-CONTAINING SUBDURAL HEMATOMAS, LEFT LARGER THAN RIGHT, WITH LEFT TO RIGHT MIDLINE SHIFT OF 4 MM, IMPROVED FROM PRIOR EXAMINATION OF 5/25/19 AT 1417 HOURS. Ct Head Wo   5/25/2019  CT HEAD WO CONTRAST : 5/25/2019 CLINICAL HISTORY:  INTRACRANIAL HEMORRHAGE . Recent subdural hematoma drainage surgery. COMPARISON: Earlier 5/25/2019. TECHNIQUE: Spiral unenhanced images were obtained of the head, with routine reconstructions performed. All CT scans at this facility use dose modulation, iterative reconstruction, and/or weight based dosing when appropriate to reduce radiation dose to as low as reasonably achievable. FINDINGS: Since the prior study, bilateral superior parietal craniotomies and subdural drainage catheters have been placed, which are otherwise unremarkable.  There is pneumocephaly and significantly decreasing-sized heterogeneous subdural hematomas bilaterally, which are overall mildly decreased in size and mass effect from the earlier study. There is approximately 2 to 3 mm of left-to-right midline shift, without herniation, hydrocephalus, or other significant changes identified. INTERVAL BILATERAL CRANIOTOMIES AND SUBDURAL HEMATOMA DRAINAGE CATHETER PLACEMENT SURGERY. PNEUMOCEPHALY AND SIGNIFICANTLY DECREASING-SIZED HETEROGENEOUS SUBDURAL HEMATOMAS. APPROXIMATELY 2 TO 3 MM OF LEFT-TO-RIGHT MIDLINE SHIFT WITHOUT HERNIATION, HYDROCEPHALUS, OR OTHER SIGNIFICANT CHANGES IDENTIFIED. Ct Head Wo  5/25/2019  CT HEAD WO CONTRAST : 5/25/2019 CLINICAL HISTORY: CEREBRAL HEMORRHAGE. COMPARISON: 5/24/2019. TECHNIQUE: ROUTINE. All CT scans at this facility use dose modulation, iterative reconstruction, and/or weight based dosing when appropriate to reduce radiation dose to as low as reasonably achievable. FINDINGS: Motion artifact is present on the initial scan, which was repeated. Acute on chronic subdural hematomas overlying both cerebral convexities appear stable, measuring approximately 2.2 cm in thickness on the right, and 1.8 cm in maximum thickness, on the coronal reconstructions. There is no significant midline shift, herniation, or other significant changes identified. STABLE APPROXIMATELY 2.2 CM RIGHT AND 1.8 CM LEFT SUBDURAL HEMATOMA. NO OTHER SIGNIFICANT CHANGE FROM EARLIER YESTERDAY IDENTIFIED. Ct Head Wo  5/25/2019  CT HEAD WO CONTRAST : 5/24/2019 9:15 PM CLINICAL HISTORY: CEREBRAL HEMORRHAGE. COMPARISON: Earlier 5/24/2019. TECHNIQUE: ROUTINE. All CT scans at this facility use dose modulation, iterative reconstruction, and/or weight based dosing when appropriate to reduce radiation dose to as low as reasonably achievable. FINDINGS: Motion artifact is present on the initial scan, which was repeated.  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 The left subdural hematoma again measures approximately 1.8 cm in maximum thickness, on the coronal reconstructions. There is no significant midline shift, herniation, or other significant changes identified. SLIGHTLY ENLARGING APPROXIMATELY 2.2 CM RIGHT SUBDURAL HEMATOMA AND STABLE APPROXIMATELY 1.8 CM LEFT SUBDURAL HEMATOMA. NO OTHER SIGNIFICANT CHANGE FROM EARLIER 5/24/2019 IDENTIFIED. Ct Head Wo 5/24/2019  CT HEAD WO CONTRAST : 5/24/2019 CLINICAL HISTORY: head injury s/p fall . COMPARISON: None available. TECHNIQUE: ROUTINE. All CT scans at this facility use dose modulation, iterative reconstruction, and/or weight based dosing when appropriate to reduce radiation dose to as low as reasonably achievable. FINDINGS: Heterogeneously dense subdural collections overlying the superior aspects of both cerebral hemispheres measuring up to 1.8 cm in thickness, are consistent with acute on chronic subdural hematomas. There is mass effect and mild crowding of the sulci, without midline shift, hydrocephalus or herniation. There is no parenchymal, subarachnoid, or intraventricular hemorrhage, evidence of a recent ischemic cortical infarct, skull fracture, or other complication identified. UP TO 1.8 CM ACUTE CHRONIC SUBDURAL HEMATOMAS OVER BOTH CEREBRAL CONVEXITIES, AS DESCRIBED. The findings were discussed with Dr. La Nena Ramirez shortly following completion of the study. Ct Cervical Spine Wo 5/24/2019  CT CERVICAL SPINE WO CONTRAST: 5/24/2019 CLINICAL HISTORY:  Fall, neck pain . COMPARISON: None available. TECHNIQUE: ROUTINE All CT scans at this facility use dose modulation, iterative reconstruction, and/or weight based dosing when appropriate to reduce radiation dose to as low as reasonably achievable. FINDINGS: The spine is visualized from the craniovertebral junction through the T1-2 level.  Mild to moderate hypertrophic facet changes are present, predominantly on the right at C3-4, and on the left at C2-C3 and C5-6. There is no fracture, significant subluxation, or acute paraspinous soft tissue abnormalities identified. NO FRACTURE OR EVIDENCE OF CERVICAL SPINE INJURY IDENTIFIED. CERVICAL SPONDYLOSIS. Ct Lumbar Spine Wo Contrast    Result Date: 5/24/2019  CT lumbar spine without intravenous contrast medium. HISTORY: Back pain. Technical factors: CT imaging lumbar spine obtained and formatted as 2.5 mm contiguous axial images. Sagittal and coronal reconstruction obtained during postprocessing. No intravenous contrast medium utilized. Study done in comparison with plain film imaging lumbar spine, October 3, 2018. FINDINGS: Compression fracture L1 is again identified, and is progressed since the prior study. Addition, multiple the anterior margin of L1 and fracture of the superior articular surface of L1 is now identified (series 602, image 39). Anterior osteophytes T10 and  T11. Diffuse facet hypertrophy identified. At the L3-L4 level, mild facet hypertrophy, ligamentum flavum hypertrophy, and a disc bulge, causes anterior flattening of thecal sac with possible mild central stenosis. Acute on chronic fracture, L1, with avulsion of the anterior portion of the L1 vertebral body. Findings suspicious for mild to moderate central stenosis at the L3-L4 level. All CT scans at this facility use dose modulation, iterative reconstruction, and/or weight based dosing when appropriate to reduce radiation dose to as low as reasonably achievable. Xr Chest   5/29/2019  XR CHEST PORTABLE : 5/28/2019 CLINICAL HISTORY:  increasing SOB / restless . COMPARISON: 5/24/2019. A portable upright AP radiograph of the chest was obtained. FINDINGS: A poor inspiratory volume is present with mild patchy infiltrates suggestive of edema. Atypical pneumonia should be excluded clinically. The heart is mildly enlarged with mild vascular congestion. 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  There is no dilated significant pleural effusion, pneumothorax, or displaced fractures identified. POOR INSPIRATION WITH PROBABLE MILD CARDIAC DECOMPENSATION. ATYPICAL PNEUMONIA SHOULD BE EXCLUDED CLINICALLY. Xr Chest : 5/25/2019  XR CHEST PORTABLE : 5/24/2019 CLINICAL HISTORY:  Syncope . COMPARISON: CT abdomen pelvis 10/3/2018. A portable upright AP radiograph of the chest was obtained. FINDINGS: A poor inspiratory volume is present with mild probable bibasilar atelectasis. There is no cardiomegaly, significant pleural effusion, vascular congestion, pneumothorax, or displaced fractures identified. 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 POOR INSPIRATION WITH MILD PROBABLE BIBASILAR ATELECTASIS. Us Dup Upper Extremity Right  5/31/2019  EXAMINATION: DUPLEX ULTRASOUND OF THE RIGHT UPPER EXTREMITY CLINICAL HISTORY: Right arm pain COMPARISONS: FINDINGS: Duplex color ultrasound as well as both gray scale and spectral Doppler ultrasound of the deep venous system of the right upper extremity from the neck  to the ante cubital  fossa was performed. There are no findings of deep venous thrombus in the visualized vessels of the right upper extremity. NO FINDINGS OF DEEP VENOUS THROMBUS IN  THE VISUALIZED VESSELS OF THE RIGHT UPPER EXTREMITY. Fl Modified Barium Swallow  : 5/31/2019  EXAMINATION: FL MODIFIED BARIUM SWALLOW W VIDEO: DATE AND TIME: 5/30/2019 at 10:45 AM. CLINICAL HISTORY: DYSPHAGIA. POSSIBLE ASPIRATION. COMPARISON: None available. TECHNIQUE: A modified barium swallow study was performed in the Radiology Suite in conjunction with Speech Therapy. The patient was seated upright throughout this assessment. Multiple consistencies were used to evaluate swallowing function and safety. Fluoroscopy time was 4.2 minutes. FINDINGS: Oral phase: There was slightly delayed mastication and bolus formation with some premature spillage of barium to the level of the valleculae and pyriform sinuses.  Pharyngeal phase: There was moderate pharyngeal dysfunction with penetration, but no evidence of aspiration. Esophageal phase: The patient's upper esophageal sphincter opens normally. There is no diverticulum stricture or fistula. Residual: There was some vallecular and pyriform sinus residue which adequately cleared. MODERATE ORAL AND PHARYNGEAL DYSFUNCTION WITHOUT ASPIRATION. PLEASE REFER TO SPEECH PATHOLOGY  REPORT FOR RECOMMENDATIONS. Us Dup Lower Extremities Bilateral V : 5/29/2019  US DUP LOWER EXTREMITIES BILATERAL VENOUS : 5/29/2019 CLINICAL HISTORY: Lower extremity pain. COMPARISON: None available. Grayscale, compression, color and waveform Doppler analysis of both lower extremity deep venous systems was performed with augmentation. FINDINGS: There is no deep venous thrombosis, abnormal masses, fluid collections or other findings of concern identified within either lower extremity. NO DVT IDENTIFIED IN EITHER LOWER EXTREMITY.               Labs:     labs reviewed and discussed with patient and staff    Lab Results   Component Value Date    POCGLU 148 05/28/2019     Lab Results   Component Value Date     05/30/2019    K 4.0 05/30/2019     05/30/2019    CO2 23 05/30/2019    BUN 26 05/30/2019    CREATININE 0.85 05/30/2019    CALCIUM 9.3 05/30/2019    LABALBU 3.3 05/24/2019    BILITOT 0.7 05/24/2019    ALKPHOS 53 05/24/2019    AST 24 05/24/2019    ALT 17 05/24/2019     Lab Results   Component Value Date    WBC 7.0 05/30/2019    RBC 3.83 05/30/2019    HGB 11.7 05/30/2019    HCT 34.7 05/30/2019    MCV 90.6 05/30/2019    MCH 30.4 05/30/2019    MCHC 33.6 05/30/2019    RDW 13.7 05/30/2019     05/30/2019     No results found for: VITD25  Lab Results   Component Value Date    COLORU RED 05/28/2019    NITRU Negative 05/28/2019    GLUCOSEU Negative 05/28/2019    KETUA Negative 05/28/2019    UROBILINOGEN 1.0 05/28/2019    BILIRUBINUR Negative 05/28/2019    BILIRUBINUR neg 10/08/2015     Lab Results   Component Value Date    PROTIME 11.2 05/30/2019    PROTIME 24.1 10/08/2018     Lab Results   Component Value Date    INR 1.1 05/30/2019         I discussed results with patient. The patient remains highly medically complex and continues to have severe problems with activities of daily living and mobility. The patient was assessed to be able to tolerate intensive rehabilitation and therefore was admitted to Rehabilitation to address these needs. Prior Function; everyday activities:     Social History     Socioeconomic History    Marital status:      Spouse name: Not on file    Number of children: Not on file    Years of education: Not on file    Highest education level: Not on file   Occupational History    Occupation: Retired    Social Needs    Financial resource strain: Not on file    Food insecurity:     Worry: Not on file     Inability: Not on file   Docitt needs:     Medical: Not on file     Non-medical: Not on file   Tobacco Use    Smoking status: Former Smoker    Smokeless tobacco: Never Used   Substance and Sexual Activity    Alcohol use:  Yes     Alcohol/week: 0.0 oz     Comment: social    Drug use: No    Sexual activity: Not Currently   Lifestyle    Physical activity:     Days per week: Not on file     Minutes per session: Not on file    Stress: Not on file   Relationships    Social connections:     Talks on phone: More than three times a week     Gets together: Never     Attends Alevism service: Never     Active member of club or organization: No     Attends meetings of clubs or organizations: Never     Relationship status:     Intimate partner violence:     Fear of current or ex partner: No     Emotionally abused: No     Physically abused: No     Forced sexual activity: No   Other Topics Concern    Not on file   Social History Narrative    Social/Functional History     girlfriend Kamilah    Multiple prior marriages-multiple independent with all ADLs and mobilityand did not require any outside services.        Past Medical History:   Diagnosis Date    Chronic back pain     Hyperlipidemia     Hypertension        Past Surgical History:   Procedure Laterality Date    CARDIAC CATHETERIZATION      3 stents    CORONARY ARTERY BYPASS GRAFT      CRANIOTOMY Bilateral 5/25/2019    CRANIOTOMY HEMATOMA EVACUATION performed by Jordana Martinez MD at [facility]       Current Facility-Administered Medications   Medication Dose Route Frequency Provider Last Rate Last Dose    acetaminophen (TYLENOL) tablet 650 mg  650 mg Oral Q4H PRN Gi Scullin, DO        magnesium hydroxide (MILK OF MAGNESIA) 400 MG/5ML suspension 30 mL  30 mL Oral Daily PRN Gi Scullin, DO        ondansetron (ZOFRAN) injection 4 mg  4 mg Intravenous Q6H PRN Jordana Martinez MD        bacitracin zinc ointment   Topical TID Jordana Martinez MD   1 g at 06/01/19 4123    diltiazem (CARDIZEM CD) extended release capsule 120 mg  120 mg Oral Daily Jordana Martinez MD   120 mg at 05/31/19 2228    famotidine (PEPCID) tablet 20 mg  20 mg Oral BID Jordana Martinez MD   20 mg at 06/01/19 0834    levETIRAcetam (KEPPRA) tablet 500 mg  500 mg Oral BID Jordana Martinez MD   500 mg at 06/01/19 0834    nitroGLYCERIN (NITROSTAT) SL tablet 0.4 mg  0.4 mg Sublingual Q5 Min PRN Jordana Martinez MD        pravastatin (PRAVACHOL) tablet 40 mg  40 mg Oral Nightly Jordana Martinez MD   40 mg at 05/31/19 2228    ipratropium-albuterol (DUONEB) nebulizer solution 1 ampule  1 ampule Inhalation TID Gi Scullin, DO   Stopped at 06/01/19 0800    albuterol (PROVENTIL) nebulizer solution 2.5 mg  2.5 mg Nebulization Q2H PRN Gi Scullin, DO   2.5 mg at 06/01/19 0510    predniSONE (DELTASONE) tablet 40 mg  40 mg Oral Daily Juan Alberto Griffithsar, DO   40 mg at 06/01/19 1247       Allergies   Allergen Reactions    Honey Bee Venom [Bee Venom] Swelling    Penicillins Swelling       Social History     Socioeconomic History    Marital status: complaint. Review of Systems   Constitutional: Positive for fatigue and fever. Negative for chills and diaphoresis. HENT: Positive for sinus pressure. Negative for congestion, ear discharge, ear pain, hearing loss, nosebleeds, sore throat and tinnitus. Eyes: Positive for blurred vision. Negative for photophobia, pain and redness. Respiratory: Positive for shortness of breath. Negative for cough, wheezing and stridor. Shortness of breath on exertion   Cardiovascular: Negative for chest pain, palpitations and leg swelling. Gastrointestinal: Positive for anorexia and constipation. Negative for abdominal pain, blood in stool, bowel incontinence, diarrhea, nausea and vomiting. Endocrine: Negative for polydipsia. Genitourinary: Positive for bladder incontinence. Negative for dysuria, flank pain, frequency, hematuria and urgency. Musculoskeletal: Positive for arthralgias, back pain, joint swelling and myalgias. Negative for neck pain. Skin: Negative for rash. Allergic/Immunologic: Negative for environmental allergies. Neurological: Positive for weakness, light-headedness, numbness, headaches and loss of balance. Negative for dizziness, tingling, tremors, focal weakness, seizures and syncope. Hematological: Does not bruise/bleed easily. Psychiatric/Behavioral: Positive for confusion and memory loss. Negative for hallucinations and suicidal ideas. The patient has insomnia. The patient is not nervous/anxious. Objective  BP (!) 117/59   Pulse 60   Temp 98 °F (36.7 °C)   Resp 15   Ht 6' 0.05\" (1.83 m)   Wt 181 lb 7 oz (82.3 kg)   SpO2 93%   BMI 24.58 kg/m² *    Physical Exam   Constitutional: He appears well-developed and well-nourished. Non-toxic appearance. He does not have a sickly appearance. He does not appear ill. No distress. HENT:   Head: Normocephalic. Not macrocephalic and not microcephalic. Head is without raccoon's eyes and without Santos's sign. Mouth/Throat: Oropharyngeal exudate present. Scalp incision well approx, no erythema or drainage   Eyes: Pupils are equal, round, and reactive to light. Conjunctivae and EOM are normal. Right eye exhibits no discharge. Left eye exhibits no discharge. No scleral icterus. Neck: Normal range of motion. Neck supple. Normal carotid pulses, no hepatojugular reflux and no JVD present. Spinous process tenderness and muscular tenderness present. Carotid bruit is not present. No tracheal deviation present. No thyromegaly present. Cardiovascular: Normal rate. An irregularly irregular rhythm present. Exam reveals distant heart sounds. Pulmonary/Chest: Effort normal and breath sounds normal. No stridor. No respiratory distress. RR 30s   Abdominal: Soft. Bowel sounds are normal. He exhibits no distension. There is no tenderness. There is no rebound and no guarding. Musculoskeletal: He exhibits edema (RUE). Cervical back: He exhibits decreased range of motion and tenderness. Thoracic back: He exhibits decreased range of motion and tenderness. Lumbar back: He exhibits decreased range of motion and tenderness. Lymphadenopathy:        Head (right side): No submental and no submandibular adenopathy present. Head (left side): No submental and no submandibular adenopathy present. He has no cervical adenopathy. He has no axillary adenopathy. Neurological: He is alert. A sensory deficit is present. Coordination and gait abnormal.   Reflex Scores:       Tricep reflexes are 1+ on the right side and 1+ on the left side. Bicep reflexes are 1+ on the right side and 1+ on the left side. Brachioradialis reflexes are 1+ on the right side and 1+ on the left side. Patellar reflexes are 1+ on the right side. Achilles reflexes are 0 on the right side and 0 on the left side. Nonverbal  Veronica snot follow commands   Skin: Skin is warm and dry. He is not diaphoretic.  There is erythema (right hand and Bilat 1st MTP). There is pallor. Psychiatric: He has a normal mood and affect. Thought content normal. His mood appears not anxious. His affect is not angry. His speech is delayed and slurred. His speech is not rapid and/or pressured. He is slowed. He is not withdrawn and not actively hallucinating. Cognition and memory are not impaired. He does not express impulsivity or inappropriate judgment. He does not exhibit a depressed mood. He exhibits abnormal recent memory. He exhibits normal remote memory. He is attentive. Back Exam     Muscle Strength   Right Quadriceps:  4/5   Left Quadriceps:  4/5   Right Hamstrings:  4/5   Left Hamstrings:  4/5           Neurologic Exam     Mental Status   Disoriented to street and number. Oriented to country, city and area. Disoriented to time. Disoriented to month, date and day. Oriented to year and season. Registration: recalls 1 of 3 objects. Follows 1 step commands. Attention: decreased. Concentration: decreased. Speech: slurred   Level of consciousness: drowsy  Knowledge: inconsistent with education. Able to name object. Unable to read. Able to repeat. Unable to write. Abnormal comprehension. Cranial Nerves     CN III, IV, VI   Pupils are equal, round, and reactive to light.   Extraocular motions are normal.     Motor Exam   Muscle bulk: decreased  Overall muscle tone: normal    Strength   Right neck flexion: 4/5  Left neck flexion: 4/5  Right neck extension: 4/5  Left neck extension: 4/5  Right deltoid: 4/5  Left deltoid: 4/5  Right biceps: 4/5  Left biceps: 4/5  Right triceps: 4/5  Left triceps: 4/5  Right wrist flexion: 4/5  Left wrist flexion: 4/5  Right wrist extension: 4/5  Left wrist extension: 4/5  Right interossei: 4/5  Left interossei: 4/5  Right abdominals: 4/5  Left abdominals: 4/5  Right iliopsoas: 4/5  Left iliopsoas: 4/5  Right quadriceps: 4/5  Left quadriceps: 4/5  Right hamstrin/5  Left hamstrin/5  Right glutei: 4/5  Left glutei: 4/5  Right anterior tibial: 4/5  Left anterior tibial: 4/5  Right posterior tibial: 4/5  Left posterior tibial: 4/5  Right peroneal: 4/5  Left peroneal: 4/5  Right gastroc: 4/5  Left gastroc: 4/5    Gait, Coordination, and Reflexes     Reflexes   Right brachioradialis: 1+  Left brachioradialis: 1+  Right biceps: 1+  Left biceps: 1+  Right triceps: 1+  Left triceps: 1+  Right patellar: 1+  Right achilles: 0  Left achilles: 0      After extensive review of the records and above physical exam, I have formulated the followingdiagnoses and plan:      DIAGNOSES:    1. The patient was admitted to the acute rehabilitation unit with the primary rehab diagnoses being severe abnormality of gait and mobility andimpaired self care and ADL's due to  subdural hematoma    Compared to Pre-Admission Assessment, patients medical and functional status remain challengingly complex and patient continues to requireintensive therapeutic intervention from multiple therapies, therefore, initiate acute intensive comprehensive inpatient rehabilitation program including PT/OT to improve balance, ambulation, ADLs, and to improve the P/AROM. Functional and medical status reassessed regarding patients ability to participate in therapies and patient found to be able to participate in acute intensivecomprehensive inpatient rehabilitation program.  Therapeutic modifications regarding activities in therapies, place, amount of time per day and intensity of therapy made daily. Enroll in acute course of therapy program to include 1 1/2 hours per day of PT 5 to 7days per week and 1 1/2 hours per day of OT 5 to 7 days per week, and   SLP and Rec T 1/2 hour per day 3-5 days per week.   The patient is stable medically and physically on previous exam.       This patient present with significant new onset decreased mobility andinability to perform activities of daily living skills independently and is at significant risk for prolonged disability  For this reason they have been [facility] to [facility]. Thepatient's current functional and medical status are highly complex but the patient is able to participate in intensive rehabilitation. A comprehensive inpatient rehabilitation program is appropriate. The patient Sai Macario initial evaluation by the rehabilitation team and be discussed at regular treatment team meetings to assess progress, mobility, self care, mood and discharge issues. Physical therapy will be consulted for mobilityand endurance issues and should be performed 1 to 2 times per day, 7 days per week for the length of stay. Occupational therapy will be consulted for activities of daily living and should be performed 1 to 2 times per day,7 days per week for the length of stay. Their capacity to participate at an acute level, decision to be treated in the gym, room or on the unit, their activity goals for the day and the number of minutes of activeparticipation will be reassessed and re-prescribed daily. Because this patient is medically complex, I will check a CBC, BMP, UA and orthostatic blood pressures. They will be reassessed daily regarding their ability toparticipate in an acute level rehabilitation program.  Recreational Therapy will be consulted for community re-entry and adjustment to disability. Communication, cognitive and emotional issues will also be addressed duringthis rehabilitation stay by rehabilitation psychologist or speech therapist as appropriate. I reviewed the patient's old and current charts and discussed other rehabilitation options with the rehabilitation teamincluding the rehab RN and the admission team as well as the patient.   I feel that the patient's functional recovery would be best served at an acute inpatient rehabilitation program because the patient needs intense therapythree hours per day, direct RN supervision and daily monitoring by a physician for medical status. This cannot be sufficiently provided by home health care, a skilled nursing facility or in an outpatient setting. I furtherfeel that the patient has the potential to improve functional abilities in an acute intensive rehabilitation program.    Old records were reviewed and summarized. 2.  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 [facility] admit 05/31/19. Active Problems:  1. Afib,   Essential hypertension,   Hyperlipidemia,   S/P CABG (coronary artery bypass graft), S/P PTCA (percutaneous transluminal coronary angioplasty),   RBBB,   CAD (coronary artery disease), long-term previous use of blood thinners  2. Subdural hematoma-status post evacuation with , hold blood thinners-vital signs every shift, dose and titrate cardiac medications, recheck BMP CBC and consult hospitalist for backup medical  3.   prostatic hyperplasia with urinary frequency,   Prostate cancer -check postvoid residuals check stat UA  4. Facet arthropathy, lumbosacral,   Chronic back pain,   Spondylolisthesis of lumbar region-add Lidoderm, Tylenol, Norco as needed  5. Gross hematuria, Long term current use of anticoagulant-check UA monitor urine for blood  6. Long-term use of aspirin therapy and other blood thinners on hold at present because of recent subdural hematoma  7. SHAYY on CPAP-add CPAP from home if available is not available add CPAP from the hospital  8. Prediabetes-recheck BMP, low carb diet  9. Vitamin D deficiency-high-dose vitamin D, add Tums at daily dose of vitamin D after high doses done recheck as an outpatient  10. Aphasia, and oropharyngeal dysphagia-recently which pathology consult, add e-stim, add supplementation to diet push oral fluids  11.    Poorly nourished due to poor nutrition and previous alcohol use BMI 26.0-26.9,adult--add protein supplementation vitamin B12 vitamin D CoQ10,  to stop drinking alcohol as an outpatient and at chemical dependency counseling when patient is able to participate  12. Complex social situation-patient has a girlfriend he lives alone. He is estranged from his several children. I am especially concerned about  stroke and heart attack risk being off of his blood thinners but he is too high of a risk for blood thinners at this point. The patient's high risk medication use includes  opiate medications carrying considering his upper use. The patient is high risk for urinary tract infection, an admission urinalysis has been ordered. I will have the nurses check post void residual bladder volumes and place acatheter if excessive urine is retained in the bladder after voiding. The patient is risk for deep venous thromosis,complex deep venous thrombosis protocol prophylaxis has been ordered -minimize thinners because of recent intracranial bleeding . The patient is high risk for orthostasis and a hydration program and orthostatic blood pressure screening have been ordered. I will attempt to get old records from the patient's previous hospital stay. Care everywhere on [facility] wasutilized. 3.  Current and previous medications were reviewed and summarized and compared to old medication lists from home and from the acute floor. 4.  Complex labs and x-rays were reviewed. I will reviewpatient's old EKG and place it on the chart. 5.  Will provide emotional support for this patient regarding adjustment to their disability. Cognition and mood will be screened daily and addressed by rehabilitationpsychology and/or speech therapy as appropriate. I have encouraged the patient to attend the Rehab Adjustment to Disability Support Group and recreational therapy. 6.  Estimated length of stay is 3-4 weeks.   Discharge to home with help from family and home health PT, OT, RN, and aide. Patient should be independent at discharge. 7.  The patient's medical and rehab prognosis are good. [address] regarding the patient's back up to general medical needs. A welcome letter was presented with an explanation of my services, my specialty and what to expect during the rehabilitation process. Aswell as introducing myself, I also wrote my name on their bedside marker board with their name as well as the names of the other physicians with an explanation of our individual roles in their care, as well as the rehab process.             Ramon Ch D.O., F.A.A.P.M.&RUBEN.

## 2019-06-01 NOTE — PROGRESS NOTES
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 S/P fall with Subdural Hematomas; Bilateral Craniotomy with Evacuation of Subdural Hematomas  General Comment  Comments: Patient in chair upon PTA arrival     Restrictions:  Restrictions/Precautions: Swallowing - Thickened Liquids, Fall Risk, Seizure    SUBJECTIVE: Subjective: \"Drink water\" is patients response when asked name, bd and if pain  Pain Screening  Patient Currently in Pain: Other (comment)(no objective signs of pain, patient repeats \"pain\" as he has expressive aphasia)  Pre Treatment Pain Screening  Pain at present: 0(no objective signs of pain)    Post Treatment Pain Screening:no objective signs  Pain Assessment  Pain Assessment: (no objective signs of pain)    OBJECTIVE:   Overall Orientation Status: (Unable to formally assess)  Follows Commands: Impaired(follows simple commands 25% of time)         Bed mobility  Bridging: Minimal assistance; Moderate assistance  Rolling to Left: Moderate assistance  Rolling to Right: Moderate assistance  Supine to Sit: Maximum assistance  Sit to Supine: Maximum assistance  Comment: Patient required hand over hand assist with all mobility tasks    Transfers  Sit to Stand: Minimal Assistance; Moderate Assistance  Stand to sit: Contact guard assistance  Bed to Chair: Maximum assistance;2 Person Assistance(safety )  Comment: +2 for safety, cues with all tasks, patient with varied ability to follow simple step by step command     Ambulation  Ambulation?: No(safety concern)        Activity Tolerance  Activity Tolerance: Patient limited by fatigue  Activity Tolerance: unable to follow step by step, hand over hand instruction this session    Exercises  Straight Leg Raise: x10 aarom tramaine  Knee Long Arc Quad: x10 aarom     ASSESSMENT/COMMENTS:  Body structures, Functions, Activity limitations: Decreased functional mobility ; Decreased safe awareness;Decreased balance;Decreased coordination;Decreased vision/visual deficit; Decreased endurance;Decreased strength;Decreased cognition;Decreased ADL status  Assessment: Patient with poor participation d/t fatigue/aphasia. Hand over hand, step by step commands prove difficult throughout. PLAN OF CARE/Safety:   Safety Devices  Type of devices:  All fall risk precautions in place      Therapy Time:   Individual   Time In 1430   Time Out 1440   Minutes 10     Minutes:10      Transfer/Bed mobility trainin     Therapeutic ex:2      Checo Matamoros PTA, 19 at 4:52 PM

## 2019-06-01 NOTE — PROGRESS NOTES
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    Restrictions:  Restrictions/Precautions: Swallowing - Thickened Liquids, Seizure, Fall Risk  Position Activity Restriction  Other position/activity restrictions: nectar thick     SUBJECTIVE: Subjective: Pt with significant expressive aphasia. Repeat-back single words at times. Per pt's S.O., this has been chronic    Pre Treatment Pain Screening  Comments / Details: No s/s of pain throughout assessment    Post Treatment Pain Screening:  Pain Assessment  Pain Assessment: (unchanged)    Prior Level of Function:  Social/Functional History  Lives With: Significant other  Type of Home: House  Home Layout: One level  Home Access: Stairs to enter with rails  Entrance Stairs - Number of Steps: 4  Home Equipment: Rolling walker  ADL Assistance: Independent  Homemaking Assistance: Independent  Ambulation Assistance: Independent(with [employer])  Transfer Assistance: Independent  Occupation: Retired  Type of occupation:   Additional Comments: Pt unable to verbalize. Pt's S.O. present to provide PLOF. Per her report, pt has been falling very frequently over past 6months. She has had to pick him up multiple times. Pt's impaired speech has also been long standing per her report.      OBJECTIVE:   Vision/Hearing:  Vision Exceptions: Wears glasses at all times(Pt does not attend to full visual field unless provided heavy prompting; unable to conduct formal visual screen d/t pt unable to follow. )  Hearing Exceptions: Bilateral hearing aid;Hard of hearing/hearing concerns    Cognition:  Overall Orientation Status: (Unable to formally assess)  Follows Commands: Impaired(follows simple commands 25% of time)  Observation/Palpation  Observation: Flat affect; Alert; cooperative    ROM:  RLE PROM: WFL  LLE PROM: WFL    Strength:  Strength Other  Other: Pt unable to follow formal MMT. Pt demonstrates generalized weakness throughout with greatest weakness throughout R UE/LE. Able to move R LE only partially through full ROM against gravity. Neuro:  Sensation  Overall Sensation Status: (unable to formally assess)     Balance  Comments: Pt able to sit unsupported once positioned in midline with SBA. Unable to shift weight to reach OBOS. In standing, pt effectively retropulsive requiring steadying assist frequently to maintain upright. Motor Control  Gross Motor?: (Pt demonstrates increased tone L LE when knee extended; Neglectful of R hemibody despite decreased attention overall; poor coordination noted globally)    Bed mobility  Bridging: Minimal assistance; Moderate assistance  Rolling to Left: Moderate assistance  Rolling to Right: Moderate assistance  Supine to Sit: Maximum assistance;2 Person assistance  Sit to Supine: Maximum assistance  Comment: Pt requiring step by step cueing and manual facilitation for effective completion; Hand over hand guidance provided. Max encouragement and time required to complete full task. Transfers  Sit to Stand: Minimal Assistance; Moderate Assistance  Stand to sit: Contact guard assistance  Bed to Chair: Maximum assistance;2 Person Assistance(for safety)  Squat Pivot Transfers: Moderate Assistance  Comment: Pt responds best to simple commands \"stand up/sit down\" and completes with automatic movement patterns requiring only partial assistance to complete safely. Pt with impaired motor control to coordinate stand pivot chair>bed requiring +2 assist for safe completion.  Pt instructed in low pivot transfer with hand placement provided on WC armrest for guidance. Pt with improved performance with simple tasks. Ambulation 1  Surface: carpet  Device: Rolling Walker  Other Apparatus: Wheelchair follow;O2  Assistance: Maximum assistance; Moderate assistance;2 Person assistance  Quality of Gait: shuffling steps veering heavily to R requiring assist for maintaining straight path  Distance: 5ft  Stairs/Curb  Stairs?: No(safety concerns)  Wheelchair Activities  Propulsion: Yes  Propulsion 1  Propulsion: Manual  Level: Level Tile  Method: RUE;LUE  Level of Assistance: Dependent/Total  Description/ Details: unable to advance WC or coordinate extremities for effective propulsion  Distance: 2ft    Activity Tolerance  Activity Tolerance: Patient Tolerated treatment well        Car transfers: Not tested  Walk 10 ft: Not tested  Walk 50 ft with 2 turns: Not tested  Walk 150 ft in corridor: Not tested  Walking 10 ft on unlevel surface: Not tested  Picking up objects: Not tested  Wheelchair Mobility: No     ASSESSMENT:  Body structures, Functions, Activity limitations: Decreased functional mobility ; Decreased safe awareness;Decreased balance;Decreased coordination;Decreased vision/visual deficit; Decreased endurance;Decreased strength;Decreased cognition;Decreased ADL status  Decision Making: High Complexity  History: High  Exam: High  Clinical Presentation: High    Prognosis: Good  Patient Education: PT POC; DC recs; role of PT in the hosp  Barriers to Learning: cognitive awareness    CLINICAL IMPRESSION: Pt presents with impaired motor control, balance, and strength which has negatively impacted his functional mobility and increased his risk of falls and caregiver burden. Continued PT indicated to progress mobility and facilitate DC at highest level of indep and safety. D/t extent of pt's cognitive/neurological symptoms, plus prior history of frequent falls, anticipate pt will require verbal or tactile cues for all mobility upon DC.      PLAN OF CARE:  Frequency: 1-2

## 2019-06-01 NOTE — PROGRESS NOTES
Pt is new pt on rehab at 1900. Pt brought by bed to rehab. Pt is a complete assist at this time. Pt is slow to respond and speak. Expressive aphasia. Can say some words with effort/ whispers. Follows commands. Pt is oriented to person. Incisions/ staples noted to both sides of the top of his head. Dry with some dried blood noted. Open to air. Lungs Clear. Incontinent of urine. Depends on pt. Right index finger joint is swollen and red. Both Great toe joints red and swollen. 2 - # 20  IV's noted in left a/c. Bruising noted to left and right hips. Right arm/ hand is swollen +2 edema. Elevated on pillow. Dr. Digna May notified about admission and orders. Telemetry dc'd and pt put back on prednisone 40mg daily for gout. Pt is a full assist to turn in bed and assist with feeding as reported by medical floor RN. PT and OT and Speech to eval and tx. Call bell in reach. ADEOLA REYNOLDS

## 2019-06-01 NOTE — PROGRESS NOTES
Disorder  (acute or chronic)  []   Severe or Chronic w/ Exacerbation  []     Surgical Status No [x]   Surgeries     General []   Surgery Lower []   Abdominal Thoracic or []   Upper Abdominal Thoracic with  PulmonaryDisorder  []     Chest X-ray Clear/Not  Ordered     []  Chronic Changes  Results Pending  []  Infiltrates, atelectasis, pleural effusion, or edema  []  Infiltrates in more than one lobe []  Infiltrate + Atelectasis, &/or pleural effusion  [x]    Respiratory Pattern Regular,  RR = 12-20 [x]  Increased,  RR = 21-25 []  [place], irregular,  or RR = 26-30 []  Decreased FEV1  or RR = 31-35 []  Severe SOB, use  of accessory muscles, or RR ? 35  []    Mental Status Alert, oriented,  Cooperative []  Confused but Follows commands [x]  Lethargic or unable to follow commands []  Obtunded  []  Comatose  []    Breath Sounds Clear to  auscultation  []  Decreased unilaterally or  in bases only [x]  Decreased  bilaterally  []  Crackles or intermittent wheezes []  Wheezes []    Cough Strong, Spontan., & nonproductive [x]  Strong,  spontaneous, &  productive []  Weak,  Nonproductive []  Weak, productive or  with wheezes []  No spontaneous  cough or may require suctioning []    Level of Activity Ambulatory []  Ambulatory w/ Assist  []  Non-ambulatory [x]  Paraplegic []  Quadriplegic []    Total    Score:__10_____     Triage Score:__4______      Tri       Triage:     1. (>20) Freq: Q3   uterol Q2 PRN    5. (0-5) Freq Q4prn

## 2019-06-01 NOTE — CONSULTS
Consult Note    Reason for Consult:  Medical mgmt    Requesting Physician:  Jonathan Chavarria DO    HISTORY OF PRESENT ILLNESS:    The patient is a 80 y.o. male who presents as transfer from ED [facility] medical floor s/p BL craniotomy d/t BL subdural hematomas 2/2 repeated falls. Pt is muttering unintelligibly during my encounter and nursing staff reports that he responded w/one word answers earlier today. He is unable to follow commands currently. Past Medical History:   Diagnosis Date    Chronic back pain     Hyperlipidemia     Hypertension        Past Surgical History:   Procedure Laterality Date    CARDIAC CATHETERIZATION      3 stents    CORONARY ARTERY BYPASS GRAFT      CRANIOTOMY Bilateral 5/25/2019    CRANIOTOMY HEMATOMA EVACUATION performed by Darvin Pierre MD at [facility]       Prior to Admission medications    Medication Sig Start Date End Date Taking?  Authorizing Provider   pravastatin (PRAVACHOL) 40 MG tablet Take 1 tablet by mouth nightly 5/28/19   Israel Ledezma MD   diltiazem (CARDIZEM CD) 120 MG extended release capsule Take 120 mg by mouth daily    Historical Provider, MD   lisinopril (PRINIVIL;ZESTRIL) 10 MG tablet  10/5/15   Historical Provider, MD   metoprolol (TOPROL-XL) 25 MG XL tablet  9/8/15   Historical Provider, MD   Cholecalciferol (VITAMIN D-3 PO) Take by mouth    Historical Provider, MD   nitroGLYCERIN (NITROSTAT) 0.4 MG SL tablet Place 0.4 mg under the tongue every 5 minutes as needed for Chest pain    Historical Provider, MD       Scheduled Meds:   bacitracin zinc   Topical TID    diltiazem  120 mg Oral Daily    famotidine  20 mg Oral BID    levETIRAcetam  500 mg Oral BID    pravastatin  40 mg Oral Nightly    ipratropium-albuterol  1 ampule Inhalation TID    predniSONE  40 mg Oral Daily     Continuous Infusions:  PRN Meds:acetaminophen, magnesium hydroxide, ondansetron, nitroGLYCERIN, albuterol    Allergies   Allergen Reactions    Honey Bee Venom [Bee Venom] Swelling    Penicillins Swelling       Social History     Socioeconomic History    Marital status:      Spouse name: Not on file    Number of children: Not on file    Years of education: Not on file    Highest education level: Not on file   Occupational History    Occupation: Retired    Social Needs    Financial resource strain: Not on file    Food insecurity:     Worry: Not on file     Inability: Not on file   saperatec needs:     Medical: Not on file     Non-medical: Not on file   Tobacco Use    Smoking status: Former Smoker    Smokeless tobacco: Never Used   Substance and Sexual Activity    Alcohol use: Yes     Alcohol/week: 0.0 oz     Comment: social    Drug use: No    Sexual activity: Not Currently   Lifestyle    Physical activity:     Days per week: Not on file     Minutes per session: Not on file    Stress: Not on file   Relationships    Social connections:     Talks on phone: More than three times a week     Gets together: Never     Attends Restorationist service: Never     Active member of club or organization: No     Attends meetings of clubs or organizations: Never     Relationship status:     Intimate partner violence:     Fear of current or ex partner: No     Emotionally abused: No     Physically abused: No     Forced sexual activity: No   Other Topics Concern    Not on file   Social History Narrative    Social/Functional History     girlfriend Kamilah    Multiple prior marriages-multiple prior children but does not have a relationship with them.         Has a son Elliot--Elliot reports that he and his father have been estranged and haven't spoken for a long time           Lives With: Alone    Home Layout: One level    Home Access: Stairs to enter with rails  - Number of Steps: 4    ADL Assistance: Independent    Homemaking Assistance: Independent    Ambulation Assistance: Independent    Transfer Assistance: Independent    Occupation: Retired    Type of occupation: % 2.1 %    Basophils % 0.3 %    Neutrophils # 4.7 1.4 - 6.5 K/uL    Lymphocytes # 0.9 (L) 1.0 - 4.8 K/uL    Monocytes # 1.2 (H) 0.2 - 0.8 K/uL    Eosinophils # 0.1 0.0 - 0.7 K/uL    Basophils # 0.0 0.0 - 0.2 K/uL   Basic Metabolic Panel w/ Reflex to MG    Collection Time: 05/30/19 10:05 AM   Result Value Ref Range    Sodium 140 135 - 144 mEq/L    Potassium reflex Magnesium 4.0 3.4 - 4.9 mEq/L    Chloride 105 95 - 107 mEq/L    CO2 23 20 - 31 mEq/L    Anion Gap 12 9 - 15 mEq/L    Glucose 116 (H) 70 - 99 mg/dL    BUN 26 (H) 8 - 23 mg/dL    CREATININE 0.85 0.70 - 1.20 mg/dL    GFR Non-African American >60.0 >60    GFR  >60.0 >60    Calcium 9.3 8.5 - 9.9 mg/dL   URIC ACID    Collection Time: 05/31/19 11:39 AM   Result Value Ref Range    Uric Acid, Serum 5.1 3.4 - 7.0 mg/dL       Data:    Us Dup Upper Extremity Right Venous    Result Date: 5/31/2019  EXAMINATION: DUPLEX ULTRASOUND OF THE RIGHT UPPER EXTREMITY CLINICAL HISTORY: Right arm pain COMPARISONS: FINDINGS: Duplex color ultrasound as well as both gray scale and spectral Doppler ultrasound of the deep venous system of the right upper extremity from the neck  to the ante cubital  fossa was performed. There are no findings of deep venous thrombus in the visualized vessels of the right upper extremity. NO FINDINGS OF DEEP VENOUS THROMBUS IN  THE VISUALIZED VESSELS OF THE RIGHT UPPER EXTREMITY. Assessment/Plan:  1. Change in mental status  2. S/p BL craniotomy 2/2 SDH  3. Gait ataxia  4. HTN  5. HLD  6. Hyperglycemia - likely steroid induced      CT brain STAT  Cbc, bmp, Mg now and in am  PT/OT per rehab attending  Neuro checks q4h      Electronically signed by Nina Hui PA-C on 6/1/19 at 5:24 PM    Dr. Ronnell Puri MD - supervising physician    Attending Supervising Physicians Attestation Statement  I saw and evaluated the patient.   I discussed the findings and plans with physician assistant and agree as documented in her note Electronically signed by Amanda Adams MD on 6/2/19 at 1:35 PM

## 2019-06-01 NOTE — PROGRESS NOTES
Occupational Therapy  Facility/Department: [facility]  Daily Treatment Note  NAME: Pedro Edge  : 1934  MRN: [id number]    Date of Service: 2019    Discharge Recommendations:          Assessment   Performance deficits / Impairments: Decreased functional mobility ; Decreased strength;Decreased endurance;Decreased coordination;Decreased ADL status; Decreased safe awareness;Decreased ROM; Decreased cognition;Decreased balance;Decreased fine motor control  Assessment: Pt demonstrated increased RUE tone and confusion during OT session. With W/C adaptations, pt demonstrated improved sitting posture in W/C. Prognosis: Fair  Decision Making: High Complexity  Patient Education: Pt educated on safe hand placement during transfer training. Activity Tolerance  Activity Tolerance: Patient limited by fatigue  Safety Devices  Safety Devices in place: Yes  Type of devices: All fall risk precautions in place; Chair alarm in place         Patient Diagnosis(es): There were no encounter diagnoses. has a past medical history of Chronic back pain, Hyperlipidemia, and Hypertension. has a past surgical history that includes Cardiac catheterization; Coronary artery bypass graft; and craniotomy (Bilateral, 2019). Restrictions  Restrictions/Precautions  Restrictions/Precautions: Swallowing - Thickened Liquids, Fall Risk, Seizure  Position Activity Restriction  Other position/activity restrictions: nectar thick   Subjective   General  Chart Reviewed: Yes  Response to previous treatment: Patient reporting fatigue but able to participate  Subjective  Subjective: Pt seated in W/C upon arrival. Pt on 3L of O2. General Comment  Comments: Observed pt repeating words/echolalia when attempting to communicate with OT.    Vital Signs  Patient Currently in Pain: Other (comment)(Pt unable to verbalize pain or faces; pt winced during PROM RUE; expressive aphasia)   Orientation     Objective                   Transfers  Stand Pivot Transfers: Maximum assistance;2 Person assistance  Transfer Comments: Pt performed Max A x 1 with gait belt and assist of second set of hands to assit with W/C. Cognition  Arousal/Alertness: Delayed responses to stimuli  Following Commands: Follows one step commands with increased time; Follows one step commands with repetition  Attention Span: Attends with cues to redirect  Memory: (unable to formally assess, pt aphasic )  Safety Judgement: Decreased awareness of need for safety  Problem Solving: Assistance required to generate solutions;Assistance required to implement solutions;Assistance required to identify errors made  Insights: Decreased awareness of deficits  Initiation: Requires cues for all  Sequencing: Requires cues for all  Cognition Comment: Comp   Exp    Social    Prob   Mem             Positioning  Wheelchair Position Type: 1/2 lap tray  Wheelchair Postion Comment: Pt demonstrated R lateral lean sitting in W/C. When added 1/2 RUE positioning tray, it dug into pt side. Switched out manual wheelchair to a larger seat width to accommodate 1/2 lap tray as well as cushion to provide a more upright sitting posture. Additional time to complete. RUE PROM (degrees)  RUE PROM: Exceptions  RUE General PROM: OT facilitated PROM RUE due to marked increase of tone to improve overall ROM and prevent joint deformity.  Modified Luther Scale 2 R shoulder, biceps and forearm  R Shoulder Flex  0-180: 0-90 degrees                 Plan   Plan  Current Treatment Recommendations: Strengthening, Endurance Training, Neuromuscular Re-education, Patient/Caregiver Education & Training, Positioning, ROM, Wheelchair Mobility Training, Cognitive Reorientation, Equipment Evaluation, Education, & procurement, Balance Training, Pain Management, Modalities (comment), Self-Care / ADL, Functional Mobility Training, Safety Education & Training, Cognitive/Perceptual Training  Plan Comment: Continue OT per POC               Goals          Therapy Time   Individual Concurrent Group Co-treatment   Time In 1330         Time Out [address] [facility] [place], OTR/L

## 2019-06-01 NOTE — PROGRESS NOTES
Facility/Department: [facility]   CLINICAL BEDSIDE SWALLOW EVALUATION    NAME: Nicolle Vinson  : 1934  MRN: [id number]    ADMISSION DATE: 2019  REHAB DIAGNOSIS(ES): Aphasia  ADMITTING DIAGNOSIS: has Afib ([facility] [address] Subdural hematoma ([facility] [address] Anatomical narrow angle, bilateral; Angina pectoris ([facility] [address] Benign prostatic hyperplasia with urinary frequency; Closed compression fracture of L1 lumbar vertebra ([facility] [address] Coronary atherosclerosis; Essential hypertension; Facet arthropathy, lumbosacral; Gross hematuria; Hyperlipidemia; Long term current use of anticoagulant; Long-term use of aspirin therapy; Memory loss; SHAYY on CPAP; Prediabetes; Prostate cancer ([facility] [address] S/P CABG (coronary artery bypass graft); S/P PTCA (percutaneous transluminal coronary angioplasty); Spondylolisthesis of lumbar region; Vasculogenic erectile dysfunction; Vitamin D deficiency; 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 [facility] admit 19.; Dysphagia, oropharyngeal phase; RBBB; Aphasia; CAD (coronary artery disease); BMI 26.0-26.9,adult; Chronic back pain; and Abnormality of gait and mobility on their problem list.    ONSET DATE: 19    Date of Eval: 2019  Evaluating Therapist: Joey Corley    Recommended Diet and Intervention  Diet Solids Recommendation: Dysphagia I Pureed  Liquid Consistency Recommendation: Nectar  Recommended Form of Meds: Crushed in puree as able  Recommendations: Dysphagia treatment; Total feed  Therapeutic Interventions: Diet tolerance monitoring, Therapeutic PO trials with SLP, Patient/Family education, Thermal gustatory stimulation, Oral motor exercises, Tongue base strengthening, Effortful swallow    Compensatory Swallowing Strategies  Compensatory Swallowing Strategies: Small bites/sips; Total feed;Upright as possible for all oral intake;Swallow 2 times per bite/sip;Eat/Feed slowly    Reason for Referral  Nicolle Vinson was supervision or strategies. Two or more diet consistencies restricted     Treatment Plan  Requires SLP Intervention: Yes  Duration/Frequency of Treatment: 2-4 x/week          Treatment/Goals  Short-term Goals  Timeframe for Short-term Goals: 2-4/week  Goal 1: Pt will tolerate puree diet with nectar thick liquids with no overt s/s of aspiration. Goal 2: Pt will perform oral motor ROM/strength exercieses with max cues 5x/each. Goal 3: Pt will perform base of tongue exercises with max cues 10x/each. Goal 4: Pt will tolerate therapeutic trials of mechanical soft/thin as able, with no overt s/s of aspiration. Long-term Goals  Timeframe for Long-term Goals: 2-4 weeks  Goal 1: Pt will tolerate least restrictive diet with no overt s/s of aspiration. Dysphagia Goals: The patient will tolerate recommended diet without observed clinical signs of aspiration    Prognosis  Prognosis  Prognosis for safe diet advancement: fair  Individuals consulted  Consulted and agree with results and recommendations: Patient; Family member(sister and girlfriend)    Education  Patient Education: Educated pt on results.   Patient Education Response: Needs reinforcement  Safety Devices in place: Yes  Type of devices: Chair alarm in place;Call light within reach    [x]  Dionne Gorman RN notified   [x]  Dr. Michael Garza notified via voicemail  []  OT/PT Departments notified via mailboxes    Pain:  Pain Assessment  Patient Currently in Pain: Denies  Pain Level: (sleeping)       Therapy Time  SLP Individual Minutes  Time In: 1287  Time Out: [address]  Minutes: [address] [facility], [address] [facility], Date: 6/1/2019, Time: 12:37 PM

## 2019-06-01 NOTE — PROGRESS NOTES
Occupational Therapy   Occupational Therapy Initial Assessment  Date: 2019   Patient Name: Michelle Goncalves  MRN: [id number]     : 1934    Date of Service: 2019    Discharge Recommendations:  Continue to assess pending progress        Transfer care to lead OTR    Assessment   Performance deficits / Impairments: Decreased functional mobility ; Decreased ADL status; Decreased ROM; Decreased strength;Decreased safe awareness;Decreased cognition;Decreased balance;Decreased endurance;Decreased high-level IADLs;Decreased fine motor control;Decreased coordination  Assessment: Pt is an 80 y.o male s/p craniotomy with evacuation of SDH after fall. Pt with above mentioned deficits impairing IND with ADL's. Pt may benefit from skilled OT to address deficits and return to highest level of functioning. Prognosis: Fair  Decision Making: High Complexity  History: Multi comorb  Exam: 11 perf imp   Assistance / Modification: Brayan A   Patient Education: Pt educated on safety during transfer training. REQUIRES OT FOLLOW UP: Yes  Activity Tolerance  Activity Tolerance: Treatment limited secondary to decreased cognition;Patient limited by pain; Patient limited by fatigue  Safety Devices  Safety Devices in place: Yes  Type of devices: All fall risk precautions in place; Chair alarm in place           Patient Diagnosis(es): There were no encounter diagnoses. has a past medical history of Chronic back pain, Hyperlipidemia, and Hypertension. has a past surgical history that includes Cardiac catheterization; Coronary artery bypass graft; and craniotomy (Bilateral, 2019).          Restrictions  Restrictions/Precautions  Restrictions/Precautions: Swallowing - Thickened Liquids, Fall Risk, Seizure  Position Activity Restriction  Other position/activity restrictions: nectar thick     Subjective   General  Chart Reviewed: Yes  Response to previous treatment: Patient reporting fatigue but able to participate  Subjective  Subjective: Pt seated in W/C upon arrival. Pt on 3L of O2. General Comment  Comments: Observed pt repeating words/echolalia when attempting to communicate with OT. Oxygen Therapy  SpO2: 95 %  O2 Device: Nasal cannula  O2 Flow Rate (L/min): 2 L/min     Social/Functional History  Social/Functional History  Lives With: Significant other  Type of Home: House  Home Layout: One level  Home Access: Stairs to enter with rails  Entrance Stairs - Number of Steps: 4  Bathroom Shower/Tub: Tub/Shower unit  Home Equipment: Rolling walker  ADL Assistance: Independent  Homemaking Assistance: Independent  Ambulation Assistance: Independent(with [employer])  Transfer Assistance: Independent  Occupation: Retired  Type of occupation:   Additional Comments: Pt unable to verbalize. Per chart review, pt's S.O. provided PLOF. Per her report, pt has been falling very frequently over past 6months. She has had to pick him up multiple times. Pt's impaired speech has also been long standing per her report. Objective   Vision: Impaired  Vision Exceptions: Wears glasses at all times  Hearing: Exceptions to [facility]  Hearing Exceptions: Bilateral hearing aid;Hard of hearing/hearing concerns    Orientation  Overall Orientation Status: Impaired  Orientation Level: Oriented to person(pt responds to name pt stutters say 7-7-7-7-7-7 for , when asked year pt able to state '31)  Observation/Palpation  Observation: Pt RUE swollen, pt with stables over parietal area   Balance  Sitting Balance: Contact guard assistance  Standing Balance: Moderate assistance  Functional Mobility  Functional - Mobility Device: Wheelchair  Activity: To/from bathroom  Assist Level: Dependent/Total  Toilet Transfers  Toilet Transfer: Unable to assess  Toilet Transfers Comments: Pt brief dry, no accidents during ADL's   Shower Transfers  Shower Transfers Comments: NT for safety   ADL  Feeding: Verbal cueing; Thickened liquids; Dentures  Grooming: Maximum assistance(pt able to use tootheet to wash mouth )  UE Bathing: Moderate assistance; Increased time to complete;Verbal cueing(Pt with assist to wash LUE and under R arm)  LE Bathing: Maximum assistance  UE Dressing: Maximum assistance(Pt able to pull shirt over trunk )  LE Dressing: Dependent/Total  Toileting: Unable to assess(comment)(pt did not use toilet, brief dry )  Tone RUE  RUE Tone: Hypertonic  Tone LUE  LUE Tone: Normotonic  Coordination  Movements Are Fluid And Coordinated: No  Coordination and Movement description: Fine motor impairments;Gross motor impairments;Decreased speed;Decreased accuracy; Right UE;Left UE     Bed mobility  Supine to Sit: Maximum assistance  Sit to Supine: Unable to assess(pt seated in w/c )  Transfers  Stand Pivot Transfers: Maximum assistance;2 Person assistance  Sit to stand: Maximum assistance(Pt varies from Max to Min A to stand )  Stand to sit: Moderate assistance  Transfer Comments: Pt performed Max A x 1 with gait belt and assist of second set of hands to assit with W/C. Cognition  Arousal/Alertness: Delayed responses to stimuli  Following Commands: Follows one step commands with increased time; Follows one step commands with repetition  Attention Span: Attends with cues to redirect  Memory: (unable to formally assess, pt aphasic )  Safety Judgement: Decreased awareness of need for safety  Problem Solving: Assistance required to generate solutions;Assistance required to implement solutions;Assistance required to identify errors made  Insights: Decreased awareness of deficits  Initiation: Requires cues for all  Sequencing: Requires cues for all  Cognition Comment: Comp   Exp    Social    Prob   Mem    Perception  Overall Perceptual Status: Impaired(Pt with some R side neglect, arm often hanging over side )     Sensation  Overall Sensation Status: (unable to formally assess)        LUE AROM (degrees)  LUE General AROM: ~90 shoulder flexion 45-60 minutes of ADLs. [x]  Patient will improve B UE strength and endurance to 3/5 in order to participate in self-care activities as projected. [x]  Patient will improve B hand fine motor [facility] to manage clothing fasteners/self-care containers in a timely manner   []  Patient will improve visual perception to  in order to improve participation in self care and leisure activities   [x]  Patient will perform kitchen mobility at device level without episodes of LOB and good safety awareness    [x]  Patient will perform basic room mobility at Mod IND level. [x]  Patient will access appropriate D/C site with as few architectural barriers as possible. []  Patient and/or caregiver will demonstrate understanding of hip precautions with  verbal/tactile cues. []  Patient and/or caregiver will demonstrate understanding of energy conservation techniques with  verbal/tactile cues. []  Patient and/or caregiver will demonstrate understanding of recommended HEP.               Therapy Time   Individual Concurrent Group Co-treatment   Time In 9:05         Time Out 10:05         Minutes 60         Electronically signed by KATARINA Tavarez/L on 6/1/2019 at 4:39 PM    KATARINA Tavarez/FITO

## 2019-06-01 NOTE — PROGRESS NOTES
given orally with 75% accuracy with mod cues to increase the pt's ability to follow directions provided by caregivers for safe follow through with ADLs. Goal 2: Pt will identify objects/pictures within a field of 2-3 with 75% accuracy with mod cues in order to increase his understanding of objects in his environment for safer and more independent completion of ADLs. Goal 3: Pt will answer low level yes/no questions with 85% accuracy with mod cues to assist the caregiver in obtaining important information regarding the patient's personal, medical, and safety needs. Goal 4: Pt will answer low level Wh- questions with 75% accuracy with mod cues to assist the caregiver in obtaining important information regarding the patient's personal, medical, and safety needs. Goal 5: Pt will demonstrate reading comprehension at the word  level to 60% acc with mod cues to promote pt's ability to utilize reading/writing. Long-term Goals  Timeframe for Long-term Goals: 2-4 weeks  Goal 1: Pt will improve his Receptive Language abilities to min-mod assist level (from max-total assist level) for comprehension of conversation and safety directions with familiar and unfamiliar communication partners. Goal 2: Pt will improve his Expressive Language Abilities to a min-mod assist level(from max-total assist level)  for effective communication with familiar and unfamiliar communication partners so they may functionally communicate and express safety/medical concerns.    Patient/family involved in developing goals and treatment plan: sister     Subjective:  General  Chart Reviewed: Yes  Family / Caregiver Present: Yes(Sister and girlfriend present)  Social/Functional History  Lives With: Alone  Occupation: Retired  Vision  Vision: Impaired  Vision Exceptions: Wears glasses at all times  Hearing  Hearing: Exceptions to [facility]  Hearing Exceptions: Bilateral hearing aid;Hard of hearing/hearing concerns           Objective:     Oral/Motor  Oral Motor: Exceptions to [facility]  Labial ROM: Reduced left; Reduced right  Labial Strength: Reduced  Labial Coordination: Reduced  Lingual ROM: Reduced left; Reduced right  Lingual Strength: Reduced  Lingual Coordination: Reduced    Auditory Comprehension  Comprehension: Exceptions  Yes/No Questions: Moderate(Simple yes/no questions with 50% accuracy)  Basic Questions: Severe(20% accuracy. Often had no response)  One Step Basic Commands: Severe(Followed one step directions in 2/8 trials. Requried max models in order to increase to 4/8)  Two Step Basic Commands: Severe  Common Objects: Severe(Objects around room 1/5 trials)  Pictures: Severe(Pictured items 3/7. Increased to 4/7 with verbal cues and further increased to 6/7 with phonemic cues.)  Conversation: Severe  Interfering Components: Attention - sustained;Processing speed  Effective Techniques: Repetition;Visual/Gestural cues; Extra processing time         Expression  Primary Mode of Expression: Verbal    Verbal Expression  Verbal Expression: Exceptions to functional limits  Initiation: Severe  Repetition: Mild  Automatic Speech: Moderate(Required phonemic cueing)  Responsive: Moderate  Interfering Components: Perseverations; Impaired thought organization;Paraphasia  Effective Techniques: Word retrived strategies; Phrasing;Provide extra time         Motor Speech  Motor Speech: Exceptions to [facility]  Dysarthria : Mild    Pragmatics/Social Functioning  Pragmatics: Exceptions to [facility]  Affect: Moderate    Cognition:      Attention  Attention: Exceptions to [facility]  Selective Attention: Moderate  Sustained Attention: Mild  Memory  Memory: Unable to assess  Problem Solving  Problem Solving: Unable to assess  Numeric Reasoning  Numeric Reasoning: Unable to assess  Abstract Reasoning  Abstract Reasoning: Unable to assess  Safety/Judgement  Safety/Judgement: Unable to assess      Prognosis:  Speech Therapy Prognosis  Prognosis: Fair  Prognosis Considerations: Severity of Impairments  Individuals consulted  Consulted and agree with results and recommendations: Patient; Family member(sister and girlfriend)    Education:  Patient Education: Educated pt on results.   Patient Education Response: Needs reinforcement  Safety Devices in place: Yes  Type of devices: Chair alarm in place;Call light within reach    Pain:  Pain Assessment  Patient Currently in Pain: Denies  Pain Level: (sleeping)    Comprehension          Expression   []7 - Independent   []7 - Indpendent   []6 - Modified Independent  []6 - Modified Independent   []5 - Supervision   []5 - Supervision   []4 - Min Assist   []4 - Min Assist   []3 - Mod Assist   []3 - Mod Assist   [x]2 - Max Assist   []2 - Max Assist   []1 - Dependent   [x]1 - Dependent    Problem Solving        Memory   []7 - Independent   []7 - Independent   []6 - Modified Independent  []6 - Modified Independent   []5 - Supervision   []5 - Supervision   []4 - Min Assist   []4 - Min Assist   []3 - Mod Assist   []3 - Mod Assist   []2 - Max Assist   []2 - Max Assist   [x]1 - Dependent   [x] 1 -Dependent            Therapy Time:   Individual Concurrent Group Co-treatment   Time In [address] [facility]         Time Out 1115         Minutes [id number] [facility], [id number] [facility], Date: 6/1/2019, Time: 12:12 PM

## 2019-06-01 NOTE — PROGRESS NOTES
Hallie Slater is a 80 y.o. male patient. Current Facility-Administered Medications   Medication Dose Route Frequency Provider Last Rate Last Dose    acetaminophen (TYLENOL) tablet 650 mg  650 mg Oral Q4H PRN Gi Scullin, DO        magnesium hydroxide (MILK OF MAGNESIA) 400 MG/5ML suspension 30 mL  30 mL Oral Daily PRN Gi Scullin, DO        ondansetron (ZOFRAN) injection 4 mg  4 mg Intravenous Q6H PRN Mary Ellen Ibarra MD        bacitracin zinc ointment   Topical TID Mary Ellen Ibarra MD   1 g at 06/01/19 1426    diltiazem (CARDIZEM CD) extended release capsule 120 mg  120 mg Oral Daily Mary Ellen Ibarra MD   120 mg at 05/31/19 2228    famotidine (PEPCID) tablet 20 mg  20 mg Oral BID Mary Ellen Ibarra MD   20 mg at 06/01/19 0834    levETIRAcetam (KEPPRA) tablet 500 mg  500 mg Oral BID Mary Ellen Ibarra MD   500 mg at 06/01/19 0834    nitroGLYCERIN (NITROSTAT) SL tablet 0.4 mg  0.4 mg Sublingual Q5 Min PRN Mary Ellen Ibarra MD        pravastatin (PRAVACHOL) tablet 40 mg  40 mg Oral Nightly Mary Ellen Ibarra MD   40 mg at 05/31/19 2228    ipratropium-albuterol (DUONEB) nebulizer solution 1 ampule  1 ampule Inhalation TID Gi Scullin, DO   1 ampule at 06/01/19 1612    albuterol (PROVENTIL) nebulizer solution 2.5 mg  2.5 mg Nebulization Q2H PRN Gi Scullin, DO   2.5 mg at 06/01/19 0510    predniSONE (DELTASONE) tablet 40 mg  40 mg Oral Daily Juan Alberto PENNY Sedar, DO   40 mg at 06/01/19 1247     Allergies   Allergen Reactions    Honey Bee Venom [Bee Venom] Swelling    Penicillins Swelling     Principal Problem:    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 [facility] admit 05/31/19.   Active Problems:    Afib (HCC)    Subdural hematoma (HCC)    Benign prostatic hyperplasia with urinary frequency    Essential hypertension    Facet arthropathy, lumbosacral    Gross hematuria    Hyperlipidemia    Long term current use of anticoagulant    Long-term use of aspirin dependent for all care. Patient was able to feed himself with set up. Patient on nectar thick liquids. Will drink when offered. Patient response is limited to one word answers. Will continue to assess for patient needs. ).        Luisa Agarwal RN  6/1/2019

## 2019-06-02 LAB
GLUCOSE BLD-MCNC: 116 MG/DL (ref 60–115)
GLUCOSE BLD-MCNC: 160 MG/DL (ref 60–115)
GLUCOSE BLD-MCNC: 181 MG/DL (ref 60–115)
GLUCOSE BLD-MCNC: 195 MG/DL (ref 60–115)
HCT VFR BLD CALC: 34.6 % (ref 42–52)
HEMOGLOBIN: 11.9 G/DL (ref 14–18)
MCH RBC QN AUTO: 30.9 PG (ref 27–31.3)
MCHC RBC AUTO-ENTMCNC: 34.5 % (ref 33–37)
MCV RBC AUTO: 89.5 FL (ref 80–100)
PDW BLD-RTO: 13.8 % (ref 11.5–14.5)
PERFORMED ON: ABNORMAL
PLATELET # BLD: 254 K/UL (ref 130–400)
PRO-BNP: 1266 PG/ML
RBC # BLD: 3.86 M/UL (ref 4.7–6.1)
WBC # BLD: 10.2 K/UL (ref 4.8–10.8)

## 2019-06-02 PROCEDURE — 6370000000 HC RX 637 (ALT 250 FOR IP): Performed by: PHYSICIAN ASSISTANT

## 2019-06-02 PROCEDURE — 6370000000 HC RX 637 (ALT 250 FOR IP): Performed by: NEUROLOGICAL SURGERY

## 2019-06-02 PROCEDURE — 1180000000 HC REHAB R&B

## 2019-06-02 PROCEDURE — 94762 N-INVAS EAR/PLS OXIMTRY CONT: CPT

## 2019-06-02 PROCEDURE — 36415 COLL VENOUS BLD VENIPUNCTURE: CPT

## 2019-06-02 PROCEDURE — 94640 AIRWAY INHALATION TREATMENT: CPT

## 2019-06-02 PROCEDURE — 2700000000 HC OXYGEN THERAPY PER DAY

## 2019-06-02 PROCEDURE — 6370000000 HC RX 637 (ALT 250 FOR IP): Performed by: PHYSICAL MEDICINE & REHABILITATION

## 2019-06-02 PROCEDURE — 6370000000 HC RX 637 (ALT 250 FOR IP): Performed by: INTERNAL MEDICINE

## 2019-06-02 PROCEDURE — 99233 SBSQ HOSP IP/OBS HIGH 50: CPT | Performed by: PHYSICAL MEDICINE & REHABILITATION

## 2019-06-02 PROCEDURE — 85027 COMPLETE CBC AUTOMATED: CPT

## 2019-06-02 PROCEDURE — 2580000003 HC RX 258: Performed by: PHYSICAL MEDICINE & REHABILITATION

## 2019-06-02 PROCEDURE — 94760 N-INVAS EAR/PLS OXIMETRY 1: CPT

## 2019-06-02 RX ORDER — SODIUM CHLORIDE 9 MG/ML
500 INJECTION, SOLUTION INTRAVENOUS NIGHTLY
Status: DISCONTINUED | OUTPATIENT
Start: 2019-06-02 | End: 2019-06-06

## 2019-06-02 RX ORDER — ACETAMINOPHEN 500 MG
500 TABLET ORAL 3 TIMES DAILY
Status: DISCONTINUED | OUTPATIENT
Start: 2019-06-02 | End: 2019-06-28 | Stop reason: HOSPADM

## 2019-06-02 RX ORDER — HYDROCODONE BITARTRATE AND ACETAMINOPHEN 5; 325 MG/1; MG/1
1 TABLET ORAL EVERY 4 HOURS PRN
Status: DISCONTINUED | OUTPATIENT
Start: 2019-06-02 | End: 2019-06-28 | Stop reason: HOSPADM

## 2019-06-02 RX ADMIN — INSULIN LISPRO 1 UNITS: 100 INJECTION, SOLUTION INTRAVENOUS; SUBCUTANEOUS at 16:52

## 2019-06-02 RX ADMIN — FAMOTIDINE 20 MG: 20 TABLET ORAL at 08:03

## 2019-06-02 RX ADMIN — INSULIN LISPRO 1 UNITS: 100 INJECTION, SOLUTION INTRAVENOUS; SUBCUTANEOUS at 12:29

## 2019-06-02 RX ADMIN — PRAVASTATIN SODIUM 40 MG: 40 TABLET ORAL at 21:52

## 2019-06-02 RX ADMIN — SODIUM CHLORIDE 500 ML: 9 INJECTION, SOLUTION INTRAVENOUS at 23:33

## 2019-06-02 RX ADMIN — IPRATROPIUM BROMIDE AND ALBUTEROL SULFATE 1 AMPULE: .5; 3 SOLUTION RESPIRATORY (INHALATION) at 10:41

## 2019-06-02 RX ADMIN — IPRATROPIUM BROMIDE AND ALBUTEROL SULFATE 1 AMPULE: .5; 3 SOLUTION RESPIRATORY (INHALATION) at 05:19

## 2019-06-02 RX ADMIN — PREDNISONE 40 MG: 10 TABLET ORAL at 08:02

## 2019-06-02 RX ADMIN — LEVETIRACETAM 500 MG: 500 TABLET ORAL at 08:02

## 2019-06-02 RX ADMIN — FAMOTIDINE 20 MG: 20 TABLET ORAL at 21:48

## 2019-06-02 RX ADMIN — IPRATROPIUM BROMIDE AND ALBUTEROL SULFATE 1 AMPULE: .5; 3 SOLUTION RESPIRATORY (INHALATION) at 19:02

## 2019-06-02 RX ADMIN — ACETAMINOPHEN 500 MG: 325 TABLET ORAL at 21:47

## 2019-06-02 RX ADMIN — ACETAMINOPHEN 500 MG: 325 TABLET ORAL at 10:25

## 2019-06-02 RX ADMIN — DILTIAZEM HYDROCHLORIDE 120 MG: 120 CAPSULE, COATED, EXTENDED RELEASE ORAL at 21:47

## 2019-06-02 RX ADMIN — BACITRACIN ZINC 1 G: 500 OINTMENT TOPICAL at 08:02

## 2019-06-02 RX ADMIN — BACITRACIN ZINC: 500 OINTMENT TOPICAL at 21:45

## 2019-06-02 RX ADMIN — BACITRACIN ZINC 1 G: 500 OINTMENT TOPICAL at 12:29

## 2019-06-02 RX ADMIN — LEVETIRACETAM 500 MG: 500 TABLET ORAL at 21:52

## 2019-06-02 ASSESSMENT — PAIN SCALES - GENERAL
PAINLEVEL_OUTOF10: 0
PAINLEVEL_OUTOF10: 2
PAINLEVEL_OUTOF10: 0

## 2019-06-02 NOTE — PROGRESS NOTES
Pt PEGGY Triana called for update, she verified hippa code. She verbalized pt normal talk is a whisper. She will not be in today she will be in tomorrow and bring shoes and clothes with her. Verbalized she and the pt live together.

## 2019-06-02 NOTE — PROGRESS NOTES
Subjective: The patient complains of severe  acute  confusion secondary to subdural hematoma partially relieved by PT OT, speech-language pathology, recent decompression of the subdural hematomas and exacerbated by fatigue, exhaustion, dehydration. I am concerned about patients impulsivity and have added a video monitoring side and placed him in the [place] [address] [facility] with frequent nursing checks. Am also concerned about his elevated blood sugar. I will add a diabetic restriction and sliding scale insulin coverage. He may also need nocturnal IV fluids because of the nectar effects. ROS x10: The patient also complains of severely impaired mobility and activities of daily living. Otherwise no new problems with vision, hearing, nose, mouth, throat, dermal, cardiovascular, GI, , pulmonary, musculoskeletal, psychiatric or neurological. See Rehab H&P on Rehab chart dated . Vital signs:  BP (!) 153/90   Pulse 66   Temp 98 °F (36.7 °C) (Oral)   Resp 18   Ht 6' 0.05\" (1.83 m)   Wt 181 lb 7 oz (82.3 kg)   SpO2 93%   BMI 24.58 kg/m²   I/O:   PO/Intake:  fair PO intake,  dysphagia 1 puréed with nectar thick    Bowel/Bladder:  continent, no problems noted. General:  Patient is well developed, adequately nourished, non-obese and     well kempt. HEENT:    PERRLA, hearing intact to loud voice, external inspection of ear     and nose benign. Inspection of lips, tongue and gums benign  Musculoskeletal: No significant change in strength or tone. All joints stable. Inspection and palpation of digits and nails show no clubbing,       cyanosis or inflammatory conditions. Neuro/Psychiatric: Affect: flat but pleasant. Alert and oriented to person, place and     Situation-with max cues. No significant change in deep tendon reflexes or     Sensation-poor judgment reasoning and insight. Lungs:  Diminished, CTA-B. Respiration effort is normal at rest.     Heart:   S1 = S2, RRR.   No loud - Patient understands basic needs 25-49% of the time  Expression: 1 - Expresses basic ideas/needs < 25% of the time  Social Interaction: 2 - Patient appropriate  25%-49% of the time  Problem Solvin - Patient solves simple/routine tasks < 25%  Memory: 1 - Patient remembers < 25% of the time      Lab/X-ray studies reviewed, analyzed and discussed with patient and staff:   Recent Results (from the past 24 hour(s))   CBC Auto Differential    Collection Time: 19  6:56 PM   Result Value Ref Range    WBC 9.1 4.8 - 10.8 K/uL    RBC 3.82 (L) 4.70 - 6.10 M/uL    Hemoglobin 11.7 (L) 14.0 - 18.0 g/dL    Hematocrit 34.7 (L) 42.0 - 52.0 %    MCV 91.1 80.0 - 100.0 fL    MCH 30.5 27.0 - 31.3 pg    MCHC 33.5 33.0 - 37.0 %    RDW 14.2 11.5 - 14.5 %    Platelets 362 947 - 452 K/uL    Neutrophils % 92.9 %    Lymphocytes % 2.3 %    Monocytes % 4.6 %    Eosinophils % 0.0 %    Basophils % 0.2 %    Neutrophils # 8.5 (H) 1.4 - 6.5 K/uL    Lymphocytes # 0.2 (L) 1.0 - 4.8 K/uL    Monocytes # 0.4 0.2 - 0.8 K/uL    Eosinophils # 0.0 0.0 - 0.7 K/uL    Basophils # 0.0 0.0 - 0.2 K/uL   Basic Metabolic Panel    Collection Time: 19  6:56 PM   Result Value Ref Range    Sodium 142 135 - 144 mEq/L    Potassium 4.2 3.4 - 4.9 mEq/L    Chloride 105 95 - 107 mEq/L    CO2 20 20 - 31 mEq/L    Anion Gap 17 (H) 9 - 15 mEq/L    Glucose 303 (H) 70 - 99 mg/dL    BUN 49 (H) 8 - 23 mg/dL    CREATININE 1.07 0.70 - 1.20 mg/dL    GFR Non-African American >60.0 >60    GFR  >60.0 >60    Calcium 9.0 8.5 - 9.9 mg/dL   Magnesium    Collection Time: 19  6:56 PM   Result Value Ref Range    Magnesium 2.5 (H) 1.7 - 2.4 mg/dL   Hemoglobin A1C    Collection Time: 19  8:45 PM   Result Value Ref Range    Hemoglobin A1C 5.8 4.8 - 5.9 %   POCT Glucose    Collection Time: 19  9:06 PM   Result Value Ref Range    POC Glucose 316 (H) 60 - 115 mg/dl    Performed on ACCU-CHEK    POCT Glucose    Collection Time: 19  5:53 AM   Result Value Ref Range    POC Glucose 116 (H) 60 - 115 mg/dl    Performed on ACCU-CHEK    CBC    Collection Time: 06/02/19  6:10 AM   Result Value Ref Range    WBC 10.2 4.8 - 10.8 K/uL    RBC 3.86 (L) 4.70 - 6.10 M/uL    Hemoglobin 11.9 (L) 14.0 - 18.0 g/dL    Hematocrit 34.6 (L) 42.0 - 52.0 %    MCV 89.5 80.0 - 100.0 fL    MCH 30.9 27.0 - 31.3 pg    MCHC 34.5 33.0 - 37.0 %    RDW 13.8 11.5 - 14.5 %    Platelets 197 135 - 317 K/uL       Xr Chest Standard (2 Vw)    Result Date: 5/30/2019  EXAMINATION: XR CHEST (2 VW)  CLINICAL HISTORY:  low grade temp, tachypnea, dysphagia COMPARISONS: May 28, 2018  FINDINGS: 3 views of the chest are submitted. There are multiple median sternotomy wires. Limited exam due to low lung volume. The cardiac silhouette is enlarged. Unchanged. .  The mediastinum is unremarkable. Pulmonary vascular unremarkable. Right sided trachea. Areas of atelectasis, patchy groundglass infiltrate in the bases. Right greater than left Trace small right pleural effusion. .  No Pneumothoraces. Areas of atelectasis, patchy groundglass infiltrate in the bases. Right greater than left . Trace/ small right pleural effusion    Ct Head Wo Contrast    Result Date: 5/28/2019  CT Brain Contrast medium:  Not utilized. History: Altered mental status Comparison:  CT brain, earlier today, 0438 hours, May 26, 2019, May 25, 2019. Findings: Bilateral frontal craniotomies and apex, again identified. Again identified are extra-axial fluid collections bilaterally in the frontal, and parietal regions. The focal area of increased attenuation within the extra-axial fluid found in the right posterior parietal region, and left posterior frontal region, remains unchanged. Likewise, the strand-like area of increased attenuation within the extra-axial fluid found anteriorly, and bilaterally in left right frontal region is also unchanged.  The amount of air now dosing when appropriate to reduce radiation dose to as low as reasonably achievable. FINDINGS: Since the prior postoperative examination, the volume of gas and blood in the bilateral subdural collections have decreased. The right subdural collection has a maximal width of 10 mm. The left subdural collection has a maximal width of 18 mm. There is still a considerable amount of gas in the left subdural collection. There is only trace residual gas in the right subdural collection. There is 4 mm left to right midline shift. There is no transtentorial herniation. Bilateral craniotomies and subdural drains are again noted. POSTOPERATIVE BILATERAL GAS-CONTAINING SUBDURAL HEMATOMAS, LEFT LARGER THAN RIGHT, WITH LEFT TO RIGHT MIDLINE SHIFT OF 4 MM, IMPROVED FROM PRIOR EXAMINATION OF 5/25/19 AT 1417 HOURS. Ct Head Wo Contrast    Result Date: 5/25/2019  CT HEAD WO CONTRAST : 5/25/2019 CLINICAL HISTORY:  INTRACRANIAL HEMORRHAGE . Recent subdural hematoma drainage surgery. COMPARISON: Earlier 5/25/2019. TECHNIQUE: Spiral unenhanced images were obtained of the head, with routine reconstructions performed. All CT scans at this facility use dose modulation, iterative reconstruction, and/or weight based dosing when appropriate to reduce radiation dose to as low as reasonably achievable. FINDINGS: Since the prior study, bilateral superior parietal craniotomies and subdural drainage catheters have been placed, which are otherwise unremarkable. There is pneumocephaly and significantly decreasing-sized heterogeneous subdural hematomas bilaterally, which are overall mildly decreased in size and mass effect from the earlier study. There is approximately 2 to 3 mm of left-to-right midline shift, without herniation, hydrocephalus, or other significant changes identified. INTERVAL BILATERAL CRANIOTOMIES AND SUBDURAL HEMATOMA DRAINAGE CATHETER PLACEMENT SURGERY.  PNEUMOCEPHALY AND SIGNIFICANTLY DECREASING-SIZED HETEROGENEOUS SUBDURAL HEMATOMAS. APPROXIMATELY 2 TO 3 MM OF LEFT-TO-RIGHT MIDLINE SHIFT WITHOUT HERNIATION, HYDROCEPHALUS, OR OTHER SIGNIFICANT CHANGES IDENTIFIED. Ct Head Wo Contrast    Result Date: 5/25/2019  CT HEAD WO CONTRAST : 5/25/2019 CLINICAL HISTORY: CEREBRAL HEMORRHAGE. COMPARISON: 5/24/2019. TECHNIQUE: ROUTINE. All CT scans at this facility use dose modulation, iterative reconstruction, and/or weight based dosing when appropriate to reduce radiation dose to as low as reasonably achievable. FINDINGS: Motion artifact is present on the initial scan, which was repeated. Acute on chronic subdural hematomas overlying both cerebral convexities appear stable, measuring approximately 2.2 cm in thickness on the right, and 1.8 cm in maximum thickness, on the coronal reconstructions. There is no significant midline shift, herniation, or other significant changes identified. STABLE APPROXIMATELY 2.2 CM RIGHT AND 1.8 CM LEFT SUBDURAL HEMATOMA. NO OTHER SIGNIFICANT CHANGE FROM EARLIER YESTERDAY IDENTIFIED. Ct Head Wo Contrast    Result Date: 5/25/2019  CT HEAD WO CONTRAST : 5/24/2019 9:15 PM CLINICAL HISTORY: CEREBRAL HEMORRHAGE. COMPARISON: Earlier 5/24/2019. TECHNIQUE: ROUTINE. All CT scans at this facility use dose modulation, iterative reconstruction, and/or weight based dosing when appropriate to reduce radiation dose to as low as reasonably achievable. FINDINGS: Motion artifact is present on the initial scan, which was repeated. 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 The left subdural hematoma again measures approximately 1.8 cm in maximum thickness, on the coronal reconstructions. There is no significant midline shift, herniation, or other significant changes identified.      SLIGHTLY ENLARGING APPROXIMATELY 2.2 CM RIGHT SUBDURAL HEMATOMA AND STABLE APPROXIMATELY 1.8 CM LEFT SUBDURAL HEMATOMA. NO OTHER SIGNIFICANT CHANGE FROM EARLIER 5/24/2019 IDENTIFIED. Ct Head Wo Contrast    Result Date: 5/24/2019  CT HEAD WO CONTRAST : 5/24/2019 CLINICAL HISTORY: head injury s/p fall . COMPARISON: None available. TECHNIQUE: ROUTINE. All CT scans at this facility use dose modulation, iterative reconstruction, and/or weight based dosing when appropriate to reduce radiation dose to as low as reasonably achievable. FINDINGS: Heterogeneously dense subdural collections overlying the superior aspects of both cerebral hemispheres measuring up to 1.8 cm in thickness, are consistent with acute on chronic subdural hematomas. There is mass effect and mild crowding of the sulci, without midline shift, hydrocephalus or herniation. There is no parenchymal, subarachnoid, or intraventricular hemorrhage, evidence of a recent ischemic cortical infarct, skull fracture, or other complication identified. UP TO 1.8 CM ACUTE CHRONIC SUBDURAL HEMATOMAS OVER BOTH CEREBRAL CONVEXITIES, AS DESCRIBED. The findings were discussed with Dr. Daisy Max shortly following completion of the study. Ct Cervical Spine Wo Contrast    Result Date: 5/24/2019  CT CERVICAL SPINE WO CONTRAST: 5/24/2019 CLINICAL HISTORY:  Fall, neck pain . COMPARISON: None available. TECHNIQUE: ROUTINE All CT scans at this facility use dose modulation, iterative reconstruction, and/or weight based dosing when appropriate to reduce radiation dose to as low as reasonably achievable. FINDINGS: The spine is visualized from the craniovertebral junction through the T1-2 level. Mild to moderate hypertrophic facet changes are present, predominantly on the right at C3-4, and on the left at C2-C3 and C5-6. There is no fracture, significant subluxation, or acute paraspinous soft tissue abnormalities identified. NO FRACTURE OR EVIDENCE OF CERVICAL SPINE INJURY IDENTIFIED. CERVICAL SPONDYLOSIS.       Ct Lumbar Spine Wo Contrast    Result Date: 5/24/2019  CT lumbar spine without intravenous contrast medium. HISTORY: Back pain. Technical factors: CT imaging lumbar spine obtained and formatted as 2.5 mm contiguous axial images. Sagittal and coronal reconstruction obtained during postprocessing. No intravenous contrast medium utilized. Study done in comparison with plain film imaging lumbar spine, October 3, 2018. FINDINGS: Compression fracture L1 is again identified, and is progressed since the prior study. Addition, multiple the anterior margin of L1 and fracture of the superior articular surface of L1 is now identified (series 602, image 39). Anterior osteophytes T10 and  T11. Diffuse facet hypertrophy identified. At the L3-L4 level, mild facet hypertrophy, ligamentum flavum hypertrophy, and a disc bulge, causes anterior flattening of thecal sac with possible mild central stenosis. Acute on chronic fracture, L1, with avulsion of the anterior portion of the L1 vertebral body. Findings suspicious for mild to moderate central stenosis at the L3-L4 level. All CT scans at this facility use dose modulation, iterative reconstruction, and/or weight based dosing when appropriate to reduce radiation dose to as low as reasonably achievable. Xr Chest Portable    Result Date: 5/29/2019  XR CHEST PORTABLE : 5/28/2019 CLINICAL HISTORY:  increasing SOB / restless . COMPARISON: 5/24/2019. A portable upright AP radiograph of the chest was obtained. FINDINGS: A poor inspiratory volume is present with mild patchy infiltrates suggestive of edema. Atypical pneumonia should be excluded clinically. The heart is mildly enlarged with mild vascular congestion. 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 There is no dilated significant pleural effusion, pneumothorax, or displaced fractures identified. POOR INSPIRATION WITH PROBABLE MILD CARDIAC DECOMPENSATION. ATYPICAL PNEUMONIA SHOULD BE EXCLUDED CLINICALLY.      Xr Chest esophageal sphincter opens normally. There is no diverticulum stricture or fistula. Residual: There was some vallecular and pyriform sinus residue which adequately cleared. MODERATE ORAL AND PHARYNGEAL DYSFUNCTION WITHOUT ASPIRATION. PLEASE REFER TO SPEECH PATHOLOGY  REPORT FOR RECOMMENDATIONS. Us Dup Lower Extremities Bilateral Venous    Result Date: 5/29/2019  US DUP LOWER EXTREMITIES BILATERAL VENOUS : 5/29/2019 CLINICAL HISTORY: Lower extremity pain. COMPARISON: None available. Grayscale, compression, color and waveform Doppler analysis of both lower extremity deep venous systems was performed with augmentation. FINDINGS: There is no deep venous thrombosis, abnormal masses, fluid collections or other findings of concern identified within either lower extremity. NO DVT IDENTIFIED IN EITHER LOWER EXTREMITY. Previous extensive, complex labs, notes and diagnostics reviewed and analyzed. ALLERGIES:    Allergies as of 05/31/2019 - Review Complete 05/31/2019   Allergen Reaction Noted    Honey bee venom [bee venom] Swelling 10/08/2015    Penicillins Swelling 10/08/2015      (please also verify by checking [facility])     Complex Physical Medicine & Rehab Issues Assess & Plan:   1. Severe abnormality of gait and mobility and impaired self-care and ADL's secondary to progressive subdural hematomas . Functional and medical status reassessed regarding patients ability to participate in therapies and patient found to be able to participate in acute intensive comprehensive inpatient rehabilitation program including PT/OT to improve balance, ambulation, ADLs, and to improve the P/AROM. Therapeutic modifications regarding activities in therapies, place, amount of time per day and intensity of therapy made daily. In bed therapies or bedside therapies prn.   2. Bowel and Bladder dysfunction:  frequent toileting, ambulate to bathroom with assistance, check post void residuals.   Check for C.difficile x1 if >2 loose stools in 24 hours, continue bowel & bladder program.  Monitor bowel and bladder function. Lactinex 2 PO every AC. MOM prn, Brown Bomb prn, Glycerin suppository prn, enema prn.  3. Severe postoperative craniotomy pain generalized OA pain: reassess pain every shift and prior to and after each therapy session, give prn Tylenol and scheduled Tylenol and consider Norco, modalities prn in therapy, Lidoderm, K-pad prn.   4. Skin healing and breakdown risk:  continue pressure relief program.  Daily skin exams and reports from nursing. 5. Severe Fatigue due to nutritional and hydration deficiency:  continue to monitor I&Os, calorie counts prn, dietary consult prn.  6. Acute episodic insomnia with situational adjustment disorder:  prn Ambien, monitor for day time sedation. 7. Falls risk elevated:  patient to use call light to get nursing assistance to get up, bed and chair alarm. 8. Elevated DVT risk: progressive activities in PT, continue prophylaxis JOSE DE JESUS hose, elevation and  patient is not on a blood thinner secondary to recent subdural hematomas . 9. Complex discharge planning:  Weekly team meeting every Monday to assess progress towards goals, discuss and address social, psychological and medical comorbidities and to address difficulties they may be having progressing in therapy. Patient and family education is in progress. The patient is to follow-up with their family physician after discharge. Complex Active General Medical Issues that complicate care Assess & Plan:    1. Afib,   Essential hypertension,   Hyperlipidemia,   S/P CABG (coronary artery bypass graft), S/P PTCA (percutaneous transluminal coronary angioplasty),   RBBB,   CAD (coronary artery disease), long-term previous use of blood thinners  2.    Subdural hematoma-status post evacuation with , hold blood thinners-vital signs every shift, dose and titrate cardiac medications, recheck BMP CBC and consult hospitalist for backup

## 2019-06-02 NOTE — PROGRESS NOTES
Hospitalist Progress Note      PCP: James Holly MD    Date of Admission: 5/31/2019    Chief Complaint:  No acute events overnight,afebrile,stable HD, has expressive aphasia with one word answers    Medications:  Reviewed    Infusion Medications    sodium chloride      dextrose       Scheduled Medications    acetaminophen  500 mg Oral TID    insulin lispro  0-6 Units Subcutaneous TID WC    insulin lispro  0-3 Units Subcutaneous Nightly    bacitracin zinc   Topical TID    diltiazem  120 mg Oral Daily    famotidine  20 mg Oral BID    levETIRAcetam  500 mg Oral BID    pravastatin  40 mg Oral Nightly    ipratropium-albuterol  1 ampule Inhalation TID     PRN Meds: HYDROcodone 5 mg - acetaminophen, compounded oral suspension, acetaminophen, magnesium hydroxide, glucose, dextrose, glucagon (rDNA), dextrose, ondansetron, nitroGLYCERIN, albuterol      Intake/Output Summary (Last 24 hours) at 6/2/2019 0941  Last data filed at 6/1/2019 1614  Gross per 24 hour   Intake --   Output 200 ml   Net -200 ml       Exam:    BP (!) 153/90   Pulse 66   Temp 98 °F (36.7 °C) (Oral)   Resp 18   Ht 6' 0.05\" (1.83 m)   Wt 181 lb 7 oz (82.3 kg)   SpO2 93%   BMI 24.58 kg/m²     General appearance: awake, no apparent distress,expressive aphasia . Respiratory:  Clear to auscultation, bilaterally without Rales/Wheezes/Rhonchi. Cardiovascular: Regular rate and rhythm with normal S1/S2 . Abdomen: Soft, active bowel sounds. Musculoskeletal: No edema of the LE bilaterally. Labs:   Recent Labs     05/30/19  1005 06/01/19  1856 06/02/19  0610   WBC 7.0 9.1 10.2   HGB 11.7* 11.7* 11.9*   HCT 34.7* 34.7* 34.6*    244 254     Recent Labs     05/30/19  1005 06/01/19  1856    142   K 4.0 4.2    105   CO2 23 20   BUN 26* 49*   CREATININE 0.85 1.07   CALCIUM 9.3 9.0     No results for input(s): AST, ALT, BILIDIR, BILITOT, ALKPHOS in the last 72 hours. No results for input(s): INR in the last 72 hours.   No results for input(s): Donovan Wen in the last 72 hours. Urinalysis:      Lab Results   Component Value Date    NITRU Negative 05/28/2019    WBCUA 20-50 05/28/2019    BACTERIA Negative 05/28/2019    RBCUA >100 05/28/2019    BLOODU LARGE 05/28/2019    SPECGRAV 1.019 05/28/2019    GLUCOSEU Negative 05/28/2019       Radiology:  XR CHEST PORTABLE   Final Result   THE INTERSTITIUM REMAINS MILDLY PROMINENT. COULD SUGGEST UNDERLYING CHF. CORRELATE CLINICALLY   NO ACUTE ACTIVE CARDIOPULMONARY PROCESS      CT HEAD WO CONTRAST    (Results Pending)           Assessment/Plan:    Severe abnormality of gait and mobility due to Bilateral SDH s/p bilateral craniotomy on 5/25  - transferred to acute rehab on 5/31  - repeat CT head negative for acute findings per report  - management per primary service and neurosurgery    Primary progressive aphasia associated with dementia  - neurology following    CAD s/p CABG/PCI   - continue pravastatin    AFIB  - off warfarin due to SDH  - continue Cardizem CD    HTN  - continue BP meds     Hyperglycemia / prerenal azotemia  - likely due to prednisone that was started empirically for possible gout  - clinically no evidence of gout, stopped prednisone  - ISS  - monitor glucose level      Dysphagia  Diet: DIET GENERAL; Carb Control: 3 carb choices (45 gms)/meal; Dysphagia I Pureed;  Nectar Thick  Dietary Nutrition Supplements: Snack (see comment)    Code Status: Full Code              Electronically signed by Partha Yee MD on 6/2/2019 at 9:41 AM

## 2019-06-03 LAB
GLUCOSE BLD-MCNC: 105 MG/DL (ref 60–115)
GLUCOSE BLD-MCNC: 110 MG/DL (ref 60–115)
GLUCOSE BLD-MCNC: 144 MG/DL (ref 60–115)
PERFORMED ON: ABNORMAL
PERFORMED ON: NORMAL
PERFORMED ON: NORMAL

## 2019-06-03 PROCEDURE — 6370000000 HC RX 637 (ALT 250 FOR IP): Performed by: NEUROLOGICAL SURGERY

## 2019-06-03 PROCEDURE — 99233 SBSQ HOSP IP/OBS HIGH 50: CPT | Performed by: PHYSICAL MEDICINE & REHABILITATION

## 2019-06-03 PROCEDURE — 97542 WHEELCHAIR MNGMENT TRAINING: CPT

## 2019-06-03 PROCEDURE — 2580000003 HC RX 258: Performed by: PHYSICAL MEDICINE & REHABILITATION

## 2019-06-03 PROCEDURE — 6370000000 HC RX 637 (ALT 250 FOR IP): Performed by: PHYSICAL MEDICINE & REHABILITATION

## 2019-06-03 PROCEDURE — 92507 TX SP LANG VOICE COMM INDIV: CPT

## 2019-06-03 PROCEDURE — 97530 THERAPEUTIC ACTIVITIES: CPT

## 2019-06-03 PROCEDURE — 1180000000 HC REHAB R&B

## 2019-06-03 PROCEDURE — 2700000000 HC OXYGEN THERAPY PER DAY

## 2019-06-03 PROCEDURE — 97535 SELF CARE MNGMENT TRAINING: CPT

## 2019-06-03 RX ORDER — IPRATROPIUM BROMIDE AND ALBUTEROL SULFATE 2.5; .5 MG/3ML; MG/3ML
1 SOLUTION RESPIRATORY (INHALATION) EVERY 4 HOURS PRN
Status: DISCONTINUED | OUTPATIENT
Start: 2019-06-03 | End: 2019-06-28 | Stop reason: HOSPADM

## 2019-06-03 RX ADMIN — BACITRACIN ZINC 1 G: 500 OINTMENT TOPICAL at 07:56

## 2019-06-03 RX ADMIN — BACITRACIN ZINC 1 G: 500 OINTMENT TOPICAL at 15:50

## 2019-06-03 RX ADMIN — SODIUM CHLORIDE 500 ML: 9 INJECTION, SOLUTION INTRAVENOUS at 20:57

## 2019-06-03 RX ADMIN — FAMOTIDINE 20 MG: 20 TABLET ORAL at 20:57

## 2019-06-03 RX ADMIN — DILTIAZEM HYDROCHLORIDE 120 MG: 120 CAPSULE, COATED, EXTENDED RELEASE ORAL at 20:59

## 2019-06-03 RX ADMIN — BACITRACIN ZINC 1 G: 500 OINTMENT TOPICAL at 21:02

## 2019-06-03 RX ADMIN — PRAVASTATIN SODIUM 40 MG: 40 TABLET ORAL at 20:57

## 2019-06-03 RX ADMIN — ACETAMINOPHEN 500 MG: 325 TABLET ORAL at 20:59

## 2019-06-03 RX ADMIN — ACETAMINOPHEN 500 MG: 325 TABLET ORAL at 07:52

## 2019-06-03 RX ADMIN — FAMOTIDINE 20 MG: 20 TABLET ORAL at 07:52

## 2019-06-03 RX ADMIN — ACETAMINOPHEN 500 MG: 325 TABLET ORAL at 15:50

## 2019-06-03 ASSESSMENT — PAIN SCALES - GENERAL
PAINLEVEL_OUTOF10: 0
PAINLEVEL_OUTOF10: 0
PAINLEVEL_OUTOF10: 2

## 2019-06-03 NOTE — PROGRESS NOTES
Occupational Therapy  Facility/Department: [facility]  Daily Treatment Note  NAME: Sintia Martel  : 1934  MRN: [id number]    Date of Service: 6/3/2019    Discharge Recommendations:  Continue to assess pending progress       Assessment      Activity Tolerance  Activity Tolerance: Patient Tolerated treatment well  Safety Devices  Safety Devices in place: Yes  Type of devices: All fall risk precautions in place  Restraints  Initially in place: No         Patient Diagnosis(es): There were no encounter diagnoses. has a past medical history of Chronic back pain, Hyperlipidemia, and Hypertension. has a past surgical history that includes Cardiac catheterization; Coronary artery bypass graft; and craniotomy (Bilateral, 2019). Restrictions  Restrictions/Precautions  Restrictions/Precautions: Swallowing - Thickened Liquids, Fall Risk, Seizure  Position Activity Restriction  Other position/activity restrictions: nectar thick   Subjective Pt's girlfriend present for part of session>\"  General  Chart Reviewed: Yes  Response to previous treatment: Patient reporting fatigue but able to participate  Subjective  Subjective: Pt seated in W/C upon arrival. Pt on 3L of O2. General Comment  Comments: Observed pt repeating words/echolalia when attempting to communicate with OT. Orientation     Objective    ADL  Grooming: Maximum assistance  UE Bathing: Maximum assistance  LE Bathing: Maximum assistance  UE Dressing: Maximum assistance  LE Dressing: Dependent/Total  Toileting: Unable to assess(comment)        Shower Transfers  Shower - Transfer From: Wheelchair  Shower - Transfer Type: To and From  Shower - Transfer To: Shower seat with back  Shower - Technique: Stand pivot  Shower Transfers: Moderate assistance      Pt required increased time for task completion. Verbal cues secondary to perseveration. Pt reports 0/10 pain during session                  Plan   Plan  Times per week: 5-7   Plan weeks: 4  Current Treatment Recommendations: Strengthening, ROM, Balance Training, Functional Mobility Training, Endurance Training, Safety Education & Training, Cognitive Reorientation, Patient/Caregiver Education & Training, Equipment Evaluation, Education, & procurement, Self-Care / ADL, Cognitive/Perceptual Training, Positioning  Plan Comment: Continue OT per POC  G-Code     OutComes Score                                                  AM-PAC Score             Goals          Therapy Time   Individual Concurrent Group Co-treatment   Time In 1030         Time Out 1 [address] [place] OTR/L Electronically signed by Suzette Walden OTR/L on 5/9/93 at 12:07 PM

## 2019-06-03 NOTE — FLOWSHEET NOTE
EDSON continued for patient being impulsive at times. IV fluids infusing as ordered. Nocturnal pulse ox. Was completed.

## 2019-06-03 NOTE — PROGRESS NOTES
Physical Therapy Rehab Treatment Note  [facility]  [facility]       NAME: Corrina Dunn  : 1934 (80 y.o.)  MRN: [id number]  CODE STATUS: Full Code    Date of Service: 6/3/2019  Chart Reviewed: Yes  Family / Caregiver Present: No    Restrictions:  Restrictions/Precautions: Swallowing - Thickened Liquids, Fall Risk, Seizure  Position Activity Restriction  Other position/activity restrictions: nectar thick        SUBJECTIVE: Subjective: pt nods and says \"Jose\"   Pain Screening  Patient Currently in Pain: No  Pre Treatment Pain Screening  Pain at present: 0  Scale Used: Numeric Score  Intervention List: Patient able to continue with treatment    Post Treatment Pain Screening:No change        OBJECTIVE:        Transfers  Sit to Stand: Minimal Assistance; Moderate Assistance(VC to stand tall, proper hand placement)  Stand to sit: Moderate Assistance;Minimal Assistance(quick impulsive sit with incorrect hand placement)      Ambulation  Ambulation?: No(safety concerns)  Stairs/Curb  Stairs?: No(safety concerns)Wheelchair Activities  Propulsion: Yes  Propulsion 1  Propulsion: Manual  Level: Carpet  Method: RLE;LLE  Level of Assistance: Maximum assistance;Minimal assistance(varies depending on pt focus as well as max needed for turns)  Description/ Details: improved WC mobility when using BLE versus BUE. constact VC and TC as well as verbal coaching needed to complete, excessive time to perform, simple commands used   Distance: 22' with two turns             Other exercises  Other exercises?: Yes  Other exercises 1: STS x5- focus on good tech and proper hand placement to prepare for future gait   Other exercises 2: static standing at [employer] [id number]      ASSESSMENT/COMMENTS:   Pt demonstrated ability to stand at [employer] for 30sec in pre gait interventions. Pt able to follow simple commands during this intervention. Pt needing frequent VC for slow descend to chair following transfer with poor carryover.  Pt able to maneuver WC better while using BLE vs BUE although still needing frequent VC and occasional hands on assistance especially during turns. PLAN OF CARE/Safety:   Safety Devices  Type of devices:  All fall risk precautions in place      Therapy Time:   Individual   Time In 1400   Time Out 1430   Minutes 30     Minutes:30      Transfer/Bed mobility training:15      Gait trainin      Neuro re education:0     Therapeutic ex:0    WC: Jesús Adhikari PTA, 19 at 2:26 PM

## 2019-06-03 NOTE — PROGRESS NOTES
pack years  [x]   Smoking history  ?  20 pack years  []   Pulmonary Disorder  (acute or chronic)  []   Severe or Chronic w/ Exacerbation  []     Surgical Status No [x]   Surgeries     General []   Surgery Lower []   Abdominal Thoracic or []   Upper Abdominal Thoracic with  PulmonaryDisorder  []     Chest X-ray Clear/Not  Ordered     []  Chronic Changes  Results Pending  []  Infiltrates, atelectasis, pleural effusion, or edema  [x]  Infiltrates in more than one lobe []  Infiltrate + Atelectasis, &/or pleural effusion  []    Respiratory Pattern Regular,  RR = 12-20 [x]  Increased,  RR = 21-25 []  [place], irregular,  or RR = 26-30 []  Decreased FEV1  or RR = 31-35 []  Severe SOB, use  of accessory muscles, or RR ? 35  []    Mental Status Alert, oriented,  Cooperative [x]  Confused but Follows commands []  Lethargic or unable to follow commands []  Obtunded  []  Comatose  []    Breath Sounds Clear to  auscultation  []  Decreased unilaterally or  in bases only [x]  Decreased  bilaterally  []  Crackles or intermittent wheezes []  Wheezes []    Cough Strong, Spontan., & nonproductive []  Strong,  spontaneous, &  productive []  Weak,  Nonproductive []  Weak, productive or  with wheezes []  No spontaneous  cough or may require suctioning []    Level of Activity Ambulatory []  Ambulatory w/ Assist  [x]  Non-ambulatory []  Paraplegic []  Quadriplegic []    Total    Score:____5___     Triage Score:____5____      Tri       Triage:     1. (>20) Freq: Q3    2. (16-20) Freq: Q4   3. (11-15) Freq: QID & Albuterol Q2 PRN    4. (6-10) Freq: TID & Albuterol Q2 PRN    5. (0-5) Freq Q4prn

## 2019-06-03 NOTE — PROGRESS NOTES
PATIENT AWAKE BUT DOES NOT FOLLOW COMMANDS. ABLE TO SQUEEZE BUT NOT CONSISTENTLY SO LEFT ARM BETTER THAN THE RIGHT. EATING POORLY  THE REPEAT CT BRAIN IS VERY COMPARABLE TO THE PREVIOUS STUDIES. NO ACUTE CHANGES THAT WOULD  MANDATE A IMMEDIATE CHANGE IN THE TREATMENT   STAPLES TO BE REMOVED TOMORROW

## 2019-06-03 NOTE — PROGRESS NOTES
Speech Language Pathology Treatment Note  Facility/Department: [facility]  6/3/2019    Rehab Dx/Hx: Abnormality of gait and mobility [R26.9]    Precautions: falls and aspirations    ST Dx: Aphasia and Dysphagia     Date of Admit: 5/31/2019  Room #: R240/R240-01    Time in: 1015  Time out: 1030  Pt was in pt care from [id number]    Subjective:  Alert, Cooperative, Distractible and Confused        Interventions used this date:  Speech Production, Expressive Language and Receptive Language    Objective/Assessment:  Patient progressing towards goals:  Short-term Goals for Speech & Language POC:   Goal 1: Pt will follow 1 step directions given orally with 75% accuracy with mod cues to increase the pt's ability to follow directions provided by caregivers for safe follow through with ADLs. Hand over hand cues required for all simple 1 step directions x 10    Pt had difficulty producing volitional articulatory postures. Suspect apraxia. Pt exhibited severe dysphonia, as all speech was produced as a whisper and hardly audible. He was not able to complete any vocal fold adduction exercises secondary to difficulty following directions. Pt was able to count 1-10 in conjunction with SLP x 2. With graded cues, pt was able to state 6-9 with minimal visual cues only. Pt required max verbal cues (tandem counting) with SLP for 11-20. Pt required max verbal cues (tandem) for OMAR with SLP, which he produced with 57% acc. Goal 2: Pt will identify objects/pictures within a field of 2-3 with 75% accuracy with mod cues in order to increase his understanding of objects in his environment for safer and more independent completion of ADLs. Goal 3: Pt will answer low level yes/no questions with 85% accuracy with mod cues to assist the caregiver in obtaining important information regarding the patient's personal, medical, and safety needs.       Goal 4: Pt will answer low level Wh- questions with 75% accuracy

## 2019-06-03 NOTE — CARE COORDINATION
Spoke with patients significant other and explained role in the team. Significant other questions answered appropriately. Explained discharge process. Significant other  stated understanding. Patient lives with sig other who was helping patient prior to admission. Patient has been falling prior to admission and sig other has had to  patient. Patient also was having incontinent issues prior to admission. Patient has a 2 level home with 3 steps to enter. Bed/bath upstairs with half bath on 1st floor. Patient has a ww. Per sig other, patient has a son in [place] that does not speak with patient. Per sig other, patient does not have room for a bed on the first floor or a wheelchair in the home. Sig other is worried about patient level of care needed upon discharge at this time, educated sig other that we will work on patient going home with a safe discharge plan, sig other stated understanding.   Electronically signed by Akiko Koenig RN on 6/3/2019 at 9:24 AM

## 2019-06-03 NOTE — PROGRESS NOTES
comprehension at the word  level to 60% acc with mod cues to promote pt's ability to utilize reading/writing. Add goal for automatic speech?- After starter (clinician started counting 1, 2,..) pt was able to count to 3-10 independently. After starter, pt was able to state the days of the week with phonemic cues. In tandem with the clinician, pt stated the months of the year with 25% accuracy. Short-term Goals for Dysphagia POC: Dysphagia not targeted during this session. Goal 1: Pt will tolerate puree diet with nectar thick liquids with no overt s/s of aspiration. Goal 2: Pt will perform oral motor ROM/strength exercieses with max cues 5x/each. Goal 3: Pt will perform base of tongue exercises with max cues 10x/each. Goal 4: Pt will tolerate therapeutic trials of mechanical soft/thin as able, with no overt s/s of aspiration. Dysphagia Goals: The patient will tolerate recommended diet without observed clinical signs of aspiration  Compensatory Swallowing Strategies: Small bites/sips, Total feed, Upright as possible for all oral intake, Swallow 2 times per bite/sip, Eat/Feed slowly      Treatment/Activity Tolerance:  Patient tolerated treatment well    Plan:  Continue per POC    Pain:  Patient demonstrated no s/s of pain. Patient/Caregiver Education:  Patient educated on session and progression towards goals. and Education needs reinforcement. Safety Devices:   All fall risk precautions in place    Comprehension:  2- Maximal assist    Expression:  1- Total assist    Problem Solvin- Total assist    Memory:  1- Total assist    Signature: Electronically signed by CIERRA Robles on 6/3/2019 at 2:25 PM

## 2019-06-03 NOTE — PROGRESS NOTES
Subjective: The patient complains of severe  acute  confusion secondary to subdural hematoma partially relieved by PT OT, speech-language pathology, recent decompression of the subdural hematomas and exacerbated by fatigue, exhaustion, dehydration. I am concerned about patients impulsivity and have added a video monitoring side and placed him in the [place] [facility] Final with frequent nursing checks. Am also concerned about his elevated blood sugar. I will add a diabetic restriction and sliding scale insulin coverage. He may also need nocturnal IV fluids because of the nectar effects. ROS x10: The patient also complains of severely impaired mobility and activities of daily living. Otherwise no new problems with vision, hearing, nose, mouth, throat, dermal, cardiovascular, GI, , pulmonary, musculoskeletal, psychiatric or neurological. See Rehab H&P on Rehab chart dated . Vital signs:  BP (!) 153/73   Pulse 51   Temp 97 °F (36.1 °C) (Oral)   Resp 16   Ht 6' 0.05\" (1.83 m)   Wt 181 lb 7 oz (82.3 kg)   SpO2 95%   BMI 24.58 kg/m²   I/O:   PO/Intake:  fair PO intake,  dysphagia 1 puréed with nectar thick    Bowel/Bladder:  continent, no problems noted. General:  Patient is well developed, adequately nourished, non-obese and     well kempt. HEENT:    PERRLA, hearing intact to loud voice, external inspection of ear     and nose benign. Inspection of lips, tongue and gums benign  Musculoskeletal: No significant change in strength or tone. All joints stable. Inspection and palpation of digits and nails show no clubbing,       cyanosis or inflammatory conditions. Neuro/Psychiatric: Affect: flat but pleasant. Alert and oriented to person, place and     Situation-with max cues. No significant change in deep tendon reflexes or     Sensation-poor judgment reasoning and insight. Lungs:  Diminished, CTA-B. Respiration effort is normal at rest.     Heart:   S1 = S2, RRR.   No loud murmurs. Abdomen:  Soft, non-tender, no enlargement of liver or spleen. Extremities:  No significant lower extremity edema or tenderness. Skin:   Intact to general survey,  craniotomy incisions healing. Rehabilitation:  Physical therapy: FIMS:  Bed Mobility:      Transfers: Sit to Stand: Minimal Assistance, Moderate Assistance  Stand to sit: Contact guard assistance  Bed to Chair: Maximum assistance, 2 Person Assistance(safety )  Squat Pivot Transfers: Moderate Assistance, Ambulation 1  Surface: carpet  Device: Rolling Walker  Other Apparatus: Wheelchair follow, O2  Assistance: Maximum assistance, Moderate assistance, 2 Person assistance  Quality of Gait: shuffling steps veering heavily to R requiring assist for maintaining straight path  Distance: 5ft,      FIMS: Bed, Chair, Wheel Chair: 0 - Did not occur  Walk: 1 - Total Assistance Walks < 50 feet OR requires two or more people OR patient performs < 25% of locomotion effort  Distance Walked: 5ft  Wheel Chair: 1 - Total Assistance Operates wheelchair < 50 feet OR requires two or more people OR patient performs < 25% of locomotion effort  Distance Traveled in Wheel Chair: 2,  , Assessment: Patient with poor participation d/t fatigue/aphasia. Hand over hand, step by step commands prove difficult throughout. Occupational therapy: FIMS:  Eating: (did not occur)  Groomin - Able to perform 1-2 tasks (max assist)  Bathin - Able to bathe 3-4 areas  Dressing-Upper: 2 - Requires assist with 3 tasks  Dressing-Lower: 1 - Total assist with lower body dressing  Toiletin - Total assist(pt incont. )  Toilet Transfer: 0 - Did not occur  Shower Transfer: 0 - Activity does not occur,  , Assessment: Pt is an 80 y.o male s/p craniotomy with evacuation of SDH after fall. Pt with above mentioned deficits impairing IND with ADL's. Pt may benefit from skilled OT to address deficits and return to highest level of functioning.      Speech therapy: FIMS: Comprehension: 2 - Patient understands basic needs 25-49% of the time  Expression: 1 - Expresses basic ideas/needs < 25% of the time  Social Interaction: 2 - Patient appropriate  25%-49% of the time  Problem Solvin - Patient solves simple/routine tasks < 25%  Memory: 1 - Patient remembers < 25% of the time      Lab/X-ray studies reviewed, analyzed and discussed with patient and staff:   Recent Results (from the past 24 hour(s))   POCT Glucose    Collection Time: 19 11:35 AM   Result Value Ref Range    POC Glucose 160 (H) 60 - 115 mg/dl    Performed on ACCU-CHEK    POCT Glucose    Collection Time: 19  4:09 PM   Result Value Ref Range    POC Glucose 195 (H) 60 - 115 mg/dl    Performed on ACCU-CHEK    POCT Glucose    Collection Time: 19  8:35 PM   Result Value Ref Range    POC Glucose 181 (H) 60 - 115 mg/dl    Performed on ACCU-CHEK    POCT Glucose    Collection Time: 19  5:35 AM   Result Value Ref Range    POC Glucose 110 60 - 115 mg/dl    Performed on ACCU-CHEK        Xr Chest  2019   Areas of atelectasis, patchy groundglass infiltrate in the bases. Right greater than left . Trace/ small right pleural effusion    Ct Head   2019  Impression: Bilateral frontal and parietal subdural hematomas. The amount of extra-axial air has slightly decreased since the prior study. No new areas of hemorrhage are identified. All CT scans at this facility use dose modulation, iterative reconstruction, and/or weight based dosing when appropriate to reduce radiation dose to as low as reasonably achievable. Ct Head Wo  : 2019   DECREASING-SIZED BILATERAL CEREBRAL CONVEXITY SUBDURAL HEMATOMAS AFTER INTERVAL REMOVAL OF BOTH DRAINAGE CATHETERS. Ct Head Wo  2019   POSTOPERATIVE BILATERAL GAS-CONTAINING SUBDURAL HEMATOMAS, LEFT LARGER THAN RIGHT, WITH LEFT TO RIGHT MIDLINE SHIFT OF 4 MM, IMPROVED FROM PRIOR EXAMINATION OF 19 AT 1417 HOURS.     Ct Head Wo : 2019   INTERVAL BILATERAL CRANIOTOMIES AND SUBDURAL HEMATOMA DRAINAGE CATHETER PLACEMENT SURGERY. PNEUMOCEPHALY AND SIGNIFICANTLY DECREASING-SIZED HETEROGENEOUS SUBDURAL HEMATOMAS. APPROXIMATELY 2 TO 3 MM OF LEFT-TO-RIGHT MIDLINE SHIFT WITHOUT HERNIATION, HYDROCEPHALUS, OR OTHER SIGNIFICANT CHANGES IDENTIFIED. Ct Head Wo   5/25/2019  STABLE APPROXIMATELY 2.2 CM RIGHT AND 1.8 CM LEFT SUBDURAL HEMATOMA. NO OTHER SIGNIFICANT CHANGE FROM EARLIER YESTERDAY IDENTIFIED. Ct Head Wo   5/25/2019  CT HEAD WO CONTRAST : 5/24/2019 9:15 PM CLINICAL HISTORY: CEREBRAL HEMORRHAGE. COMPARISON: Earlier    SLIGHTLY ENLARGING APPROXIMATELY 2.2 CM RIGHT SUBDURAL HEMATOMA AND STABLE APPROXIMATELY 1.8 CM LEFT SUBDURAL HEMATOMA. NO OTHER SIGNIFICANT CHANGE FROM EARLIER 5/24/2019 IDENTIFIED. Ct Head Wo : 5/24/2019   UP TO 1.8 CM ACUTE CHRONIC SUBDURAL HEMATOMAS OVER BOTH CEREBRAL CONVEXITIES, AS DESCRIBED. The      Ct Cervical Spine  5/24/2019   . Mild to moderate hypertrophic facet changes are present, predominantly on the right at C3-4, and on the left at C2-C3 and C5-6. There is no fracture, significant subluxation, or acute paraspinous soft tissue abnormalities identified. NO FRACTURE OR EVIDENCE OF CERVICAL SPINE INJURY IDENTIFIED. CERVICAL SPONDYLOSIS. Ct Lumbar   5/24/2019  Acute on chronic fracture, L1, with avulsion of the anterior portion of the L1 vertebral body. Findings suspicious for mild to moderate central stenosis at the L3-L4 level. Us Dup Upper Extremity Right Eric 5/31/2019     NO FINDINGS OF DEEP VENOUS THROMBUS IN  THE VISUALIZED VESSELS OF THE RIGHT UPPER EXTREMITY. Fl Modified Barium   5/31/2019  EXAMINATION: FL MODIFIED BARIUM SWALLOW W VIDEO: DATE AND TIME: 5/30/2019 at 10:45 AM. CLINICAL HISTORY:    MODERATE ORAL AND PHARYNGEAL DYSFUNCTION WITHOUT ASPIRATION. PLEASE REFER TO SPEECH PATHOLOGY  REPORT FOR RECOMMENDATIONS. Us Dup Lower Extremities Bilateral V 5/29/2019  NO DVT IDENTIFIED IN EITHER LOWER EXTREMITY. Previous extensive, complex labs, notes and diagnostics reviewed and analyzed. ALLERGIES:    Allergies as of 05/31/2019 - Review Complete 05/31/2019   Allergen Reaction Noted    Honey bee venom [bee venom] Swelling 10/08/2015    Penicillins Swelling 10/08/2015      (please also verify by checking MAR)     Today I evaluated this patient for periodic reassessment of medical and functional status. The patient was discussed in detail at the treatment team meeting focusing on current medical issues, progress in therapies, social issues, psychological issues, barriers to progress and strategies to address these barriers, and discharge planning. See the hand written addendum to rehab progress note. The patient continues to be high risk for future disability and their medical and rehabilitation prognosis continue to be good and therefore, we will continue the patient's rehabilitation course as planned. The patient's tentative discharge date was set. Patient and family education was discussed. The patient was made aware of the team discussion regarding their progress. Complex Physical Medicine & Rehab Issues Assess & Plan:   1. Severe abnormality of gait and mobility and impaired self-care and ADL's secondary to progressive subdural hematomas . Functional and medical status reassessed regarding patients ability to participate in therapies and patient found to be able to participate in acute intensive comprehensive inpatient rehabilitation program including PT/OT to improve balance, ambulation, ADLs, and to improve the P/AROM. Therapeutic modifications regarding activities in therapies, place, amount of time per day and intensity of therapy made daily. In bed therapies or bedside therapies prn.   2. Bowel and Bladder dysfunction:  frequent toileting, ambulate to bathroom with assistance, check post void residuals.   Check for C.difficile x1 if >2 loose stools in 24 hours, continue bowel & bladder program.  Monitor bowel and bladder function. Lactinex 2 PO every AC. MOM prn, Brown Bomb prn, Glycerin suppository prn, enema prn.  3. Severe postoperative craniotomy pain generalized OA painAcute on chronic fracture, L1, with avulsion of the anterior portion of the L1 vertebral body. Findings suspicious for mild to moderate central stenosis at the L3-L4 level.: reassess pain every shift and prior to and after each therapy session, give prn Tylenol and scheduled Tylenol and consider Norco, modalities prn in therapy, Lidoderm, K-pad prn.   4. Skin healing and breakdown risk:  continue pressure relief program.  Daily skin exams and reports from nursing. 5. Severe Fatigue due to nutritional and hydration deficiency:  continue to monitor I&Os, calorie counts prn, dietary consult prn.  6. Acute episodic insomnia with situational adjustment disorder:  prn Ambien, monitor for day time sedation. 7. Falls risk elevated:  patient to use call light to get nursing assistance to get up, bed and chair alarm. 8. Elevated DVT risk: progressive activities in PT, continue prophylaxis JOSE DE JESUS hose, elevation and  patient is not on a blood thinner secondary to recent subdural hematomas . 9. Complex discharge planning:  Weekly team meeting every Monday to assess progress towards goals, discuss and address social, psychological and medical comorbidities and to address difficulties they may be having progressing in therapy. Patient and family education is in progress. The patient is to follow-up with their family physician after discharge. Complex Active General Medical Issues that complicate care Assess & Plan:    1. Afib,   Essential hypertension,   Hyperlipidemia,   S/P CABG (coronary artery bypass graft), S/P PTCA (percutaneous transluminal coronary angioplasty),   RBBB,   CAD (coronary artery disease), long-term previous use of blood thinners  2.    Subdural hematoma-status post evacuation with , hold blood thinners-vital signs every shift, dose and titrate cardiac medications, recheck BMP CBC and consult hospitalist for backup medical  3.   prostatic hyperplasia with urinary frequency,   Prostate cancer -check postvoid residuals check stat UA  4. Facet arthropathy, lumbosacral,   Chronic back pain,   Spondylolisthesis of lumbar region-add Lidoderm, Tylenol, Norco as needed  5. Gross hematuria, Long term current use of anticoagulant-check UA monitor urine for blood  6. Long-term use of aspirin therapy and other blood thinners on hold at present because of recent subdural hematoma  7. SHAYY on CPAP-add CPAP from home if available is not available add CPAP from the hospital  8. Prediabetes-recheck BMP, low carb diet  9. Vitamin D deficiency-high-dose vitamin D, add Tums at daily dose of vitamin D after high doses done recheck as an outpatient  10. Aphasia, and oropharyngeal dysphagia-recently which pathology consult, add e-stim, add supplementation to diet push oral fluids  11. Poorly nourished due to poor nutrition and previous alcohol use BMI 26.0-26.9,adult--add protein supplementation vitamin B12 vitamin D CoQ10,  to stop drinking alcohol as an outpatient and at chemical dependency counseling when patient is able to participate  12. Complex social situation-patient has a girlfriend he lives alone. He is estranged from his several children.         Grzegorz Servin D.O., PM&R     Attending    [address] [facility]

## 2019-06-03 NOTE — PROGRESS NOTES
Physical Therapy Rehab Treatment Note  Facility/Department: [facility] [place] [facility]  Room: [place] [address]       NAME: Judi Davis  : 1934 (80 y.o.)  MRN: [id number]  CODE STATUS: Full Code    Date of Service: 6/3/2019  Chart Reviewed: Yes  Family / Caregiver Present: Yes    Restrictions:  Restrictions/Precautions: Swallowing - Thickened Liquids, Fall Risk, Seizure  Position Activity Restriction  Other position/activity restrictions: nectar thick        SUBJECTIVE: Subjective: pt nods and says \"Jose\"   Pain Screening  Patient Currently in Pain: No  Pre Treatment Pain Screening  Pain at present: 0  Scale Used: Numeric Score  Intervention List: Patient able to continue with treatment    Post Treatment Pain Screening:No change        OBJECTIVE:          Bed mobility  Bridging: Minimal assistance  Rolling to Left: Moderate assistance(hand rails used)  Rolling to Right: Moderate assistance(hand rails used )  Supine to Sit: Maximum assistance  Sit to Supine: Maximum assistance  Comment: step by step directions need, pt requires execessive time to process tasks, increased time and effort needed to perform, simple commands better for pt understanding     Transfers  Sit to Stand: Minimal Assistance; Moderate Assistance  Stand to sit: Contact guard assistance  Bed to Chair: Maximum assistance(need assistance directing hips to chair.  multiple VC and step by step instuction)  Squat Pivot Transfers: Maximum Assistance(assistance with hip direction)  Comment: simple commands best, increased time for transfers, assistance needed to direct hips, increased time and effort    Ambulation  Ambulation?: No(safety concerns)  Stairs/Curb  Stairs?: No(safety concerns)Wheelchair Activities  Propulsion: Yes  Propulsion 1  Propulsion: Manual  Level: Carpet  Method: LUE(tries to use RUE but unable )  Level of Assistance: Dependent/Total  Description/ Details: unable to advance WC or coordinate extremities for effective propulsion  Distance: 5' ASSESSMENT/COMMENTS:   Pt required excessive time to process tasks at hand with simple commands working best for pt participation. Increased time and effort needed during transfers and bed mobility. Decreased use of RUE during WC mobility. Will try BLE with WC mobility in the future. PLAN OF CARE/Safety:   Safety Devices  Type of devices:  All fall risk precautions in place      Therapy Time:   Individual   Time In 1130   Time Out 1200   Minutes 30     Minutes:30      Transfer/Bed mobility trainin      Gait trainin      Neuro re education:0     Therapeutic ex:0    WC: 10    Karly Nunez PTA, 19 at 12:02 PM

## 2019-06-03 NOTE — PROGRESS NOTES
increase participation in ADL task. Pt easily distracted for session with mod difficulty in participating activity requiring max VC and tactile cues to be fully engaged in task provided.       Plan   Plan  Times per week: 5-7   Plan weeks: 4  Current Treatment Recommendations: Strengthening, ROM, Balance Training, Functional Mobility Training, Endurance Training, Safety Education & Training, Cognitive Reorientation, Patient/Caregiver Education & Training, Equipment Evaluation, Education, & procurement, Self-Care / ADL, Cognitive/Perceptual Training, Positioning  Plan Comment: Continue with OT POC  Therapy Time   Individual Concurrent Group Co-treatment   Time In 1430         Time Out 1530         Minutes 61                 HECTOR Pack Electronically signed by HECTOR Pack on 6/3/2019 at 3:43 PM

## 2019-06-04 LAB
GLUCOSE BLD-MCNC: 90 MG/DL (ref 60–115)
PERFORMED ON: NORMAL

## 2019-06-04 PROCEDURE — 6370000000 HC RX 637 (ALT 250 FOR IP): Performed by: NEUROLOGICAL SURGERY

## 2019-06-04 PROCEDURE — 97140 MANUAL THERAPY 1/> REGIONS: CPT

## 2019-06-04 PROCEDURE — 97116 GAIT TRAINING THERAPY: CPT

## 2019-06-04 PROCEDURE — 92526 ORAL FUNCTION THERAPY: CPT

## 2019-06-04 PROCEDURE — 2580000003 HC RX 258: Performed by: PHYSICAL MEDICINE & REHABILITATION

## 2019-06-04 PROCEDURE — 97110 THERAPEUTIC EXERCISES: CPT

## 2019-06-04 PROCEDURE — 97127 HC OT THER IVNTJ W/FOCUS COG FUNCJ: CPT

## 2019-06-04 PROCEDURE — 92507 TX SP LANG VOICE COMM INDIV: CPT

## 2019-06-04 PROCEDURE — 1180000000 HC REHAB R&B

## 2019-06-04 PROCEDURE — 97530 THERAPEUTIC ACTIVITIES: CPT

## 2019-06-04 PROCEDURE — 99232 SBSQ HOSP IP/OBS MODERATE 35: CPT | Performed by: PHYSICAL MEDICINE & REHABILITATION

## 2019-06-04 PROCEDURE — 97535 SELF CARE MNGMENT TRAINING: CPT

## 2019-06-04 PROCEDURE — 6370000000 HC RX 637 (ALT 250 FOR IP): Performed by: PHYSICAL MEDICINE & REHABILITATION

## 2019-06-04 RX ADMIN — BACITRACIN ZINC 1 G: 500 OINTMENT TOPICAL at 13:08

## 2019-06-04 RX ADMIN — FAMOTIDINE 20 MG: 20 TABLET ORAL at 08:27

## 2019-06-04 RX ADMIN — ACETAMINOPHEN 500 MG: 325 TABLET ORAL at 20:29

## 2019-06-04 RX ADMIN — FAMOTIDINE 20 MG: 20 TABLET ORAL at 20:29

## 2019-06-04 RX ADMIN — ACETAMINOPHEN 500 MG: 325 TABLET ORAL at 08:27

## 2019-06-04 RX ADMIN — PRAVASTATIN SODIUM 40 MG: 40 TABLET ORAL at 20:28

## 2019-06-04 RX ADMIN — DILTIAZEM HYDROCHLORIDE 120 MG: 120 CAPSULE, COATED, EXTENDED RELEASE ORAL at 20:39

## 2019-06-04 RX ADMIN — BACITRACIN ZINC 1 G: 500 OINTMENT TOPICAL at 08:32

## 2019-06-04 RX ADMIN — ACETAMINOPHEN 500 MG: 325 TABLET ORAL at 13:07

## 2019-06-04 RX ADMIN — SODIUM CHLORIDE 500 ML: 9 INJECTION, SOLUTION INTRAVENOUS at 20:29

## 2019-06-04 RX ADMIN — BACITRACIN ZINC 1 G: 500 OINTMENT TOPICAL at 20:29

## 2019-06-04 ASSESSMENT — PAIN SCALES - GENERAL
PAINLEVEL_OUTOF10: 0

## 2019-06-04 NOTE — PROGRESS NOTES
Occupational Therapy  Facility/Department: [facility]  Daily Treatment Note  NAME: Jame Spurling  : 1934  MRN: [id number]    Date of Service: 2019    Discharge Recommendations:  Continue to assess pending progress       Assessment   Assessment: Pt demonstrated decreased pain during R UE PROM exercies. Minimal muscle contraction noted during PROM exercises. Pt demonstrated increased difficulty completing cogntive tasks and required increased time throughout OT session. Pt continues to benefit from skilled OT to maximize independence in ADLs and IADLs. REQUIRES OT FOLLOW UP: Yes  Activity Tolerance  Activity Tolerance: Treatment limited secondary to decreased cognition;Patient Tolerated treatment well  Safety Devices  Safety Devices in place: Yes  Type of devices: All fall risk precautions in place; Chair alarm in place         Patient Diagnosis(es): There were no encounter diagnoses. has a past medical history of Chronic back pain, Hyperlipidemia, and Hypertension. has a past surgical history that includes Cardiac catheterization; Coronary artery bypass graft; and craniotomy (Bilateral, 2019).     Restrictions  Restrictions/Precautions  Restrictions/Precautions: Swallowing - Thickened Liquids, Fall Risk, Seizure  Position Activity Restriction  Other position/activity restrictions: nectar thick   Subjective   General  Chart Reviewed: Yes  Patient assessed for rehabilitation services?: Yes  Response to previous treatment: Patient reporting fatigue but able to participate  Family / Caregiver Present: No  Subjective  General Comment    Pain Assessment  Pain Assessment: 0-10  Pain Level: 0  Vital Signs  Patient Currently in Pain: Denies   Orientation  Orientation  Overall Orientation Status: Impaired  Orientation Level: Oriented to person  Objective     Pt completed R UE PROM exercises 1x10 of shoulder flexion/extension, elbow flexion/extension, horizontal abduction/adduction, wrist flexion/extension, finger flexion/extension and thumb flexion/extension to improve functional use of UE during ADLs and IADLs. Minimal muscle contraction noted during shoulder extension and horizontal adduction. Pt experienced no pain during exercises. Pt engaged in 2 cognitive tasks to improve problem solving and safety awareness. Pt. Initially asked to sequence all months of the year on index cards. Pt. was unable to independently read the months and sequence. Pt correctly sequenced the months of the year when given a choice between two different months. Pt said the month when making a choice. Pt required increased time to accurately complete the task. Therapy student said the months to the patient that had already been correctly sequenced as a verbal cue for 12/12 months. Pt had mod difficulty completing the task. Pt unable to accurately sort a pile of red and blue cards into the correct pile. Pt given increased time to sort but could not complete the task. Pt. Was unable to effectively utilize cues or accurately state if cards were red or blue.      Plan   Plan  Times per week: 5-7   Plan weeks: 4  Current Treatment Recommendations: Strengthening, ROM, Balance Training, Functional Mobility Training, Endurance Training, Safety Education & Training, Cognitive Reorientation, Patient/Caregiver Education & Training, Equipment Evaluation, Education, & procurement, Self-Care / ADL, Cognitive/Perceptual Training, Positioning  Plan Comment: Continue with OT POC  G-Code     OutComes Score                                                  AM-PAC Score             Goals          Therapy Time   Individual Concurrent Group Co-treatment   Time In 1030         Time Out 1130         Minutes 60              Electronically signed by Jovon Connor on 6/4/2019 at 4:34 PM      Jovon Connor

## 2019-06-04 NOTE — PROGRESS NOTES
Staples removed to craniotomy incision. Patient tolerated well. Minimal bleeding noted.   Electronically signed by Herbert Wilson RN on 6/4/19 at 6:16 PM

## 2019-06-04 NOTE — PROGRESS NOTES
the correct answer         Short-term Goals for Dysphagia POC:  Goal 1: Pt will tolerate puree diet with nectar thick liquids with no overt s/s of aspiration. Pt tolerated 8 ounces of nectar thick liquids via cups sips without any overt s/s of aspiration. Pt was somewhat impulsive, taking many consecutive large sips unless prompted otherwise. Pt required full hand over hand assistance with pureed consistencies. Without full hand over hand assist, pt attempted to use the spoon as a straw and then took the spoon out of the pudding and trialed to lick the pudding from cup. With hand over hand cues, he was able to bring food to mouth. He also required cues to initiate, as he would continuously lick the same spoon without adding another bolus. Goal 2: Pt will perform oral motor ROM/strength exercieses with max cues 5x/each. Pt unable to produce volitional oral motor postures    Goal 3: Pt will perform base of tongue exercises with max cues 10x/each. Goal 4: Pt will tolerate therapeutic trials of mechanical soft/thin as able, with no overt s/s of aspiration. Trialed approx 3 oz of thin liquids via cups sips without any reflexive throat clearing or coughing. Pt did exhibit watery eyes s/p 2/5 and immediately placed his hand on his throat. When prompted to clear his throat, pt was unable to follow the directions to produce a hard throat clear. Rather than follow the directive, pt echoed \"cough cough cough\" or \"clear throat throat throat\". Other 3/5 trials were tolerated without any overt s/s of aspiration. ST will continue to monitor and trial, as able. Trials soft solids with pt able to form and clear a cohesive bolus given only slightly additional time to complete the task. Approx 5 second delay observed per soft solids. Difficulty with stage transition duration time, coordination, and following directions for compensatory strategies     Dysphagia Goals:  The patient will tolerate recommended diet without

## 2019-06-04 NOTE — PROGRESS NOTES
Subjective: The patient complains of severe  acute  confusion secondary to subdural hematoma partially relieved by PT OT, speech-language pathology, recent decompression of the subdural hematomas and exacerbated by fatigue, exhaustion, dehydration. I am concerned about patients impulsivity and have added a video monitoring side and placed him in the [place] [facility] with frequent nursing checks. His blood sugars continue to be elevated. I'm also concerned about his staples which need to be removed in his craniotomy incision. We will contact neurosurgeon to get okay for the removal.    ROS x10: The patient also complains of severely impaired mobility and activities of daily living. Otherwise no new problems with vision, hearing, nose, mouth, throat, dermal, cardiovascular, GI, , pulmonary, musculoskeletal, psychiatric or neurological. See Rehab H&P on Rehab chart dated . Vital signs:  BP (!) 153/96   Pulse 74   Temp 98 °F (36.7 °C) (Oral)   Resp 18   Ht 6' 0.05\" (1.83 m)   Wt 181 lb 7 oz (82.3 kg)   SpO2 96%   BMI 24.58 kg/m²   I/O:   PO/Intake:  fair PO intake,  dysphagia 1 puréed with nectar thick    Bowel/Bladder:  continent, no problems noted. General:  Patient is well developed, adequately nourished, non-obese and     well kempt. HEENT:    PERRLA, hearing intact to loud voice, external inspection of ear     and nose benign. Inspection of lips, tongue and gums benign  Musculoskeletal: No significant change in strength or tone. All joints stable. Inspection and palpation of digits and nails show no clubbing,       cyanosis or inflammatory conditions. Neuro/Psychiatric: Affect: flat but pleasant. Echolalia. Alert and oriented to person, place and     Situation-with max cues. No significant change in deep tendon reflexes or     Sensation-poor judgment reasoning and insight. Lungs:  Diminished, CTA-B. Respiration effort is normal at rest.     Heart:   S1 = S2, RRR.   No loud murmurs. Abdomen:  Soft, non-tender, no enlargement of liver or spleen. Extremities:  No significant lower extremity edema or tenderness. Skin:   Intact to general survey,  craniotomy incisions healing. Rehabilitation:  Physical therapy: FIMS:  Bed Mobility:      Transfers: Sit to Stand: Minimal Assistance, Moderate Assistance(VC to stand tall, proper hand placement)  Stand to sit: Moderate Assistance, Minimal Assistance(quick impulsive sit with incorrect hand placement)  Bed to Chair: Maximum assistance(need assistance directing hips to chair. multiple VC and step by step instuction)  Squat Pivot Transfers: Maximum Assistance(assistance with hip direction), Ambulation 1  Surface: carpet  Device: Rolling Walker  Other Apparatus: Wheelchair follow, O2  Assistance: Maximum assistance, Moderate assistance, 2 Person assistance  Quality of Gait: shuffling steps veering heavily to R requiring assist for maintaining straight path  Distance: 5ft,      FIMS: Bed, Chair, Wheel Chair: 1 - Requires >75% assitance to transfer  Walk: 0 - Activity Not Assessed/Does Not Occur  Distance Walked: 5ft  Wheel Chair: 1 - Total Assistance Operates wheelchair < 50 feet OR requires two or more people OR patient performs < 25% of locomotion effort  Distance Traveled in Wheel Chair: 5'  Stairs: 0 - Activity Does not Occur ( 0 only for the admission assessment),  , Assessment: Patient with poor participation d/t fatigue/aphasia. Hand over hand, step by step commands prove difficult throughout.     Occupational therapy: FIMS:  Eating: (did not occur)  Groomin - Able to perform 1-2 tasks (max assist)  Bathin - Able to bathe 3-4 areas  Dressing-Upper: 1 - Requires assist with 4 tasks/total assist  Dressing-Lower: 1 - Total assist with lower body dressing  Toiletin - Total assist(pt incont. )  Toilet Transfer: 0 - Did not occur  Shower Transfer: 0 - Activity does not occur,  , Assessment: Pt is an 80 y.o male s/p craniotomy with evacuation of SDH after fall. Pt with above mentioned deficits impairing IND with ADL's. Pt may benefit from skilled OT to address deficits and return to highest level of functioning. Speech therapy: FIMS: Comprehension: 2 - Patient understands basic needs 25-49% of the time  Expression: 1 - Expresses basic ideas/needs < 25% of the time  Social Interaction: 2 - Patient appropriate  25%-49% of the time  Problem Solvin - Patient solves simple/routine tasks < 25%  Memory: 1 - Patient remembers < 25% of the time      Lab/X-ray studies reviewed, analyzed and discussed with patient and staff:   Recent Results (from the past 24 hour(s))   POCT Glucose    Collection Time: 19 11:56 AM   Result Value Ref Range    POC Glucose 105 60 - 115 mg/dl    Performed on ACCU-CHEK    POCT Glucose    Collection Time: 19  9:16 PM   Result Value Ref Range    POC Glucose 144 (H) 60 - 115 mg/dl    Performed on ACCU-CHEK    POCT Glucose    Collection Time: 19  6:28 AM   Result Value Ref Range    POC Glucose 90 60 - 115 mg/dl    Performed on ACCU-CHEK        Xr Chest  2019   Areas of atelectasis, patchy groundglass infiltrate in the bases. Right greater than left . Trace/ small right pleural effusion    Ct Head   2019  Impression: Bilateral frontal and parietal subdural hematomas. The amount of extra-axial air has slightly decreased since the prior study. No new areas of hemorrhage are identified. All CT scans at this facility use dose modulation, iterative reconstruction, and/or weight based dosing when appropriate to reduce radiation dose to as low as reasonably achievable. Ct Head Wo  : 2019   DECREASING-SIZED BILATERAL CEREBRAL CONVEXITY SUBDURAL HEMATOMAS AFTER INTERVAL REMOVAL OF BOTH DRAINAGE CATHETERS.      Ct Head Wo  2019   POSTOPERATIVE BILATERAL GAS-CONTAINING SUBDURAL HEMATOMAS, LEFT LARGER THAN RIGHT, WITH LEFT TO RIGHT MIDLINE SHIFT OF 4 MM, IMPROVED FROM PRIOR EXAMINATION OF and functional status. The patient was discussed in detail at the treatment team meeting focusing on current medical issues, progress in therapies, social issues, psychological issues, barriers to progress and strategies to address these barriers, and discharge planning. See the hand written addendum to rehab progress note. The patient continues to be high risk for future disability and their medical and rehabilitation prognosis continue to be good and therefore, we will continue the patient's rehabilitation course as planned. The patient's tentative discharge date was set. Patient and family education was discussed. The patient was made aware of the team discussion regarding their progress. Discharge plans were discussed along with barriers to progress and strategies to address these barriers, patient encouraged to continue to discuss discharge plans with . Complex Physical Medicine & Rehab Issues Assess & Plan:   1. Severe abnormality of gait and mobility and impaired self-care and ADL's secondary to progressive subdural hematomas . Functional and medical status reassessed regarding patients ability to participate in therapies and patient found to be able to participate in acute intensive comprehensive inpatient rehabilitation program including PT/OT to improve balance, ambulation, ADLs, and to improve the P/AROM. Therapeutic modifications regarding activities in therapies, place, amount of time per day and intensity of therapy made daily. In bed therapies or bedside therapies prn.   2. Bowel and Bladder dysfunction:  frequent toileting, ambulate to bathroom with assistance, check post void residuals. Check for C.difficile x1 if >2 loose stools in 24 hours, continue bowel & bladder program.  Monitor bowel and bladder function. Lactinex 2 PO every AC.   MOM prn, Brown Bomb prn, Glycerin suppository prn, enema prn.  3. Severe postoperative craniotomy pain generalized OA painAcute on chronic fracture, L1, with avulsion of the anterior portion of the L1 vertebral body. Findings suspicious for mild to moderate central stenosis at the L3-L4 level.: reassess pain every shift and prior to and after each therapy session, give prn Tylenol and scheduled Tylenol and consider Norco, modalities prn in therapy, Lidoderm, K-pad prn.   4. Skin healing and breakdown risk:  continue pressure relief program.  Daily skin exams and reports from nursing. 5. Severe Fatigue due to nutritional and hydration deficiency:  continue to monitor I&Os, calorie counts prn, dietary consult prn.  6. Acute episodic insomnia with situational adjustment disorder:  prn Ambien, monitor for day time sedation. 7. Falls risk elevated:  patient to use call light to get nursing assistance to get up, bed and chair alarm. 8. Elevated DVT risk: progressive activities in PT, continue prophylaxis JOSE DE JESUS hose, elevation and  patient is not on a blood thinner secondary to recent subdural hematomas . 9. Complex discharge planning:  Discharge 6/28/19 home with his significant other at a wheelchair level. He will likely need 24-hour supervision at discharge. Weekly team meeting every Monday to assess progress towards goals, discuss and address social, psychological and medical comorbidities and to address difficulties they may be having progressing in therapy. Patient and family education is in progress. The patient is to follow-up with their family physician after discharge. Complex Active General Medical Issues that complicate care Assess & Plan:    1. Afib,   Essential hypertension,   Hyperlipidemia,   S/P CABG (coronary artery bypass graft), S/P PTCA (percutaneous transluminal coronary angioplasty),   RBBB,   CAD (coronary artery disease), long-term previous use of blood thinners  2.    Subdural hematoma-status post evacuation with , hold blood thinners-vital signs every shift, dose and titrate cardiac medications, recheck

## 2019-06-04 NOTE — PROGRESS NOTES
INPATIENT PROGRESS NOTE    SERVICE DATE:  6/4/2019   SERVICE TIME:  4:14 PM      SUBJECTIVE    INTERVAL HPI: 80year old male who is no acute distress.      MEDICATIONS:    Current Facility-Administered Medications   Medication Dose Route Frequency Provider Last Rate Last Dose    ipratropium-albuterol (DUONEB) nebulizer solution 1 ampule  1 ampule Inhalation Q4H PRN Gi Scullin, DO        0.9 % sodium chloride infusion  500 mL Intravenous Nightly Gi Scullin, DO   Stopped at 06/04/19 0700    HYDROcodone-acetaminophen (NORCO) 5-325 MG per tablet 1 tablet  1 tablet Oral Q4H PRN Gi Scullin, DO        acetaminophen (TYLENOL) tablet 500 mg  500 mg Oral TID Gi Scullin, DO   500 mg at 06/04/19 1307    lidocaine viscous hcl (XYLOCAINE) 30 mL, diphenhydrAMINE (BENADRYL) 12.5 MG/5ML 75 mg, aluminum-magnesium hydroxide 200-200 MG/5ML 30 mL compounded oral suspension   Oral 4x Daily PRN Gi Scullin, DO        acetaminophen (TYLENOL) tablet 650 mg  650 mg Oral Q4H PRN Gi Scullin, DO        magnesium hydroxide (MILK OF MAGNESIA) 400 MG/5ML suspension 30 mL  30 mL Oral Daily PRN Gi Scullin, DO        glucose (GLUTOSE) 40 % oral gel 15 g  15 g Oral PRN Lum Pellet, PA-C        dextrose 50 % solution 12.5 g  12.5 g Intravenous PRN Lum Pellet, PA-C        glucagon (rDNA) injection 1 mg  1 mg Intramuscular PRN Lum Pellet, PA-C        dextrose 5 % solution  100 mL/hr Intravenous PRN Swapna Vera PA-C        ondansetron (ZOFRAN) injection 4 mg  4 mg Intravenous Q6H PRN Berry Shepard MD        bacitracin zinc ointment   Topical TID Berry Shepard MD   1 g at 06/04/19 1308    diltiazem (CARDIZEM CD) extended release capsule 120 mg  120 mg Oral Daily Berry Shepard MD   120 mg at 06/03/19 2059    famotidine (PEPCID) tablet 20 mg  20 mg Oral BID Berry Shepard MD   20 mg at 06/04/19 0827    nitroGLYCERIN (NITROSTAT) SL tablet 0.4 mg  0.4 mg Sublingual Q5 Min PRN Berry BlinksMD Metcalf pravastatin (PRAVACHOL) tablet 40 mg  40 mg Oral Nightly Lobito Snow MD   40 mg at 06/03/19 2057       OBJECTIVE  PHYSICAL EXAM:   BP (!) 153/96   Pulse 74   Temp 98 °F (36.7 °C) (Oral)   Resp 18   Ht 6' 0.05\" (1.83 m)   Wt 181 lb 7 oz (82.3 kg)   SpO2 96%   BMI 24.58 kg/m²   Body mass index is 24.58 kg/m². CONSTITUTIONAL:  awake, alert, cooperative, no apparent distress, and appears stated age  NECK:  Supple, symmetrical, trachea midline, no adenopathy, thyroid symmetric, not enlarged and no tenderness, skin normal  BACK:  Symmetric, no curvature, spinous processes are non-tender on palpation, paraspinous muscles are non-tender on palpation, no costal vertebral tenderness  LUNGS:  No increased work of breathing, good air exchange, clear to auscultation bilaterally, no crackles or wheezing  CARDIOVASCULAR:  Normal apical impulse, regular rate and rhythm, normal S1 and S2, no S3 or S4, and no murmur noted  ABDOMEN:  No scars, normal bowel sounds, soft, non-distended, non-tender, no masses palpated, no hepatosplenomegally  MUSCULOSKELETAL:  There is no redness, warmth, or swelling of the joints. Full range of motion noted. Motor strength is 5 out of 5 all extremities bilaterally. Tone is normal.  NEUROLOGIC:  Awake, alert, expressive aphasia  SKIN:  no bruising or bleeding, no lesions and no jaundice    DATA:     Recent Results (from the past 24 hour(s))   POCT Glucose    Collection Time: 06/03/19  9:16 PM   Result Value Ref Range    POC Glucose 144 (H) 60 - 115 mg/dl    Performed on ACCU-CHEK    POCT Glucose    Collection Time: 06/04/19  6:28 AM   Result Value Ref Range    POC Glucose 90 60 - 115 mg/dl    Performed on ACCU-CHEK        ASSESSMENT AND PLAN   1. Gait instability and immobility secondary to bilateral SDH s/p bilateral craniotomy   2. A-fib  3. Aphasia/dysphagia  4. Dementia  5.   HTN-continue antihypertensive meds       SIGNATURE: Cinthya Walls PATIENT NAME: Kaden Carpio   DATE: June 4, 2019 MRN: [id number]   TIME: 4:14 PM PAGER: [phone]     Dr. Aarti Page, [address] [place] [address]

## 2019-06-04 NOTE — PROGRESS NOTES
Physical Therapy Rehab Treatment Note  [facility]  [facility]       NAME: Cristo Jensen  : 1934 (80 y.o.)  MRN: [id number]  CODE STATUS: Full Code    Date of Service: 2019  Chart Reviewed: Yes  Family / Caregiver Present: No  General Comment  Comments: up in chair on arrival    Restrictions:  Restrictions/Precautions: Swallowing - Thickened Liquids, Fall Risk, Seizure  Position Activity Restriction  Other position/activity restrictions: nectar thick        SUBJECTIVE: Subjective: \"Pain\" \"no\"  Pain Screening  Patient Currently in Pain: Denies  Pre Treatment Pain Screening  Pain at present: 0    Post Treatment Pain Screenin  Pain Assessment  Pain Assessment: 0-10  Pain Level: 0    OBJECTIVE:   Overall Orientation Status: Impaired  Orientation Level: Oriented to person    Bed mobility  Rolling to Left: Stand by assistance  Rolling to Right: Stand by assistance  Supine to Sit: Moderate assistance  Sit to Supine: Minimal assistance  Comment: a little impulsive with bed mobility. Transfers safest toward left side. Transfers  Squat Pivot Transfers: Moderate Assistance  Comment: simple comands best, increased time for transfers  Manual: stretches and rom to legs while seated and supine. gt: +2 mod assist for control, wc follow. Very nbos and left scissor. 20 feet. ASSESSMENT/COMMENTS:pt laughed a lot during tx and started to fall asleep with supine activity. Pt improved with bed mobility but attempted to get to standing on his own before therapist was ready. pt states that he is  to [employer] and has a son named xavier and daughter named ligia.      PLAN OF CARE/Safety:      Therapy Time:   Individual   Time In 930   Time Out 1000   Minutes 30     Minutes:30      Transfer/Bed mobility training:15      Gait trainin      Neuro re education:0     Therapeutic ex:0      Manual: 15      [facility] CAMELIA LOZADA PTA, 19 at 9:56 AM

## 2019-06-04 NOTE — PROGRESS NOTES
Occupational Therapy  Facility/Department: [facility]  Daily Treatment Note  NAME: Roger Florez  : 1934  MRN: [id number]    Date of Service: 2019    Discharge Recommendations:  Continue to assess pending progress       Assessment   Assessment: Pt demonstrated decreased pain during R UE PROM exercies. Minimal muscle contraction noted during PROM exercises. Pt demonstrated increased difficulty completing cogntive tasks and required increased time throughout OT session. Pt continues to benefit from skilled OT to maximize independence in ADLs and IADLs. REQUIRES OT FOLLOW UP: Yes  Activity Tolerance  Activity Tolerance: Patient Tolerated treatment well;Treatment limited secondary to decreased cognition  Safety Devices  Safety Devices in place: Yes  Type of devices: All fall risk precautions in place; Chair alarm in place         Patient Diagnosis(es): There were no encounter diagnoses. has a past medical history of Chronic back pain, Hyperlipidemia, and Hypertension. has a past surgical history that includes Cardiac catheterization; Coronary artery bypass graft; and craniotomy (Bilateral, 2019). Restrictions  Restrictions/Precautions  Restrictions/Precautions: Swallowing - Thickened Liquids, Fall Risk, Seizure  Position Activity Restriction  Other position/activity restrictions: nectar thick   Subjective   General  Chart Reviewed: Yes  Patient assessed for rehabilitation services?: Yes  Response to previous treatment: Patient reporting fatigue but able to participate  Family / Caregiver Present: No  Subjective  Subjective: Pt seated in W/C upon arrival. Pt on 3L of O2. General Comment  Comments: Observed pt repeating words/echolalia when attempting to communicate with OT. Orientation     Objective  Attempted having pt sort playing cards into red/black piles but he did not understand what was asked with Max cues and Min A.    He worked on forming 3 different colored rows of pegs (red, yellow, and green) with Mod cues to continue the task. Pt is easily distracted by people walking by. He required Max cues to take out pegs and put them in the bucket when task was over. He performed task of placing the correct large puzzle piece into the corresponding puzzle block when presented with 2 blocks at a time 7/8 correct. When presented with 3 blocks at time he had 1/2 trials correct. Pt performed B UE AROM activity of shoulder arc with Min A at times to initiate task and Mod cues to stay on task.                                                                                      Plan   Plan  Times per week: 5-7   Plan weeks: 4  Current Treatment Recommendations: Strengthening, ROM, Balance Training, Functional Mobility Training, Endurance Training, Safety Education & Training, Cognitive Reorientation, Patient/Caregiver Education & Training, Equipment Evaluation, Education, & procurement, Self-Care / ADL, Cognitive/Perceptual Training, Positioning  Plan Comment: Continue with OT POC  G-Code     OutComes Score                                                  AM-PAC Score             Goals          Therapy Time   Individual Concurrent Group Co-treatment   Time In 1500         Time Out 1600         Minutes [facility] [employer] doherty Electronically signed by HECTOR Gutiérrez on 6/4/2019 at 4:19 PM

## 2019-06-05 PROCEDURE — 97530 THERAPEUTIC ACTIVITIES: CPT

## 2019-06-05 PROCEDURE — 97535 SELF CARE MNGMENT TRAINING: CPT

## 2019-06-05 PROCEDURE — 97116 GAIT TRAINING THERAPY: CPT

## 2019-06-05 PROCEDURE — 92507 TX SP LANG VOICE COMM INDIV: CPT

## 2019-06-05 PROCEDURE — 1180000000 HC REHAB R&B

## 2019-06-05 PROCEDURE — 92526 ORAL FUNCTION THERAPY: CPT

## 2019-06-05 PROCEDURE — 6370000000 HC RX 637 (ALT 250 FOR IP): Performed by: PHYSICAL MEDICINE & REHABILITATION

## 2019-06-05 PROCEDURE — 6370000000 HC RX 637 (ALT 250 FOR IP): Performed by: NEUROLOGICAL SURGERY

## 2019-06-05 PROCEDURE — 99232 SBSQ HOSP IP/OBS MODERATE 35: CPT | Performed by: PHYSICAL MEDICINE & REHABILITATION

## 2019-06-05 PROCEDURE — 97542 WHEELCHAIR MNGMENT TRAINING: CPT

## 2019-06-05 PROCEDURE — 2580000003 HC RX 258: Performed by: PHYSICAL MEDICINE & REHABILITATION

## 2019-06-05 RX ADMIN — DILTIAZEM HYDROCHLORIDE 120 MG: 120 CAPSULE, COATED, EXTENDED RELEASE ORAL at 21:51

## 2019-06-05 RX ADMIN — ACETAMINOPHEN 500 MG: 325 TABLET ORAL at 21:51

## 2019-06-05 RX ADMIN — BACITRACIN ZINC 1 G: 500 OINTMENT TOPICAL at 08:17

## 2019-06-05 RX ADMIN — ACETAMINOPHEN 500 MG: 325 TABLET ORAL at 13:54

## 2019-06-05 RX ADMIN — FAMOTIDINE 20 MG: 20 TABLET ORAL at 21:51

## 2019-06-05 RX ADMIN — BACITRACIN ZINC: 500 OINTMENT TOPICAL at 13:56

## 2019-06-05 RX ADMIN — HYDROCODONE BITARTRATE AND ACETAMINOPHEN 1 TABLET: 5; 325 TABLET ORAL at 21:50

## 2019-06-05 RX ADMIN — ACETAMINOPHEN 500 MG: 325 TABLET ORAL at 08:17

## 2019-06-05 RX ADMIN — SODIUM CHLORIDE 500 ML: 9 INJECTION, SOLUTION INTRAVENOUS at 21:51

## 2019-06-05 RX ADMIN — PRAVASTATIN SODIUM 40 MG: 40 TABLET ORAL at 21:51

## 2019-06-05 RX ADMIN — BACITRACIN ZINC 1 G: 500 OINTMENT TOPICAL at 21:50

## 2019-06-05 RX ADMIN — FAMOTIDINE 20 MG: 20 TABLET ORAL at 08:16

## 2019-06-05 ASSESSMENT — PAIN SCALES - GENERAL
PAINLEVEL_OUTOF10: 0
PAINLEVEL_OUTOF10: 0
PAINLEVEL_OUTOF10: 3

## 2019-06-05 NOTE — PROGRESS NOTES
Physical Therapy Rehab Treatment Note  [facility]  [facility]       NAME: Franky Giordano  : 1934 (80 y.o.)  MRN: [id number]  CODE STATUS: Full Code    Date of Service: 2019  Chart Reviewed: Yes  Family / Caregiver Present: No    Restrictions:  Restrictions/Precautions: Swallowing - Thickened Liquids, Fall Risk, Seizure  Position Activity Restriction  Other position/activity restrictions: nectar thick        SUBJECTIVE: Subjective: nods \"Jose\"  Pain Screening  Patient Currently in Pain: No  Pre Treatment Pain Screening  Pain at present: 0  Scale Used: Numeric Score  Intervention List: Patient able to continue with treatment    Post Treatment Pain Screening:No change        OBJECTIVE:        Bed mobility  Rolling to Left: Moderate assistance  Rolling to Right: Moderate assistance  Supine to Sit: Moderate assistance  Sit to Supine: Moderate assistance  Scooting: Moderate assistance  Comment: simple commands used but pt not following today, VC and TC needed, increased time and effort, fatigue following      Transfers  Sit to Stand: Moderate Assistance  Stand to sit: Moderate Assistance  Bed to Chair: Maximum assistance(assistance guiding hips to chair )  Squat Pivot Transfers: Maximum Assistance(assistance guiding hips , simple commands used with poor comprehension)  Comment: simple comands best, increased time for transfers    Stairs/Curb  Stairs?: NoWheelchair Activities  Propulsion: Yes  Propulsion 1  Propulsion: Manual  Level: Carpet  Method: RLE;LLE  Level of Assistance: Maximum assistance  Description/ Details: VC and step by step direction needed, decreased participation   Distance: 15'                   ASSESSMENT/COMMENTS:   Pt demonstrated decreased comprehension this date even following simple tasks requiring excessive time for bed mobility and WC management. PLAN OF CARE/Safety:   Safety Devices  Type of devices:  All fall risk precautions in place      Therapy Time: Individual   Time In 930   Time Out 1000   Minutes 30     Minutes:30      Transfer/Bed mobility trainin      Gait trainin      Neuro re education:0     Therapeutic ex:0      WC:10    Kayla Kathleen PTA, 19 at 9:57 AM

## 2019-06-05 NOTE — PROGRESS NOTES
hand over hand assist to match a picture to the written word (from a field of 2). Short-term Goals for Dysphagia POC:  Goal 1: Pt will tolerate puree diet with nectar thick liquids with no overt s/s of aspiration. Pt tolerated 8 ounces of nectar thick liquids via cups sips without any overt s/s of aspiration. Pt was  impulsive, taking many consecutive large sips unless prompted otherwise. Goal 2: Pt will perform oral motor ROM/strength exercieses with max cues 5x/each. Pt unable to produce volitional oral motor postures. Pt said \"can't can't\"    Goal 3: Pt will perform base of tongue exercises with max cues 10x/each. Goal 4: Pt will tolerate therapeutic trials of mechanical soft/thin as able, with no overt s/s of aspiration. Trialed soft solids with pt able to form and clear a cohesive bolus given only slightly additional time to complete the task. Pt was observed to be highly impulsive, taking very large bites unless prompted otherwise. Suspect pharyngeal residuals s/p soft solids secondary to muffled/wet vocal quality, which pt was able to clear with a cued liquid wash. He required consistent cues to sequence self-feeding task, as he would stop and require initiation    Dysphagia Goals: The patient will tolerate recommended diet without observed clinical signs of aspiration  Compensatory Swallowing Strategies: Small bites/sips, Total feed, Upright as possible for all oral intake, Swallow 2 times per bite/sip, Eat/Feed slowly      Treatment/Activity Tolerance:  Patient tolerated treatment well    Plan:  Continue per POC    Pain:  Patient demonstrated no s/s of pain. Patient/Caregiver Education:  Patient educated on session and progression towards goals. and Education needs reinforcement.       Safety Devices:   All fall risk precautions in place     Comprehension:  2- Maximal assist - 1- Total assist     Expression:  1- Total assist     Problem Solvin- Total assist     Memory:  1- Total assist      Signature:  Electronically signed by Daryle Linsey, SLP on 6/5/2019 at 12:07 PM

## 2019-06-05 NOTE — PROGRESS NOTES
Physical Therapy Rehab Treatment Note  Facility/Department: [facility]  Room: R2/R240-01       NAME: Lola Cedeno  : 1934 (80 y.o.)  MRN: [id number]  CODE STATUS: Full Code    Date of Service: 2019  Chart Reviewed: Yes  Family / Caregiver Present: No    Restrictions:  Restrictions/Precautions: Swallowing - Thickened Liquids, Fall Risk, Seizure  Position Activity Restriction  Other position/activity restrictions: nectar thick        SUBJECTIVE: Subjective: nods \"Jose\"  Pain Screening  Patient Currently in Pain: No  Pre Treatment Pain Screening  Pain at present: 0  Scale Used: Numeric Score  Intervention List: Patient able to continue with treatment    Post Treatment Pain Screening:No change        OBJECTIVE:          Transfers  Sit to Stand: Minimal Assistance; Moderate Assistance  Stand to sit: Moderate Assistance(VC slow eccentric control )  Comment: simple comands best, increased time for transfers. VC to lean forward and for proper hand placement with poor carryover. Practiced sit to stand 2x. Ambulation  Ambulation?: Yes  Ambulation 1  Surface: level tile;carpet  Device: Rolling Walker  Other Apparatus: Wheelchair follow  Assistance: 2 Person assistance; Moderate assistance  Quality of Gait: decreased RLE foot clearance and step length, shuffling steps, TC and VC used along with simple commands, very NBOS occasional scissoring gait. Distance: 28'  Stairs/Curb  Stairs?: No       Other exercises  Other exercises 1: STS x2- focus on good tech and proper hand placement to prepare for future gait      ASSESSMENT/COMMENTS:    Pt demonstrated improved ability to ambulate farther distance this date with need for many VC and visual guides throughout ambulation. Pt needing VC to lean forward during sit to stand transfer and for proper hand placement with poor carryover. Decreased RLE foot clearance and step length during gait. Fatigue following transfers and gait requiring seated rest break.      PLAN OF CARE/Safety:   Safety Devices  Type of devices:  All fall risk precautions in place      Therapy Time:   Individual   Time In 1300   Time Out 1330   Minutes 30     Minutes:30      Transfer/Bed mobility training:10      Gait trainin      Neuro re education:0     Therapeutic ex:0      Kevin Valladares PTA, 19 at 1:22 PM

## 2019-06-05 NOTE — PROGRESS NOTES
No s/s of pain or discomfort noted. Patient has expressive aphasia but is able to answer yes and no questions. Bilateral cranial incisions are HECTOR and well approximated. Patient is incontinent at HS. Leyda care provided and barrier cream applied. Nocturnal IV fluids initiated.

## 2019-06-05 NOTE — PROGRESS NOTES
Occupational Therapy  Facility/Department: [facility]  Daily Treatment Note  NAME: Jame Spurling  : 1934  MRN: [id number]    Date of Service: 2019    Discharge Recommendations:  Continue to assess pending progress       Assessment   Assessment: Pt exhibiting increased tolerance for acts, however exhibits increased distractabliity. Activity Tolerance  Activity Tolerance: Treatment limited secondary to decreased cognition  Activity Tolerance: Fair  Safety Devices  Safety Devices in place: Yes  Type of devices: All fall risk precautions in place  Restraints  Initially in place: No         Patient Diagnosis(es): There were no encounter diagnoses. has a past medical history of Chronic back pain, Hyperlipidemia, and Hypertension. has a past surgical history that includes Cardiac catheterization; Coronary artery bypass graft; and craniotomy (Bilateral, 2019). Restrictions  Restrictions/Precautions  Restrictions/Precautions: Swallowing - Thickened Liquids, Fall Risk, Seizure  Position Activity Restriction  Other position/activity restrictions: nectar thick   Subjective Pt seen alone. General  Chart Reviewed: Yes  Patient assessed for rehabilitation services?: Yes  Response to previous treatment: Patient reporting fatigue but able to participate  Family / Caregiver Present: No  Subjective  Subjective: Pt seated in W/C upon arrival. Pt on 3L of O2. General Comment  Comments: Observed pt repeating words/echolalia when attempting to communicate with OT. Orientation     Objective  Pt participated in multiple acts at table top to increase cognitive and UE function for task completion. Pt completed hand puzzle with mod verbal and physical assist for completion with increased time. Pt participated in placing large pegs in pegboard to increase UE function and was able to place 28 pegs with increased time and verbal cues moderate for task completion.   Pt participated in cone stacking on table top with mod verbal cues and max physical assist for completion. Pt attempted to complete block stacking activity with max verbal cues, however patient was unable to understand directions even with multiple cues. Pt reports 0/10 pain during session. Pt did not utilize right hand for tasks even when physically prompted.                Plan   Plan  Times per week: 5-7   Plan weeks: 4  Current Treatment Recommendations: Strengthening, ROM, Balance Training, Functional Mobility Training, Endurance Training, Safety Education & Training, Cognitive Reorientation, Patient/Caregiver Education & Training, Equipment Evaluation, Education, & procurement, Self-Care / ADL, Cognitive/Perceptual Training, Positioning  Plan Comment: Continue with OT POC  G-Code     OutComes Score                                                  AM-PAC Score             Goals          Therapy Time   Individual Concurrent Group Co-treatment   Time In 1500         Time Out 1600         Minutes [address] [place] [address] KATARINA Jeffery/L Electronically signed by KATARINA Parekh/L on 3/2/21 at 4:20 PM

## 2019-06-05 NOTE — PROGRESS NOTES
Goal 5: Pt will demonstrate reading comprehension at the word  level to 60% acc with mod cues to promote pt's ability to utilize reading/writing.  0% acc independently, and did not benefit from cues; would repeat word accurately when read by SLP          Short-term Goals for Dysphagia POC:  Goal 1: Pt will tolerate puree diet with nectar thick liquids with no overt s/s of aspiration. Goal 2: Pt will perform oral motor ROM/strength exercieses with max cues 5x/each. Goal 3: Pt will perform base of tongue exercises with max cues 10x/each. Goal 4: Pt will tolerate therapeutic trials of mechanical soft/thin as able, with no overt s/s of aspiration. Pt trialed thin liquids via cup with no overt s/s of aspiration. Pt initially fed thin liquids via cup by SLP, but then self-fed at an appropriate rate with no over s/s of aspiration per 4 oz of thin liquid. Dysphagia Goals: The patient will tolerate recommended diet without observed clinical signs of aspiration  Compensatory Swallowing Strategies: Small bites/sips, Total feed, Upright as possible for all oral intake, Swallow 2 times per bite/sip, Eat/Feed slowly    Recommend meal monitor with consistent trials of thin within 1-5 days for possible upgrade to thin liquids. Treatment/Activity Tolerance:  Patient tolerated treatment well    Plan:  Continue per POC    Pain:  Patient demonstrated no s/s of pain. Patient/Caregiver Education:  Patient educated on session and progression towards goals. and Education needs reinforcement.       Safety Devices:   All fall risk precautions in place     Comprehension:   1- Total assist     Expression:  1- Total assist     Problem Solvin- Total assist     Memory:  1- Total assist      Signature:  Electronically signed by CIERRA Morse on 19 at 3:59 PM

## 2019-06-05 NOTE — PROGRESS NOTES
Mariana Solitario is a 80 y.o. male patient.  Pt was seen and evaluated, girl friend at beside, non verbal, know the pt from before  ROS: could not be obtained bc of acuity of condition    Current Facility-Administered Medications   Medication Dose Route Frequency Provider Last Rate Last Dose    ipratropium-albuterol (DUONEB) nebulizer solution 1 ampule  1 ampule Inhalation Q4H PRN Gi Scullin, DO        0.9 % sodium chloride infusion  500 mL Intravenous Nightly Gi Scullin, DO   Stopped at 06/05/19 0700    HYDROcodone-acetaminophen (NORCO) 5-325 MG per tablet 1 tablet  1 tablet Oral Q4H PRN Gi Scullin, DO        acetaminophen (TYLENOL) tablet 500 mg  500 mg Oral TID Gi Scullin, DO   500 mg at 06/05/19 1354    lidocaine viscous hcl (XYLOCAINE) 30 mL, diphenhydrAMINE (BENADRYL) 12.5 MG/5ML 75 mg, aluminum-magnesium hydroxide 200-200 MG/5ML 30 mL compounded oral suspension   Oral 4x Daily PRN Gi Scullin, DO        acetaminophen (TYLENOL) tablet 650 mg  650 mg Oral Q4H PRN Gi Scullin, DO        magnesium hydroxide (MILK OF MAGNESIA) 400 MG/5ML suspension 30 mL  30 mL Oral Daily PRN Gi Scullin, DO        glucose (GLUTOSE) 40 % oral gel 15 g  15 g Oral PRN Yadira Sauceda PA-C        dextrose 50 % solution 12.5 g  12.5 g Intravenous PRN Yadira Sauceda PA-C        glucagon (rDNA) injection 1 mg  1 mg Intramuscular PRN Yadira Sauceda PA-C        dextrose 5 % solution  100 mL/hr Intravenous PRN Swapna Vera PA-C        ondansetron (ZOFRAN) injection 4 mg  4 mg Intravenous Q6H PRN Rehana Winter MD        bacitracin zinc ointment   Topical TID Rehana Winter MD        diltiazem (CARDIZEM CD) extended release capsule 120 mg  120 mg Oral Daily Rehana Winter MD   120 mg at 06/04/19 2039    famotidine (PEPCID) tablet 20 mg  20 mg Oral BID Rehana Winter MD   20 mg at 06/05/19 0816    nitroGLYCERIN (NITROSTAT) SL tablet 0.4 mg  0.4 mg Sublingual Q5 Min PRN Rehana Winter MD        pravastatin (PRAVACHOL) tablet 40 mg  40 mg Oral Nightly Paco Yoder MD   40 mg at 06/04/19 2028     Allergies   Allergen Reactions    Honey Bee Venom [Bee Venom] Swelling    Penicillins Swelling     Principal Problem:    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 [facility] admit 05/31/19. Active Problems:    Afib (HCC)    Subdural hematoma (HCC)    Benign prostatic hyperplasia with urinary frequency    Essential hypertension    Facet arthropathy, lumbosacral    Gross hematuria    Hyperlipidemia    Long term current use of anticoagulant    Long-term use of aspirin therapy    SHAYY on CPAP    Prediabetes    Prostate cancer (HCC)    S/P CABG (coronary artery bypass graft)    S/P PTCA (percutaneous transluminal coronary angioplasty)    Spondylolisthesis of lumbar region    Vitamin D deficiency    RBBB    Aphasia    CAD (coronary artery disease)    BMI 26.0-26.9,adult    Chronic back pain    Abnormality of gait and mobility  Resolved Problems:    * No resolved hospital problems. *    Blood pressure 135/62, pulse 61, temperature 97 °F (36.1 °C), resp. rate 15, height 6' 0.05\" (1.83 m), weight 179 lb 7.3 oz (81.4 kg), SpO2 99 %. Subjective  Objective:  General Appearance: In no acute distress. Vital signs: (most recent): Blood pressure 135/62, pulse 61, temperature 97 °F (36.1 °C), resp. rate 15, height 6' 0.05\" (1.83 m), weight 179 lb 7.3 oz (81.4 kg), SpO2 99 %. Lungs:  Normal effort. Heart: Normal rate. Regular rhythm. S1 normal.    Abdomen: Abdomen is soft. There is no epigastric area tenderness. There is splenomegaly. Extremities: There is no deformity. Pulses: Distal pulses are intact. Skin:  Warm and dry.       Past Medical History:   Diagnosis Date    Chronic back pain     Hyperlipidemia     Hypertension        Assessment & Plan  1) s/p BL craniotomy d/t BL subdural hematomas 2/2 repeated falls  FU neurosurgery  2) ataxia  C/w pt/ot  3) HTN stable  4) LEORA Lopes MD  6/5/2019

## 2019-06-05 NOTE — PROGRESS NOTES
Plan  Times per week: 5-7   Plan weeks: 4  Current Treatment Recommendations: Strengthening, ROM, Balance Training, Functional Mobility Training, Endurance Training, Safety Education & Training, Cognitive Reorientation, Patient/Caregiver Education & Training, Equipment Evaluation, Education, & procurement, Self-Care / ADL, Cognitive/Perceptual Training, Positioning  Plan Comment: Continue with OT POC  G-Code     OutComes Score                                                  AM-PAC Score             Goals          Therapy Time   Individual Concurrent Group Co-treatment   Time In 1030         Time Out 1130         Minutes [facility] KATARINA Jeffery/L Electronically signed by GAVIN Billings on 0/4/36 at 2:19 PM

## 2019-06-05 NOTE — PROGRESS NOTES
Patient noted incontinent of urine. Upon removing pants blood noted to underwear. Did not see any open areas or tears. Also had patient urinate into urinal and did not see any blood in the urine. Patient denies discomfort. Will continue to monitor for further episodes.  Electronically signed by Sana Bejarano RN on 6/5/19 at 5:01 PM

## 2019-06-05 NOTE — PROGRESS NOTES
place and     Situation-with max cues. No significant change in deep tendon reflexes or     Sensation-poor judgment reasoning and insight. Lungs:  Diminished, CTA-B. Respiration effort is normal at rest.     Heart:   S1 = S2, RRR. No loud murmurs. Abdomen:  Soft, non-tender, no enlargement of liver or spleen. Extremities:  No significant lower extremity edema or tenderness. Skin:   Intact to general survey,  craniotomy incisions healing. Rehabilitation:  Physical therapy: FIMS:  Bed Mobility:      Transfers: Sit to Stand: Minimal Assistance, Moderate Assistance(VC to stand tall, proper hand placement)  Stand to sit: Moderate Assistance, Minimal Assistance(quick impulsive sit with incorrect hand placement)  Bed to Chair: Maximum assistance(need assistance directing hips to chair. multiple VC and step by step instuction)  Squat Pivot Transfers: Moderate Assistance, Ambulation 1  Surface: carpet  Device: Rolling Walker  Other Apparatus: Wheelchair follow  Assistance: 2 Person assistance, Moderate assistance  Quality of Gait: shuffling steps veering heavily to R requiring assist for maintaining straight path  Distance: 5ft,      FIMS: Bed, Chair, Wheel Chair: 1 - Requires >75% assitance to transfer  Walk: 0 - Activity Not Assessed/Does Not Occur  Distance Walked: 5ft  Wheel Chair: 1 - Total Assistance Operates wheelchair < 50 feet OR requires two or more people OR patient performs < 25% of locomotion effort  Distance Traveled in Wheel Chair: 5'  Stairs: 0 - Activity Does not Occur ( 0 only for the admission assessment),  , Assessment: Patient with poor participation d/t fatigue/aphasia. Hand over hand, step by step commands prove difficult throughout.     Occupational therapy: FIMS:  Eatin - Requires help steadying cup or utensil/check mouth for pocketing  Groomin - Able to perform 1-2 tasks (max assist)  Bathin - Did not occur  Dressing-Upper: 1 - Requires assist with 4 tasks/total assist  Dressing-Lower: 1 - Total assist with lower body dressing  Toiletin - Total assist  Toilet Transfer: 1 - Requires > 75% assist getting on & off toilet  Shower Transfer: 0 - Activity does not occur,  , Assessment: Pt demonstrated decreased pain during R UE PROM exercies. Minimal muscle contraction noted during PROM exercises. Pt demonstrated increased difficulty completing cogntive tasks and required increased time throughout OT session. Pt continues to benefit from skilled OT to maximize independence in ADLs and IADLs. Speech therapy: FIMS: Comprehension: 2 - Patient understands basic needs 25-49% of the time  Expression: 1 - Expresses basic ideas/needs < 25% of the time  Social Interaction: 2 - Patient appropriate  25%-49% of the time  Problem Solvin - Patient solves simple/routine tasks < 25%  Memory: 1 - Patient remembers < 25% of the time      Lab/X-ray studies reviewed, analyzed and discussed with patient and staff:   No results found for this or any previous visit (from the past 24 hour(s)). Xr Chest  2019   Areas of atelectasis, patchy groundglass infiltrate in the bases. Right greater than left . Trace/ small right pleural effusion    Ct Head   2019  Impression: Bilateral frontal and parietal subdural hematomas. The amount of extra-axial air has slightly decreased since the prior study. No new areas of hemorrhage are identified. All CT scans at this facility use dose modulation, iterative reconstruction, and/or weight based dosing when appropriate to reduce radiation dose to as low as reasonably achievable. Ct Head Wo  : 2019   DECREASING-SIZED BILATERAL CEREBRAL CONVEXITY SUBDURAL HEMATOMAS AFTER INTERVAL REMOVAL OF BOTH DRAINAGE CATHETERS. Ct Head Wo  2019   POSTOPERATIVE BILATERAL GAS-CONTAINING SUBDURAL HEMATOMAS, LEFT LARGER THAN RIGHT, WITH LEFT TO RIGHT MIDLINE SHIFT OF 4 MM, IMPROVED FROM PRIOR EXAMINATION OF 19 AT 1417 HOURS.     Ct Head Wo : 5/25/2019   INTERVAL BILATERAL CRANIOTOMIES AND SUBDURAL HEMATOMA DRAINAGE CATHETER PLACEMENT SURGERY. PNEUMOCEPHALY AND SIGNIFICANTLY DECREASING-SIZED HETEROGENEOUS SUBDURAL HEMATOMAS. APPROXIMATELY 2 TO 3 MM OF LEFT-TO-RIGHT MIDLINE SHIFT WITHOUT HERNIATION, HYDROCEPHALUS, OR OTHER SIGNIFICANT CHANGES IDENTIFIED. Ct Head Wo : 5/24/2019   UP TO 1.8 CM ACUTE CHRONIC SUBDURAL HEMATOMAS OVER BOTH CEREBRAL CONVEXITIES, AS DESCRIBED. The      Ct Cervical Spine  5/24/2019   . Mild to moderate hypertrophic facet changes are present, predominantly on the right at C3-4, and on the left at C2-C3 and C5-6. There is no fracture, significant subluxation, or acute paraspinous soft tissue abnormalities identified. NO FRACTURE OR EVIDENCE OF CERVICAL SPINE INJURY IDENTIFIED. CERVICAL SPONDYLOSIS. Ct Lumbar   5/24/2019  Acute on chronic fracture, L1, with avulsion of the anterior portion of the L1 vertebral body. Findings suspicious for mild to moderate central stenosis at the L3-L4 level. Us Dup Upper Extremity Right Eric 5/31/2019  NO FINDINGS OF DEEP VENOUS THROMBUS IN  THE VISUALIZED VESSELS OF THE RIGHT UPPER EXTREMITY. Fl Modified Barium   5/31/2019  EXAMINATION: FL MODIFIED BARIUM SWALLOW W VIDEO: DATE AND TIME: 5/30/2019 at 10:45 AM. CLINICAL HISTORY:    MODERATE ORAL AND PHARYNGEAL DYSFUNCTION WITHOUT ASPIRATION. PLEASE REFER TO SPEECH PATHOLOGY  REPORT FOR RECOMMENDATIONS. Us Dup Lower Extremities Bilateral V 5/29/2019  NO DVT IDENTIFIED IN EITHER LOWER EXTREMITY. Previous extensive, complex labs, notes and diagnostics reviewed and analyzed.      ALLERGIES:    Allergies as of 05/31/2019 - Review Complete 05/31/2019   Allergen Reaction Noted    Honey bee venom [bee venom] Swelling 10/08/2015    Penicillins Swelling 10/08/2015      (please also verify by checking MAR)      I reviewed her [facility] prescription monitoring service data sheets in hopes of eliminating polypharmacy and with situational adjustment disorder:  prn Ambien, monitor for day time sedation. 7. Falls risk elevated:  patient to use call light to get nursing assistance to get up, bed and chair alarm. 8. Elevated DVT risk: progressive activities in PT, continue prophylaxis JOSE DE JESUS hose, elevation and  patient is not on a blood thinner secondary to recent subdural hematomas . 9. Complex discharge planning:  Discharge 6/28/19 home with his significant other at a wheelchair level. He will likely need 24-hour supervision at discharge. Weekly team meeting every Monday to assess progress towards goals, discuss and address social, psychological and medical comorbidities and to address difficulties they may be having progressing in therapy. Patient and family education is in progress. The patient is to follow-up with their family physician after discharge. Complex Active General Medical Issues that complicate care Assess & Plan:    1. Afib,   Essential hypertension,   Hyperlipidemia,   S/P CABG (coronary artery bypass graft), S/P PTCA (percutaneous transluminal coronary angioplasty),   RBBB,   CAD (coronary artery disease), long-term previous use of blood thinners  2. Subdural hematoma-status post evacuation with , hold blood thinners-vital signs every shift, dose and titrate cardiac medications, recheck BMP CBC and consult hospitalist for backup medical  3.   prostatic hyperplasia with urinary frequency,   Prostate cancer -check postvoid residuals check stat UA  4. Facet arthropathy, lumbosacral,   Chronic back pain,   Spondylolisthesis of lumbar region-add Lidoderm, Tylenol, Norco as needed  5. Gross hematuria, Long term current use of anticoagulant-check UA monitor urine for blood  6. Long-term use of aspirin therapy and other blood thinners on hold at present because of recent subdural hematoma  7. SHAYY on CPAP-add CPAP from home if available is not available add CPAP from the hospital  8. Prediabetes-recheck BMP, low carb diet  9. Vitamin D deficiency-high-dose vitamin D, add Tums at daily dose of vitamin D after high doses done recheck as an outpatient  10. Aphasia, and oropharyngeal dysphagia-recently which pathology consult, add e-stim, add supplementation to diet push oral fluids  11. Poorly nourished due to poor nutrition and previous alcohol use BMI 26.0-26.9,adult--add protein supplementation vitamin B12 vitamin D CoQ10,  to stop drinking alcohol as an outpatient and at chemical dependency counseling when patient is able to participate  12. Complex social situation-patient has a girlfriend he lives alone. He is estranged from his several children.         Melba Fregoso D.O., PM&R     Attending    [address]

## 2019-06-06 PROCEDURE — 6370000000 HC RX 637 (ALT 250 FOR IP): Performed by: NEUROLOGICAL SURGERY

## 2019-06-06 PROCEDURE — 1180000000 HC REHAB R&B

## 2019-06-06 PROCEDURE — 97150 GROUP THERAPEUTIC PROCEDURES: CPT

## 2019-06-06 PROCEDURE — 2580000003 HC RX 258: Performed by: PHYSICAL MEDICINE & REHABILITATION

## 2019-06-06 PROCEDURE — 6370000000 HC RX 637 (ALT 250 FOR IP): Performed by: PHYSICAL MEDICINE & REHABILITATION

## 2019-06-06 PROCEDURE — 99232 SBSQ HOSP IP/OBS MODERATE 35: CPT | Performed by: PHYSICAL MEDICINE & REHABILITATION

## 2019-06-06 PROCEDURE — 97116 GAIT TRAINING THERAPY: CPT

## 2019-06-06 PROCEDURE — 92507 TX SP LANG VOICE COMM INDIV: CPT

## 2019-06-06 PROCEDURE — 92526 ORAL FUNCTION THERAPY: CPT

## 2019-06-06 PROCEDURE — 97112 NEUROMUSCULAR REEDUCATION: CPT

## 2019-06-06 PROCEDURE — 97535 SELF CARE MNGMENT TRAINING: CPT

## 2019-06-06 PROCEDURE — 97530 THERAPEUTIC ACTIVITIES: CPT

## 2019-06-06 PROCEDURE — 2700000000 HC OXYGEN THERAPY PER DAY

## 2019-06-06 RX ORDER — SODIUM CHLORIDE 9 MG/ML
1000 INJECTION, SOLUTION INTRAVENOUS NIGHTLY
Status: DISCONTINUED | OUTPATIENT
Start: 2019-06-06 | End: 2019-06-09

## 2019-06-06 RX ADMIN — BACITRACIN ZINC 1 G: 500 OINTMENT TOPICAL at 14:48

## 2019-06-06 RX ADMIN — BACITRACIN ZINC 1 G: 500 OINTMENT TOPICAL at 21:04

## 2019-06-06 RX ADMIN — PRAVASTATIN SODIUM 40 MG: 40 TABLET ORAL at 21:04

## 2019-06-06 RX ADMIN — SODIUM CHLORIDE 1000 ML: 9 INJECTION, SOLUTION INTRAVENOUS at 21:04

## 2019-06-06 RX ADMIN — ACETAMINOPHEN 500 MG: 325 TABLET ORAL at 21:04

## 2019-06-06 RX ADMIN — FAMOTIDINE 20 MG: 20 TABLET ORAL at 09:25

## 2019-06-06 RX ADMIN — BACITRACIN ZINC 1 G: 500 OINTMENT TOPICAL at 09:25

## 2019-06-06 RX ADMIN — FAMOTIDINE 20 MG: 20 TABLET ORAL at 21:04

## 2019-06-06 RX ADMIN — ACETAMINOPHEN 500 MG: 325 TABLET ORAL at 14:47

## 2019-06-06 RX ADMIN — ACETAMINOPHEN 500 MG: 325 TABLET ORAL at 09:25

## 2019-06-06 RX ADMIN — HYDROCODONE BITARTRATE AND ACETAMINOPHEN 1 TABLET: 5; 325 TABLET ORAL at 21:04

## 2019-06-06 RX ADMIN — DILTIAZEM HYDROCHLORIDE 120 MG: 120 CAPSULE, COATED, EXTENDED RELEASE ORAL at 21:04

## 2019-06-06 ASSESSMENT — PAIN SCALES - GENERAL
PAINLEVEL_OUTOF10: 5
PAINLEVEL_OUTOF10: 0

## 2019-06-06 NOTE — PROGRESS NOTES
Speech Language Pathology Treatment Note  Facility/Department: [facility]  6/6/2019    Rehab Dx/Hx: Abnormality of gait and mobility [R26.9]    Precautions: falls and aspirations    ST Dx: Aphasia and Dysphagia     Date of Admit: 5/31/2019  Room #: R240/R240-01    Time in: 10:00 AM       Time out: 10:30 AM  Swallowing Time In: 10:00 AM   Swallowing Time Out: 10:15 AM  Language Time In: 10:15 AM    Language Time Out: 10:30 AM    Subjective:  Alert, Cooperative, Distractible and Confused        Interventions used this date:   Expressive receptive language; dysphagia therapy    Objective/Assessment:  Patient progressing towards goals:  Short-term Goals for Speech & Language POC:   Goal 1: Pt will follow 1 step directions given orally with 75% accuracy with mod cues to increase the pt's ability to follow directions provided by caregivers for safe follow through with ADLs. 20% acc given mod cues with slowed rate of presentation and max processing time, increase to 40% acc with max cues and SLP model, increase to 100% acc with hand over hand assist.     Goal 2: Pt will identify objects/pictures within a field of 2-3 with 75% accuracy with mod cues in order to increase his understanding of objects in his environment for safer and more independent completion of ADLs. 20% acc given a FO2 for objects of everyday living. Goal 3: Pt will answer low level yes/no questions with 85% accuracy with mod cues to assist the caregiver in obtaining important information regarding the patient's personal, medical, and safety needs. Goal 4: Pt will answer low level Wh- questions with 75% accuracy with mod cues to assist the caregiver in obtaining important information regarding the patient's personal, medical, and safety needs. 66% acc for low level personal orientation questions given repetitions, increased processing time, and slowed rate of presentation.      Goal 5: Pt will demonstrate reading comprehension at the word  level to 60% acc with mod cues to promote pt's ability to utilize reading/writing. Short-term Goals for Dysphagia POC:  Goal 1: Pt will tolerate puree diet with nectar thick liquids with no overt s/s of aspiration. Pt consumed puree textures x5 with no overt s/s of aspiration. Pt with prompt swallow and no oral residues. Pt with baseline hoarse vocal quality post all trials. Goal 2: Pt will perform oral motor ROM/strength exercieses with max cues 5x/each. Goal 3: Pt will perform base of tongue exercises with max cues 10x/each. Goal 4: Pt will tolerate therapeutic trials of mechanical soft/thin as able, with no overt s/s of aspiration. Pt consumed thin liquids via cup sip x5 with no overt s/s of aspiration or difficulty swallowing. Pt observed to be impulsive with cup sip taking 2-3 sequential sips. Pt with baseline vocal quality post all trials of thin liquid. No coughing or throat clearing observed. Pt with prompt swallow. Recommendation for meal monitor to assess potential upgrade to thin liquids. Dysphagia Goals: The patient will tolerate recommended diet without observed clinical signs of aspiration     Compensatory Swallowing Strategies: Small bites/sips, Total feed, Upright as possible for all oral intake, Swallow 2 times per bite/sip, Eat/Feed slowly  Pt required continued instruction for recommendation for double swallow. Pt unable to complete double swallow this date post modeling and cueing from SLP       Treatment/Activity Tolerance:  Patient tolerated treatment well    Plan:  Continue per POC    Pain:  Patient demonstrated no s/s of pain. Patient/Caregiver Education:  Patient educated on session and progression towards goals. and Education needs reinforcement.       Safety Devices:   All fall risk precautions in place     Comprehension:   1- Total assist     Expression:  1- Total assist     Problem Solvin- Total assist     Memory:  1- Total assist      Signature: Electronically signed by CIERRA Pham on 6/6/2019 at 10:34 AM

## 2019-06-06 NOTE — PROGRESS NOTES
Subjective: The patient complains of severe  acute  confusion secondary to subdural hematoma partially relieved by PT OT, speech-language pathology, recent decompression of the subdural hematomas and exacerbated by fatigue, exhaustion, dehydration. I am concerned about patients impulsivity and have added a video monitoring side and placed him in the [place] [address] [facility] Final with frequent nursing checks. I'm concerned about his continued dehydration and inability to take in enough fluids with the nectar thick liquids. This is despite having 500 mL of normal saline each night IV. I will increase his nocturnal IV 2000 mL. ROS x10: The patient also complains of severely impaired mobility and activities of daily living. Otherwise no new problems with vision, hearing, nose, mouth, throat, dermal, cardiovascular, GI, , pulmonary, musculoskeletal, psychiatric or neurological. See Rehab H&P on Rehab chart dated . Vital signs:  /62   Pulse 61   Temp 97 °F (36.1 °C)   Resp 15   Ht 6' 0.05\" (1.83 m)   Wt 179 lb 10.8 oz (81.5 kg)   SpO2 99%   BMI 24.34 kg/m²   I/O:   PO/Intake:  fair PO intake,  dysphagia 1 puréed with nectar thick    Bowel/Bladder:  continent, no problems noted. General:  Patient is well developed, adequately nourished, non-obese and     well kempt. HEENT:    PERRLA, hearing intact to loud voice, external inspection of ear     and nose benign. Inspection of lips, tongue and gums dry  Musculoskeletal: No significant change in strength or tone. All joints stable. Inspection and palpation of digits and nails show no clubbing,       cyanosis or inflammatory conditions. Neuro/Psychiatric: Affect: flat but pleasant. Echolalia. Alert and oriented to person, place and     Situation-with max cues. No significant change in deep tendon reflexes or     Sensation-poor judgment reasoning and insight. Lungs:  Diminished, CTA-B.  Respiration effort is normal at rest.     Heart:   S1 = S2, RRR. No loud murmurs. Abdomen:  Soft, non-tender, no enlargement of liver or spleen. Extremities:  No significant lower extremity edema or tenderness. Skin:   Intact to general survey,  craniotomy incisions healing. Rehabilitation:  Physical therapy: FIMS:  Bed Mobility: Scooting: Moderate assistance    Transfers: Sit to Stand: Minimal Assistance, Moderate Assistance(practiced 2x)  Stand to sit: Moderate Assistance(VC slow eccentric control )  Bed to Chair: Maximum assistance(assistance guiding hips to chair )  Squat Pivot Transfers: Maximum Assistance(assistance guiding hips , simple commands used with poor comprehension), Ambulation 1  Surface: level tile, carpet  Device: Rolling Walker  Other Apparatus: Wheelchair follow  Assistance: 2 Person assistance, Moderate assistance  Quality of Gait: decreased RLE foot clearance and step length, shuffling steps, TC and VC used along with simple commands, very NBOS occasional scissoring gait. Distance: 35',      FIMS: Bed, Chair, Wheel Chair: 1 - Requires >75% assitance to transfer  Walk: 1 - Total Assistance Walks < 50 feet OR requires two or more people OR patient performs < 25% of locomotion effort  Distance Walked: 35'  Wheel Chair: 1 - Total Assistance Operates wheelchair < 50 feet OR requires two or more people OR patient performs < 25% of locomotion effort  Distance Traveled in Wheel Chair: 15'  Stairs: 0 - Activity Does not Occur ( 0 only for the admission assessment),  , Assessment: Patient with poor participation d/t fatigue/aphasia. Hand over hand, step by step commands prove difficult throughout.     Occupational therapy: FIMS:  Eatin - Requires help steadying cup or utensil/check mouth for pocketing  Groomin - Requires setup/cues to do all tasks  Bathin - Able to bathe all 10 areas with setup/sup/cues  Dressing-Upper: 5 - Requires setup/supervision/cues and/or requires assist with presthesis/brace only  Dressing-Lower: 5 - Requires setup/supervision/cues and/or staff applies TEDS/prosthesis/brace only  Toiletin - Requires setup/supervision/cues  Toilet Transfer: 1 - Requires > 75% assist getting on & off toilet  Shower Transfer: 0 - Activity does not occur,  , Assessment: Pt exhibiting increased tolerance for acts, however exhibits increased distractabliity. Speech therapy: FIMS: Comprehension: 1 - Patient understands basic needs <25% of the time  Expression: 1 - Expresses basic ideas/needs < 25% of the time  Social Interaction: 2 - Patient appropriate  25%-49% of the time  Problem Solvin - Patient solves simple/routine tasks < 25%  Memory: 1 - Patient remembers < 25% of the time      Lab/X-ray studies reviewed, analyzed and discussed with patient and staff:   No results found for this or any previous visit (from the past 24 hour(s)). Xr Chest  2019   Areas of atelectasis, patchy groundglass infiltrate in the bases. Right greater than left . Trace/ small right pleural effusion    Ct Head   2019  Impression: Bilateral frontal and parietal subdural hematomas. The amount of extra-axial air has slightly decreased since the prior study. No new areas of hemorrhage are identified. All CT scans at this facility use dose modulation, iterative reconstruction, and/or weight based dosing when appropriate to reduce radiation dose to as low as reasonably achievable. Ct Head Wo  : 2019   DECREASING-SIZED BILATERAL CEREBRAL CONVEXITY SUBDURAL HEMATOMAS AFTER INTERVAL REMOVAL OF BOTH DRAINAGE CATHETERS. Ct Head Wo  2019   POSTOPERATIVE BILATERAL GAS-CONTAINING SUBDURAL HEMATOMAS, LEFT LARGER THAN RIGHT, WITH LEFT TO RIGHT MIDLINE SHIFT OF 4 MM, IMPROVED FROM PRIOR EXAMINATION OF 19 AT 1417 HOURS. Ct Head Wo : 2019   INTERVAL BILATERAL CRANIOTOMIES AND SUBDURAL HEMATOMA DRAINAGE CATHETER PLACEMENT SURGERY. PNEUMOCEPHALY AND SIGNIFICANTLY DECREASING-SIZED HETEROGENEOUS SUBDURAL HEMATOMAS.  APPROXIMATELY 2 TO 3 MM OF LEFT-TO-RIGHT MIDLINE SHIFT WITHOUT HERNIATION, HYDROCEPHALUS, OR OTHER SIGNIFICANT CHANGES IDENTIFIED. Ct Head Wo : 5/24/2019   UP TO 1.8 CM ACUTE CHRONIC SUBDURAL HEMATOMAS OVER BOTH CEREBRAL CONVEXITIES, AS DESCRIBED. The      Ct Cervical Spine  5/24/2019   . Mild to moderate hypertrophic facet changes are present, predominantly on the right at C3-4, and on the left at C2-C3 and C5-6. There is no fracture, significant subluxation, or acute paraspinous soft tissue abnormalities identified. NO FRACTURE OR EVIDENCE OF CERVICAL SPINE INJURY IDENTIFIED. CERVICAL SPONDYLOSIS. Ct Lumbar   5/24/2019  Acute on chronic fracture, L1, with avulsion of the anterior portion of the L1 vertebral body. Findings suspicious for mild to moderate central stenosis at the L3-L4 level. Us Dup Upper Extremity Right Eric 5/31/2019  NO FINDINGS OF DEEP VENOUS THROMBUS IN  THE VISUALIZED VESSELS OF THE RIGHT UPPER EXTREMITY. Fl Modified Barium   5/31/2019  EXAMINATION: FL MODIFIED BARIUM SWALLOW W VIDEO: DATE AND TIME: 5/30/2019 at 10:45 AM. CLINICAL HISTORY:    MODERATE ORAL AND PHARYNGEAL DYSFUNCTION WITHOUT ASPIRATION. PLEASE REFER TO SPEECH PATHOLOGY  REPORT FOR RECOMMENDATIONS. Us Dup Lower Extremities Bilateral V 5/29/2019  NO DVT IDENTIFIED IN EITHER LOWER EXTREMITY. Previous extensive, complex labs, notes and diagnostics reviewed and analyzed. ALLERGIES:    Allergies as of 05/31/2019 - Review Complete 05/31/2019   Allergen Reaction Noted    Honey bee venom [bee venom] Swelling 10/08/2015    Penicillins Swelling 10/08/2015      (please also verify by checking MAR)      Part of patient's rehabilitation program I am encouraging them to attend game day today under recreational therapy to improve their dexterity, cognition, socialization and endurance. Complex Physical Medicine & Rehab Issues Assess & Plan:   1.  Severe abnormality of gait and mobility and impaired self-care and ADL's secondary to progressive subdural hematomas . Functional and medical status reassessed regarding patients ability to participate in therapies and patient found to be able to participate in acute intensive comprehensive inpatient rehabilitation program including PT/OT to improve balance, ambulation, ADLs, and to improve the P/AROM. Therapeutic modifications regarding activities in therapies, place, amount of time per day and intensity of therapy made daily. In bed therapies or bedside therapies prn.   2. Bowel and Bladder dysfunction:  frequent toileting, ambulate to bathroom with assistance, check post void residuals. Check for C.difficile x1 if >2 loose stools in 24 hours, continue bowel & bladder program.  Monitor bowel and bladder function. Lactinex 2 PO every AC. MOM prn, Brown Bomb prn, Glycerin suppository prn, enema prn.  3. Severe postoperative craniotomy pain generalized OA painAcute on chronic fracture, L1, with avulsion of the anterior portion of the L1 vertebral body. Findings suspicious for mild to moderate central stenosis at the L3-L4 level.: reassess pain every shift and prior to and after each therapy session, give prn Tylenol and scheduled Tylenol and consider Norco, modalities prn in therapy, Lidoderm, K-pad prn.   4. Skin healing and breakdown risk: Side-to-side turns add CoQ10 continue pressure relief program.  Daily skin exams and reports from nursing. 5. Severe Fatigue due to nutritional and hydration deficiency: Patient and his girlfriend are to learn how to thicken the liquids. continue to monitor I&Os, calorie counts prn, dietary consult prn. Increased nocturnal IV 2000 mL overnight. 6. Acute episodic insomnia with situational adjustment disorder:  prn Ambien, monitor for day time sedation. 7. Falls risk elevated:  patient to use call light to get nursing assistance to get up, bed and chair alarm.   8. Elevated DVT risk: progressive activities in PT, continue prophylaxis JOSE DE JESUS hose, tio and  patient is not on a blood thinner secondary to recent subdural hematomas . 9. Complex discharge planning:  Discharge 6/28/19 home with his significant other at a wheelchair level. He will likely need 24-hour supervision at discharge. Weekly team meeting every Monday to assess progress towards goals, discuss and address social, psychological and medical comorbidities and to address difficulties they may be having progressing in therapy. Patient and family education is in progress. The patient is to follow-up with their family physician after discharge. Complex Active General Medical Issues that complicate care Assess & Plan:    1. Afib,   Essential hypertension,   Hyperlipidemia,   S/P CABG (coronary artery bypass graft), S/P PTCA (percutaneous transluminal coronary angioplasty),   RBBB,   CAD (coronary artery disease), long-term previous use of blood thinners  2. Subdural hematoma-status post evacuation with , hold blood thinners-vital signs every shift, dose and titrate cardiac medications, recheck BMP CBC and consult hospitalist for backup medical  3.   prostatic hyperplasia with urinary frequency,   Prostate cancer -check postvoid residuals check stat UA  4. Facet arthropathy, lumbosacral,   Chronic back pain,   Spondylolisthesis of lumbar region-add Lidoderm, Tylenol, Norco as needed  5. Gross hematuria, Long term current use of anticoagulant-check UA monitor urine for blood  6. Long-term use of aspirin therapy and other blood thinners on hold at present because of recent subdural hematoma  7. SHAYY on CPAP-add CPAP from home if available is not available add CPAP from the hospital  8. Prediabetes-recheck BMP, low carb diet  9. Vitamin D deficiency-high-dose vitamin D, add Tums at daily dose of vitamin D after high doses done recheck as an outpatient  10.    Aphasia, and oropharyngeal dysphagia-recently which pathology consult, add e-stim, add supplementation to diet push oral fluids  11. Poorly nourished due to poor nutrition and previous alcohol use BMI 26.0-26.9,adult--add protein supplementation vitamin B12 vitamin D CoQ10,  to stop drinking alcohol as an outpatient and at chemical dependency counseling when patient is able to participate  12. Complex social situation-patient has a girlfriend he lives alone. He is estranged from his several children.         Dee Dee Barnett D.O., PM&R     Attending    [address]

## 2019-06-06 NOTE — PROGRESS NOTES
Occupational Therapy  Facility/Department: [facility]  Daily Treatment Note  NAME: Evelyn Calix  : 1934  MRN: [id number]    Date of Service: 2019    Discharge Recommendations:  Continue to assess pending progress       Assessment   Assessment: Pt highly distractable even with multiple cues to remain on task. Activity Tolerance  Activity Tolerance: Treatment limited secondary to decreased cognition  Safety Devices  Safety Devices in place: Yes  Type of devices: All fall risk precautions in place  Restraints  Initially in place: No         Patient Diagnosis(es): There were no encounter diagnoses. has a past medical history of Chronic back pain, Hyperlipidemia, and Hypertension. has a past surgical history that includes Cardiac catheterization; Coronary artery bypass graft; and craniotomy (Bilateral, 2019). Restrictions  Restrictions/Precautions  Restrictions/Precautions: Swallowing - Thickened Liquids, Fall Risk, Seizure(nectar thisck)  Position Activity Restriction  Other position/activity restrictions: nectar thick   Subjective   General  Patient assessed for rehabilitation services?: Yes  Patient reported no pain but could have been echolalia response to therapist asking. Patient had no indication of pain throughout the session. Orientation     Objective        Patient was seen in group sessions with 2 different patients    Patient showed interest in the other patients and responded with a smile when introduced. Patient had difficulty with initiation during task with placing pieces on a [employer] game although did complete the task using his left hand with min verbal cues. Patient was then able to place pieces in a simple puzzle of the hands when pieces were handed to him with about 50% accuracy on his own when the pieces were handed to him in the order that they belonged. Patient initiated picking up a dowel that the other patient was working with that had rolled across the table. Patient needed hand over hand assist to hand the dowel back to the other patient. Patient was able to fold 5 washcloths with max extra time and mod verbal cues. Patient initiated reaching across the table to take the other patients pillow case and did stretch it out in front of him but did not fold it. Patient was able to take apart simple pop beads using right hand to stabilize them and left hand to take them apart. Patient was very slow with the task but kept working on the task without encouragement needed.              Plan   Plan  Times per week: 5-7   Plan weeks: 4  Current Treatment Recommendations: Strengthening, ROM, Balance Training, Functional Mobility Training, Endurance Training, Safety Education & Training, Cognitive Reorientation, Patient/Caregiver Education & Training, Equipment Evaluation, Education, & procurement, Self-Care / ADL, Cognitive/Perceptual Training, Positioning  Plan Comment: Continue with OT POC    Goals  Patient Goals   Patient goals : Patient is unable to report a goal at this time due to global aphasia       Therapy Time   Individual Concurrent Group Co-treatment   Time In    1500     Time Out    1600     Minutes    Overhorsagustín Garcia, OTR/L    Electronically signed by KATARINA Wilburn/L on 6/6/2019 at 4:58 PM

## 2019-06-06 NOTE — PROGRESS NOTES
Occupational Therapy  Facility/Department: [facility]  Daily Treatment Note  NAME: Lola Cedeno  : 1934  MRN: [id number]    Date of Service: 2019    Discharge Recommendations:  Continue to assess pending progress       Assessment   Assessment: Pt highly distractable even with multiple cues to remain on task. Activity Tolerance  Activity Tolerance: Treatment limited secondary to decreased cognition  Safety Devices  Safety Devices in place: Yes  Type of devices: All fall risk precautions in place  Restraints  Initially in place: No         Patient Diagnosis(es): There were no encounter diagnoses. has a past medical history of Chronic back pain, Hyperlipidemia, and Hypertension. has a past surgical history that includes Cardiac catheterization; Coronary artery bypass graft; and craniotomy (Bilateral, 2019). Restrictions  Restrictions/Precautions  Restrictions/Precautions: Swallowing - Thickened Liquids, Fall Risk, Seizure(nectar thisck)  Position Activity Restriction  Other position/activity restrictions: nectar thick   Subjective Pt seen alone. General  Chart Reviewed: Yes  Patient assessed for rehabilitation services?: Yes  Response to previous treatment: Patient reporting fatigue but able to participate  Family / Caregiver Present: No  Subjective  Subjective: Pt seated in W/C upon arrival. Pt on 3L of O2. General Comment  Comments: Observed pt repeating words/echolalia when attempting to communicate with OT. Orientation     Objective  Pt completed tasks at table top to increase UE function and ability to follow commands consistently. Pt completed placing 25 marbles on pegboard holes when presented with marbles one at a time x 25 marbles with increased time. Pt completed placing and removing rings from ring tree x 3 levels all on left side of tree. Pt completed picking up bean bags from table top with right hand and placing in bucket to increase right UE use.   Pt's left hand restricted to allow right side function. Pt required increased time for task completion. Pt reports 0/10 pain during session.          Plan   Plan  Times per week: 5-7   Plan weeks: 4  Current Treatment Recommendations: Strengthening, ROM, Balance Training, Functional Mobility Training, Endurance Training, Safety Education & Training, Cognitive Reorientation, Patient/Caregiver Education & Training, Equipment Evaluation, Education, & procurement, Self-Care / ADL, Cognitive/Perceptual Training, Positioning  Plan Comment: Continue with OT POC  G-Code     OutComes Score                                                  AM-PAC Score             Goals          Therapy Time   Individual Concurrent Group Co-treatment   Time In 1030         Time Out 36         Minutes [address] [facility] [address], OTR/L Electronically signed by Destini Gonzalez OTR/L on 2/2/67 at 2:30 PM

## 2019-06-06 NOTE — PROGRESS NOTES
Physical Therapy Rehab Treatment Note  Facility/Department: [facility]  Room: [place] [address]       NAME: Franky Giordano  : 1934 (80 y.o.)  MRN: [id number]  CODE STATUS: Full Code    Date of Service: 2019       Restrictions:  Restrictions/Precautions: Swallowing - Thickened Liquids, Fall Risk, Seizure(nectar thick)  Position Activity Restriction  Other position/activity restrictions: nectar thick        SUBJECTIVE:            Pre and Post Treatment Pain Screenin/10       OBJECTIVE:               Neuromuscular Education  PNF: supine and sitting balance facilitation - inhibition of rigidity and facilitation of motor control incorporated with fair response  Neuromuscular Comments: standing balance facilitation with UE movements across walker - pt with difficulty with grasp and releas of walker to complete task without max assist      Bed mobility  Bridging: Minimal assistance  Rolling to Left: Minimal assistance  Rolling to Right: Minimal assistance  Supine to Sit: Maximum assistance  Sit to Supine: Maximum assistance  Comment: in bed with no rails    Transfers  Sit to Stand: Minimal Assistance; Moderate Assistance  Stand to sit: Minimal Assistance; Moderate Assistance  Bed to Chair: Moderate assistance(from bed to chair)  Comment: increased time required for processing of requests and repetition required intermittently to ensure understanding of instructions    Ambulation  Ambulation?: Yes  Ambulation 1  Surface: carpet  Device: Rolling Walker  Other Apparatus: O2  Assistance:  Moderate assistance  Quality of Gait: poor right foot clearance and step length, weight shift and verbal cues required, poor management of walker with st path and turns, flexed trunk, improved balance with environment less busy this session  Distance: 35 ft x2  Comments: xcessive time required for task with poor follow thru of instructions between trials  Stairs/Curb  Stairs?: No        Activity Tolerance  Activity Tolerance: Patient

## 2019-06-07 PROCEDURE — 97530 THERAPEUTIC ACTIVITIES: CPT

## 2019-06-07 PROCEDURE — 97150 GROUP THERAPEUTIC PROCEDURES: CPT

## 2019-06-07 PROCEDURE — 6370000000 HC RX 637 (ALT 250 FOR IP): Performed by: PHYSICAL MEDICINE & REHABILITATION

## 2019-06-07 PROCEDURE — 6370000000 HC RX 637 (ALT 250 FOR IP): Performed by: NEUROLOGICAL SURGERY

## 2019-06-07 PROCEDURE — 97542 WHEELCHAIR MNGMENT TRAINING: CPT

## 2019-06-07 PROCEDURE — 92526 ORAL FUNCTION THERAPY: CPT

## 2019-06-07 PROCEDURE — 2580000003 HC RX 258: Performed by: PHYSICAL MEDICINE & REHABILITATION

## 2019-06-07 PROCEDURE — 99232 SBSQ HOSP IP/OBS MODERATE 35: CPT | Performed by: PHYSICAL MEDICINE & REHABILITATION

## 2019-06-07 PROCEDURE — 97116 GAIT TRAINING THERAPY: CPT

## 2019-06-07 PROCEDURE — 97535 SELF CARE MNGMENT TRAINING: CPT

## 2019-06-07 PROCEDURE — 92507 TX SP LANG VOICE COMM INDIV: CPT

## 2019-06-07 PROCEDURE — 1180000000 HC REHAB R&B

## 2019-06-07 RX ADMIN — ACETAMINOPHEN 500 MG: 325 TABLET ORAL at 08:52

## 2019-06-07 RX ADMIN — SODIUM CHLORIDE 1000 ML: 9 INJECTION, SOLUTION INTRAVENOUS at 21:46

## 2019-06-07 RX ADMIN — ACETAMINOPHEN 500 MG: 325 TABLET ORAL at 14:46

## 2019-06-07 RX ADMIN — DILTIAZEM HYDROCHLORIDE 120 MG: 120 CAPSULE, COATED, EXTENDED RELEASE ORAL at 21:31

## 2019-06-07 RX ADMIN — ACETAMINOPHEN 500 MG: 325 TABLET ORAL at 21:30

## 2019-06-07 RX ADMIN — FAMOTIDINE 20 MG: 20 TABLET ORAL at 21:33

## 2019-06-07 RX ADMIN — BACITRACIN ZINC 1 G: 500 OINTMENT TOPICAL at 14:47

## 2019-06-07 RX ADMIN — PRAVASTATIN SODIUM 40 MG: 40 TABLET ORAL at 21:33

## 2019-06-07 RX ADMIN — FAMOTIDINE 20 MG: 20 TABLET ORAL at 08:52

## 2019-06-07 RX ADMIN — BACITRACIN ZINC 1 G: 500 OINTMENT TOPICAL at 21:34

## 2019-06-07 RX ADMIN — BACITRACIN ZINC 1 G: 500 OINTMENT TOPICAL at 08:52

## 2019-06-07 ASSESSMENT — PAIN SCALES - GENERAL
PAINLEVEL_OUTOF10: 0

## 2019-06-07 NOTE — PROGRESS NOTES
Occupational Therapy  Facility/Department: [facility]  Daily Treatment Note  NAME: Frank Rogers  : 1934  MRN: [id number]    Date of Service: 2019    Discharge Recommendations:  Continue to assess pending progress       Assessment   Assessment: Pt requiring mod verbal cues for attention to task. Activity Tolerance  Activity Tolerance: Patient Tolerated treatment well  Activity Tolerance: Fair  Safety Devices  Safety Devices in place: Yes  Type of devices: All fall risk precautions in place  Restraints  Initially in place: No         Patient Diagnosis(es): There were no encounter diagnoses. has a past medical history of Chronic back pain, Hyperlipidemia, and Hypertension. has a past surgical history that includes Cardiac catheterization; Coronary artery bypass graft; and craniotomy (Bilateral, 2019). Restrictions  Restrictions/Precautions  Restrictions/Precautions: Swallowing - Thickened Liquids, Fall Risk, Seizure  Position Activity Restriction  Other position/activity restrictions: nectar thick   Subjective Pt seen in individual and group settings   General  Patient assessed for rehabilitation services?: Yes  Response to previous treatment: Patient reporting fatigue but able to participate  Family / Caregiver Present: No  Subjective  Subjective: Pt seated in W/C upon arrival. Pt on 3L of O2. General Comment  Comments: Observed pt repeating words/echolalia when attempting to communicate with OT. Orientation     Objective  Pt in group setting to increase UE strength  Completed strength ex with 1/2# dumbbell. Pt required mod verbal and physical cues for completion of strengthening ex in all planes as tolerated 2 x 10 reps. Pt in individual setting completed acts to increase bilateral UE function for adl completion. Pt completed removing clothes pins from rope ladder mainly using left UE even with encouragement to use right UE.   Pt completed rubber washers on vertical dowels with verbal cues required for correct placement. Pt reports 0/10 pain for tx session. Pt required increased time for all   Acts.                   Plan   Plan  Times per week: 5-7   Plan weeks: 4  Current Treatment Recommendations: Strengthening, ROM, Balance Training, Functional Mobility Training, Endurance Training, Safety Education & Training, Cognitive Reorientation, Patient/Caregiver Education & Training, Equipment Evaluation, Education, & procurement, Self-Care / ADL, Cognitive/Perceptual Training, Positioning  Plan Comment: Continue with OT POC  G-Code     OutComes Score                                                  AM-PAC Score             Goals  Patient Goals   Patient goals : Patient is unable to report a goal at this time due to global aphasia       Therapy Time   Individual Concurrent Group Co-treatment   Time In 1515   1500     Time Out 1600   1515     Minutes 39   [address], OTR/L Electronically signed by KATARINA Donald/L on 6/0/33 at 4:24 PM

## 2019-06-07 NOTE — PROGRESS NOTES
Subjective: The patient complains of severe  acute  confusion secondary to subdural hematoma partially relieved by PT OT, speech-language pathology, recent decompression of the subdural hematomas and exacerbated by fatigue, exhaustion, dehydration. I am concerned about patients impulsivity and have added a video monitoring side and placed him in the [place] [facility] with frequent nursing checks. I am concerned about his ongoing incontinence of bowel and bladder as well as  impulsivity. He is also occasionally bradycardic on telemetry. ROS x   10: The patient also complains of severely impaired mobility and activities of daily living. Otherwise no new problems with vision, hearing, nose, mouth, throat, dermal, cardiovascular, GI, , pulmonary, musculoskeletal, psychiatric or neurological. See Rehab H&P on Rehab chart dated . Vital signs:  BP (!) 131/59   Pulse 59   Temp 98 °F (36.7 °C) (Oral)   Resp 18   Ht 6' 0.05\" (1.83 m)   Wt 179 lb 10.8 oz (81.5 kg)   SpO2 98%   BMI 24.34 kg/m²   I/O:   PO/Intake:  fair PO intake,  dysphagia 1 puréed with nectar thick    Bowel/Bladder:  incontinent, no problems noted. General:  Patient is well developed, adequately nourished, non-obese and     well kempt. HEENT:    PERRLA, hearing intact to loud voice, external inspection of ear     and nose benign. Inspection of lips, tongue and gums dry  Musculoskeletal: No significant change in strength or tone. All joints stable. Inspection and palpation of digits and nails show no clubbing,       cyanosis or inflammatory conditions. Neuro/Psychiatric: Affect: flat but pleasant. Echolalia. Alert and oriented to person, place and     Situation-with max cues. No significant change in deep tendon reflexes or     Sensation-poor judgment reasoning and insight. Lungs:  Diminished, CTA-B. Respiration effort is normal at rest.     Heart:   S1 = S2, RRR. No loud murmurs.     Abdomen:  Soft, non-tender, no enlargement of liver or spleen. Extremities:  No significant lower extremity edema or tenderness. Skin:   Intact to general survey,  craniotomy incisions healing. Rehabilitation:  Physical therapy: FIMS:  Bed Mobility: Scooting: Moderate assistance    Transfers: Sit to Stand: Minimal Assistance, Moderate Assistance  Stand to sit: Minimal Assistance, Moderate Assistance  Bed to Chair: Moderate assistance(from bed to chair)  Squat Pivot Transfers: Maximum Assistance(assistance guiding hips , simple commands used with poor comprehension), Ambulation 1  Surface: carpet  Device: Rolling Walker  Other Apparatus: O2  Assistance: Moderate assistance  Quality of Gait: poor right foot clearance and step length, weight shift and verbal cues required, poor management of walker with st path and turns, flexed trunk, improved balance with environment less busy this session  Distance: 35 ft x2  Comments: xcessive time required for task with poor follow thru of instructions between trials,      FIMS: Bed, Chair, Wheel Chair: 2 - Requires 50-74% assistance to transfer  Walk: 1 - Total Assistance Walks < 50 feet OR requires two or more people OR patient performs < 25% of locomotion effort  Distance Walked: 15  Wheel Chair: 0 - Activity Not Assessed/Does Not Occur  Distance Traveled in Wheel Chair: 15'  Stairs: 0 - Activity Does not Occur ( 0 only for the admission assessment),  , Assessment: Pt overall presents with poor initiation of all motor tasks. He is unable to consistently follow verbal instructions. With facilitation techniques he is able to walk as related in this note.  When facilitation removed pt is unable to weight shift to advance LE's    Occupational therapy: FIMS:  Eatin - Requires help steadying cup or utensil/check mouth for pocketing  Groomin - Requires setup/cues to do all tasks  Bathin - Able to bathe all 10 areas with setup/sup/cues  Dressing-Upper: 5 - Requires setup/supervision/cues and/or requires assist with presthesis/brace only  Dressing-Lower: 5 - Requires setup/supervision/cues and/or staff applies TEDS/prosthesis/brace only  Toiletin - Requires setup/supervision/cues  Toilet Transfer: 5 - Requires setup/supervision/cues  Shower Transfer: 0 - Activity does not occur,  , Assessment: Pt highly distractable even with multiple cues to remain on task. Speech therapy: FIMS: Comprehension: 1 - Patient understands basic needs <25% of the time  Expression: 1 - Expresses basic ideas/needs < 25% of the time  Social Interaction: 2 - Patient appropriate  25%-49% of the time  Problem Solvin - Patient solves simple/routine tasks < 25%  Memory: 1 - Patient remembers < 25% of the time      Lab/X-ray studies reviewed, analyzed and discussed with patient and staff:   No results found for this or any previous visit (from the past 24 hour(s)). Xr Chest  2019   Areas of atelectasis, patchy groundglass infiltrate in the bases. Right greater than left . Trace/ small right pleural effusion    Ct Head   2019  Impression: Bilateral frontal and parietal subdural hematomas. The amount of extra-axial air has slightly decreased since the prior study. No new areas of hemorrhage are identified. All CT scans at this facility use dose modulation, iterative reconstruction, and/or weight based dosing when appropriate to reduce radiation dose to as low as reasonably achievable. Ct Head Wo  : 2019   DECREASING-SIZED BILATERAL CEREBRAL CONVEXITY SUBDURAL HEMATOMAS AFTER INTERVAL REMOVAL OF BOTH DRAINAGE CATHETERS. Ct Head Wo : 2019   UP TO 1.8 CM ACUTE CHRONIC SUBDURAL HEMATOMAS OVER BOTH CEREBRAL CONVEXITIES    Ct Cervical Spine  2019   . Mild to moderate hypertrophic facet changes are present, predominantly on the right at C3-4, and on the left at C2-C3 and C5-6. There is no fracture, significant subluxation, or acute paraspinous soft tissue abnormalities identified.      NO FRACTURE OR EVIDENCE OF CERVICAL SPINE INJURY IDENTIFIED. CERVICAL SPONDYLOSIS. Ct Lumbar   5/24/2019  Acute on chronic fracture, L1, with avulsion of the anterior portion of the L1 vertebral body. Findings suspicious for mild to moderate central stenosis at the L3-L4 level. Us Dup Upper Extremity Right Eric 5/31/2019  NO FINDINGS OF DEEP VENOUS THROMBUS IN  THE VISUALIZED VESSELS OF THE RIGHT UPPER EXTREMITY. Fl Modified Barium   5/31/2019  EXAMINATION: FL MODIFIED BARIUM SWALLOW W VIDEO: DATE AND TIME: 5/30/2019 at 10:45 AM. CLINICAL HISTORY:    MODERATE ORAL AND PHARYNGEAL DYSFUNCTION WITHOUT ASPIRATION. PLEASE REFER TO SPEECH PATHOLOGY  REPORT FOR RECOMMENDATIONS. Us Dup Lower Extremities Bilateral V 5/29/2019  NO DVT IDENTIFIED IN EITHER LOWER EXTREMITY. Previous extensive, complex labs, notes and diagnostics reviewed and analyzed. ALLERGIES:    Allergies as of 05/31/2019 - Review Complete 05/31/2019   Allergen Reaction Noted    Honey bee venom [bee venom] Swelling 10/08/2015    Penicillins Swelling 10/08/2015      (please also verify by checking [facility])          Complex Physical Medicine & Rehab Issues Assess & Plan:   1. Severe abnormality of gait and mobility and impaired self-care and ADL's secondary to progressive subdural hematomas . Functional and medical status reassessed regarding patients ability to participate in therapies and patient found to be able to participate in acute intensive comprehensive inpatient rehabilitation program including PT/OT to improve balance, ambulation, ADLs, and to improve the P/AROM. Therapeutic modifications regarding activities in therapies, place, amount of time per day and intensity of therapy made daily. In bed therapies or bedside therapies prn.   2. Bowel and Bladder dysfunction including incontinence :  frequent toileting, ambulate to bathroom with assistance, check post void residuals.   Check for C.difficile x1 if >2 loose stools in 24 hours, continue bowel & bladder program.  Monitor bowel and bladder function. Lactinex 2 PO every AC. MOM prn, Brown Bomb prn, Glycerin suppository prn, enema prn.  3. Severe postoperative craniotomy pain generalized OA painAcute on chronic fracture, L1, with avulsion of the anterior portion of the L1 vertebral body. Findings suspicious for mild to moderate central stenosis at the L3-L4 level.: reassess pain every shift and prior to and after each therapy session, give prn Tylenol and schedull and consider Norco, modalities prn in therapy, Lidoderm, K-pad prn.   4. Skin healing and breakdown risk: Side-to-side turns add CoQ10 continue pressure relief program.  Daily skin exams and reports from nursing. 5. Severe Fatigue due to nutritional and hydration deficiency: Patient and his girlfriend are to learn how to thicken the liquids. continue to monitor I&Os, calorie counts prn, dietary consult prn. Increased nocturnal IV 2000 mL overnight. 6. Acute episodic insomnia with situational adjustment disorder:  prn Ambien, monitor for day time sedation. 7. Falls risk elevated:  patient to use call light to get nursing assistance to get up, bed and chair alarm. 8. Elevated DVT risk: progressive activities in PT, continue prophylaxis JOSE DE JESUS hose, elevation and  patient is not on a blood thinner secondary to recent subdural hematomas . 9. Complex discharge planning:  Discharge 6/28/19 home with his significant other at a wheelchair level. He will likely need 24-hour supervision at discharge. Weekly team meeting every Monday to assess progress towards goals, discuss and address social, psychological and medical comorbidities and to address difficulties they may be having progressing in therapy. Patient and family education is in progress. The patient is to follow-up with their family physician after discharge. Complex Active General Medical Issues that complicate care Assess & Plan:    1.   Afib,   Essential hypertension,   Hyperlipidemia,   S/P CABG (coronary artery bypass graft), S/P PTCA (percutaneous transluminal coronary angioplasty),   RBBB,   CAD (coronary artery disease), long-term previous use of blood thinners telemetry to monitor for bradycardia blood pressure checks every shift dose and titrate cardiac medications, consult hospitals for backup medical and recheck CBC and BMP as needed -  2. Subdural hematoma-status post evacuation with , hold blood thinners-vital signs every shift, dose and titrate cardiac medications, recheck BMP CBC and consult hospitalist for backup medical  3.   prostatic hyperplasia with urinary frequency,   Prostate cancer -check postvoid residuals check stat UA  4. Facet arthropathy, lumbosacral,   Chronic back pain,   Spondylolisthesis of lumbar region-add Lidoderm, Tylenol, Norco as needed  5. Gross hematuria, Long term current use of anticoagulant-check UA monitor urine for blood  6. Long-term use of aspirin therapy and other blood thinners on hold at present because of recent subdural hematoma  7. SHAYY on CPAP-add CPAP from home if available is not available add CPAP from the hospital  8. Prediabetes-recheck BMP, low carb diet  9. Vitamin D deficiency-high-dose vitamin D, add Tums at daily dose of vitamin D after high doses done recheck as an outpatient  10. Aphasia, and oropharyngeal dysphagia-recently which pathology consult, add e-stim, add supplementation to diet push oral fluids  11. Poorly nourished due to poor nutrition and previous alcohol use BMI 26.0-26.9,adult--add protein supplementation vitamin B12 vitamin D CoQ10,  to stop drinking alcohol as an outpatient and at chemical dependency counseling when patient is able to participate  12. Complex social situation-patient has a girlfriend he lives alone. He is estranged from his several children.         Enoch Villarreal D.O., PM&R     Attending    [address] [place] [address] [place]

## 2019-06-07 NOTE — PROGRESS NOTES
Physical Therapy Rehab Treatment Note  Facility/Department: [facility]   Room: [place] [address]       NAME: Dayanna Headley  : 1934 (80 y.o.)  MRN: [id number]  CODE STATUS: Full Code    Date of Service: 2019  Chart Reviewed: Yes  Family / Caregiver Present: No    Restrictions:  Restrictions/Precautions: Swallowing - Thickened Liquids, Fall Risk, Seizure  Position Activity Restriction  Other position/activity restrictions: nectar thick        SUBJECTIVE: Subjective: nods \"Jose\"  Pain Screening  Patient Currently in Pain: No  Pre Treatment Pain Screening  Pain at present: 0  Scale Used: Numeric Score  Intervention List: Patient able to continue with treatment    Post Treatment Pain Screening:*    No change       OBJECTIVE:        Transfers  Sit to Stand: Minimal Assistance  Stand to sit: Moderate Assistance     Wheelchair Activities  Propulsion: Yes  Propulsion 1  Propulsion: Manual  Level: Carpet  Method: RLE;LLE  Level of Assistance: Maximum assistance  Description/ Details: VC and step by step direction needed, excessive time to travel distance, frequent stopping due to pt becoming distracted   Distance: 50'            Other exercises  Other exercises 1: STS x5 focus on good tech and hand placement      ASSESSMENT/COMMENTS:   Pt demonstrated need for frequent step by step instruction due to pt becoming distracted during  mobility. During sit to stand transfers from [facility] pt was able to stand with Min A and no retro LOB. Poor carryover with hand placement. Pt needing Mod A for slow and controlled descend to [facility] due to pt sitting down quickly and occasionally getting distracted by his heart monitor and losing his balance. PLAN OF CARE/Safety:   Safety Devices  Type of devices:  All fall risk precautions in place      Therapy Time:   Individual   Time In 1400   Time Out 1430   Minutes 30     Minutes:30      Transfer/Bed mobility training:10      Gait trainin      Neuro re education:0     Therapeutic ex:0     WC: [address] JACQUE Hunt, 06/07/19 at 2:23 PM

## 2019-06-07 NOTE — PROGRESS NOTES
reading/writing. not addressed     Short-term Goals for Dysphagia POC:  Goal 1: Pt will tolerate puree diet with nectar thick liquids with no overt s/s of aspiration. Pt consumed puree textures x5 with no overt s/s of aspiration. Pt with prompt swallow and no oral residues. Pt with baseline hoarse vocal quality post all trials. Goal 2: Pt will perform oral motor ROM/strength exercieses with max cues 5x/each. Goal 3: Pt will perform base of tongue exercises with max cues 10x/each. Goal 4: Pt will tolerate therapeutic trials of mechanical soft/thin as able, with no overt s/s of aspiration. Pt consumed 4 oz of thin liquids via cup with no overt s/s of aspiration. Pt not impulsive with PO intake, but did consistently p/w hoarse vocal quality (which is his current baseline). Pt consistently double swallowed with all trials. Please note following a 5/30/19 MBS pt was recommended to have thin liquids, but was downgraded to nectar following 6/1/19 MBS. Today marks the third consecutive day of thin liquid trials with no overt s/s of aspiration. SLP recommends upgrade to thin liquids at this time with consistent supervision at meal time. SLP spoke at length with Westley Hdez about pt's dysphagia hx recommendations and need for supervision now that he is back on thin liquids, and she verbalized good understanding. ST rec: a diet upgrade to thin liquids. [x]  BRIDGER Hdez notified  [x]  Dr. Glenroy Wright notified via voicemail  [x]  PT/OT notified via purple sheets         Dysphagia Goals: The patient will tolerate recommended diet without observed clinical signs of aspiration     Compensatory Swallowing Strategies: Small bites/sips, Total feed, Upright as possible for all oral intake, Swallow 2 times per bite/sip, Eat/Feed slowly         Treatment/Activity Tolerance:  Patient tolerated treatment well    Plan:  Continue per POC    Pain:  Patient demonstrated no s/s of pain.     Patient/Caregiver Education:  Patient educated on session and progression towards goals. and Education needs reinforcement.       Safety Devices:   All fall risk precautions in place     Comprehension:   1- Total assist     Expression:  1- Total assist     Problem Solvin- Total assist     Memory:  1- Total assist      Signature: Electronically signed by CIERRA Milligan on 19 at 11:19 AM

## 2019-06-07 NOTE — PROGRESS NOTES
Physical Therapy Rehab Treatment Note  [facility]/Department[facility]  [facility]       NAME: Laure Montelongo  : 1934 (80 y.o.)  MRN: [id number]  CODE STATUS: Full Code    Date of Service: 2019       Restrictions:  Restrictions/Precautions: Swallowing - Thickened Liquids, Fall Risk, Seizure(nectar thisck)  Position Activity Restriction  Other position/activity restrictions: nectar thick        SUBJECTIVE:       Pre Treatment Pain Screening  Pain at present: 0    Post Treatment Pain Screening:  Pain Assessment  Pain Level: 0    OBJECTIVE:               Neuromuscular Education  PNF: supine and sitting balance facilitation - inhibition of rigidity and facilitation of motor control incorporated with good response      Bed mobility  Bridging: Minimal assistance  Rolling to Left: Minimal assistance  Rolling to Right: Minimal assistance  Supine to Sit: Moderate assistance  Sit to Supine: Moderate assistance  Scooting: Moderate assistance    Transfers  Sit to Stand: Minimal Assistance  Stand to sit: Minimal Assistance; Moderate Assistance  Bed to Chair: Moderate assistance(with ww)  Comment: step by step instructions required as well as physical facilitation of weight shifting - excessive time overall required    Ambulation  Ambulation?: Yes  Ambulation 1  Surface: carpet  Device: Rolling Walker  Other Apparatus: O2  Assistance: Moderate assistance  Quality of Gait: improved upright posture with intermittent facilitation; weight shift required for initiation of gait and for change of direction; once in a straight path pt demonstrated difficulty modulating speed; pt distractible during this task when other staff in environment  Distance: 40 ft  Comments: excessive time required for task completion  Stairs/Curb  Stairs?: No        Activity Tolerance  Activity Tolerance: Patient Tolerated treatment well          ASSESSMENT/COMMENTS:  Assessment: Pt overall presents with poor initiation of all motor tasks.  He is unable to consistently follow verbal instructions. With facilitation techniques he is able to walk as related in this note.  When facilitation removed pt is unable to weight shift to advance LE's    PLAN OF CARE/Safety:   Safety Devices  Type of devices: Left in chair;Chair alarm in place;Gait belt      Therapy Time:   Individual   Time In 930   Time Out 1000   Minutes 30     Minutes:      Transfer/Bed mobility trainin      Gait training:10      Neuro re education:7           Tara Bernard PT, 19 at 11:56 AM

## 2019-06-07 NOTE — FLOWSHEET NOTE
Slept at intervals. EDSON continued for being impulsive at times. Bed alarm on and call light within reach.

## 2019-06-08 PROCEDURE — 2580000003 HC RX 258: Performed by: PHYSICAL MEDICINE & REHABILITATION

## 2019-06-08 PROCEDURE — 1180000000 HC REHAB R&B

## 2019-06-08 PROCEDURE — 6370000000 HC RX 637 (ALT 250 FOR IP): Performed by: INTERNAL MEDICINE

## 2019-06-08 PROCEDURE — 97116 GAIT TRAINING THERAPY: CPT

## 2019-06-08 PROCEDURE — 97535 SELF CARE MNGMENT TRAINING: CPT

## 2019-06-08 PROCEDURE — 6370000000 HC RX 637 (ALT 250 FOR IP): Performed by: PHYSICAL MEDICINE & REHABILITATION

## 2019-06-08 PROCEDURE — 92526 ORAL FUNCTION THERAPY: CPT

## 2019-06-08 PROCEDURE — 92507 TX SP LANG VOICE COMM INDIV: CPT

## 2019-06-08 PROCEDURE — 6370000000 HC RX 637 (ALT 250 FOR IP): Performed by: NEUROLOGICAL SURGERY

## 2019-06-08 PROCEDURE — 2700000000 HC OXYGEN THERAPY PER DAY

## 2019-06-08 RX ORDER — LABETALOL 20 MG/4 ML (5 MG/ML) INTRAVENOUS SYRINGE
5 EVERY 6 HOURS PRN
Status: DISCONTINUED | OUTPATIENT
Start: 2019-06-08 | End: 2019-06-08

## 2019-06-08 RX ORDER — METOPROLOL TARTRATE 5 MG/5ML
2.5 INJECTION INTRAVENOUS
Status: DISCONTINUED | OUTPATIENT
Start: 2019-06-08 | End: 2019-06-28 | Stop reason: HOSPADM

## 2019-06-08 RX ORDER — DILTIAZEM HYDROCHLORIDE 120 MG/1
120 CAPSULE, COATED, EXTENDED RELEASE ORAL 2 TIMES DAILY
Status: DISCONTINUED | OUTPATIENT
Start: 2019-06-08 | End: 2019-06-25

## 2019-06-08 RX ADMIN — BACITRACIN ZINC 1 G: 500 OINTMENT TOPICAL at 14:54

## 2019-06-08 RX ADMIN — SODIUM CHLORIDE 1000 ML: 9 INJECTION, SOLUTION INTRAVENOUS at 21:05

## 2019-06-08 RX ADMIN — BACITRACIN ZINC 1 G: 500 OINTMENT TOPICAL at 08:56

## 2019-06-08 RX ADMIN — MAGNESIUM OXIDE TAB 400 MG (241.3 MG ELEMENTAL MG) 400 MG: 400 (241.3 MG) TAB at 17:40

## 2019-06-08 RX ADMIN — ACETAMINOPHEN 500 MG: 325 TABLET ORAL at 08:55

## 2019-06-08 RX ADMIN — ACETAMINOPHEN 500 MG: 325 TABLET ORAL at 14:54

## 2019-06-08 RX ADMIN — BACITRACIN ZINC 1 G: 500 OINTMENT TOPICAL at 21:27

## 2019-06-08 RX ADMIN — FAMOTIDINE 20 MG: 20 TABLET ORAL at 21:25

## 2019-06-08 RX ADMIN — FAMOTIDINE 20 MG: 20 TABLET ORAL at 08:55

## 2019-06-08 RX ADMIN — DILTIAZEM HYDROCHLORIDE 120 MG: 120 CAPSULE, COATED, EXTENDED RELEASE ORAL at 21:25

## 2019-06-08 RX ADMIN — HYDROCODONE BITARTRATE AND ACETAMINOPHEN 1 TABLET: 5; 325 TABLET ORAL at 01:50

## 2019-06-08 RX ADMIN — HYDROCODONE BITARTRATE AND ACETAMINOPHEN 1 TABLET: 5; 325 TABLET ORAL at 21:00

## 2019-06-08 RX ADMIN — ACETAMINOPHEN 500 MG: 325 TABLET ORAL at 21:25

## 2019-06-08 RX ADMIN — PRAVASTATIN SODIUM 40 MG: 40 TABLET ORAL at 21:25

## 2019-06-08 ASSESSMENT — PAIN SCALES - GENERAL
PAINLEVEL_OUTOF10: 7
PAINLEVEL_OUTOF10: 0
PAINLEVEL_OUTOF10: 0
PAINLEVEL_OUTOF10: 2
PAINLEVEL_OUTOF10: 7
PAINLEVEL_OUTOF10: 6
PAINLEVEL_OUTOF10: 0

## 2019-06-08 NOTE — PROGRESS NOTES
Subjective: The patient complains of severe  acute  confusion secondary to subdural hematoma partially relieved by PT OT, speech-language pathology, recent decompression of the subdural hematomas and exacerbated by fatigue, exhaustion, dehydration. I am concerned about patients impulsivity and have added a video monitoring side and placed him in the [place] [facility] with frequent nursing checks. I am concerned about his ongoing incontinence of bowel and bladder as well as  impulsivity. He is also occasionally bradycardic on telemetry. ROS x   10: The patient also complains of severely impaired mobility and activities of daily living. Otherwise no new problems with vision, hearing, nose, mouth, throat, dermal, cardiovascular, GI, , pulmonary, musculoskeletal, psychiatric or neurological. See Rehab H&P on Rehab chart dated . Vital signs:  BP (!) 145/79   Pulse 71   Temp 98 °F (36.7 °C) (Oral)   Resp 18   Ht 6' 0.05\" (1.83 m)   Wt 179 lb 10.8 oz (81.5 kg)   SpO2 97%   BMI 24.34 kg/m²   I/O:   PO/Intake:  fair PO intake,  dysphagia 1 puréed with nectar thick    Bowel/Bladder:  incontinent, no problems noted. General:  Patient is well developed, adequately nourished, non-obese and     well kempt. HEENT:    PERRLA, hearing intact to loud voice, external inspection of ear     and nose benign. Inspection of lips, tongue and gums dry  Musculoskeletal: No significant change in strength or tone. All joints stable. Inspection and palpation of digits and nails show no clubbing,       cyanosis or inflammatory conditions. Neuro/Psychiatric: Affect: flat but pleasant. Echolalia. Alert and oriented to person, place and     Situation-with max cues. No significant change in deep tendon reflexes or     Sensation-poor judgment reasoning and insight. Lungs:  Diminished, CTA-B. Respiration effort is normal at rest.     Heart:   S1 = S2, RRR. No loud murmurs.     Abdomen:  Soft, non-tender, no enlargement of liver or spleen. Extremities:  No significant lower extremity edema or tenderness. Skin:   Intact to general survey,  craniotomy incisions healing. Rehabilitation:  Physical therapy: FIMS:  Bed Mobility: Scooting: Moderate assistance    Transfers: Sit to Stand: Minimal Assistance  Stand to sit: Moderate Assistance  Bed to Chair: Moderate assistance(with ww)  Squat Pivot Transfers: Maximum Assistance(assistance guiding hips , simple commands used with poor comprehension), Ambulation 1  Surface: carpet  Device: Rolling Walker  Other Apparatus: O2  Assistance: Moderate assistance  Quality of Gait: pt easily distracted not following cues for turning walk in straight path into furniture/walls. Manual turning of [employer] needed. Distance: 21' x2   Comments: excessive time required for task completion,      FIMS: Bed, Chair, Wheel Chair: 0 - Did not occur  Walk: 1 - Total Assistance Walks < 50 feet OR requires two or more people OR patient performs < 25% of locomotion effort  Distance Walked: 40  Wheel Chair: 0 - Activity Not Assessed/Does Not Occur  Distance Traveled in Wheel Chair: 15'  Stairs: 0 - Activity Does not Occur ( 0 only for the admission assessment),  , Assessment: unable to consistently follow verbal instructions. pt able to walk but has difficulties with turning and obstacle negotiation. Occupational therapy: FIMS:  Eating: (Did not observe)  Groomin - Requires setup/cues to do all tasks  Bathin - Able to bathe all 10 areas with setup/sup/cues  Dressing-Upper: 5 - Requires setup/supervision/cues and/or requires assist with presthesis/brace only  Dressing-Lower: 5 - Requires setup/supervision/cues and/or staff applies TEDS/prosthesis/brace only  Toiletin - Total assist  Toilet Transfer: 0 - Did not occur  Shower Transfer: 0 - Activity does not occur,  , Assessment: Pt requiring mod verbal cues for attention to task.     Speech therapy: FIMS: Comprehension: 1 - Patient understands basic needs <25% of the time  Expression: 1 - Expresses basic ideas/needs < 25% of the time  Social Interaction: 1 - Patient appropriate < 25% of the time  Problem Solvin - Patient solves simple/routine tasks < 25%  Memory: 1 - Patient remembers < 25% of the time      Lab/X-ray studies reviewed, analyzed and discussed with patient and staff:   No results found for this or any previous visit (from the past 24 hour(s)). Xr Chest  2019   Areas of atelectasis, patchy groundglass infiltrate in the bases. Right greater than left . Trace/ small right pleural effusion    Ct Head   2019  Impression: Bilateral frontal and parietal subdural hematomas. The amount of extra-axial air has slightly decreased since the prior study. No new areas of hemorrhage are identified. All CT scans at this facility use dose modulation, iterative reconstruction, and/or weight based dosing when appropriate to reduce radiation dose to as low as reasonably achievable. Ct Head Wo  : 2019   DECREASING-SIZED BILATERAL CEREBRAL CONVEXITY SUBDURAL HEMATOMAS AFTER INTERVAL REMOVAL OF BOTH DRAINAGE CATHETERS. Ct Head Wo : 2019   UP TO 1.8 CM ACUTE CHRONIC SUBDURAL HEMATOMAS OVER BOTH CEREBRAL CONVEXITIES    Ct Cervical Spine  2019   . Mild to moderate hypertrophic facet changes are present, predominantly on the right at C3-4, and on the left at C2-C3 and C5-6. There is no fracture, significant subluxation, or acute paraspinous soft tissue abnormalities identified. NO FRACTURE OR EVIDENCE OF CERVICAL SPINE INJURY IDENTIFIED. CERVICAL SPONDYLOSIS. Ct Lumbar   2019  Acute on chronic fracture, L1, with avulsion of the anterior portion of the L1 vertebral body. Findings suspicious for mild to moderate central stenosis at the L3-L4 level. Us Dup Upper Extremity Right Eric 2019  NO FINDINGS OF DEEP VENOUS THROMBUS IN  THE VISUALIZED VESSELS OF THE RIGHT UPPER EXTREMITY.     Fl Modified Barium   5/31/2019  EXAMINATION: FL MODIFIED BARIUM SWALLOW W VIDEO: DATE AND TIME: 5/30/2019 at 10:45 AM. CLINICAL HISTORY:    MODERATE ORAL AND PHARYNGEAL DYSFUNCTION WITHOUT ASPIRATION. PLEASE REFER TO SPEECH PATHOLOGY  REPORT FOR RECOMMENDATIONS. Us Dup Lower Extremities Bilateral V 5/29/2019  NO DVT IDENTIFIED IN EITHER LOWER EXTREMITY. Previous extensive, complex labs, notes and diagnostics reviewed and analyzed. ALLERGIES:    Allergies as of 05/31/2019 - Review Complete 05/31/2019   Allergen Reaction Noted    Honey bee venom [bee venom] Swelling 10/08/2015    Penicillins Swelling 10/08/2015      (please also verify by checking [facility])          Complex Physical Medicine & Rehab Issues Assess & Plan:   1. Severe abnormality of gait and mobility and impaired self-care and ADL's secondary to progressive subdural hematomas . Functional and medical status reassessed regarding patients ability to participate in therapies and patient found to be able to participate in acute intensive comprehensive inpatient rehabilitation program including PT/OT to improve balance, ambulation, ADLs, and to improve the P/AROM. Therapeutic modifications regarding activities in therapies, place, amount of time per day and intensity of therapy made daily. In bed therapies or bedside therapies prn.   2. Bowel and Bladder dysfunction including incontinence :  frequent toileting, ambulate to bathroom with assistance, check post void residuals. Check for C.difficile x1 if >2 loose stools in 24 hours, continue bowel & bladder program.  Monitor bowel and bladder function. Lactinex 2 PO every AC. MOM prn, Brown Bomb prn, Glycerin suppository prn, enema prn.  3. Severe postoperative craniotomy pain generalized OA painAcute on chronic fracture, L1, with avulsion of the anterior portion of the L1 vertebral body.  Findings suspicious for mild to moderate central stenosis at the L3-L4 level.: reassess pain every shift post evacuation with , hold blood thinners-vital signs every shift, dose and titrate cardiac medications, recheck BMP CBC and consult hospitalist for backup medical  3.   prostatic hyperplasia with urinary frequency,   Prostate cancer -check postvoid residuals check stat UA  4. Facet arthropathy, lumbosacral,   Chronic back pain,   Spondylolisthesis of lumbar region-add Lidoderm, Tylenol, Norco as needed  5. Gross hematuria, Long term current use of anticoagulant-check UA monitor urine for blood  6. Long-term use of aspirin therapy and other blood thinners on hold at present because of recent subdural hematoma  7. SHAYY on CPAP-add CPAP from home if available is not available add CPAP from the hospital  8. Prediabetes-recheck BMP, low carb diet  9. Vitamin D deficiency-high-dose vitamin D, add Tums at daily dose of vitamin D after high doses done recheck as an outpatient  10. Aphasia, and oropharyngeal dysphagia-recently which pathology consult, add e-stim, add supplementation to diet push oral fluids  11. Poorly nourished due to poor nutrition and previous alcohol use BMI 26.0-26.9,adult--add protein supplementation vitamin B12 vitamin D CoQ10,  to stop drinking alcohol as an outpatient and at chemical dependency counseling when patient is able to participate  12. Complex social situation-patient has a girlfriend he lives alone. He is estranged from his several children.         Maria Esther Serrato D.O., PM&R     Attending    [address]

## 2019-06-08 NOTE — PROGRESS NOTES
INPATIENT PROGRESS NOTE    SERVICE DATE:  6/8/2019   SERVICE TIME:  2:22 PM      SUBJECTIVE    INTERVAL HPI: 80year old male who makes one word comment, inappropriate at times. Attempting to get out of chair on his own. Had episode of a-fib with hr in 140's while in room. Poor historian.     MEDICATIONS:    Current Facility-Administered Medications   Medication Dose Route Frequency Provider Last Rate Last Dose    0.9 % sodium chloride infusion  1,000 mL Intravenous Nightly Gi Scullin, DO   Stopped at 06/08/19 0905    ipratropium-albuterol (DUONEB) nebulizer solution 1 ampule  1 ampule Inhalation Q4H PRN Gi Scullin, DO        HYDROcodone-acetaminophen (NORCO) 5-325 MG per tablet 1 tablet  1 tablet Oral Q4H PRN Gi Scullin, DO   1 tablet at 06/08/19 0150    acetaminophen (TYLENOL) tablet 500 mg  500 mg Oral TID Gi Scullin, DO   500 mg at 06/08/19 0855    lidocaine viscous hcl (XYLOCAINE) 30 mL, diphenhydrAMINE (BENADRYL) 12.5 MG/5ML 75 mg, aluminum-magnesium hydroxide 200-200 MG/5ML 30 mL compounded oral suspension   Oral 4x Daily PRN Gi Scullin, DO        acetaminophen (TYLENOL) tablet 650 mg  650 mg Oral Q4H PRN Gi Scullin, DO        magnesium hydroxide (MILK OF MAGNESIA) 400 MG/5ML suspension 30 mL  30 mL Oral Daily PRN Gi Scullin, DO        glucose (GLUTOSE) 40 % oral gel 15 g  15 g Oral PRN Denver Vu PA-C        dextrose 50 % solution 12.5 g  12.5 g Intravenous PRN Denver Vu PA-C        glucagon (rDNA) injection 1 mg  1 mg Intramuscular PRN Denver Vu PA-C        dextrose 5 % solution  100 mL/hr Intravenous PRN Swapna Vera PA-C        ondansetron (ZOFRAN) injection 4 mg  4 mg Intravenous Q6H PRN Kenneth Strauss MD        bacitracin zinc ointment   Topical TID Kenneth Strauss MD   1 g at 06/08/19 0856    diltiazem (CARDIZEM CD) extended release capsule 120 mg  120 mg Oral Daily Kenneth Strauss MD   120 mg at 06/07/19 5527    famotidine (PEPCID) tablet 20 mg  20 mg Oral BID Bhavana Marks MD   20 mg at 06/08/19 0855    nitroGLYCERIN (NITROSTAT) SL tablet 0.4 mg  0.4 mg Sublingual Q5 Min PRN Bhavana Makrs MD        pravastatin (PRAVACHOL) tablet 40 mg  40 mg Oral Nightly Bhavana Marks MD   40 mg at 06/07/19 6953       OBJECTIVE  PHYSICAL EXAM:   BP (!) 145/79   Pulse 71   Temp 98 °F (36.7 °C) (Oral)   Resp 18   Ht 6' 0.05\" (1.83 m)   Wt 179 lb 10.8 oz (81.5 kg)   SpO2 97%   BMI 24.34 kg/m²   Body mass index is 24.34 kg/m². CONSTITUTIONAL:  awake, alert, cooperative, no apparent distress, and appears stated age  NECK:  Supple, symmetrical, trachea midline, no adenopathy, thyroid symmetric, not enlarged and no tenderness, skin normal  BACK:  Symmetric, no curvature, spinous processes are non-tender on palpation, paraspinous muscles are non-tender on palpation, no costal vertebral tenderness  LUNGS:  No increased work of breathing, good air exchange, clear to auscultation bilaterally, no crackles or wheezing  CARDIOVASCULAR:  Normal apical impulse, rapid regular rate and irregular rhythm, normal S1 and S2, no S3 or S4, and no murmur noted  ABDOMEN:  No scars, normal bowel sounds, soft, non-distended, non-tender, no masses palpated, no hepatosplenomegally  MUSCULOSKELETAL:  There is no redness, warmth, or swelling of the joints. Full range of motion noted. Motor strength is 5 out of 5 all extremities bilaterally. Tone is normal.  NEUROLOGIC:  Awake, alert, oriented to name, place and time. Cranial nerves II-XII are grossly intact. Motor is 5 out of 5 bilaterally. Cerebellar finger to nose, heel to shin intact. Sensory is intact. Babinski down going, Romberg negative, and gait is normal.  SKIN:  no bruising or bleeding, no lesions and no jaundice    DATA:     No results found for this or any previous visit (from the past 24 hour(s)). ASSESSMENT AND PLAN   1. Gait instability and immobility secondary to bilateral SDH s/p bilateral craniotomy  2.   A-fib-had run of RVR with ambulation, will consult cardiology  3. Aphasia/dysphagia  4. Hx of dementia  5.   HTN-continue antihypertensive meds      SIGNATURE: Cinthya Hernández PATIENT NAME: Eduardo Khan   DATE: June 8, 2019 MRN: [id number]   TIME: 2:22 PM PAGER: [phone]     Dr. Chai Schmitz, [address] [place] [address] Physician

## 2019-06-08 NOTE — PROGRESS NOTES
[facility] Heart Gackle Note      Patient: Vitor Davenport    Unit/Bed: G781/L504-06  YOB: 1934  MRN: [id number]  Admit Date:  5/31/2019  Hospital Day: 8    Rounding Date: 6/8/2019    Rounding Cardiologist:  LEYDA Santos MD    PRIMARY Cardiologist:  Sherif Perez    Subjective Complaint:   Pt is aphasic but does not seem in any acute distress. Physical Examination:     BP (!) 145/79   Pulse 71   Temp 98 °F (36.7 °C) (Oral)   Resp 18   Ht 6' 0.05\" (1.83 m)   Wt 179 lb 10.8 oz (81.5 kg)   SpO2 97%   BMI 24.34 kg/m²         Intake/Output Summary (Last 24 hours) at 6/8/2019 1650  Last data filed at 6/8/2019 0730  Gross per 24 hour   Intake 750 ml   Output --   Net 750 ml                  Vitor Davenport examined at bedside in in no apparent distress. Focused exam reveals:     Cardiac: Heart regular rate and rhythm with feint murmur      Lungs:  decreased breath sounds noted-     Extremities:   Trace edema    Telemetry:      normal sinus , paced and paroxismal supraventricular tachycardia         LABS:   CBC: No results for input(s): WBC, HGB, PLT in the last 72 hours. BMP:  No results for input(s): NA, K, CL, CO2, BUN, CREATININE, GLUCOSE in the last 72 hours. Troponin: No results for input(s): TROPONINT in the last 72 hours. BNP: No results for input(s): PROBNP in the last 72 hours. INR: No results for input(s): INR in the last 72 hours. Mg: No results for input(s): MG in the last 72 hours. Cardiac Testing:    none    Assessment:    Pt with a burst of at fib most likely  CAD  PPM ---see Dr Suzette Hatfield notes  Plan:  1.  Will increase the Cardizem to bid and prn lopressor  Electronically signed by Dora Jaramillo MD on 6/8/2019 at 4:50 PM

## 2019-06-08 NOTE — PROGRESS NOTES
Assessment completed. Has no complaints or pain at the time. Pt upgraded to thin liquids per speech. Pt doing well with thins. Avasys in room for safety due to pt being impulsive at times. Tried to wean pt off of O2 this morning. While in PT pulse ox was at 90% on RA. Therapy brought pt back to rrom and applied O2 at 2L to pt. Family at bedside. Call light in reach. Will continue to monitor.

## 2019-06-08 NOTE — PROGRESS NOTES
Monitor room called, said pt hr 140s. Called back a few minutes later, said still 140s and in a-fib/a-flutter that lasted 1-2 minutes. Miriam gabriel aware. Orders received. Dr Shayy Mukherjee called and said will see tomorrow.

## 2019-06-08 NOTE — PROGRESS NOTES
Speech Language Pathology Treatment Note  Facility/Department: [facility]  6/8/2019    Rehab Dx/Hx: Abnormality of gait and mobility [R26.9]    Precautions: falls and aspirations    ST Dx: Aphasia and Dysphagia     Date of Admit: 5/31/2019  Room #: R240/R240-01    Time in: 10:20 AM       Time out: 11:00 AM  Swallow: 1403-0173  Language: 9019-3000    Subjective:  Cooperative,  Distractible and Confused        Interventions used this date:   Expressive receptive language; dysphagia therapy    Objective/Assessment:  Patient progressing towards goals:  Short-term Goals for Speech & Language POC:   Goal 1: Pt will follow 1 step directions given orally with 75% accuracy with mod cues to increase the pt's ability to follow directions provided by caregivers for safe follow through with ADLs. 3/6 correct or 50% acc with max cues. Goal 2: Pt will identify objects/pictures within a field of 2-3 with 75% accuracy with mod cues in order to increase his understanding of objects in his environment for safer and more independent completion of ADLs. Required [facility] teaching to receptively ID in FO2    Goal 3: Pt will answer low level yes/no questions with 85% accuracy with mod cues to assist the caregiver in obtaining important information regarding the patient's personal, medical, and safety needs. Goal 4: Pt will answer low level Wh- questions with 75% accuracy with mod cues to assist the caregiver in obtaining important information regarding the patient's personal, medical, and safety needs. .     Goal 5: Pt will demonstrate reading comprehension at the word  level to 60% acc with mod cues to promote pt's ability to utilize reading/writing. not addressed     Attempted automatic sequences with patient requiring a starter for all. Vocal intensity inappropriately soft. Once patient had a drink of water, his vocal intensity and quality improved.     Short-term Goals for Dysphagia POC:  Goal 1: Pt will tolerate puree diet with nectar thick liquids with no overt s/s of aspiration. Goal 2: Pt will perform oral motor ROM/strength exercieses with max cues 5x/each. Goal 3: Pt will perform base of tongue exercises with max cues 10x/each. Goal 4: Pt will tolerate therapeutic trials of mechanical soft/thin as able, with no overt s/s of aspiration. Pt consumed 4 oz of thin liquids via cup with no overt s/s of aspiration. Pt slightly impulsive with PO intake, required min/mod cues to take small sips. Pt consistently double swallowed with all trials. Vocal quality improved following liquids. Diet was changed to thin liquids on 2019 with continuation of  [facility] soft. Sign on door still stated nectar thick. Informed LPN Sera of the change. []  RN notified  []  Dr. Jessica Paulson notified via voicemail  []  PT/OT notified via purple sheets         Dysphagia Goals: The patient will tolerate recommended diet without observed clinical signs of aspiration     Compensatory Swallowing Strategies: Small bites/sips, Total feed, Upright as possible for all oral intake, Swallow 2 times per bite/sip, Eat/Feed slowly         Treatment/Activity Tolerance:  Patient tolerated treatment well    Plan:  Continue per POC    Pain:  Patient demonstrated no s/s of pain. Patient/Caregiver Education:  Patient educated on session and progression towards goals. and Education needs reinforcement.       Safety Devices:   All fall risk precautions in place     Comprehension:   1- Total assist     Expression:  1- Total assist     Problem Solvin- Total assist     Memory:  1- Total assist  Electronically signed by CIERRA Ledesma on 2019 at 2:12 PM

## 2019-06-08 NOTE — PROGRESS NOTES
Physical Therapy Rehab Treatment Note  Facility/Department: [facility]  Room: [facility]/R240-01       NAME: Kaden Carpio  : 1934 (80 y.o.)  MRN: [id number]  CODE STATUS: Full Code    Date of Service: 2019  Chart Reviewed: Yes  Family / Caregiver Present: No  General Comment  Comments: per RN pt is being weened off O2. Restrictions:  Restrictions/Precautions: Swallowing - Thickened Liquids, Fall Risk, Seizure  Position Activity Restriction  Other position/activity restrictions: nectar thick        SUBJECTIVE: Subjective: \"I'm okay\"  Pain Screening  Patient Currently in Pain: No  Pre Treatment Pain Screening  Pain at present: 0  Scale Used: Numeric Score  Intervention List: Patient able to continue with treatment    Post Treatment Pain Screening:   Pain Assessment  Pain Assessment: 0-10    OBJECTIVE:                           Transfers  Sit to Stand: Minimal Assistance  Stand to sit: Moderate Assistance  Comment: step by step instructions required as well as physical weight shifting and [employer] management - excessive time required    Ambulation  Ambulation?: Yes  Ambulation 1  Surface: carpet  Device: Rolling Walker  Assistance: Moderate assistance  Quality of Gait: pt easily distracted not following cues for turning walk in straight path into furniture/walls. Manual turning of [employer] needed. Distance: 21' x2   Comments: excessive time required for task completion  Stairs/Curb  Stairs?: No             Exercises  Comments: gastroc stretches bilateral longseated. ASSESSMENT/COMMENTS:  Assessment: unable to consistently follow verbal instructions. pt able to walk but has difficulties with turning and obstacle negotiation. PLAN OF CARE/Safety:   Safety Devices  Type of devices:  All fall risk precautions in place      Therapy Time:   Individual   Time In 0900   Time Out 0930   Minutes 30     Minutes:30      Transfer/Bed mobility training: 15      Gait training: 10      Neuro re education: 0     Therapeutic ex: [id number] [facility], [facility], 06/08/19 at 9:32 AM

## 2019-06-09 LAB
ANION GAP SERPL CALCULATED.3IONS-SCNC: 9 MEQ/L (ref 9–15)
BUN BLDV-MCNC: 17 MG/DL (ref 8–23)
CALCIUM SERPL-MCNC: 8.6 MG/DL (ref 8.5–9.9)
CHLORIDE BLD-SCNC: 110 MEQ/L (ref 95–107)
CK MB: 2.9 NG/ML (ref 0–6.7)
CO2: 24 MEQ/L (ref 20–31)
CREAT SERPL-MCNC: 0.78 MG/DL (ref 0.7–1.2)
CREATINE KINASE-MB INDEX: 5.2 % (ref 0–3.5)
GFR AFRICAN AMERICAN: >60
GFR NON-AFRICAN AMERICAN: >60
GLUCOSE BLD-MCNC: 99 MG/DL (ref 70–99)
MAGNESIUM: 2.2 MG/DL (ref 1.7–2.4)
POTASSIUM SERPL-SCNC: 4.3 MEQ/L (ref 3.4–4.9)
PRO-BNP: 384 PG/ML
SODIUM BLD-SCNC: 143 MEQ/L (ref 135–144)
TOTAL CK: 56 U/L (ref 0–190)
TROPONIN: 0.02 NG/ML (ref 0–0.01)

## 2019-06-09 PROCEDURE — 2700000000 HC OXYGEN THERAPY PER DAY

## 2019-06-09 PROCEDURE — 84484 ASSAY OF TROPONIN QUANT: CPT

## 2019-06-09 PROCEDURE — 6370000000 HC RX 637 (ALT 250 FOR IP): Performed by: PHYSICAL MEDICINE & REHABILITATION

## 2019-06-09 PROCEDURE — 93005 ELECTROCARDIOGRAM TRACING: CPT | Performed by: INTERNAL MEDICINE

## 2019-06-09 PROCEDURE — 6370000000 HC RX 637 (ALT 250 FOR IP): Performed by: NEUROLOGICAL SURGERY

## 2019-06-09 PROCEDURE — 82553 CREATINE MB FRACTION: CPT

## 2019-06-09 PROCEDURE — 83735 ASSAY OF MAGNESIUM: CPT

## 2019-06-09 PROCEDURE — 36415 COLL VENOUS BLD VENIPUNCTURE: CPT

## 2019-06-09 PROCEDURE — 82550 ASSAY OF CK (CPK): CPT

## 2019-06-09 PROCEDURE — 83880 ASSAY OF NATRIURETIC PEPTIDE: CPT

## 2019-06-09 PROCEDURE — 80048 BASIC METABOLIC PNL TOTAL CA: CPT

## 2019-06-09 PROCEDURE — 6370000000 HC RX 637 (ALT 250 FOR IP): Performed by: INTERNAL MEDICINE

## 2019-06-09 PROCEDURE — 1180000000 HC REHAB R&B

## 2019-06-09 RX ADMIN — FAMOTIDINE 20 MG: 20 TABLET ORAL at 09:20

## 2019-06-09 RX ADMIN — PRAVASTATIN SODIUM 40 MG: 40 TABLET ORAL at 20:15

## 2019-06-09 RX ADMIN — HYDROCODONE BITARTRATE AND ACETAMINOPHEN 1 TABLET: 5; 325 TABLET ORAL at 20:15

## 2019-06-09 RX ADMIN — DILTIAZEM HYDROCHLORIDE 120 MG: 120 CAPSULE, COATED, EXTENDED RELEASE ORAL at 09:19

## 2019-06-09 RX ADMIN — BACITRACIN ZINC 1 G: 500 OINTMENT TOPICAL at 20:22

## 2019-06-09 RX ADMIN — BACITRACIN ZINC 1 G: 500 OINTMENT TOPICAL at 09:20

## 2019-06-09 RX ADMIN — BACITRACIN ZINC 1 G: 500 OINTMENT TOPICAL at 15:09

## 2019-06-09 RX ADMIN — ACETAMINOPHEN 500 MG: 325 TABLET ORAL at 20:15

## 2019-06-09 RX ADMIN — ACETAMINOPHEN 500 MG: 325 TABLET ORAL at 09:20

## 2019-06-09 RX ADMIN — MAGNESIUM OXIDE TAB 400 MG (241.3 MG ELEMENTAL MG) 400 MG: 400 (241.3 MG) TAB at 09:19

## 2019-06-09 RX ADMIN — DILTIAZEM HYDROCHLORIDE 120 MG: 120 CAPSULE, COATED, EXTENDED RELEASE ORAL at 20:15

## 2019-06-09 RX ADMIN — FAMOTIDINE 20 MG: 20 TABLET ORAL at 20:15

## 2019-06-09 RX ADMIN — ACETAMINOPHEN 500 MG: 325 TABLET ORAL at 15:08

## 2019-06-09 ASSESSMENT — PAIN DESCRIPTION - ORIENTATION: ORIENTATION: RIGHT;LEFT

## 2019-06-09 ASSESSMENT — PAIN DESCRIPTION - DESCRIPTORS: DESCRIPTORS: PATIENT UNABLE TO DESCRIBE

## 2019-06-09 ASSESSMENT — PAIN SCALES - GENERAL
PAINLEVEL_OUTOF10: 0
PAINLEVEL_OUTOF10: 0

## 2019-06-09 ASSESSMENT — PAIN DESCRIPTION - LOCATION: LOCATION: LEG

## 2019-06-09 NOTE — PROGRESS NOTES
[facility] Heart Progress Note      Patient: Michelle Drivers    Unit/Bed: I580/A391-78  YOB: 1934  MRN: [id number]  Admit Date:  5/31/2019  Hospital Day: 9    Rounding Date: 6/9/2019    Rounding Cardiologist:  LEYDA Lopes MD    PRIMARY Cardiologist:  [facility]    Subjective Complaint:   Pt is aphasic . Physical Examination:     /72   Pulse 82   Temp 97 °F (36.1 °C) (Oral)   Resp 17   Ht 6' 0.05\" (1.83 m)   Wt 179 lb 10.8 oz (81.5 kg)   SpO2 95%   BMI 24.34 kg/m²     No intake or output data in the 24 hours ending 06/09/19 [address] examined at bedside in in no apparent distress. Focused exam reveals:     Cardiac: Heart regular rate and rhythm     Lungs:  clear to auscultation bilaterally- no wheezes, rales or rhonchi, normal air movement, no respiratory distress    Extremities:   Trace edema    Telemetry:      normal sinus  and paroxismal supraventricular tachycardia         LABS:   CBC: No results for input(s): WBC, HGB, PLT in the last 72 hours. BMP:    Recent Labs     06/09/19  0607      K 4.3   *   CO2 24   BUN 17   CREATININE 0.78   GLUCOSE 99              Troponin: No results for input(s): TROPONINT in the last 72 hours. BNP:   Recent Labs     06/09/19  0607   PROBNP 384      INR: No results for input(s): INR in the last 72 hours. Mg:   Recent Labs     06/09/19  0607   MG 2.2       Cardiac Testing:    none    Assessment:    Recurrence of SVT   CVA   Aphasia   Elevated trop with normal  Plan:  1. Better on doubling the cardizem  2.  Continue to monitor  3. ekg  Electronically signed by Yisel West MD on 6/9/2019 at 10:55 AM

## 2019-06-09 NOTE — PROGRESS NOTES
Subjective: The patient complains of severe  acute  confusion secondary to subdural hematoma partially relieved by PT OT, speech-language pathology, recent decompression of the subdural hematomas and exacerbated by fatigue, exhaustion, dehydration. I am concerned about patients impulsivity and have added a video monitoring side and placed him in the [facility] [address] Final with frequent nursing checks. ROS x   10: The patient also complains of severely impaired mobility and activities of daily living. Otherwise no new problems with vision, hearing, nose, mouth, throat, dermal, cardiovascular, GI, , pulmonary, musculoskeletal, psychiatric or neurological. See Rehab H&P on Rehab chart dated . Vital signs:  /72   Pulse 67   Temp 97 °F (36.1 °C) (Oral)   Resp 17   Ht 6' 0.05\" (1.83 m)   Wt 179 lb 10.8 oz (81.5 kg)   SpO2 97%   BMI 24.34 kg/m²   I/O:   PO/Intake:  fair PO intake,  dysphagia 1 puréed with nectar thick    Bowel/Bladder:  incontinent, no problems noted. General:  Patient is well developed, adequately nourished, non-obese and     well kempt. HEENT:    PERRLA, hearing intact to loud voice, external inspection of ear     and nose benign. Inspection of lips, tongue and gums dry  Musculoskeletal: No significant change in strength or tone. All joints stable. Inspection and palpation of digits and nails show no clubbing,       cyanosis or inflammatory conditions. Neuro/Psychiatric: Affect: flat but pleasant. Echolalia. Alert and oriented to person, place and     Situation-with max cues. No significant change in deep tendon reflexes or     Sensation-poor judgment reasoning and insight. Lungs:  Diminished, CTA-B. Respiration effort is normal at rest.     Heart:   S1 = S2, RRR. No loud murmurs. Abdomen:  Soft, non-tender, no enlargement of liver or spleen. Extremities:  No significant lower extremity edema or tenderness.   Skin:   Intact to general survey,  craniotomy incisions (hungry/hot/pain)  Expression: 5 - Expresses basic ideas/needs only (hungry/hot/pain)  Social Interaction: 5 - Patient is appropriate with supervision/cues  Problem Solvin - Patient able to solve simple/routine tasks  Memory: 5 - Patient requires prompting with stress/unfamiliar situations      Lab/X-ray studies reviewed, analyzed and discussed with patient and staff:   Recent Results (from the past 24 hour(s))   Basic Metabolic Panel    Collection Time: 19  6:07 AM   Result Value Ref Range    Sodium 143 135 - 144 mEq/L    Potassium 4.3 3.4 - 4.9 mEq/L    Chloride 110 (H) 95 - 107 mEq/L    CO2 24 20 - 31 mEq/L    Anion Gap 9 9 - 15 mEq/L    Glucose 99 70 - 99 mg/dL    BUN 17 8 - 23 mg/dL    CREATININE 0.78 0.70 - 1.20 mg/dL    GFR Non-African American >60.0 >60    GFR  >60.0 >60    Calcium 8.6 8.5 - 9.9 mg/dL   Magnesium    Collection Time: 19  6:07 AM   Result Value Ref Range    Magnesium 2.2 1.7 - 2.4 mg/dL   Brain Natriuretic Peptide    Collection Time: 19  6:07 AM   Result Value Ref Range    Pro- pg/mL   Troponin    Collection Time: 19  6:07 AM   Result Value Ref Range    Troponin 0.020 (HH) 0.000 - 0.010 ng/mL   CK-MB Index    Collection Time: 19  6:07 AM   Result Value Ref Range    Total CK 56 0 - 190 U/L    CK-MB 2.9 0.0 - 6.7 ng/mL    CK-MB Index 5.2 (H) 0.0 - 3.5 %       Xr Chest  2019   Areas of atelectasis, patchy groundglass infiltrate in the bases. Right greater than left . Trace/ small right pleural effusion    Ct Head   2019  Impression: Bilateral frontal and parietal subdural hematomas. The amount of extra-axial air has slightly decreased since the prior study. No new areas of hemorrhage are identified. All CT scans at this facility use dose modulation, iterative reconstruction, and/or weight based dosing when appropriate to reduce radiation dose to as low as reasonably achievable.     Ct Head Wo  : 2019   DECREASING-SIZED BILATERAL CEREBRAL CONVEXITY SUBDURAL HEMATOMAS AFTER INTERVAL REMOVAL OF BOTH DRAINAGE CATHETERS. Ct Head Wo : 5/24/2019   UP TO 1.8 CM ACUTE CHRONIC SUBDURAL HEMATOMAS OVER BOTH CEREBRAL CONVEXITIES    Ct Cervical Spine  5/24/2019   . Mild to moderate hypertrophic facet changes are present, predominantly on the right at C3-4, and on the left at C2-C3 and C5-6. There is no fracture, significant subluxation, or acute paraspinous soft tissue abnormalities identified. NO FRACTURE OR EVIDENCE OF CERVICAL SPINE INJURY IDENTIFIED. CERVICAL SPONDYLOSIS. Ct Lumbar   5/24/2019  Acute on chronic fracture, L1, with avulsion of the anterior portion of the L1 vertebral body. Findings suspicious for mild to moderate central stenosis at the L3-L4 level. Us Dup Upper Extremity Right Eric 5/31/2019  NO FINDINGS OF DEEP VENOUS THROMBUS IN  THE VISUALIZED VESSELS OF THE RIGHT UPPER EXTREMITY. Fl Modified Barium   5/31/2019  EXAMINATION: FL MODIFIED BARIUM SWALLOW W VIDEO: DATE AND TIME: 5/30/2019 at 10:45 AM. CLINICAL HISTORY:    MODERATE ORAL AND PHARYNGEAL DYSFUNCTION WITHOUT ASPIRATION. PLEASE REFER TO SPEECH PATHOLOGY  REPORT FOR RECOMMENDATIONS. Us Dup Lower Extremities Bilateral V 5/29/2019  NO DVT IDENTIFIED IN EITHER LOWER EXTREMITY. Previous extensive, complex labs, notes and diagnostics reviewed and analyzed. ALLERGIES:    Allergies as of 05/31/2019 - Review Complete 05/31/2019   Allergen Reaction Noted    Honey bee venom [bee venom] Swelling 10/08/2015    Penicillins Swelling 10/08/2015      (please also verify by checking [facility])          Complex Physical Medicine & Rehab Issues Assess & Plan:   1. Severe abnormality of gait and mobility and impaired self-care and ADL's secondary to progressive subdural hematomas .   Functional and medical status reassessed regarding patients ability to participate in therapies and patient found to be able to participate in acute intensive comprehensive inpatient rehabilitation program including PT/OT to improve balance, ambulation, ADLs, and to improve the P/AROM. Therapeutic modifications regarding activities in therapies, place, amount of time per day and intensity of therapy made daily. In bed therapies or bedside therapies prn.   2. Bowel and Bladder dysfunction including incontinence :  frequent toileting, ambulate to bathroom with assistance, check post void residuals. Check for C.difficile x1 if >2 loose stools in 24 hours, continue bowel & bladder program.  Monitor bowel and bladder function. Lactinex 2 PO every AC. MOM prn, Brown Bomb prn, Glycerin suppository prn, enema prn.  3. Severe postoperative craniotomy pain generalized OA painAcute on chronic fracture, L1, with avulsion of the anterior portion of the L1 vertebral body. Findings suspicious for mild to moderate central stenosis at the L3-L4 level.: reassess pain every shift and prior to and after each therapy session, give prn Tylenol and schedull and consider Norco, modalities prn in therapy, Lidoderm, K-pad prn.   4. Skin healing and breakdown risk: Side-to-side turns add CoQ10 continue pressure relief program.  Daily skin exams and reports from nursing. 5. Severe Fatigue due to nutritional and hydration deficiency: Patient and his girlfriend are to learn how to thicken the liquids. continue to monitor I&Os, calorie counts prn, dietary consult prn. Increased nocturnal IV 2000 mL overnight. 6. Acute episodic insomnia with situational adjustment disorder:  prn Ambien, monitor for day time sedation. 7. Falls risk elevated:  patient to use call light to get nursing assistance to get up, bed and chair alarm. 8. Elevated DVT risk: progressive activities in PT, continue prophylaxis JOSE DE JESUS hose, tio and  patient is not on a blood thinner secondary to recent subdural hematomas . 9.  Complex discharge planning:  Discharge 6/28/19 home with his significant other at a wheelchair level.  He will likely need 24-hour supervision at discharge. Weekly team meeting every Monday to assess progress towards goals, discuss and address social, psychological and medical comorbidities and to address difficulties they may be having progressing in therapy. Patient and family education is in progress. The patient is to follow-up with their family physician after discharge. Complex Active General Medical Issues that complicate care Assess & Plan:    1. Afib,   Essential hypertension,   Hyperlipidemia,   S/P CABG (coronary artery bypass graft), S/P PTCA (percutaneous transluminal coronary angioplasty),   RBBB,   CAD (coronary artery disease), long-term previous use of blood thinners telemetry to monitor for bradycardia blood pressure checks every shift dose and titrate cardiac medications, consult hospitals for backup medical and recheck CBC and BMP as needed -  2. Subdural hematoma-status post evacuation with , hold blood thinners-vital signs every shift, dose and titrate cardiac medications, recheck BMP CBC and consult hospitalist for backup medical  3.   prostatic hyperplasia with urinary frequency,   Prostate cancer -check postvoid residuals check stat UA  4. Facet arthropathy, lumbosacral,   Chronic back pain,   Spondylolisthesis of lumbar region-add Lidoderm, Tylenol, Norco as needed  5. Gross hematuria, Long term current use of anticoagulant-check UA monitor urine for blood  6. Long-term use of aspirin therapy and other blood thinners on hold at present because of recent subdural hematoma  7. SHAYY on CPAP-add CPAP from home if available is not available add CPAP from the hospital  8. Prediabetes-recheck BMP, low carb diet  9. Vitamin D deficiency-high-dose vitamin D, add Tums at daily dose of vitamin D after high doses done recheck as an outpatient  10.    Aphasia, and oropharyngeal dysphagia-recently which pathology consult, add e-stim, add supplementation to diet push oral fluids  11. Poorly nourished due to poor nutrition and previous alcohol use BMI 26.0-26.9,adult--add protein supplementation vitamin B12 vitamin D CoQ10,  to stop drinking alcohol as an outpatient and at chemical dependency counseling when patient is able to participate  12. Complex social situation-patient has a girlfriend he lives alone. He is estranged from his several children.         Nicho Gallo D.O., PM&R     Attending    [address]

## 2019-06-10 PROCEDURE — 6370000000 HC RX 637 (ALT 250 FOR IP): Performed by: PHYSICAL MEDICINE & REHABILITATION

## 2019-06-10 PROCEDURE — 97530 THERAPEUTIC ACTIVITIES: CPT

## 2019-06-10 PROCEDURE — 6370000000 HC RX 637 (ALT 250 FOR IP): Performed by: INTERNAL MEDICINE

## 2019-06-10 PROCEDURE — 97110 THERAPEUTIC EXERCISES: CPT

## 2019-06-10 PROCEDURE — 92507 TX SP LANG VOICE COMM INDIV: CPT

## 2019-06-10 PROCEDURE — 6370000000 HC RX 637 (ALT 250 FOR IP): Performed by: NEUROLOGICAL SURGERY

## 2019-06-10 PROCEDURE — 97116 GAIT TRAINING THERAPY: CPT

## 2019-06-10 PROCEDURE — 97112 NEUROMUSCULAR REEDUCATION: CPT

## 2019-06-10 PROCEDURE — 97535 SELF CARE MNGMENT TRAINING: CPT

## 2019-06-10 PROCEDURE — 92526 ORAL FUNCTION THERAPY: CPT

## 2019-06-10 PROCEDURE — 99233 SBSQ HOSP IP/OBS HIGH 50: CPT | Performed by: PHYSICAL MEDICINE & REHABILITATION

## 2019-06-10 PROCEDURE — 1180000000 HC REHAB R&B

## 2019-06-10 RX ADMIN — FAMOTIDINE 20 MG: 20 TABLET ORAL at 08:32

## 2019-06-10 RX ADMIN — HYDROCODONE BITARTRATE AND ACETAMINOPHEN 1 TABLET: 5; 325 TABLET ORAL at 22:39

## 2019-06-10 RX ADMIN — BACITRACIN ZINC 1 G: 500 OINTMENT TOPICAL at 08:32

## 2019-06-10 RX ADMIN — ACETAMINOPHEN 500 MG: 325 TABLET ORAL at 08:32

## 2019-06-10 RX ADMIN — ACETAMINOPHEN 500 MG: 325 TABLET ORAL at 19:50

## 2019-06-10 RX ADMIN — BACITRACIN ZINC 1 G: 500 OINTMENT TOPICAL at 13:44

## 2019-06-10 RX ADMIN — ACETAMINOPHEN 500 MG: 325 TABLET ORAL at 13:47

## 2019-06-10 RX ADMIN — BACITRACIN ZINC 1 G: 500 OINTMENT TOPICAL at 19:51

## 2019-06-10 RX ADMIN — DILTIAZEM HYDROCHLORIDE 120 MG: 120 CAPSULE, COATED, EXTENDED RELEASE ORAL at 08:32

## 2019-06-10 RX ADMIN — Medication 5 MG: at 23:31

## 2019-06-10 RX ADMIN — MAGNESIUM OXIDE TAB 400 MG (241.3 MG ELEMENTAL MG) 400 MG: 400 (241.3 MG) TAB at 08:32

## 2019-06-10 RX ADMIN — FAMOTIDINE 20 MG: 20 TABLET ORAL at 19:50

## 2019-06-10 RX ADMIN — DILTIAZEM HYDROCHLORIDE 120 MG: 120 CAPSULE, COATED, EXTENDED RELEASE ORAL at 19:50

## 2019-06-10 RX ADMIN — PRAVASTATIN SODIUM 40 MG: 40 TABLET ORAL at 19:50

## 2019-06-10 ASSESSMENT — PAIN SCALES - GENERAL
PAINLEVEL_OUTOF10: 0
PAINLEVEL_OUTOF10: 4

## 2019-06-10 NOTE — PROGRESS NOTES
Occupational Therapy  Facility/Department: [facility]  Daily Treatment Note  NAME: Luzmaria Saucedo  : 1934  MRN: [id number]    Date of Service: 6/10/2019    Discharge Recommendations:  Continue to assess pending progress       Assessment      Activity Tolerance  Activity Tolerance: Treatment limited secondary to decreased cognition  Safety Devices  Safety Devices in place: Yes  Type of devices: All fall risk precautions in place  Restraints  Initially in place: No         Patient Diagnosis(es): There were no encounter diagnoses. has a past medical history of Chronic back pain, Hyperlipidemia, and Hypertension. has a past surgical history that includes Cardiac catheterization; Coronary artery bypass graft; and craniotomy (Bilateral, 2019). Restrictions  Restrictions/Precautions  Restrictions/Precautions: Swallowing - Thickened Liquids, Fall Risk, Seizure  Position Activity Restriction  Other position/activity restrictions: nectar thick   Subjective   General  Patient assessed for rehabilitation services?: Yes  Response to previous treatment: Patient reporting fatigue but able to participate  Family / Caregiver Present: No  Subjective  Subjective: Pt seated in W/C upon arrival. Pt on 3L of O2. General Comment  Comments: Observed pt repeating words/echolalia when attempting to communicate with OT. Pain Assessment  Pain Assessment: Faces  Pain Level: 0  Vital Signs  Patient Currently in Pain: No   Orientation     Objective  Pt performed a 12 piece tangram puzzle with placing only 2/12 correctly. Pt performed a sorting task of placing colored ti card into 2 groups  With 5/6 correct and when adding a third group he required Mod cuing and 3/6 correct. Pt performed LB dressing by doffing socks and shoes and donning one sock and his shoes. HECTOR untied his shoes and he required Min cues to doff his shoes and Mod cues to doff his sock. For donning of his sock he required mod A. Plan   Plan  Times per week: 5-7   Plan weeks: 4  Current Treatment Recommendations: Strengthening, ROM, Balance Training, Functional Mobility Training, Endurance Training, Safety Education & Training, Cognitive Reorientation, Patient/Caregiver Education & Training, Equipment Evaluation, Education, & procurement, Self-Care / ADL, Cognitive/Perceptual Training, Positioning  Plan Comment: Continue with OT POC  G-Code     OutComes Score                                                  AM-PAC Score             Goals  Patient Goals   Patient goals : Patient is unable to report a goal at this time due to global aphasia       Therapy Time   Individual Concurrent Group Co-treatment   Time In 1505         Time Out 1605         Minutes [address] [email or web address] HECTOR Ferris Electronically signed by HECTOR Huerta on 6/10/2019 at 4:47 PM

## 2019-06-10 NOTE — PROGRESS NOTES
Subjective: The patient complains of severe  acute  confusion secondary to subdural hematoma partially relieved by PT OT, speech-language pathology, recent decompression of the subdural hematomas and exacerbated by fatigue, exhaustion, dehydration. I am concerned about patients impulsivity and have added a video monitoring side and placed him in the [facility] [address] [facility] Final with frequent nursing checks. I am concerned about his ongoing incontinence of bowel and bladder as well as  impulsivity. He is also occasionally bradycardic on telemetry. His heart rate however is coming up a little bit. Is been consistently in the 46s and 60s. Concerned about his mood continued aphasia and impulsivity. ROS x   10: The patient also complains of severely impaired mobility and activities of daily living. Otherwise no new problems with vision, hearing, nose, mouth, throat, dermal, cardiovascular, GI, , pulmonary, musculoskeletal, psychiatric or neurological. See Rehab H&P on Rehab chart dated . Vital signs:  BP (!) 190/72   Pulse 68   Temp 98 °F (36.7 °C)   Resp 18   Ht 6' 0.05\" (1.83 m)   Wt 179 lb 10.8 oz (81.5 kg)   SpO2 95%   BMI 24.34 kg/m²   I/O:   PO/Intake:  fair PO intake,  dysphagia 1 puréed with nectar thick    Bowel/Bladder:  incontinent, no problems noted. General:  Patient is well developed, adequately nourished, non-obese and     well kempt. HEENT:    PERRLA, hearing intact to loud voice, external inspection of ear     and nose benign. Inspection of lips, tongue and gums dry  Musculoskeletal: No significant change in strength or tone. All joints stable. Inspection and palpation of digits and nails show no clubbing,       cyanosis or inflammatory conditions. Neuro/Psychiatric: Affect: flat but pleasant. Echolalia. Alert and oriented to person, place and     Situation-with max cues.   No significant change in deep tendon reflexes or     Sensation-poor judgment reasoning and task.    Speech therapy: FIMS: Comprehension: 5 - Patient understands basic needs (hungry/hot/pain)  Expression: 5 - Expresses basic ideas/needs only (hungry/hot/pain)  Social Interaction: 2 - Patient appropriate  25%-49% of the time  Problem Solvin - Patient solves simple/routine tasks 25%-49%  Memory: 2 - Patient remembers 25%-49% of the time      Lab/X-ray studies reviewed, analyzed and discussed with patient and staff:   No results found for this or any previous visit (from the past 24 hour(s)). Xr Chest  2019   Areas of atelectasis, patchy groundglass infiltrate in the bases. Right greater than left . Trace/ small right pleural effusion    Ct Head   2019  Impression: Bilateral frontal and parietal subdural hematomas. The amount of extra-axial air has slightly decreased since the prior study. No new areas of hemorrhage are identified. All CT scans at this facility use dose modulation, iterative reconstruction, and/or weight based dosing when appropriate to reduce radiation dose to as low as reasonably achievable. Ct Head Wo  : 2019   DECREASING-SIZED BILATERAL CEREBRAL CONVEXITY SUBDURAL HEMATOMAS AFTER INTERVAL REMOVAL OF BOTH DRAINAGE CATHETERS. Ct Head Wo : 2019   UP TO 1.8 CM ACUTE CHRONIC SUBDURAL HEMATOMAS OVER BOTH CEREBRAL CONVEXITIES    Ct Cervical Spine  2019   . Mild to moderate hypertrophic facet changes are present, predominantly on the right at C3-4, and on the left at C2-C3 and C5-6. There is no fracture, significant subluxation, or acute paraspinous soft tissue abnormalities identified. NO FRACTURE OR EVIDENCE OF CERVICAL SPINE INJURY IDENTIFIED. CERVICAL SPONDYLOSIS. Ct Lumbar   2019  Acute on chronic fracture, L1, with avulsion of the anterior portion of the L1 vertebral body. Findings suspicious for mild to moderate central stenosis at the L3-L4 level.             Us Dup Upper Extremity Right Eric 2019  NO FINDINGS OF DEEP VENOUS alcohol use BMI 26.0-26.9,adult--add protein supplementation vitamin B12 vitamin D CoQ10,  to stop drinking alcohol as an outpatient and at chemical dependency counseling when patient is able to participate  12. Complex social situation-patient has a girlfriend he lives alone. He is estranged from his several children.         Capri Templeton D.O., PM&R     Attending    [address]

## 2019-06-10 NOTE — PROGRESS NOTES
;Decreased safe awareness;Decreased balance;Decreased coordination;Decreased vision/visual deficit; Decreased endurance;Decreased strength;Decreased cognition;Decreased ADL status  Assessment: Patient requires increased time to complete tasks after verbal cue is given. Patient shows improved wc mobility    PLAN OF CARE/Safety:   Safety Devices  Type of devices: Chair alarm in place; Left in chair;Gait belt      Therapy Time:   Individual   Time In 1400   Time Out 1430   Minutes 30     Minutes: 30      Transfer/Bed mobility training:10      Gait trainin N Jacobo Snyder PTA, 06/10/19 at 4:06 PM

## 2019-06-10 NOTE — PROGRESS NOTES
[facility]   Acute  Rehabilitation  RECREATIONAL THERAPY    Recreation Therapy Assessment    Date:  6/10/2019        Patient Name: Frank Rogers       MRN: [id number]        YOB: 1934 (80 y.o.)       Gender: male        RESTRICTIONS/PRECAUTIONS:  Restrictions/Precautions: Swallowing - Thickened Liquids, Fall Risk, Seizure  Vision: Impaired  Hearing: Exceptions to [facility]  Hearing Exceptions: Bilateral hearing aid, Hard of hearing/hearing concerns  [id number]  Recreation Therapy referral received.       , Chart reviewed       and Patient interviewed       Attempted to educate pt on Recreation Therapy programs. Pt soft spoken, able to give birth date. Pt continuously distracted. Patient reports pain is a  Not an issue at the moment     Ability to provide information regarding leisure and recreation interests:   Issues with cognition (memory, confusion) and Issues with communication (speech, hearing)    Patient:    Has interest in: Pet Therapy and Music Therapy, Educated on relaxation techniques, Provided with leisure education and Educated on coping skills    Interests: Pt chose [employer] and [place] after choices provided for what type of music he preferred. Pt stated an affirmative for model trains at home. Observed/noted emotions:    Cooperative     Patient provided with the following accessible and independently used recreation/leisure resources when in hospital room:   5 days week large print orientation/puzzle activity handout    Recommendations:   Recreation Therapy services offered to all [facility] inpatients:  Recommended pet therapy and music therapy per his preferences    Comments: Pt expressed he had a lady friend named Micharadha Carter.      Electronically signed by: Juan J Morgan  Date: 6/10/2019   Electronically signed by Juan J Morgan on 6/10/2019 at 1:08 PM

## 2019-06-10 NOTE — PROGRESS NOTES
Focus note  80 y.o. male who s/p bilateral  craniotomy d/t bilateral L subdural hematomas secondary to  repeated falls. Pt is muttering unintelligibly during my encounter however he does  responded with one word answers on occassions. He is unable to follow commands it takes cueing and demonstration to transfer him on to the bed from wheel chair. Patient appetite improved consumed 100% of lunch with set up assist. Encouraging fluids. Team met today. Significant other recognizes the condition patient is in at this time she will be unable to care for him at home therapy goals remain current and updated. Goals are met and increased as improvement is noted. Will continue to assess for patient needs.

## 2019-06-10 NOTE — CARE COORDINATION
Received call from pt girlfriend who stated that she is unable to take care of patient at this time. She is calling [facility] and is recommending LTC upon discharge. She stated that she will call back and follow up with plans. Electronically signed by Clare Dawkins RN on 6/10/2019 at 10:12 AM

## 2019-06-10 NOTE — PROGRESS NOTES
Physical Therapy Rehab Treatment Note  Facility/Department: [facility]  Room: [place] [id number]       NAME: Jame Spurling  : 1934 (80 y.o.)  MRN: [id number]  CODE STATUS: Full Code    Date of Service: 6/10/2019       Restrictions:  Restrictions/Precautions: Swallowing - Thickened Liquids, Fall Risk, Seizure(nectar thisck)  Position Activity Restriction  Other position/activity restrictions: nectar thick        SUBJECTIVE:            Pre and Post Treatment Pain Screenin/10       OBJECTIVE:               Neuromuscular Education  PNF: standing balance facilitation with and without UE support - posture emphasized with pt demonstrating quick fatigue      Bed mobility  Bridging: Minimal assistance  Rolling to Left: Minimal assistance  Rolling to Right: Minimal assistance  Supine to Sit: Moderate assistance  Sit to Supine: Minimal assistance  Scooting: Moderate assistance  Comment: in hospital bed with no rails - max verbal cueing and min- mod physical facilitation    Transfers  Sit to Stand: Minimal Assistance  Stand to sit: Moderate Assistance  Bed to Chair: Minimal assistance; Moderate assistance(significant time required - multiple trials)  Comment: poor ability to motor plan approach to chair with ww and with rollator requiring vc and physical facilitation to complete - poor motor planning of pivot transfer    Ambulation  Ambulation?: Yes  Ambulation 1  Surface: carpet  Device: Rolling Walker;Rollator  Other Apparatus: (no O2)  Assistance:  Moderate assistance;Minimal assistance  Quality of Gait: improved gait overall with rollator compared to ww especially with change of direction, however pt does have difficulty controlling pace of rollator on straight path  Distance: 40 feet x 2  Comments: excessive time required for task completion and pt ability to process instructions  Stairs/Curb  Stairs?: No        Activity Tolerance  Activity Tolerance: Patient Tolerated treatment well

## 2019-06-10 NOTE — PROGRESS NOTES
Occupational Therapy  Facility/Department: [facility]  Daily Treatment Note  NAME: Eduardo Khan  : 1934  MRN: [id number]    Date of Service: 6/10/2019    Discharge Recommendations:  Continue to assess pending progress       Assessment      Activity Tolerance  Activity Tolerance: Treatment limited secondary to decreased cognition  Safety Devices  Safety Devices in place: Yes  Type of devices: All fall risk precautions in place  Restraints  Initially in place: No         Patient Diagnosis(es): There were no encounter diagnoses. has a past medical history of Chronic back pain, Hyperlipidemia, and Hypertension. has a past surgical history that includes Cardiac catheterization; Coronary artery bypass graft; and craniotomy (Bilateral, 2019). Restrictions  Restrictions/Precautions  Restrictions/Precautions: Swallowing - Thickened Liquids, Fall Risk, Seizure  Position Activity Restriction  Other position/activity restrictions: nectar thick   Subjective Pt seen alone  General  Patient assessed for rehabilitation services?: Yes  Response to previous treatment: Patient reporting fatigue but able to participate  Family / Caregiver Present: No  Subjective  Subjective: Pt seated in W/C upon arrival. Pt on 3L of O2. General Comment  Comments: Observed pt repeating words/echolalia when attempting to communicate with OT. Orientation     Objective    ADL  Feeding: Setup  Grooming: Setup  UE Bathing: Maximum assistance  LE Bathing: Maximum assistance  UE Dressing: Maximum assistance  LE Dressing: Maximum assistance        Shower Transfers  Shower - Transfer From: Wheelchair  Shower - Transfer Type: To and From  Shower - Transfer To: Shower seat with back  Shower - Technique: Stand pivot  Shower Transfers: Minimal assistance      Pt completed AM ADL routine with functional levels as stated above. Pt impulsive for transfers and requiring repeated verbal cues for ADL.             Plan   Plan  Times per week: 5-7   Plan weeks: 4  Current Treatment Recommendations: Strengthening, ROM, Balance Training, Functional Mobility Training, Endurance Training, Safety Education & Training, Cognitive Reorientation, Patient/Caregiver Education & Training, Equipment Evaluation, Education, & procurement, Self-Care / ADL, Cognitive/Perceptual Training, Positioning  Plan Comment: Continue with OT POC  G-Code     OutComes Score                                                  AM-PAC Score             Goals  Patient Goals   Patient goals : Patient is unable to report a goal at this time due to global aphasia       Therapy Time   Individual Concurrent Group Co-treatment   Time In 0830         Time Out 0930         Minutes [address] [place], [place] [address]way, OTR/L Electronically signed by KATARINA Vo/L on 5/61/84 at 1:28 PM

## 2019-06-10 NOTE — PROGRESS NOTES
Pt is restless this evening. He has already been incontinent twice. avaysis have called many times. Will continue to monitor.  Electronically signed by Reid Larsen LPN on 5/1/1149 at 33:45 PM

## 2019-06-10 NOTE — PROGRESS NOTES
Speech Language Pathology Treatment Note  Facility/Department: [facility]  6/10/2019    Rehab Dx/Hx: Abnormality of gait and mobility [R26.9]    Precautions: falls and aspirations    ST Dx: Aphasia and Dysphagia     Date of Admit: 2019  Room #: R240/R240-01    Time in: 10:00 AM       Time out: 10:30 AM  Swallow: [id number]  Language: [id number]    Subjective: Pt required phonemic cues for his name. He was unable to state   Cooperative, Lethargic, Distractible and Confused        Interventions used this date:   Expressive receptive language; dysphagia therapy    Objective/Assessment:  Patient progressing towards goals:  Short-term Goals for Speech & Language POC:   Goal 1: Pt will follow 1 step directions given orally with 75% accuracy with mod cues to increase the pt's ability to follow directions provided by caregivers for safe follow through with ADLs. Same 5 one-step directions given 3 times-  First trial: 3/5 with verbal and visual cues which increased to 4/5 with an additional repetition  Second trial: 2/5 with verbal and visual cues  Third trial: 2/5 with verbal and visual cues  In order to achieve errorless learning, full physical [place] assistance was provided to increase accuracy to 5/5    Goal 2: Pt will identify objects/pictures within a field of 2-3 with 75% accuracy with mod cues in order to increase his understanding of objects in his environment for safer and more independent completion of ADLs. Required full [place] teaching to receptively ID in FO2  Required full [address] teaching to gesture appropriate use of objects    Goal 3: Pt will answer low level yes/no questions with 85% accuracy with mod cues to assist the caregiver in obtaining important information regarding the patient's personal, medical, and safety needs.     Goal 4: Pt will answer low level Wh- questions with 75% accuracy with mod cues to assist the caregiver in obtaining important information regarding the patient's

## 2019-06-11 PROCEDURE — 92526 ORAL FUNCTION THERAPY: CPT

## 2019-06-11 PROCEDURE — 97530 THERAPEUTIC ACTIVITIES: CPT

## 2019-06-11 PROCEDURE — 99232 SBSQ HOSP IP/OBS MODERATE 35: CPT | Performed by: PHYSICAL MEDICINE & REHABILITATION

## 2019-06-11 PROCEDURE — 6370000000 HC RX 637 (ALT 250 FOR IP): Performed by: NEUROLOGICAL SURGERY

## 2019-06-11 PROCEDURE — 1180000000 HC REHAB R&B

## 2019-06-11 PROCEDURE — 97150 GROUP THERAPEUTIC PROCEDURES: CPT

## 2019-06-11 PROCEDURE — 6370000000 HC RX 637 (ALT 250 FOR IP): Performed by: INTERNAL MEDICINE

## 2019-06-11 PROCEDURE — 97112 NEUROMUSCULAR REEDUCATION: CPT

## 2019-06-11 PROCEDURE — 92507 TX SP LANG VOICE COMM INDIV: CPT

## 2019-06-11 PROCEDURE — 6370000000 HC RX 637 (ALT 250 FOR IP): Performed by: PHYSICAL MEDICINE & REHABILITATION

## 2019-06-11 PROCEDURE — 97116 GAIT TRAINING THERAPY: CPT

## 2019-06-11 RX ADMIN — DILTIAZEM HYDROCHLORIDE 120 MG: 120 CAPSULE, COATED, EXTENDED RELEASE ORAL at 21:01

## 2019-06-11 RX ADMIN — PRAVASTATIN SODIUM 40 MG: 40 TABLET ORAL at 21:01

## 2019-06-11 RX ADMIN — BACITRACIN ZINC 1 G: 500 OINTMENT TOPICAL at 14:46

## 2019-06-11 RX ADMIN — LIDOCAINE HYDROCHLORIDE: 20 SOLUTION ORAL; TOPICAL at 21:02

## 2019-06-11 RX ADMIN — BACITRACIN ZINC 1 G: 500 OINTMENT TOPICAL at 21:02

## 2019-06-11 RX ADMIN — DILTIAZEM HYDROCHLORIDE 120 MG: 120 CAPSULE, COATED, EXTENDED RELEASE ORAL at 07:31

## 2019-06-11 RX ADMIN — ACETAMINOPHEN 500 MG: 325 TABLET ORAL at 07:32

## 2019-06-11 RX ADMIN — FAMOTIDINE 20 MG: 20 TABLET ORAL at 21:01

## 2019-06-11 RX ADMIN — BACITRACIN ZINC 1 G: 500 OINTMENT TOPICAL at 09:24

## 2019-06-11 RX ADMIN — ACETAMINOPHEN 500 MG: 325 TABLET ORAL at 21:02

## 2019-06-11 RX ADMIN — MAGNESIUM OXIDE TAB 400 MG (241.3 MG ELEMENTAL MG) 400 MG: 400 (241.3 MG) TAB at 07:32

## 2019-06-11 RX ADMIN — Medication 5 MG: at 21:01

## 2019-06-11 RX ADMIN — ACETAMINOPHEN 500 MG: 325 TABLET ORAL at 14:47

## 2019-06-11 RX ADMIN — FAMOTIDINE 20 MG: 20 TABLET ORAL at 07:31

## 2019-06-11 ASSESSMENT — PAIN SCALES - GENERAL
PAINLEVEL_OUTOF10: 0
PAINLEVEL_OUTOF10: 0
PAINLEVEL_OUTOF10: 4
PAINLEVEL_OUTOF10: 0
PAINLEVEL_OUTOF10: 4
PAINLEVEL_OUTOF10: 0

## 2019-06-11 ASSESSMENT — PAIN DESCRIPTION - DESCRIPTORS: DESCRIPTORS: PATIENT UNABLE TO DESCRIBE

## 2019-06-11 ASSESSMENT — PAIN DESCRIPTION - LOCATION: LOCATION: LEG

## 2019-06-11 ASSESSMENT — PAIN DESCRIPTION - ORIENTATION: ORIENTATION: RIGHT;LEFT

## 2019-06-11 NOTE — PROGRESS NOTES
Occupational Therapy  Facility/Department: [facility]  Daily Treatment Note  NAME: Luzmaria Saucedo  : 1934  MRN: [id number]    Date of Service: 2019    Discharge Recommendations:  Continue to assess pending progress       Assessment   Assessment: Pt required mod verbal cues to redirect during session but was able to complete tasks with increased time. Pt continues to benefit from skilled OT to maximize independence in ADLs and IADLs. REQUIRES OT FOLLOW UP: Yes  Activity Tolerance  Activity Tolerance: Treatment limited secondary to decreased cognition  Activity Tolerance: Fair  Safety Devices  Safety Devices in place: Yes  Type of devices: All fall risk precautions in place; Chair alarm in place;Call light within reach  Restraints  Initially in place: No         Patient Diagnosis(es): There were no encounter diagnoses. has a past medical history of Chronic back pain, Hyperlipidemia, and Hypertension. has a past surgical history that includes Cardiac catheterization; Coronary artery bypass graft; and craniotomy (Bilateral, 2019). Restrictions  Restrictions/Precautions  Restrictions/Precautions: Fall Risk, Seizure(thin liquids ok now)  Position Activity Restriction  Other position/activity restrictions: nectar thick   Subjective   General  Chart Reviewed: Yes  Patient assessed for rehabilitation services?: Yes  Response to previous treatment: Patient with no complaints from previous session  Family / Caregiver Present: No  Subjective  Pain Assessment  Pain Assessment: 0-10  Pain Level: 0  Vital Signs  Patient Currently in Pain: No  Orientation  Orientation  Overall Orientation Status: Within Functional Limits  Orientation Level: Oriented to person;Oriented to time;Oriented to situation  Objective     Pt placed cones in blue or orange stack using his L UE to work on cognitive retraining. Pt required mod verbal cues to redirect.  Pt had less difficulty completing the task when therapy student showed

## 2019-06-11 NOTE — PROGRESS NOTES
Speech Language Pathology Treatment Note  Facility/Department: [facility]  2019    Rehab Dx/Hx: Abnormality of gait and mobility [R26.9]    Precautions: falls and aspirations    ST Dx: Aphasia and Dysphagia     Date of Admit: 2019  Room #: R240/R240-01    Time in: 10:00 AM       Time out: 10:30 AM    Subjective: Pt required cues for initiation for his name and . After SLP stated his first name, he was able to produce his last name. After SLP provided his birth month, he was able to state the 7th. SLP stated \"[age]. Fernanda Gerard Fernanda Gerard \" and pt replied \"[age]\"  Lethargic, Distractible and Confused - Pt falling asleep without constant stimulation. Interventions used this date:   Expressive receptive language;     Objective/Assessment:  Patient progressing towards goals:  Short-term Goals for Speech & Language POC:   Goal 1: Pt will follow 1 step directions given orally with 75% accuracy with mod cues to increase the pt's ability to follow directions provided by caregivers for safe follow through with ADLs. 20% acc with mod-maximum verbal and visual cues for simple repetitive 1 step directions. Pt was provided with full physical assist to promote errorless learning     Goal 2: Pt will identify objects/pictures within a field of 2-3 with 75% accuracy with mod cues in order to increase his understanding of objects in his environment for safer and more independent completion of ADLs. Required full [place] teaching to receptively ID in FO2  Required full [address] teaching to gesture appropriate use of objects    Goal 3: Pt will answer low level yes/no questions with 85% accuracy with mod cues to assist the caregiver in obtaining important information regarding the patient's personal, medical, and safety needs. Large visual \"YES\" and \"NO\" cards provided. Full hand over hand assistance to touch each answer (yes vs no) for simple low level yes/no questions.  Along with full hand over hand assistance, pt required full repetition cues provided. For example, SLP would ask \"Do cars fly? \" and assist pt in pointing to correct answer and provide the verbal response \"no\". Pt would then repeat \"no\". SLP attempted graded cueing technique to only physical assist and phonemic start for answer yes or no (e.g.- \"yyyy. Beckey Conquest Beckey Conquest Beckey Conquest \" or \"nnn. ...\"), however, pt would not complete the correct answer. Goal 4: Pt will answer low level Wh- questions with 75% accuracy with mod cues to assist the caregiver in obtaining important information regarding the patient's personal, medical, and safety needs. Goal 5: Pt will demonstrate reading comprehension at the word  level to 60% acc with mod cues to promote pt's ability to utilize reading/writing. Full physical assist.     Attempted automatic speech tasks. Pt was able to count from 1-10 in tandem with SLP with written #s provided in 3/3 trials. OMAR: pt was able to provide OMAR in tandem with SLP with writtn words provided in 2/3 trials. Speech characterized as breathy and dysphonic. Short-term Goals for Dysphagia POC:- ST to see for therapeutic meal monitor at lunch this date to assess tolerance of prescribed diet and possible diet upgrade. Goal 1: Pt will tolerate puree diet with nectar thick liquids with no overt s/s of aspiration. Goal 2: Pt will perform oral motor ROM/strength exercieses with max cues 5x/each. Goal 3: Pt will perform base of tongue exercises with max cues 10x/each. Goal 4: Pt will tolerate therapeutic trials of mechanical soft/thin as able, with no overt s/s of aspiration. Dysphagia Goals:  The patient will tolerate recommended diet without observed clinical signs of aspiration     Compensatory Swallowing Strategies: Small bites/sips, Total feed, Upright as possible for all oral intake, Swallow 2 times per bite/sip, Eat/Feed slowly         Treatment/Activity Tolerance:  Patient limited by fatigue    Plan:  Continue per POC    Pain:  Patient

## 2019-06-11 NOTE — PROGRESS NOTES
Physical Therapy Rehab Treatment Note  [facility]  [facility]       NAME: Ross Da Silva  : 1934 (80 y.o.)  MRN: [id number]  CODE STATUS: Full Code    Date of Service: 2019    Restrictions:  Restrictions/Precautions: Fall Risk, Seizure(thin liquids ok now)  Position Activity Restriction  Other position/activity restrictions: nectar thick        SUBJECTIVE:       Pre Treatment Pain Screening  Pain at present: 0    Post Treatment Pain Screening:  Pain Assessment  Pain Level: 0    OBJECTIVE:               Neuromuscular Education  PNF: standing balance facilitation with and without UE support - posture emphasized   Neuromuscular Comments: BLE strength and motor control facilitation in sitting and supine with NDT and PNF incorporated      Bed mobility  Bridging: Minimal assistance  Rolling to Left: Minimal assistance  Rolling to Right: Minimal assistance  Supine to Sit: Moderate assistance  Sit to Supine: Minimal assistance  Comment: on mat this session    Transfers  Sit to Stand: Minimal Assistance;Stand by assistance  Stand to sit: Minimal Assistance;Stand by assistance  Bed to Chair: Minimal assistance  Comment: multiple trials with varying level of assistance and facilitation required    Ambulation  Ambulation?: Yes  Ambulation 1  Surface: level tile  Device: Rollator  Assistance: Minimal assistance  Quality of Gait: improved balance  and automatic LE function, less facilitation of weight shift and maneuvering in environment required, improved speed and safety in gait  Distance: 75 ft  Comments: pt overall demonstrated improved gait ability  Stairs/Curb  Stairs?: Yes   Stairs  # Steps : 4  Stairs Height: 6\"  Rails: Bilateral  Assistance:  Moderate assistance;Minimal assistance  Comment: second person present due to concern for pts ability to follow instructions - physical assistance was not needed    Activity Tolerance  Activity Tolerance: Patient Tolerated treatment well ASSESSMENT/COMMENTS:  Assessment: Pt demonstrated improved timing of follow thru of instructions when in a non- distracting environment. He continues with delay however the delay is less in length and pt following of instructions is more often accurate. Deficits remain in motor and postural control however. Initation of tasks in a busy environment requires physical  facilitation. Pt able to begin stairs this date and ambulate a greater distance    PLAN OF CARE/Safety:   Safety Devices  Type of devices: Chair alarm in place; Left in chair;Gait belt      Therapy Time:   Individual   Time In 0930   Time Out 1000   Minutes 30     Minutes:      Transfer/Bed mobility training: 10      Gait training:10      Neuro re education:10          Fabiola Dietz, PT, 06/11/19 at 1:08 PM

## 2019-06-11 NOTE — PROGRESS NOTES
Pt is very restless. VSS. Assessment complete. Pt c/o tramaine leg pain. Medicated with a Wilsey. Avasys in room to monitor pt for safety. Pt has 2L O2 on throughout the night. Bed alarm on. Call light in reach. Will continue to monitor. 2300 Dr. Michelle Mora ordered 5 mg Melatonin. Will continue to monitor.

## 2019-06-11 NOTE — PROGRESS NOTES
Subjective: The patient complains of severe  acute  confusion secondary to subdural hematoma partially relieved by PT OT, speech-language pathology, recent decompression of the subdural hematomas and exacerbated by fatigue, exhaustion, dehydration. I am concerned about patients impulsivity and have added a video monitoring side and placed him in the [place] [facility] with frequent nursing checks. Concerned about his incontinence at times he also is difficult to direct he occasionally will urinate right onto the floor. He is very distractible he is very poor problem-solving. His significant other is not able or willing to help him at this level of care at home and therefore he will most likely go to either long-term care or skilled facility after discharge. He has no other family members that are involved despite having several children in the area he is estranged from all of them. His significant other has asked that he apply for Medicaid she will help with some of the paperwork regarding that. Concerned about his mood continued aphasia and impulsivity. ROS x   10: The patient also complains of severely impaired mobility and activities of daily living. Otherwise no new problems with vision, hearing, nose, mouth, throat, dermal, cardiovascular, GI, , pulmonary, musculoskeletal, psychiatric or neurological. See Rehab H&P on Rehab chart dated . Vital signs:  /60   Pulse 60   Temp 95 °F (35 °C) (Oral)   Resp 17   Ht 6' 0.05\" (1.83 m)   Wt 179 lb 10.8 oz (81.5 kg)   SpO2 98%   BMI 24.34 kg/m²   I/O:   PO/Intake:  fair PO intake,  dysphagia 1 puréed with nectar thick    Bowel/Bladder:  incontinent, no problems noted. General:  Patient is well developed, adequately nourished, non-obese and     well kempt. HEENT:    PERRLA, hearing intact to loud voice, external inspection of ear     and nose benign.   Inspection of lips, tongue and gums dry  Musculoskeletal: No significant change in strength or tone. All joints stable. Inspection and palpation of digits and nails show no clubbing,       cyanosis or inflammatory conditions. Neuro/Psychiatric: Affect: flat but pleasant. Echolalia. Alert and oriented to person, place and     Situation-with max cues. No significant change in deep tendon reflexes or     Sensation-poor judgment reasoning and insight. Lungs:  Diminished, CTA-B. Respiration effort is normal at rest.     Heart:   S1 = S2, RRR. No loud murmurs. Abdomen:  Soft, non-tender, no enlargement of liver or spleen. Extremities:  No significant lower extremity edema or tenderness. Skin:   Intact to general survey,  craniotomy incisions healing. Rehabilitation:  Physical therapy: FIMS:  Bed Mobility: Scooting: Moderate assistance    Transfers: Sit to Stand: Contact guard assistance, Minimal Assistance  Stand to sit: Moderate Assistance  Bed to Chair: Minimal assistance, Moderate assistance(significant time required - multiple trials)  Squat Pivot Transfers: Maximum Assistance(assistance guiding hips , simple commands used with poor comprehension), Ambulation 1  Surface: level tile  Device: Rollator  Other Apparatus: (no O2)  Assistance: Moderate assistance, Minimal assistance  Quality of Gait: Varying step length, tena and dana throughout gait. One LOB require modA to correct  Distance: 40 feet x 2  Comments: excessive time required for task completion and pt ability to process instructions,      FIMS: Bed, Chair, Wheel Chair: 3 - Requires 25-49% assistance to transfer  Walk: 1 - Total Assistance Walks < 50 feet OR requires two or more people OR patient performs < 25% of locomotion effort  Distance Walked: 40  Wheel Chair: 0 - Activity Not Assessed/Does Not Occur  Distance Traveled in Wheel Chair: 15'  Stairs: 0 - Activity Does not Occur ( 0 only for the admission assessment),  , Assessment: Patient requires increased time to complete tasks after verbal cue is given.  Patient is no fracture, significant subluxation, or acute paraspinous soft tissue abnormalities identified. NO FRACTURE OR EVIDENCE OF CERVICAL SPINE INJURY IDENTIFIED. CERVICAL SPONDYLOSIS. Ct Lumbar   5/24/2019  Acute on chronic fracture, L1, with avulsion of the anterior portion of the L1 vertebral body. Findings suspicious for mild to moderate central stenosis at the L3-L4 level. Us Dup Upper Extremity Right Eric 5/31/2019  NO FINDINGS OF DEEP VENOUS THROMBUS IN  THE VISUALIZED VESSELS OF THE RIGHT UPPER EXTREMITY. Fl Modified Barium   5/31/2019  EXAMINATION: FL MODIFIED BARIUM SWALLOW W VIDEO: DATE AND TIME: 5/30/2019 at 10:45 AM. CLINICAL HISTORY:    MODERATE ORAL AND PHARYNGEAL DYSFUNCTION WITHOUT ASPIRATION. PLEASE REFER TO SPEECH PATHOLOGY  REPORT FOR RECOMMENDATIONS. Us Dup Lower Extremities Bilateral V 5/29/2019  NO DVT IDENTIFIED IN EITHER LOWER EXTREMITY. Previous extensive, complex labs, notes and diagnostics reviewed and analyzed. ALLERGIES:    Allergies as of 05/31/2019 - Review Complete 05/31/2019   Allergen Reaction Noted    Honey bee venom [bee venom] Swelling 10/08/2015    Penicillins Swelling 10/08/2015      (please also verify by checking MAR)       Yesterday I evaluated this patient for periodic reassessment of medical and functional status. The patient was discussed in detail at the treatment team meeting focusing on current medical issues, progress in therapies, social issues, psychological issues, barriers to progress and strategies to address these barriers, and discharge planning. See the hand written addendum to rehab progress note. The patient continues to be high risk for future disability and their medical and rehabilitation prognosis continue to be good and therefore, we will continue the patient's rehabilitation course as planned. The patient's tentative discharge date was set. Patient and family education was discussed.   The patient was made aware of the team discussion regarding their progress. Discharge plans were discussed along with barriers to progress and strategies to address these barriers, patient encouraged to continue to discuss discharge plans with . Complex Physical Medicine & Rehab Issues Assess & Plan:   1. Severe abnormality of gait and mobility and impaired self-care and ADL's secondary to progressive subdural hematomas . Functional and medical status reassessed regarding patients ability to participate in therapies and patient found to be able to participate in acute intensive comprehensive inpatient rehabilitation program including PT/OT to improve balance, ambulation, ADLs, and to improve the P/AROM. Therapeutic modifications regarding activities in therapies, place, amount of time per day and intensity of therapy made daily. In bed therapies or bedside therapies prn.   2. Bowel and Bladder dysfunction including incontinence :  frequent toileting, ambulate to bathroom with assistance, check post void residuals. Check for C.difficile x1 if >2 loose stools in 24 hours, continue bowel & bladder program.  Monitor bowel and bladder function. Lactinex 2 PO every AC. MOM prn, Brown Bomb prn, Glycerin suppository prn, enema prn.  3. Severe postoperative craniotomy pain generalized OA painAcute on chronic fracture, L1, with avulsion of the anterior portion of the L1 vertebral body. Findings suspicious for mild to moderate central stenosis at the L3-L4 level.: reassess pain every shift and prior to and after each therapy session, give prn Tylenol and schedull and consider Norco, modalities prn in therapy, Lidoderm, K-pad prn.   4. Skin healing and breakdown risk: Side-to-side turns add CoQ10 continue pressure relief program.  Daily skin exams and reports from nursing. 5. Severe Fatigue due to nutritional and hydration deficiency: Patient and his girlfriend are to learn how to thicken the liquids.   continue to monitor I&Os, calorie counts prn, dietary consult prn. Increased nocturnal IV 2000 mL overnight. 6. Acute episodic insomnia with situational adjustment disorder:  prn Ambien, monitor for day time sedation. 7. Falls risk elevated:  patient to use call light to get nursing assistance to get up, bed and chair alarm. 8. Elevated DVT risk: progressive activities in PT, continue prophylaxis JOSE DE JESUS hose, elevation and  patient is not on a blood thinner secondary to recent subdural hematomas . 9. Complex discharge planning:  Discharge 6/28/19 skilled level and then eventually long-term care at a wheelchair level. He will likely need 24-hour supervision at discharge. His significant other will help him apply for Medicaid as he will likely need care for the rest of his life. Weekly team meeting every Monday to assess progress towards goals, discuss and address social, psychological and medical comorbidities and to address difficulties they may be having progressing in therapy. Patient and family education is in progress. The patient is to follow-up with their family physician after discharge. Complex Active General Medical Issues that complicate care Assess & Plan:    1. Afib,   Essential hypertension,   Hyperlipidemia,   S/P CABG (coronary artery bypass graft), S/P PTCA (percutaneous transluminal coronary angioplasty),   RBBB,   CAD (coronary artery disease), long-term previous use of blood thinners telemetry to monitor for bradycardia blood pressure checks every shift dose and titrate cardiac medications, consult hospitals for backup medical and recheck CBC and BMP as needed -  2. Subdural hematoma-status post evacuation with , hold blood thinners-vital signs every shift, dose and titrate cardiac medications, recheck BMP CBC and consult hospitalist for backup medical  3.   prostatic hyperplasia with urinary frequency,   Prostate cancer -check postvoid residuals check stat UA  4.    Facet arthropathy, lumbosacral,   Chronic back pain,   Spondylolisthesis of lumbar region-add Lidoderm, Tylenol, Norco as needed  5. Gross hematuria, Long term current use of anticoagulant-check UA monitor urine for blood  6. Long-term use of aspirin therapy and other blood thinners on hold at present because of recent subdural hematoma  7. SHAYY on CPAP-add CPAP from home if available is not available add CPAP from the hospital  8. Prediabetes-recheck BMP, low carb diet  9. Vitamin D deficiency-high-dose vitamin D, add Tums at daily dose of vitamin D after high doses done recheck as an outpatient  10. Aphasia, and oropharyngeal dysphagia-recently which pathology consult, add e-stim, add supplementation to diet push oral fluids  11. Poorly nourished due to poor nutrition and previous alcohol use BMI 26.0-26.9,adult--add protein supplementation vitamin B12 vitamin D CoQ10,  to stop drinking alcohol as an outpatient and at chemical dependency counseling when patient is able to participate  12. Complex social situation-patient has a girlfriend he lives alone. He is estranged from his several children.         Liyah Stephens D.O., PM&R     Attending    [address] [facility]

## 2019-06-11 NOTE — PROGRESS NOTES
Physical Therapy Rehab Treatment Note  Facility/Department: [facility]  Room: 40/[facility]       NAME: Vitor Davenport  : 1934 (80 y.o.)  MRN: [id number]  CODE STATUS: Full Code    Date of Service: 2019  Restrictions:  Restrictions/Precautions: Fall Risk, Seizure(thin liquids ok now)  Position Activity Restriction         SUBJECTIVE:       Pre Treatment Pain Screening  Pain at present: 0    Post Treatment Pain Screening:  Pain Assessment  Pain Level: 0    OBJECTIVE:               Neuromuscular Education  PNF: standing balance facilitation with and without UE support - posture emphasized   Neuromuscular Comments: gait in varying directins with difficulty following instructions with new tasks   - Obstacle course performance introduce. Max verbal cues required for pt to complete    Transfers  Sit to Stand: Minimal Assistance;Stand by assistance  Stand to sit: Minimal Assistance;Stand by assistance  Bed to Chair: Minimal assistance  Comment: multiple trials with varying level of assistance and facilitation required    Ambulation  Ambulation?: Yes  Ambulation 1  Surface: level tile  Device: Rollator  Assistance: Minimal assistance  Quality of Gait: improved balance  and automatic LE function, less facilitation of weight shift and maneuvering in environment required, improved speed and safety in gait  Distance: 75 ft  Comments: pt overall demonstrated improved gait ability - carry over of instructions is poor without frequent cues      Activity Tolerance  Activity Tolerance: Patient Tolerated treatment well  Activity Tolerance: Pt exhibits mild fatigue between tasks with O2 sat remaining over 98% on room air          ASSESSMENT/COMMENTS:  Assessment: Pt demonstrates continued improvement overall however he does present with greater distractibility this pm. This resulted in pt requiring more cues  for mobility and ex completion    PLAN OF CARE/Safety:   Safety Devices  Type of devices: Chair alarm in place; Left in chair;Gait belt      Therapy Time:   Individual   Time In 0930   Time Out 1000   Minutes 30     Minutes:      Transfer/Bed mobility training: 10      Gait training:10      Neuro re education:10        Amee Ballesteros PT, 06/11/19 at 3:58 PM

## 2019-06-11 NOTE — PROGRESS NOTES
Occupational Therapy  Facility/Department: [facility]  Daily Treatment Note  NAME: Liliana Ptits  : 1934  MRN: [id number]    Date of Service: 2019    Discharge Recommendations:  Continue to assess pending progress       Assessment   Assessment: Pt required mod verbal cues to redirect during session but was able to complete tasks with increased time. Pt continues to benefit from skilled OT to maximize independence in ADLs and IADLs. REQUIRES OT FOLLOW UP: Yes  Activity Tolerance  Activity Tolerance: Treatment limited secondary to decreased cognition  Activity Tolerance: Fair  Safety Devices  Safety Devices in place: Yes  Type of devices: All fall risk precautions in place; Chair alarm in place;Call light within reach  Restraints  Initially in place: No         Patient Diagnosis(es): There were no encounter diagnoses. has a past medical history of Chronic back pain, Hyperlipidemia, and Hypertension. has a past surgical history that includes Cardiac catheterization; Coronary artery bypass graft; and craniotomy (Bilateral, 2019). Restrictions  Restrictions/Precautions  Restrictions/Precautions: Fall Risk, Seizure(thin liquids ok now)  Position Activity Restriction  Other position/activity restrictions: nectar thick   Subjective   General  Chart Reviewed: Yes  Patient assessed for rehabilitation services?: Yes  Response to previous treatment: Patient with no complaints from previous session  Family / Caregiver Present: No  Subjective  Subjective: Pt seated in W/C upon arrival. Pt on 3L of O2. General Comment  Comments: Observed pt repeating words/echolalia when attempting to communicate with OT.    Pain Assessment  Pain Assessment: 0-10  Pain Level: 0  Vital Signs  Patient Currently in Pain: No   Orientation  Orientation  Overall Orientation Status: Within Functional Limits  Orientation Level: Oriented to person;Oriented to time;Oriented to situation  Objective        Pt engaged in fine motor coordination group for 10 minutes to increase independence in self care tasks. Pt placed golf tees into peg board using B UEs. Pt required mod verbal cues to redirect. Pt continued working on the task after the group treatment ended and required significant increased time to complete. Pt placed graded clips of various lbs onto vertical rods using B UEs to increase  strength during ADLs and IADLs. Pt required mod verbal cues to redirect but was able to complete the task with increased time.      Plan   Plan  Times per week: 5-7   Plan weeks: 4  Current Treatment Recommendations: Strengthening, ROM, Balance Training, Functional Mobility Training, Endurance Training, Safety Education & Training, Cognitive Reorientation, Patient/Caregiver Education & Training, Equipment Evaluation, Education, & procurement, Self-Care / ADL, Cognitive/Perceptual Training, Positioning  Plan Comment: Continue with OT POC  G-Code     OutComes Score    AM-PAC Score  Goals  Patient Goals   Patient goals : Patient is unable to report a goal at this time due to global aphasia       Therapy Time   Individual Concurrent Group Co-treatment   Time In 450 0043     Time Out 1605   1515     Minutes 50   10       This therapy session was supervised by Roscoe Bailey MS OTR/L    Electronically signed by Beto Membreno on 6/11/2019 at 5:12 PM    Beto Membreno

## 2019-06-11 NOTE — PROGRESS NOTES
Speech Language Pathology Treatment Note  Facility/Department: [facility]  6/11/2019    Rehab Dx/Hx: Abnormality of gait and mobility [R26.9]    Precautions: falls and aspirations    ST Dx: Aphasia and Dysphagia     Date of Admit: 5/31/2019  Room #: R240/R240-01    Time in: 1200       Time out: 1235    Subjective:   Alert, Distractible and Confused     Interventions used this date:  Dysphagia; Therapeutic meal monitor    Objective/Assessment:  Patient progressing towards goals:  Short-term Goals for Speech & Language POC:   Goal 1: Pt will follow 1 step directions given orally with 75% accuracy with mod cues to increase the pt's ability to follow directions provided by caregivers for safe follow through with ADLs. Goal 2: Pt will identify objects/pictures within a field of 2-3 with 75% accuracy with mod cues in order to increase his understanding of objects in his environment for safer and more independent completion of ADLs. Goal 3: Pt will answer low level yes/no questions with 85% accuracy with mod cues to assist the caregiver in obtaining important information regarding the patient's personal, medical, and safety needs. Goal 4: Pt will answer low level Wh- questions with 75% accuracy with mod cues to assist the caregiver in obtaining important information regarding the patient's personal, medical, and safety needs. Goal 5: Pt will demonstrate reading comprehension at the word  level to 60% acc with mod cues to promote pt's ability to utilize reading/writing. Short-term Goals for Dysphagia POC:  Goal 1: Pt will tolerate puree diet with nectar thick liquids with no overt s/s of aspiration. Goal met. Goal 2: Pt will perform oral motor ROM/strength exercieses with max cues 5x/each. Goal 3: Pt will perform base of tongue exercises with max cues 10x/each. Goal 4: Pt will tolerate therapeutic trials of mechanical soft/thin as able, with no overt s/s of aspiration.   Goal met for thin liquids. Jose's liquids were upgraded to thin on 6/7/19. Trialed mechanical soft consistency lunch tray this date of ground beef and rice. Pt was able to form a cohesive bolus adequately. Minimal-mild lingual residuals noted, which pt immediately sensed and cleared. He did require occasional cues for liquid wash. Pt is highly impulsive with oral intake and takes very large bites unless prompted otherwise. He exhibits decreased sequencing of self-feeding task. For example, his spoon was in his mashed potatoes and he wanted to eat his pudding. Rather than  his spoon, he began to lick the top of the pudding until full physical assistance was provided to  his spoon. When the pureed tray was brought to the pt, a slice of lime was placed on his pudding (as a garnish) in error. The dining ambassador saw the lime slice and attempted to remove it, but pt grabbed the lime slice too quickly and put it in his mouth (lime peel and all). Luckily, the pt was able to expectorate the lime slice with cueing provided by SLP. For this reason, 1:1 assistance is recommended with all meals. ST rec: a diet upgrade to mechanical soft (NDD level II) consistencies with thin liquids. [x]  RNPurvi notified  [x]  Dr. Jessica Paulson notified via voicemail  [x]  PT/OT notified via purple sheets    Swallow safety guidelines to be posted in room this afternoon. Dysphagia Goals: The patient will tolerate recommended diet without observed clinical signs of aspiration     Compensatory Swallowing Strategies: Small bites/sips, Total feed, Upright as possible for all oral intake, Swallow 2 times per bite/sip, Eat/Feed slowly         Treatment/Activity Tolerance:  Patient limited by fatigue    Plan:  Continue per POC    Pain:  Patient demonstrated no s/s of pain. Patient/Caregiver Education:  Patient educated on session and progression towards goals. and Education needs reinforcement.       Safety Devices:   All fall risk precautions in place     Comprehension:   1- Total assist     Expression:  1- Total assist     Problem Solvin- Total assist     Memory:  1- Total assist      Signature: Electronically signed by CIERRA Cerna on 2019 at 1:02 PM

## 2019-06-12 PROCEDURE — 97116 GAIT TRAINING THERAPY: CPT

## 2019-06-12 PROCEDURE — 92508 TX SP LANG VOICE COMM GROUP: CPT

## 2019-06-12 PROCEDURE — 97535 SELF CARE MNGMENT TRAINING: CPT

## 2019-06-12 PROCEDURE — 92526 ORAL FUNCTION THERAPY: CPT

## 2019-06-12 PROCEDURE — 6370000000 HC RX 637 (ALT 250 FOR IP): Performed by: PHYSICAL MEDICINE & REHABILITATION

## 2019-06-12 PROCEDURE — 97127 HC SP THER IVNTJ W/FOCUS COG FUNCJ: CPT

## 2019-06-12 PROCEDURE — 97127 HC OT THER IVNTJ W/FOCUS COG FUNCJ: CPT

## 2019-06-12 PROCEDURE — 6370000000 HC RX 637 (ALT 250 FOR IP): Performed by: NEUROLOGICAL SURGERY

## 2019-06-12 PROCEDURE — 6370000000 HC RX 637 (ALT 250 FOR IP): Performed by: INTERNAL MEDICINE

## 2019-06-12 PROCEDURE — 97530 THERAPEUTIC ACTIVITIES: CPT

## 2019-06-12 PROCEDURE — 1180000000 HC REHAB R&B

## 2019-06-12 PROCEDURE — 99232 SBSQ HOSP IP/OBS MODERATE 35: CPT | Performed by: PHYSICAL MEDICINE & REHABILITATION

## 2019-06-12 RX ADMIN — ACETAMINOPHEN 500 MG: 325 TABLET ORAL at 15:56

## 2019-06-12 RX ADMIN — BACITRACIN ZINC 1 G: 500 OINTMENT TOPICAL at 07:42

## 2019-06-12 RX ADMIN — LIDOCAINE HYDROCHLORIDE: 20 SOLUTION ORAL; TOPICAL at 07:42

## 2019-06-12 RX ADMIN — FAMOTIDINE 20 MG: 20 TABLET ORAL at 07:41

## 2019-06-12 RX ADMIN — PRAVASTATIN SODIUM 40 MG: 40 TABLET ORAL at 20:30

## 2019-06-12 RX ADMIN — ACETAMINOPHEN 500 MG: 325 TABLET ORAL at 20:30

## 2019-06-12 RX ADMIN — DILTIAZEM HYDROCHLORIDE 120 MG: 120 CAPSULE, COATED, EXTENDED RELEASE ORAL at 07:41

## 2019-06-12 RX ADMIN — DILTIAZEM HYDROCHLORIDE 120 MG: 120 CAPSULE, COATED, EXTENDED RELEASE ORAL at 20:30

## 2019-06-12 RX ADMIN — ACETAMINOPHEN 500 MG: 325 TABLET ORAL at 07:41

## 2019-06-12 RX ADMIN — HYDROCODONE BITARTRATE AND ACETAMINOPHEN 1 TABLET: 5; 325 TABLET ORAL at 20:30

## 2019-06-12 RX ADMIN — FAMOTIDINE 20 MG: 20 TABLET ORAL at 20:30

## 2019-06-12 RX ADMIN — BACITRACIN ZINC 1 G: 500 OINTMENT TOPICAL at 15:56

## 2019-06-12 RX ADMIN — BACITRACIN ZINC 1 G: 500 OINTMENT TOPICAL at 20:30

## 2019-06-12 RX ADMIN — MAGNESIUM OXIDE TAB 400 MG (241.3 MG ELEMENTAL MG) 400 MG: 400 (241.3 MG) TAB at 07:41

## 2019-06-12 RX ADMIN — Medication 5 MG: at 20:30

## 2019-06-12 ASSESSMENT — PAIN SCALES - GENERAL
PAINLEVEL_OUTOF10: 0
PAINLEVEL_OUTOF10: 2
PAINLEVEL_OUTOF10: 0
PAINLEVEL_OUTOF10: 0

## 2019-06-12 ASSESSMENT — PAIN SCALES - WONG BAKER: WONGBAKER_NUMERICALRESPONSE: 6

## 2019-06-12 ASSESSMENT — PAIN DESCRIPTION - LOCATION: LOCATION: KNEE

## 2019-06-12 ASSESSMENT — PAIN DESCRIPTION - ORIENTATION: ORIENTATION: LEFT

## 2019-06-12 ASSESSMENT — PAIN DESCRIPTION - PAIN TYPE: TYPE: ACUTE PAIN

## 2019-06-12 NOTE — PROGRESS NOTES
Speech Language Pathology Treatment Note  Facility/Department: [facility]  6/12/2019    Rehab Dx/Hx: Abnormality of gait and mobility [R26.9]    Precautions: falls and aspirations    ST Dx: Aphasia and Dysphagia     Date of Admit: 5/31/2019  Room #: R240/R240-01    Time in: 1130       Time out: 1220    Subjective:   Alert, Distractible and Confused     Interventions used this date:  Dysphagia; Therapeutic Dining Group, Cognition    Objective/Assessment:  Patient progressing towards goals:  Short-term Goals for Speech & Language POC:   Goal 1: Pt will follow 1 step directions given orally with 75% accuracy with mod cues to increase the pt's ability to follow directions provided by caregivers for safe follow through with ADLs. Goal 2: Pt will identify objects/pictures within a field of 2-3 with 75% accuracy with mod cues in order to increase his understanding of objects in his environment for safer and more independent completion of ADLs. Goal 3: Pt will answer low level yes/no questions with 85% accuracy with mod cues to assist the caregiver in obtaining important information regarding the patient's personal, medical, and safety needs. Goal 4: Pt will answer low level Wh- questions with 75% accuracy with mod cues to assist the caregiver in obtaining important information regarding the patient's personal, medical, and safety needs. Goal 5: Pt will demonstrate reading comprehension at the word  level to 60% acc with mod cues to promote pt's ability to utilize reading/writing. Short-term Goals for Dysphagia POC:  Goal 1: Pt will tolerate puree diet with nectar thick liquids with no overt s/s of aspiration. Goal met. Goal 2: Pt will perform oral motor ROM/strength exercieses with max cues 5x/each. Goal 3: Pt will perform base of tongue exercises with max cues 10x/each.     Goal 4: Pt will tolerate therapeutic trials of mechanical soft/thin as able, with no overt s/s of aspiration. Pt was seen for a therapeutic meal monitor as part of a Therapeutic Dining Group at lunch on this date to assess recall and implementation of mealtime swallow strategies. Pt recalled 0/2 mealtime strategies (small bites/sips, eat/feed slowly) and required maximum verbal and tactile cues to implement the above strategies during this meal. No overt s/s of aspiration observed per meal tray of mechanical soft (NDD level II) consistencies with thin liquids. Severely impaired oral prep phase of swallow. Pt requires max cues to sequence self feeding task and exhibit adequate safety judgment during oral intake. For example, when he dropped his peaches on the floor, he did not request assistance but attempted to  the pieces (with his fork) and continue eating. SLP witnessed pt's attempt to reach food on the ground and redirected the pt with maximum verbal and physical cues. Dysphagia Goals: The patient will tolerate recommended diet without observed clinical signs of aspiration     Compensatory Swallowing Strategies: Small bites/sips, Total feed, Upright as possible for all oral intake, Swallow 2 times per bite/sip, Eat/Feed slowly         Treatment/Activity Tolerance:  Patient tolerated treatment well    Plan:  Continue per POC    Pain:  Patient demonstrated no s/s of pain. Patient/Caregiver Education:  Patient educated on session and progression towards goals. and Education needs reinforcement.       Safety Devices:   All fall risk precautions in place     Comprehension:   1- Total assist     Expression:  1- Total assist     Problem Solvin- Total assist     Memory:  1- Total assist      Signature: Electronically signed by Parag Warner, SLP on 2019 at 1:14 PM

## 2019-06-12 NOTE — PROGRESS NOTES
met. Pt was seen for a therapeutic meal monitor with breakfast this date to assess for tolerance of newly upgraded diet. Pt was served a breakfast tray of scrambled eggs, ground sausage, pancakes, hashbrowns, yogurt, and pudding with thin liquids. He was able to form and clear a cohesive bolus per all presented consistencies in a timely manner. He required max assistance for sequencing self-feeding task. At one point, when SLP poured him a glass of water, he smiled and then put a piece of egg into his water. Will continue to monitor and address oral prep phase, oral phase, and pharyngeal phase of swallow. Dysphagia Goals: The patient will tolerate recommended diet without observed clinical signs of aspiration     Compensatory Swallowing Strategies: Small bites/sips, Total feed, Upright as possible for all oral intake, Swallow 2 times per bite/sip, Eat/Feed slowly         Treatment/Activity Tolerance:  Patient tolerated treatment well    Plan:  Continue per POC    Pain:  Patient demonstrated no s/s of pain. Patient/Caregiver Education:  Patient educated on session and progression towards goals. and Education needs reinforcement.       Safety Devices:   All fall risk precautions in place     Comprehension:   1- Total assist     Expression:  1- Total assist     Problem Solvin- Total assist     Memory:  1- Total assist      Signature: Electronically signed by CIERRA Gonzalez on 2019 at 8:33 AM

## 2019-06-12 NOTE — PROGRESS NOTES
Physical Therapy Rehab Treatment Note  Facility/Department: [facility]  Room: R240/R240-01       NAME: Sabina Mclaughlin  : 1934 (80 y.o.)  MRN: [id number]  CODE STATUS: Full Code    Date of Service: 2019  Chart Reviewed: Yes  Family / Caregiver Present: No    Restrictions:  Restrictions/Precautions: Fall Risk, Seizure  Position Activity Restriction  Other position/activity restrictions: nectar thick        SUBJECTIVE: Subjective: nods \"Jose\"  Pain Screening  Patient Currently in Pain: No  Pre Treatment Pain Screening  Pain at present: 0  Scale Used: Numeric Score  Intervention List: Patient able to continue with treatment    Post Treatment Pain Screening:No change        OBJECTIVE:      Bed mobility  Bridging: Minimal assistance  Rolling to Left: Minimal assistance  Rolling to Right: Minimal assistance  Supine to Sit: Minimal assistance  Sit to Supine: Minimal assistance  Scooting: Moderate assistance  Comment: excessive time and effort needed, simple commands best, hand rails used    Transfers  Sit to Stand: Minimal Assistance  Stand to sit: Minimal Assistance  Bed to Chair: Minimal assistance  Comment: poor approach to chair with poor carryover, good hand placement despite VC    Ambulation  Ambulation?: Yes  Ambulation 1  Surface: carpet  Device: Rollator  Assistance: Moderate assistance  Quality of Gait: occasional scissoring, NBOS, confused on where to go and unable to follow simple instructions, quick tena, unsteady turns   Distance: [de-identified]'                   ASSESSMENT/COMMENTS:   Pt requiring moderate assist during ambulation for safe rollator control and due to frequent confusion and pt unable to follow simple tasks this pm session. Poor approach to chair during stand to sit transfers and poor hand placement. PLAN OF CARE/Safety:   Safety Devices  Type of devices:  All fall risk precautions in place      Therapy Time:   Individual   Time In 1400   Time Out 1430   Minutes 30     Minutes:30 Transfer/Bed mobility training:15      Gait training:15      Neuro re education:0     Therapeutic ex:0      Saray Ortega PTA, 06/12/19 at 2:26 PM

## 2019-06-12 NOTE — PROGRESS NOTES
Pt is resting with eyes closed at this time. Call light is within reach.  Electronically signed by Gianna Valerio LPN on 0/07/9553 at 87:76 AM

## 2019-06-12 NOTE — PROGRESS NOTES
Pt c/o pain in legs, routine Tylenol effective. Pt was inappropriate with PCA this evening and was explained that behavior is not acceptable here. Pt is incontinent, will offer to toilet q 2 hours. AVASYS in place for fall precautions d/t pt doesn't call appropriately and is impulsive. He has tried multiple times to climb out of the bed without assistance. Pt is a 2 person pivot assist. LB 6/10/19. Will continue to monitor.  Electronically signed by Alysa Vinson RN on 6/11/2019 at 10:08 PM

## 2019-06-12 NOTE — PROGRESS NOTES
Physical Therapy Rehab Treatment Note  Facility/Department: [facility]  Room: R240/R240-01       NAME: Laure Montelongo  : 1934 (80 y.o.)  MRN: [id number]  CODE STATUS: Full Code    Date of Service: 2019       Restrictions:  Restrictions/Precautions: Fall Risk, Seizure(thin liquids ok now)  Position Activity Restriction  Other position/activity restrictions: nectar thick        SUBJECTIVE:            Pre andPost Treatment Pain Screenin/10       OBJECTIVE:               Neuromuscular Education  Neuromuscular Comments: gait in varying directins with difficulty following instructions           Transfers  Sit to Stand: Moderate Assistance;Minimal Assistance  Stand to sit: Moderate Assistance  Comment: poor ability to approach chair and follow instructions - multiple verbal cues and physical facilitation required    Ambulation  Ambulation?: Yes  Ambulation 1  Surface: level tile  Device: Rollator  Assistance: Minimal assistance; Moderate assistance  Quality of Gait: improved balance  and automatic LE function, less facilitation of weight shift and maneuvering in environment required, improved speed and safety in gait  Distance: 75 ft  Comments: pt with poor approach to the chair requiring constant cues and mod facilitation for completion of safe approach to chair  Stairs/Curb  Stairs?: Yes   Stairs  # Steps : 4  Stairs Height: 6\"  Rails: Bilateral  Assistance: Moderate assistance;Minimal assistance  Comment: second person present due to concern for pts ability to follow instructions - additional physical assistance was needed intermittently    Activity Tolerance  Activity Tolerance: Patient Tolerated treatment well          ASSESSMENT/COMMENTS:  Assessment: Pt with decreased awareness of surroundings this date. He also experieinced greater difficulty following instructions. He did not have his hearing aides which may be an impact for his ability to follow instructions. Pt overall physical performance is improved. Pts overall motor planning is impaired as is his overall cognition    PLAN OF CARE/Safety:   Safety Devices  Type of devices: Chair alarm in place; Left in chair;Gait belt      Therapy Time:   Individual   Time In 930   Time Out 1000   Minutes 30     Minutes:      Transfer/Bed mobility trainin      Gait training:15      Neuro re education:10          Flavia Steiner, PT, 19 at 1:11 PM

## 2019-06-12 NOTE — PROGRESS NOTES
Subjective: The patient complains of severe  acute  confusion secondary to subdural hematoma partially relieved by PT OT, speech-language pathology, recent decompression of the subdural hematomas and exacerbated by fatigue, exhaustion, dehydration. I am concerned about patients impulsivity and have added a video monitoring side and placed him in the [place] [facility] with frequent nursing checks. Patient and his girlfriend have the current discharge plan to go to [facility] transitioning to long-term care with Medicaid pending. We will have to see if he is accepted to that facility. He continues to complain of bilateral lower extremity pain partially relieved with the Norco.    I reviewed his recent modified barium swallow and have upgraded him to mechanical soft with thin liquids which she seems to be tolerating well. ROS x   10: The patient also complains of severely impaired mobility and activities of daily living. Otherwise no new problems with vision, hearing, nose, mouth, throat, dermal, cardiovascular, GI, , pulmonary, musculoskeletal, psychiatric or neurological. See Rehab H&P on Rehab chart dated . Vital signs:  BP (!) 172/98   Pulse 69   Temp 98 °F (36.7 °C) (Oral)   Resp 18   Ht 6' 0.05\" (1.83 m)   Wt 179 lb 10.8 oz (81.5 kg)   SpO2 96%   BMI 24.34 kg/m²   I/O:   PO/Intake:  fair PO intake,   mechanical soft with thin liquids    Bowel/Bladder:  incontinent, no problems noted. General:  Patient is well developed, adequately nourished, non-obese and     well kempt. HEENT:    PERRLA, hearing intact to loud voice, external inspection of ear     and nose benign. Inspection of lips, tongue and gums dry  Musculoskeletal: No significant change in strength or tone. All joints stable. Inspection and palpation of digits and nails show no clubbing,       cyanosis or inflammatory conditions. Neuro/Psychiatric: Affect: flat but pleasant. Echolalia.   Alert and oriented to person, place and     Situation-with max cues. No significant change in deep tendon reflexes or     Sensation-poor judgment reasoning and insight. Lungs:  Diminished, CTA-B. Respiration effort is normal at rest.     Heart:   S1 = S2, RRR. No loud murmurs. Abdomen:  Soft, non-tender, no enlargement of liver or spleen. Extremities:  No significant lower extremity edema or tenderness. Skin:   Intact to general survey,  craniotomy incisions healing. Rehabilitation:  Physical therapy: FIMS:  Bed Mobility: Scooting: Moderate assistance    Transfers: Sit to Stand: Minimal Assistance, Stand by assistance  Stand to sit: Minimal Assistance, Stand by assistance  Bed to Chair: Minimal assistance  Squat Pivot Transfers: Maximum Assistance(assistance guiding hips , simple commands used with poor comprehension), Ambulation 1  Surface: level tile  Device: Rollator  Other Apparatus: (no O2)  Assistance: Minimal assistance  Quality of Gait: improved balance  and automatic LE function, less facilitation of weight shift and maneuvering in environment required, improved speed and safety in gait  Distance: 75 ft  Comments: pt overall demonstrated improved gait ability - carry over of instructions is poor without frequent cues, Stairs  # Steps : 4  Stairs Height: 6\"  Rails: Bilateral  Assistance:  Moderate assistance, Minimal assistance  Comment: second person present due to concern for pts ability to follow instructions - physical assistance was not needed    FIMS: Bed, Chair, Wheel Chair: 0 - Did not occur  Walk: 2 - Maximal Assistance Requires up to Maximal Assistance AND requires assistance of one person to walk between  feet (Patient performs 25-49% of locomotion effort or goes between  feet)  Distance Walked: 75  Wheel Chair: 0 - Activity Not Assessed/Does Not Occur  Distance Traveled in Wheel Chair: 15'  Stairs: 1- Total Assistance perfoms less than 25% of the effort, or requirs the assistance of therapies and patient found to be able to participate in acute intensive comprehensive inpatient rehabilitation program including PT/OT to improve balance, ambulation, ADLs, and to improve the P/AROM. Therapeutic modifications regarding activities in therapies, place, amount of time per day and intensity of therapy made daily. In bed therapies or bedside therapies prn.   2. Bowel and Bladder dysfunction including incontinence :  frequent toileting, ambulate to bathroom with assistance, check post void residuals. Check for C.difficile x1 if >2 loose stools in 24 hours, continue bowel & bladder program.  Monitor bowel and bladder function. Lactinex 2 PO every AC. MOM prn, Brown Bomb prn, Glycerin suppository prn, enema prn.  3. Severe postoperative craniotomy pain generalized OA painAcute on chronic fracture, L1, with avulsion of the anterior portion of the L1 vertebral body. Findings suspicious for mild to moderate central stenosis at the L3-L4 level.: reassess pain every shift and prior to and after each therapy session, give prn Tylenol and schedull and consider Norco, modalities prn in therapy, Lidoderm, K-pad prn.   4. Skin healing and breakdown risk: Side-to-side turns add CoQ10 continue pressure relief program.  Daily skin exams and reports from nursing. 5. Severe Fatigue due to nutritional and hydration deficiency:    continue to monitor I&Os, calorie counts prn, dietary consult prn. Discontinue nocturnal IVs as patient is now on mechanical soft and thin liquids  6. Acute episodic insomnia with situational adjustment disorder:  prn Ambien, monitor for day time sedation. 7. Falls risk elevated:  patient to use call light to get nursing assistance to get up, bed and chair alarm. 8. Elevated DVT risk: progressive activities in PT, continue prophylaxis JOSE DE JESUS hose, elevation and  patient is not on a blood thinner secondary to recent subdural hematomas . 9.  Complex discharge planning:  Discharge 6/28/19 skilled level and then eventually long-term care at a wheelchair level. He will likely need 24-hour supervision at discharge. His significant other will help him apply for Medicaid as he will likely need care for the rest of his life. Weekly team meeting every Monday to assess progress towards goals, discuss and address social, psychological and medical comorbidities and to address difficulties they may be having progressing in therapy. Patient and family education is in progress. The patient is to follow-up with their family physician after discharge. Complex Active General Medical Issues that complicate care Assess & Plan:    1. Afib,   Essential hypertension,   Hyperlipidemia,   S/P CABG (coronary artery bypass graft), S/P PTCA (percutaneous transluminal coronary angioplasty),   RBBB,   CAD (coronary artery disease), long-term previous use of blood thinners telemetry to monitor for bradycardia blood pressure checks every shift dose and titrate cardiac medications, consult hospitals for backup medical and recheck CBC and BMP as needed -  2. Subdural hematoma-status post evacuation with , hold blood thinners-vital signs every shift, dose and titrate cardiac medications, recheck BMP CBC and consult hospitalist for backup medical  3.   prostatic hyperplasia with urinary frequency,   Prostate cancer -check postvoid residuals check stat UA  4. Facet arthropathy, lumbosacral,   Chronic back pain,   Spondylolisthesis of lumbar region-add Lidoderm, Tylenol, Norco as needed  5. Gross hematuria, Long term current use of anticoagulant-check UA monitor urine for blood  6. Long-term use of aspirin therapy and other blood thinners on hold at present because of recent subdural hematoma  7. SHAYY on CPAP-add CPAP from home if available is not available add CPAP from the hospital  8. Prediabetes-recheck BMP, low carb diet  9.    Vitamin D deficiency-high-dose vitamin D, add Tums at daily dose of vitamin D after high doses done recheck as an outpatient  10. Aphasia, and oropharyngeal dysphagia-recently which pathology consult, add e-stim, add supplementation to diet push oral fluids  11. Poorly nourished due to poor nutrition and previous alcohol use BMI 26.0-26.9,adult--add protein supplementation vitamin B12 vitamin D CoQ10,  to stop drinking alcohol as an outpatient and at chemical dependency counseling when patient is able to participate  12. Complex social situation-patient has a girlfriend he lives alone. He is estranged from his several children.         Odilia Jacobo, D.O., PM&R     Attending    [address] [facility]

## 2019-06-12 NOTE — PROGRESS NOTES
Occupational Therapy  Facility/Department: [facility]  Daily Treatment Note  NAME: Sami Yanes  : 1934  MRN: [id number]    Date of Service: 2019    Discharge Recommendations:  Continue to assess pending progress       Assessment   Assessment: Pt demonstrated good attention to task throughout OT session. Pt continues to benefit from skilled OT to maxmize independence in ADLs and IADLs  REQUIRES OT FOLLOW UP: Yes  Activity Tolerance  Activity Tolerance: Patient Tolerated treatment well  Activity Tolerance: Fair  Safety Devices  Safety Devices in place: Yes  Type of devices: All fall risk precautions in place  Restraints  Initially in place: No         Patient Diagnosis(es): There were no encounter diagnoses. has a past medical history of Chronic back pain, Hyperlipidemia, and Hypertension. has a past surgical history that includes Cardiac catheterization; Coronary artery bypass graft; and craniotomy (Bilateral, 2019). Restrictions  Restrictions/Precautions  Restrictions/Precautions: Fall Risk, Seizure  Position Activity Restriction  Other position/activity restrictions: nectar thick   Subjective   General  Chart Reviewed: Yes  Patient assessed for rehabilitation services?: Yes  Response to previous treatment: Patient with no complaints from previous session  Family / Caregiver Present: No  Subjective  Subjective: Pt seated in W/C upon arrival. Pt on 3L of O2. General Comment  Comments: Observed pt repeating words/echolalia when attempting to communicate with OT. Pain Assessment  Pain Assessment: 0-10  Pain Level: 0  Vital Signs  Patient Currently in Pain: No   Orientation  Orientation  Overall Orientation Status: Within Functional Limits  Orientation Level: Oriented to person;Oriented to time;Oriented to situation  Objective        Pt was in the bathroom with nursing upon OT arrival. Pt had urinated on the floor and soiled his pants. RN Vira Silvestre removed pants.  Therapy student and therapist took over to complete ADL treatment. Pts shoes/socks were soiled-donned hospital socks. Pt completed LE dressing task to improve independence in ADLs. Pt was able to initiate the task but was unable to problem solve or follow commands when given. Pt current level of function in LE dressing is dependent. Pt placed various sized wooden cylinders into the designated location to improve functional reaching. Pt was able to place the objects directly back into slot independently when the objects were in the correct order. However, when the task complexity increased and the cylindrical objects were put in randomized order, pt was unable to complete the task. Pt assembled beads on a string to work on fine motor coordination. Pt required significant increased time during task. Pt able to string 4 beads in 10 minutes but had good attention to task. Pt assembled 5, 1 piece foam cutouts of familiar objects, such as a cat or house to work on cognitive retraining. Pt had no difficulty completing the task when only 4 puzzles were in front of him. However, pt had increased difficulty when the task complexity increased and was given more than 4 puzzles. Pt had good attention to task.     Plan   Plan  Times per week: 5-7   Plan weeks: 4  Current Treatment Recommendations: Strengthening, ROM, Balance Training, Functional Mobility Training, Endurance Training, Safety Education & Training, Cognitive Reorientation, Patient/Caregiver Education & Training, Equipment Evaluation, Education, & procurement, Self-Care / ADL, Cognitive/Perceptual Training, Positioning  Plan Comment: Continue with OT POC  G-Code     OutComes Score    AM-PAC Score    Goals  Patient Goals   Patient goals : Patient is unable to report a goal at this time due to global aphasia       Therapy Time   Individual Concurrent Group Co-treatment   Time In 1505         Time Out 1605         Minutes 60           This therapy session was supervised by Ade Byers MS OTR/L    Electronically signed by Inderjit Zamora on 6/12/2019 at 4:43 PM    Inderjit Zamora

## 2019-06-13 PROCEDURE — 6370000000 HC RX 637 (ALT 250 FOR IP): Performed by: PHYSICAL MEDICINE & REHABILITATION

## 2019-06-13 PROCEDURE — 6370000000 HC RX 637 (ALT 250 FOR IP): Performed by: INTERNAL MEDICINE

## 2019-06-13 PROCEDURE — 6370000000 HC RX 637 (ALT 250 FOR IP): Performed by: NEUROLOGICAL SURGERY

## 2019-06-13 PROCEDURE — 92507 TX SP LANG VOICE COMM INDIV: CPT

## 2019-06-13 PROCEDURE — 97150 GROUP THERAPEUTIC PROCEDURES: CPT

## 2019-06-13 PROCEDURE — 2700000000 HC OXYGEN THERAPY PER DAY

## 2019-06-13 PROCEDURE — 99232 SBSQ HOSP IP/OBS MODERATE 35: CPT | Performed by: PHYSICAL MEDICINE & REHABILITATION

## 2019-06-13 PROCEDURE — 97530 THERAPEUTIC ACTIVITIES: CPT

## 2019-06-13 PROCEDURE — 97112 NEUROMUSCULAR REEDUCATION: CPT

## 2019-06-13 PROCEDURE — 1180000000 HC REHAB R&B

## 2019-06-13 PROCEDURE — 97127 HC OT THER IVNTJ W/FOCUS COG FUNCJ: CPT

## 2019-06-13 PROCEDURE — 97116 GAIT TRAINING THERAPY: CPT

## 2019-06-13 RX ADMIN — FAMOTIDINE 20 MG: 20 TABLET ORAL at 07:38

## 2019-06-13 RX ADMIN — ACETAMINOPHEN 500 MG: 325 TABLET ORAL at 07:38

## 2019-06-13 RX ADMIN — BACITRACIN ZINC 1 G: 500 OINTMENT TOPICAL at 20:45

## 2019-06-13 RX ADMIN — PRAVASTATIN SODIUM 40 MG: 40 TABLET ORAL at 20:45

## 2019-06-13 RX ADMIN — MAGNESIUM OXIDE TAB 400 MG (241.3 MG ELEMENTAL MG) 400 MG: 400 (241.3 MG) TAB at 07:38

## 2019-06-13 RX ADMIN — ACETAMINOPHEN 500 MG: 325 TABLET ORAL at 20:45

## 2019-06-13 RX ADMIN — Medication 5 MG: at 20:44

## 2019-06-13 RX ADMIN — ACETAMINOPHEN 500 MG: 325 TABLET ORAL at 12:12

## 2019-06-13 RX ADMIN — BACITRACIN ZINC 1 G: 500 OINTMENT TOPICAL at 07:39

## 2019-06-13 RX ADMIN — DILTIAZEM HYDROCHLORIDE 120 MG: 120 CAPSULE, COATED, EXTENDED RELEASE ORAL at 20:44

## 2019-06-13 RX ADMIN — DILTIAZEM HYDROCHLORIDE 120 MG: 120 CAPSULE, COATED, EXTENDED RELEASE ORAL at 07:38

## 2019-06-13 RX ADMIN — FAMOTIDINE 20 MG: 20 TABLET ORAL at 20:45

## 2019-06-13 RX ADMIN — BACITRACIN ZINC 1 G: 500 OINTMENT TOPICAL at 12:12

## 2019-06-13 ASSESSMENT — PAIN SCALES - GENERAL
PAINLEVEL_OUTOF10: 0
PAINLEVEL_OUTOF10: 2
PAINLEVEL_OUTOF10: 0

## 2019-06-13 NOTE — PROGRESS NOTES
Speech Language Pathology Treatment Note  Facility/Department: [facility]  6/13/2019    Rehab Dx/Hx: Abnormality of gait and mobility [R26.9]    Precautions: falls and aspirations    ST Dx: Aphasia, Dysphagia and Cognitive Impairment     Date of Admit: 5/31/2019  Room #: R240/R240-01    Time in: 1000  Time out: 1030      Subjective:  Alert, Cooperative and Confused        Interventions used this date:  Expressive Language and Receptive Language    Objective/Assessment:  Patient progressing towards goals:  Short-term Goals  Timeframe for Short-term Goals: 3-4 weeks  Goal 1: Pt will follow 1 step directions given orally with 75% accuracy with mod cues to increase the pt's ability to follow directions provided by caregivers for safe follow through with ADLs. Goal 2: Pt will identify objects/pictures within a field of 2-3 with 75% accuracy with mod cues in order to increase his understanding of objects in his environment for safer and more independent completion of ADLs. ID functional objects in a field of 2 with 60% acc and mod-max cues  Goal 3: Pt will answer low level yes/no questions with 85% accuracy with mod cues to assist the caregiver in obtaining important information regarding the patient's personal, medical, and safety needs. Answered personal binary yes/no questions with 70% acc and mod verbal and written yes/no choices  Goal 4: Pt will answer low level Wh- questions with 75% accuracy with mod cues to assist the caregiver in obtaining important information regarding the patient's personal, medical, and safety needs. Goal 5: Pt will demonstrate reading comprehension at the word  level to 60% acc with mod cues to promote pt's ability to utilize reading/writing. Short-term Goals  Timeframe for Short-term Goals: 2-4/week  Goal 1: Pt will tolerate puree diet with nectar thick liquids with no overt s/s of aspiration.   Goal 2: Pt will perform oral motor ROM/strength exercieses with

## 2019-06-13 NOTE — PROGRESS NOTES
Physical Therapy Rehab Treatment Note  Fac[facility]Department[facility]  [facility]       NAME: Jame Spurling  : 1934 (80 y.o.)  MRN: [id number]  CODE STATUS: Full Code    Date of Service: 2019  Patient assessed for rehabilitation services?: Yes    Restrictions:  Restrictions/Precautions: Fall Risk, Seizure(thin liquids ok now)  Position Activity Restriction  Other position/activity restrictions: nectar thick        SUBJECTIVE:            Pre and Post Treatment Pain Screenin/10       OBJECTIVE:               Neuromuscular Education  PNF: standing balance facilitation with and without UE support - posture emphasized  - swiss ball, PNF techniques and weight shifting tasks utilized   Neuromuscular Comments: Pt able to stand without UE support 2 min with SBA however once balance lost he required min assist due to delay in reactions           Transfers  Sit to Stand: Minimal Assistance  Stand to sit: Minimal Assistance  Bed to Chair: Minimal assistance  Comment: variable assist physically and verbally with transfers - this is impacted by environmental distraction most often    Ambulation  Ambulation?: Yes  Ambulation 1  Surface: carpet;level tile  Device: Rollator  Assistance: Minimal assistance  Quality of Gait: variable posture, variable step length and foot placement, right neglect noted in maneuvering in environment, pt highly distractible with frequent redirection required, variable walker use and safety  Distance: 180  Comments: pt requires verbal cues and assist with rollator fro safe maneuvering - pt is distractible and demonstrates poor attention to environmental barriers. Stairs/Curb  Stairs?: Yes   Stairs  # Steps : 4  Stairs Height: 6\"  Rails: Bilateral  Assistance:  Moderate assistance  Comment: +1 with variable balance due to variable performance of following instructions    Activity Tolerance  Activity Tolerance: Patient Tolerated treatment well          ASSESSMENT/COMMENTS:  Assessment: Pt demonstrates inconsistent ability to follow instructions and attend to environment. Verbal and tactile cueing is required throughout the session with no carry over between tasks noted. Pt cooperative    PLAN OF CARE/Safety:   Safety Devices  Type of devices: Chair alarm in place; Left in chair;Gait belt(with OT staff following this session)      Therapy Time:   Individual   Time In 1430   Time Out 1500   Minutes 30     Minutes:      Transfer/Bed mobility training: 10      Gait training:10      Neuro re education:10        Eliz Childers, PT, 06/13/19 at 3:54 PM

## 2019-06-13 NOTE — PROGRESS NOTES
Physical Therapy Rehab Treatment Note  [facility]  [facility]       NAME: Michelle Goncalves  : 1934 (80 y.o.)  MRN: [id number]  CODE STATUS: Full Code    Date of Service: 2019       Restrictions:  Restrictions/Precautions: Fall Risk, Seizure(thin liquids ok now)  Position Activity Restriction  Other position/activity restrictions: nectar thick        SUBJECTIVE:            Pre and Post Treatment Pain Screenin/10       OBJECTIVE:               Neuromuscular Education  PNF: standing balance facilitation with and without UE support - posture emphasized   Neuromuscular Comments: gait with rollator in varying pathways trialled - cues for task completion and safety required throughout without carry over           Transfers  Sit to Stand: Minimal Assistance  Stand to sit: Minimal Assistance  Comment: poor approach to chair - variable level of cueing required for task completion due to pts high distractibility    Ambulation  Ambulation?: Yes  Ambulation 1  Surface: carpet  Device: Rollator  Assistance: Moderate assistance;Minimal assistance  Quality of Gait: variable posture, variable step length and foot placement, right neglect noted in maneuvering in environment, pt highly distractible with frequent redirection required, variable walker use and safety  Distance: 160 feet  Comments: pt able to follwo simple direction with minimal repetition in a quiet environment. In a distracting environment he requires constant redirection and frequent cues for task completion  Stairs/Curb  Stairs?: Yes   Stairs  # Steps : 4  Stairs Height: 6\"  Rails: Bilateral  Assistance:  Moderate assistance(frequent redirection required)  Comment: +1 with variable balance due to variable performance of following instructions    Activity Tolerance  Activity Tolerance: Patient Tolerated treatment well          ASSESSMENT/COMMENTS:  Assessment: Pt demonstrates improved motor abilities however he is highly distractible and has poor awareness of environment for safe perfomrance. he demonstrates freequent LOB requiring recovery assist  as well    PLAN OF CARE/Safety:   Safety Devices  Type of devices: Chair alarm in place; Left in chair;Gait belt(pt left with OT staff following this session)      Therapy Time:   Individual   Time In 1030   Time Out 1100   Minutes 30     Minutes:      Transfer/Bed mobility trainin      Gait training:15      Neuro re education:10          Nat Louie PT, 19 at 11:45 AM

## 2019-06-13 NOTE — PROGRESS NOTES
Subjective: The patient complains of severe  acute  confusion secondary to subdural hematoma partially relieved by PT OT, speech-language pathology, recent decompression of the subdural hematomas and exacerbated by fatigue, exhaustion, dehydration. I am concerned about patients impulsivity and have added a video monitoring side and placed him in the [place] [facility] [place] with frequent nursing checks. I am concerned that he continues to need assistance for feeding. Otherwise he seems to be taking his mechanical soft with thin liquids very well. He continues to have some headaches secondary to his subdural hematomas unrelieved with Tylenol improved relief with Norco.    In general he has better eye contact and is more interactive. ROS x   10: The patient also complains of severely impaired mobility and activities of daily living. Otherwise no new problems with vision, hearing, nose, mouth, throat, dermal, cardiovascular, GI, , pulmonary, musculoskeletal, psychiatric or neurological. See Rehab H&P on Rehab chart dated . Vital signs:  /72   Pulse 53   Temp 98 °F (36.7 °C) (Oral)   Resp 17   Ht 6' 0.05\" (1.83 m)   Wt 179 lb 10.8 oz (81.5 kg)   SpO2 91%   BMI 24.34 kg/m²   I/O:   PO/Intake:  fair PO intake,   mechanical soft with thin liquids    Bowel/Bladder:  incontinent, no problems noted. General:  Patient is well developed, adequately nourished, non-obese and     well kempt. HEENT:    PERRLA, hearing intact to loud voice, external inspection of ear     and nose benign. Inspection of lips, tongue and gums dry  Musculoskeletal: No significant change in strength or tone. All joints stable. Inspection and palpation of digits and nails show no clubbing,       cyanosis or inflammatory conditions. Neuro/Psychiatric: Affect: flat but pleasant. Echolalia. Alert and oriented to person, place and     Situation-with max cues.   No significant change in deep tendon reflexes or     Sensation-poor judgment reasoning and insight. Lungs:  Diminished, CTA-B. Respiration effort is normal at rest.     Heart:   S1 = S2, RRR. No loud murmurs. Abdomen:  Soft, non-tender, no enlargement of liver or spleen. Extremities:  No significant lower extremity edema or tenderness. Skin:   Intact to general survey,  craniotomy incisions healing. Rehabilitation:  Physical therapy: FIMS:  Bed Mobility: Scooting: Moderate assistance    Transfers: Sit to Stand: Minimal Assistance  Stand to sit: Minimal Assistance  Bed to Chair: Minimal assistance  Squat Pivot Transfers: Maximum Assistance(assistance guiding hips , simple commands used with poor comprehension), Ambulation 1  Surface: carpet  Device: Rollator  Other Apparatus: (no O2)  Assistance: Moderate assistance  Quality of Gait: occasional scissoring, NBOS, confused on where to go and unable to follow simple instructions, quick tena, unsteady turns   Distance: [de-identified]'  Comments: pt with poor approach to the chair requiring constant cues and mod facilitation for completion of safe approach to chair, Stairs  # Steps : 4  Stairs Height: 6\"  Rails: Bilateral  Assistance: Moderate assistance, Minimal assistance  Comment: second person present due to concern for pts ability to follow instructions - additional physical assistance was needed intermittently    FIMS: Bed, Chair, Wheel Chair: 0 - Did not occur  Walk: 2 - Maximal Assistance Requires up to Maximal Assistance AND requires assistance of one person to walk between  feet (Patient performs 25-49% of locomotion effort or goes between  feet)  Distance Walked: 75  Wheel Chair: 0 - Activity Not Assessed/Does Not Occur  Distance Traveled in Wheel Chair: 15'  Stairs: 1- Total Assistance perfoms less than 25% of the effort, or requirs the assistance of two people, or goes up and down fewer than 4 stairs,  , Assessment: Pt with decreased awareness of surroundings this date.  He also experieinced greater difficulty following instructions. He did not have his hearing aides which may be an impact for his ability to follow instructions. Pt overall physical performance is improved    Occupational therapy: FIMS:  Eatin - Unable to put food on utensil & bring to mouth  Groomin - Requires assistance with all tasks  Bathin - Able to bathe 2 or less areas/total assist  Dressing-Upper: 1 - Requires assist with 4 tasks/total assist  Dressing-Lower: 1 - Total assist with lower body dressing  Toiletin - Did not occur  Toilet Transfer: 0 - Did not occur  Shower Transfer: 0 - Activity does not occur,  , Assessment: Pt demonstrated good attention to task throughout OT session. Pt continues to benefit from skilled OT to maxmize independence in ADLs and IADLs    Speech therapy: FIMS: Comprehension: 1 - Patient understands basic needs <25% of the time  Expression: 1 - Expresses basic ideas/needs < 25% of the time  Social Interaction: 1 - Patient appropriate < 25% of the time  Problem Solvin - Patient solves simple/routine tasks < 25%  Memory: 1 - Patient remembers < 25% of the time      Lab/X-ray studies reviewed, analyzed and discussed with patient and staff:   No results found for this or any previous visit (from the past 24 hour(s)). Xr Chest  2019   Areas of atelectasis, patchy groundglass infiltrate in the bases. Right greater than left . Trace/ small right pleural effusion    Ct Head   2019  Impression: Bilateral frontal and parietal subdural hematomas. The amount of extra-axial air has slightly decreased since the prior study. No new areas of hemorrhage are identified. All CT scans at this facility use dose modulation, iterative reconstruction, and/or weight based dosing when appropriate to reduce radiation dose to as low as reasonably achievable.     Ct Head Wo  : 2019   DECREASING-SIZED BILATERAL CEREBRAL CONVEXITY SUBDURAL HEMATOMAS AFTER INTERVAL REMOVAL OF BOTH DRAINAGE CATHETERS. Ct Head Wo : 5/24/2019   UP TO 1.8 CM ACUTE CHRONIC SUBDURAL HEMATOMAS OVER BOTH CEREBRAL CONVEXITIES    Ct Cervical Spine  5/24/2019   . Mild to moderate hypertrophic facet changes are present, predominantly on the right at C3-4, and on the left at C2-C3 and C5-6. There is no fracture, significant subluxation, or acute paraspinous soft tissue abnormalities identified. NO FRACTURE OR EVIDENCE OF CERVICAL SPINE INJURY IDENTIFIED. CERVICAL SPONDYLOSIS. Ct Lumbar   5/24/2019  Acute on chronic fracture, L1, with avulsion of the anterior portion of the L1 vertebral body. Findings suspicious for mild to moderate central stenosis at the L3-L4 level. Us Dup Upper Extremity Right Eric 5/31/2019  NO FINDINGS OF DEEP VENOUS THROMBUS IN  THE VISUALIZED VESSELS OF THE RIGHT UPPER EXTREMITY. Fl Modified Barium   5/31/2019  EXAMINATION: FL MODIFIED BARIUM SWALLOW W VIDEO: DATE AND TIME: 5/30/2019 at 10:45 AM. CLINICAL HISTORY:    MODERATE ORAL AND PHARYNGEAL DYSFUNCTION WITHOUT ASPIRATION. PLEASE REFER TO SPEECH PATHOLOGY  REPORT FOR RECOMMENDATIONS. Us Dup Lower Extremities Bilateral V 5/29/2019  NO DVT IDENTIFIED IN EITHER LOWER EXTREMITY. Previous extensive, complex labs, notes and diagnostics reviewed and analyzed. ALLERGIES:    Allergies as of 05/31/2019 - Review Complete 05/31/2019   Allergen Reaction Noted    Honey bee venom [bee venom] Swelling 10/08/2015    Penicillins Swelling 10/08/2015      (please also verify by checking MAR)       Part of patient's rehabilitation program I am encouraging them to attend game day today under recreational therapy to improve their dexterity, cognition, socialization and endurance. Complex Physical Medicine & Rehab Issues Assess & Plan:   1. Severe abnormality of gait and mobility and impaired self-care and ADL's secondary to progressive subdural hematomas .   Functional and medical status reassessed regarding patients planning:  Discharge 6/28/19 skilled level and then eventually long-term care at a wheelchair level. He will likely need 24-hour supervision at discharge. His significant other will help him apply for Medicaid as he will likely need care for the rest of his life. Weekly team meeting every Monday to assess progress towards goals, discuss and address social, psychological and medical comorbidities and to address difficulties they may be having progressing in therapy. Patient and family education is in progress. The patient is to follow-up with their family physician after discharge. Complex Active General Medical Issues that complicate care Assess & Plan:    1. Afib,   Essential hypertension,   Hyperlipidemia,   S/P CABG (coronary artery bypass graft), S/P PTCA (percutaneous transluminal coronary angioplasty),   RBBB,   CAD (coronary artery disease), long-term previous use of blood thinners telemetry to monitor for bradycardia blood pressure checks every shift dose and titrate cardiac medications, consult hospitals for backup medical and recheck CBC and BMP as needed -  2. Subdural hematoma-status post evacuation with , hold blood thinners-vital signs every shift, dose and titrate cardiac medications, recheck BMP CBC and consult hospitalist for backup medical  3.   prostatic hyperplasia with urinary frequency,   Prostate cancer -check postvoid residuals check stat UA  4. Facet arthropathy, lumbosacral,   Chronic back pain,   Spondylolisthesis of lumbar region-add Lidoderm, Tylenol, Norco as needed  5. Gross hematuria, Long term current use of anticoagulant-check UA monitor urine for blood  6. Long-term use of aspirin therapy and other blood thinners on hold at present because of recent subdural hematoma  7. SHAYY on CPAP-add CPAP from home if available is not available add CPAP from the hospital  8. Prediabetes-recheck BMP, low carb diet  9.    Vitamin D deficiency-high-dose vitamin D, add

## 2019-06-13 NOTE — PROGRESS NOTES
Occupational Therapy  Facility/Department: [facility]  Daily Treatment Note  NAME: Joan Weaver  : 1934  MRN: [id number]    Date of Service: 2019    Discharge Recommendations:  Continue to assess pending progress       Assessment   Assessment: Pt demonstrated poor ability to follow commands because pt required mutliple cues to only place washers one by one. Pt continues to benefit from skilled OT to maximze independence in ADLs and IADLs. REQUIRES OT FOLLOW UP: Yes  Activity Tolerance  Activity Tolerance: Patient Tolerated treatment well  Activity Tolerance: Fair  Safety Devices  Safety Devices in place: Yes  Type of devices: All fall risk precautions in place  Restraints  Initially in place: No         Patient Diagnosis(es): There were no encounter diagnoses. has a past medical history of Chronic back pain, Hyperlipidemia, and Hypertension. has a past surgical history that includes Cardiac catheterization; Coronary artery bypass graft; and craniotomy (Bilateral, 2019). Restrictions  Restrictions/Precautions  Restrictions/Precautions: Fall Risk, Seizure  Position Activity Restriction  Other position/activity restrictions: nectar thick   Subjective   General  Chart Reviewed: Yes  Patient assessed for rehabilitation services?: Yes  Response to previous treatment: Patient with no complaints from previous session  Family / Caregiver Present: No  Subjective  Subjective: Pt seated in W/C upon arrival. Pt on 3L of O2. General Comment  Comments: Observed pt repeating words/echolalia when attempting to communicate with OT.    Pain Assessment  Pain Assessment: 0-10  Pain Level: 0  Vital Signs  Patient Currently in Pain: No   Orientation  Orientation  Overall Orientation Status: Within Functional Limits  Orientation Level: Oriented to person;Oriented to time;Oriented to situation  Objective        Pt placed rubber washers of 3 different sizes on plastic dowels to improve functional reaching during ADLs and IADLs. Pt required max verbal cues during activity to place the washers on dowels one at a time only. Pt completed activity with significant increased amount of time. Pt filled 3 dowels in 20 minutes. Pt had good endurance. Pt removed pop beads, one at a time, from long string of beads using B UEs to improve  strength during self care. Pt required significant increased time to perform task. Pt removed 10 beads in 10 minutes. Pt required min verbal cues to redirect.                                                                               Plan   Plan  Times per week: 5-7   Plan weeks: 4  Current Treatment Recommendations: Strengthening, ROM, Balance Training, Functional Mobility Training, Endurance Training, Safety Education & Training, Cognitive Reorientation, Patient/Caregiver Education & Training, Equipment Evaluation, Education, & procurement, Self-Care / ADL, Cognitive/Perceptual Training, Positioning  Plan Comment: Continue with OT POC  G-Code     OutComes Score    AM-PAC Score    Goals  Patient Goals   Patient goals : Patient is unable to report a goal at this time due to global aphasia       Therapy Time   Individual Concurrent Group Co-treatment   Time In 1130        Time Out 1200        Minutes 30          This therapy session was supervised by Ade Byers MS OTR/L    Electronically signed by Jennifer Monday on 6/13/2019 at 1:35 PM    Jennifer Monlila

## 2019-06-13 NOTE — PROGRESS NOTES
Occupational Therapy  Facility/Department: [facility]  Daily Treatment Note  NAME: Sabina Mclaughlin  : 1934  MRN: [id number]    Date of Service: 2019    Discharge Recommendations:  Continue to assess pending progress       Assessment   Assessment: Pt demonstrates decreased ability to follow commands. Pt demonstrates good attention to task with minimal verbal cues to redirect. Pt continues to benefit from skilled OT to maximize independence in safety and ADLs. REQUIRES OT FOLLOW UP: Yes  Activity Tolerance  Activity Tolerance: Patient Tolerated treatment well  Activity Tolerance: Fair  Safety Devices  Safety Devices in place: Yes  Type of devices: All fall risk precautions in place  Restraints  Initially in place: No         Patient Diagnosis(es): There were no encounter diagnoses. has a past medical history of Chronic back pain, Hyperlipidemia, and Hypertension. has a past surgical history that includes Cardiac catheterization; Coronary artery bypass graft; and craniotomy (Bilateral, 2019). Restrictions  Restrictions/Precautions  Restrictions/Precautions: Fall Risk, Seizure  Position Activity Restriction  Other position/activity restrictions: nectar thick   Subjective   General  Chart Reviewed: Yes  Patient assessed for rehabilitation services?: Yes  Response to previous treatment: Patient with no complaints from previous session  Family / Caregiver Present: No  Subjective  Pain Assessment  Pain Assessment: 0-10  Pain Level: 0  Vital Signs  Patient Currently in Pain: No   Orientation  Orientation  Overall Orientation Status: Within Functional Limits  Orientation Level: Oriented to person;Oriented to time;Oriented to situation  Objective        Pt placed colored pegs in foam board using B UEs to work on cognitive retraining. Therapist placed initial peg of specific color for pt to fill the rest of the column with. Pt required max assist and max verbal cues to complete the task.  Pt had good attention to task but required significant increased time to complete. Pt completed 3 columns in 25  minutes. Pt squeezed tennis ball with R UE and placed wooden beads into slit with L UE to improve  strength during self care. Pt alternated hands and completed the task a second time. Pt had good attention to task and good endurance. Pt required significant increased time to complete the task. Pt placed rings on wooden tree with 1# wrist weights on B UEs to improve independence in transfers and ADLs. Pt required mod verbal cues to complete activity. Pt required significant increased time to complete the task. Pt filled 2 dowels in 20 minutes.                                                                                  Plan   Plan  Times per week: 5-7   Plan weeks: 4  Current Treatment Recommendations: Strengthening, ROM, Balance Training, Functional Mobility Training, Endurance Training, Safety Education & Training, Cognitive Reorientation, Patient/Caregiver Education & Training, Equipment Evaluation, Education, & procurement, Self-Care / ADL, Cognitive/Perceptual Training, Positioning  Plan Comment: Continue with OT POC  G-Code     OutComes Score    AM-PAC Score    Goals  Patient Goals   Patient goals : Patient is unable to report a goal at this time due to global aphasia       Therapy Time   Individual Concurrent Group Co-treatment   Time In 1500        Time Out 1600        Minutes 60          This therapy session was supervised by Laura Berger MS OTR/L    Electronically signed by Lizzy Iqbal on 6/13/2019 at 4:55 PM      Lizzy Iqbal

## 2019-06-13 NOTE — PROGRESS NOTES
was placed in the center of the table and pt. alternated with his partner in naming the card, then seeing if he had a match to one of 5 cards in front of him, and then stacking matching cards together. Pt. was able to attend to task with mod difficulty when asked if he had a card (\"do you have any tens? \") due to distraction from other patient and cues from other therapist to that patient. Pt. was able to reach 50% of the time with mod verbal cues to  cards he recognized that he had in order to make a match. Pt. alternated hands he reached with without cuing. Pt. was able to sort cards into stacks with min increased time.         Plan   Plan  Times per week: 5-7   Plan weeks: 4  Current Treatment Recommendations: Strengthening, ROM, Balance Training, Functional Mobility Training, Endurance Training, Safety Education & Training, Cognitive Reorientation, Patient/Caregiver Education & Training, Equipment Evaluation, Education, & procurement, Self-Care / ADL, Cognitive/Perceptual Training, Positioning  Plan Comment: Continue with OT POC  G-Code     OutComes Score     AM-PAC Score  Goals  Patient Goals   Patient goals : Patient is unable to report a goal at this time due to global aphasia       Therapy Time   Individual Concurrent Group Co-treatment   Time In 1115        Time Out 1130        Minutes [address] [place], [place] [address]KATARINA/L Electronically signed by KATARINA Castellanos/L on 6/13/2019 at 1:32 PM

## 2019-06-13 NOTE — PROGRESS NOTES
Patient medicated with melatoin and norco at bedtime, has slept well this night. Incontinent of urine several times and depends changed with alexis care given.

## 2019-06-13 NOTE — PROGRESS NOTES
Occupational Therapy  Facility/Department: [facility]  Daily Treatment Note  NAME: Judi Davis  : 1934  MRN: [id number]    Date of Service: 2019    Discharge Recommendations:  Continue to assess pending progress       Assessment Pt tolerated treatment fair. Pt with increased time and cues to follow commands. Pt able to state face value of cards often. Patient Diagnosis(es): There were no encounter diagnoses. has a past medical history of Chronic back pain, Hyperlipidemia, and Hypertension. has a past surgical history that includes Cardiac catheterization; Coronary artery bypass graft; and craniotomy (Bilateral, 2019). Restrictions  Restrictions/Precautions  Restrictions/Precautions: Fall Risk, Seizure  Position Activity Restriction  Other position/activity restrictions: nectar thick      Subjective   General  Chart Reviewed: Yes  Patient assessed for rehabilitation services?: Yes  Response to previous treatment: Patient with no complaints from previous session  Family / Caregiver Present: No  Subjective:  Pt on 3L of O2. Pt reported no pain prior or after OT session. Pt coughing intermittently during OT treatment. Objective        Pt seen in group to increase cognition for 15 minutes. Wrist weight on RUE. Pt with Max verbal cues to attend to card sorting activity. Pt with 5 different cards in front. Pt able to state card placed in middle with increased time and cues to state card value. Pt with increased time and repetition to follow commands. Pt with cues to obtain card and place into pile. Pt with slow movements with BUE to obtain cards in center. Transfer of care to another OTR during session.      Plan   Plan  Times per week: 5-7   Plan weeks: 4  Current Treatment Recommendations: Strengthening, ROM, Balance Training, Functional Mobility Training, Endurance Training, Safety Education & Training, Cognitive Reorientation, Patient/Caregiver Education & Training, Equipment Evaluation, Education, & procurement, Self-Care / ADL, Cognitive/Perceptual Training, Positioning  Plan Comment: Continue with OT POC    Goals  Patient Goals   Patient goals : Patient is unable to report a goal at this time due to global aphasia       Therapy Time   Individual Concurrent Group Co-treatment   Time In     1100     Time Out     1115     Minutes     15          Electronically signed by Bela Hurst OTR/L on 6/13/2019 at 12:20 PM    Bela Hurst OTR/FITO

## 2019-06-14 PROCEDURE — 6370000000 HC RX 637 (ALT 250 FOR IP): Performed by: NEUROLOGICAL SURGERY

## 2019-06-14 PROCEDURE — 6370000000 HC RX 637 (ALT 250 FOR IP): Performed by: PHYSICAL MEDICINE & REHABILITATION

## 2019-06-14 PROCEDURE — 92507 TX SP LANG VOICE COMM INDIV: CPT

## 2019-06-14 PROCEDURE — 1180000000 HC REHAB R&B

## 2019-06-14 PROCEDURE — 97127 HC OT THER IVNTJ W/FOCUS COG FUNCJ: CPT

## 2019-06-14 PROCEDURE — 97116 GAIT TRAINING THERAPY: CPT

## 2019-06-14 PROCEDURE — 6370000000 HC RX 637 (ALT 250 FOR IP): Performed by: INTERNAL MEDICINE

## 2019-06-14 PROCEDURE — 97535 SELF CARE MNGMENT TRAINING: CPT

## 2019-06-14 PROCEDURE — 99232 SBSQ HOSP IP/OBS MODERATE 35: CPT | Performed by: PHYSICAL MEDICINE & REHABILITATION

## 2019-06-14 PROCEDURE — 97530 THERAPEUTIC ACTIVITIES: CPT

## 2019-06-14 RX ORDER — ZOLPIDEM TARTRATE 5 MG/1
5 TABLET ORAL NIGHTLY
Status: DISCONTINUED | OUTPATIENT
Start: 2019-06-14 | End: 2019-06-28 | Stop reason: HOSPADM

## 2019-06-14 RX ADMIN — ACETAMINOPHEN 500 MG: 325 TABLET ORAL at 16:52

## 2019-06-14 RX ADMIN — DILTIAZEM HYDROCHLORIDE 120 MG: 120 CAPSULE, COATED, EXTENDED RELEASE ORAL at 21:34

## 2019-06-14 RX ADMIN — ACETAMINOPHEN 500 MG: 325 TABLET ORAL at 21:34

## 2019-06-14 RX ADMIN — ACETAMINOPHEN 500 MG: 325 TABLET ORAL at 09:00

## 2019-06-14 RX ADMIN — DILTIAZEM HYDROCHLORIDE 120 MG: 120 CAPSULE, COATED, EXTENDED RELEASE ORAL at 09:00

## 2019-06-14 RX ADMIN — FAMOTIDINE 20 MG: 20 TABLET ORAL at 21:34

## 2019-06-14 RX ADMIN — MAGNESIUM OXIDE TAB 400 MG (241.3 MG ELEMENTAL MG) 400 MG: 400 (241.3 MG) TAB at 09:00

## 2019-06-14 RX ADMIN — BACITRACIN ZINC 1 G: 500 OINTMENT TOPICAL at 09:00

## 2019-06-14 RX ADMIN — ZOLPIDEM TARTRATE 5 MG: 5 TABLET ORAL at 21:35

## 2019-06-14 RX ADMIN — PRAVASTATIN SODIUM 40 MG: 40 TABLET ORAL at 21:35

## 2019-06-14 RX ADMIN — FAMOTIDINE 20 MG: 20 TABLET ORAL at 09:00

## 2019-06-14 ASSESSMENT — PAIN SCALES - GENERAL
PAINLEVEL_OUTOF10: 2
PAINLEVEL_OUTOF10: 0

## 2019-06-14 NOTE — PROGRESS NOTES
Subjective: The patient complains of severe  acute  confusion secondary to subdural hematoma partially relieved by PT OT, speech-language pathology, recent decompression of the subdural hematomas and exacerbated by fatigue, exhaustion, dehydration. I am concerned about patients impulsivity and have added a video monitoring side and placed him in the [place] [facility] Final with frequent nursing checks. I am concerned about his increased impulsivity frequently trying to get out of bed nurses have made him a one-on-one part of the problem is that sometimes his hearing aids are not incompletely followed easily because they are poorly made and he cannot hear redirection. ROS x   10: The patient also complains of severely impaired mobility and activities of daily living. Otherwise no new problems with vision, hearing, nose, mouth, throat, dermal, cardiovascular, GI, , pulmonary, musculoskeletal, psychiatric or neurological. See Rehab H&P on Rehab chart dated . Vital signs:  /68   Pulse 72   Temp 97 °F (36.1 °C) (Oral)   Resp 18   Ht 6' 0.05\" (1.83 m)   Wt 179 lb 10.8 oz (81.5 kg)   SpO2 91%   BMI 24.34 kg/m²   I/O:   PO/Intake:  fair PO intake,   mechanical soft with thin liquids    Bowel/Bladder:  incontinent, no problems noted. General:  Patient is well developed, adequately nourished, non-obese and     well kempt. HEENT:    PERRLA, hearing intact to loud voice fall out frequently, external inspection of ear     and nose benign. Inspection of lips, tongue and gums dry  Musculoskeletal: No significant change in strength or tone. All joints stable. Inspection and palpation of digits and nails show no clubbing,       cyanosis or inflammatory conditions. Neuro/Psychiatric: Affect: flat but pleasant. Echolalia. Alert and oriented to person, place and     Situation-with max cues.   No significant change in deep tendon reflexes or     Sensation-poor judgment reasoning and insight. Lungs:  Diminished, CTA-B. Respiration effort is normal at rest.     Heart:   S1 = S2, RRR. No loud murmurs. Abdomen:  Soft, non-tender, no enlargement of liver or spleen. Extremities:  No significant lower extremity edema or tenderness. Skin:   Intact to general survey,  craniotomy incisions healing. Rehabilitation:  Physical therapy: FIMS:  Bed Mobility: Scooting: Moderate assistance    Transfers: Sit to Stand: Minimal Assistance  Stand to sit: Minimal Assistance  Bed to Chair: Minimal assistance  Squat Pivot Transfers: Maximum Assistance(assistance guiding hips , simple commands used with poor comprehension), Ambulation 1  Surface: carpet, level tile  Device: Rollator  Other Apparatus: (no O2)  Assistance: Minimal assistance  Quality of Gait: variable posture, variable step length and foot placement, right neglect noted in maneuvering in environment, pt highly distractible with frequent redirection required, variable walker use and safety  Distance: 180  Comments: pt requires verbal cues and assist with rollator fro safe maneuvering - pt is distractible and demonstrates poor attention to environmental barriers. , Stairs  # Steps : 4  Stairs Height: 6\"  Rails: Bilateral  Assistance:  Moderate assistance  Comment: +1 with variable balance due to variable performance of following instructions    FIMS: Bed, Chair, Wheel Chair: 4 - Requires steadying assistance only <25% assist  and/or requires assist with one leg only  Walk: 2 - Maximal Assistance Requires up to Maximal Assistance AND requires assistance of one person to walk between  feet (Patient performs 25-49% of locomotion effort or goes between  feet)  Distance Walked: 75  Wheel Chair: 0 - Activity Not Assessed/Does Not Occur  Distance Traveled in Wheel Chair: 15'  Stairs: 1- Total Assistance perfoms less than 25% of the effort, or requirs the assistance of two people, or goes up and down fewer than 4 stairs,  , Assessment: Pt demonstrates inconsistent ability to follow instructions and attend to environment. Verbal and tactile cueing is required throughout the session with no carry over between tasks noted. Pt cooperative    Occupational therapy: FIMS:  Eatin - Feeds self with setup/supervision/cues and/or requires only setup/supervision/cues to perform tube feedings  Groomin - Able to perform 3-4 tasks with touching help  Bathin - Able to bathe 3-4 areas  Dressing-Upper: 2 - Requires assist with 3 tasks  Dressing-Lower: 2 - Requires assist with 4-5 parts of dressing  Toiletin - Able to perform 1 task only (e.g. hygiene)  Toilet Transfer: 4 - Requires steadying assistance only < 25% assist  Shower Transfer: 0 - Activity does not occur,  , Assessment: Pt demonstrates decreased ability to follow commands. Pt demonstrates good attention to task with minimal verbal cues to redirect. Pt continues to benefit from skilled OT to maximize independence in safety and ADLs. Speech therapy: FIMS: Comprehension: 2 - Patient understands basic needs 25-49% of the time  Expression: 2 - Expresses basic ideas/needs 25-49% of the time  Social Interaction: 1 - Patient appropriate < 25% of the time  Problem Solvin - Patient solves simple/routine tasks < 25%  Memory: 1 - Patient remembers < 25% of the time      Lab/X-ray studies reviewed, analyzed and discussed with patient and staff:   No results found for this or any previous visit (from the past 24 hour(s)). Xr Chest  2019   Areas of atelectasis, patchy groundglass infiltrate in the bases. Right greater than left . Trace/ small right pleural effusion    Ct Head   2019  Impression: Bilateral frontal and parietal subdural hematomas. The amount of extra-axial air has slightly decreased since the prior study. No new areas of hemorrhage are identified.  All CT scans at this facility use dose modulation, iterative reconstruction, and/or weight based dosing when appropriate to reduce radiation dose to as low as reasonably achievable. Ct Head Wo  : 5/28/2019   DECREASING-SIZED BILATERAL CEREBRAL CONVEXITY SUBDURAL HEMATOMAS AFTER INTERVAL REMOVAL OF BOTH DRAINAGE CATHETERS. Ct Head Wo : 5/24/2019   UP TO 1.8 CM ACUTE CHRONIC SUBDURAL HEMATOMAS OVER BOTH CEREBRAL CONVEXITIES    Ct Cervical Spine  5/24/2019   . Mild to moderate hypertrophic facet changes are present, predominantly on the right at C3-4, and on the left at C2-C3 and C5-6. There is no fracture, significant subluxation, or acute paraspinous soft tissue abnormalities identified. NO FRACTURE OR EVIDENCE OF CERVICAL SPINE INJURY IDENTIFIED. CERVICAL SPONDYLOSIS. Ct Lumbar   5/24/2019  Acute on chronic fracture, L1, with avulsion of the anterior portion of the L1 vertebral body. Findings suspicious for mild to moderate central stenosis at the L3-L4 level. Us Dup Upper Extremity Right Eric 5/31/2019  NO FINDINGS OF DEEP VENOUS THROMBUS IN  THE VISUALIZED VESSELS OF THE RIGHT UPPER EXTREMITY. Fl Modified Barium   5/31/2019  EXAMINATION: FL MODIFIED BARIUM SWALLOW W VIDEO: DATE AND TIME: 5/30/2019 at 10:45 AM. CLINICAL HISTORY:    MODERATE ORAL AND PHARYNGEAL DYSFUNCTION WITHOUT ASPIRATION. PLEASE REFER TO SPEECH PATHOLOGY  REPORT FOR RECOMMENDATIONS. Us Dup Lower Extremities Bilateral V 5/29/2019  NO DVT IDENTIFIED IN EITHER LOWER EXTREMITY. Previous extensive, complex labs, notes and diagnostics reviewed and analyzed. ALLERGIES:    Allergies as of 05/31/2019 - Review Complete 05/31/2019   Allergen Reaction Noted    Honey bee venom [bee venom] Swelling 10/08/2015    Penicillins Swelling 10/08/2015      (please also verify by checking MAR)      1 because of impulsivity. Complex Physical Medicine & Rehab Issues Assess & Plan:   1. Severe abnormality of gait and mobility and impaired self-care and ADL's secondary to progressive subdural hematomas .   Functional and medical status reassessed regarding patients ability to participate in therapies and patient found to be able to participate in acute intensive comprehensive inpatient rehabilitation program including PT/OT to improve balance, ambulation, ADLs, and to improve the P/AROM. Therapeutic modifications regarding activities in therapies, place, amount of time per day and intensity of therapy made daily. In bed therapies or bedside therapies prn.   2. Bowel and Bladder dysfunction including incontinence :  frequent toileting, ambulate to bathroom with assistance, check post void residuals. Check for C.difficile x1 if >2 loose stools in 24 hours, continue bowel & bladder program.  Monitor bowel and bladder function. Lactinex 2 PO every AC. MOM prn, Brown Bomb prn, Glycerin suppository prn, enema prn.  3. Severe postoperative craniotomy pain generalized OA painAcute on chronic fracture, L1, with avulsion of the anterior portion of the L1 vertebral body. Findings suspicious for mild to moderate central stenosis at the L3-L4 level.: reassess pain every shift and prior to and after each therapy session, give prn Tylenol and schedull and consider Norco, modalities prn in therapy, Lidoderm, K-pad prn.   4. Skin healing and breakdown risk: Side-to-side turns add CoQ10 continue pressure relief program.  Daily skin exams and reports from nursing. 5. Severe Fatigue due to nutritional and hydration deficiency:    continue to monitor I&Os, calorie counts prn, dietary consult prn. Discontinue nocturnal IVs as patient is now on mechanical soft and thin liquids  6. Acute episodic insomnia with situational adjustment disorder:  prn Ambien, monitor for day time sedation. 7. Falls risk elevated: Patient is a one-on-one as needed. We will make sure his hearing aids are in we will post signs or visual cues so that he does not get up on his own and keep reminding him to use the call light.   Patient to use call light to get nursing assistance to get up, bed and chair alarm. 8. Elevated DVT risk: progressive activities in PT, continue prophylaxis JOSE DE JESUS hose, elevation and  patient is not on a blood thinner secondary to recent subdural hematomas . 9. Complex discharge planning:  Discharge 6/28/19 skilled level and then eventually long-term care at a wheelchair level. He will likely need 24-hour supervision at discharge. His significant other will help him apply for Medicaid as he will likely need care for the rest of his life. Weekly team meeting every Monday to assess progress towards goals, discuss and address social, psychological and medical comorbidities and to address difficulties they may be having progressing in therapy. Patient and family education is in progress. The patient is to follow-up with their family physician after discharge. Complex Active General Medical Issues that complicate care Assess & Plan:    1. Afib,   Essential hypertension,   Hyperlipidemia,   S/P CABG (coronary artery bypass graft), S/P PTCA (percutaneous transluminal coronary angioplasty),   RBBB,   CAD (coronary artery disease), long-term previous use of blood thinners telemetry to monitor for bradycardia blood pressure checks every shift dose and titrate cardiac medications, consult hospitals for backup medical and recheck CBC and BMP as needed -  2. Subdural hematoma-status post evacuation with , hold blood thinners-vital signs every shift, dose and titrate cardiac medications, recheck BMP CBC and consult hospitalist for backup medical  3.   prostatic hyperplasia with urinary frequency,   Prostate cancer -check postvoid residuals check stat UA  4. Facet arthropathy, lumbosacral,   Chronic back pain,   Spondylolisthesis of lumbar region-add Lidoderm, Tylenol, Norco as needed  5. Gross hematuria, Long term current use of anticoagulant-check UA monitor urine for blood  6.    Long-term use of aspirin therapy and other blood thinners on hold at present

## 2019-06-14 NOTE — PROGRESS NOTES
strength;Decreased cognition;Decreased ADL status  Assessment: Pt with difficulty with WC managmenet and propulsion this tx session. Pt highly distracted this tx session. Pt slow processing time and required frequet cues. PLAN OF CARE/Safety:   Safety Devices  Type of devices: Chair alarm in place; Left in chair;Gait belt      Therapy Time:   Individual   Time In 1400   Time Out 1430   Minutes 30     Minutes:30      Transfer/Bed mobility training:10      Gait training:15      Neuro re education:5     Therapeutic ex:      Sandra Tate  06/14/19 at 4:21 PM

## 2019-06-14 NOTE — PROGRESS NOTES
Speech Language Pathology Treatment Note  Facility/Department: [facility]  6/14/2019    Rehab Dx/Hx: Abnormality of gait and mobility [R26.9]    Precautions: falls and aspirations    ST Dx: Aphasia, Dysphagia and Cognitive Impairment     Date of Admit: 5/31/2019  Room #: R240/R240-01    Time in: 1030  Time out: 1100      Subjective:  Alert, Cooperative and Confused        Interventions used this date:  Expressive Language and Receptive Language    Objective/Assessment:  Patient progressing towards goals:  Short-term Goals  Timeframe for Short-term Goals: 3-4 weeks  Goal 1: Pt will follow 1 step directions given orally with 75% accuracy with mod cues to increase the pt's ability to follow directions provided by caregivers for safe follow through with ADLs. Pt followed 1-step directions given orally with 40% accuracy independently; increasing to 60% accuracy following SLP model. Goal 2: Pt will identify objects/pictures within a field of 2-3 with 75% accuracy with mod cues in order to increase his understanding of objects in his environment for safer and more independent completion of ADLs. ID pictures in field of 2 with 100% accuracy independently. Pt identified socks, toothbrush, iron, string and screw . ID pictures in field of 3 with 29% accuracy independently. Pt identified tooth brush and basket. Goal 3: Pt will answer low level yes/no questions with 85% accuracy with mod cues to assist the caregiver in obtaining important information regarding the patient's personal, medical, and safety needs. Pt answered low level yes/no questions with 60% accuracy given min cues; increasing to 80% accuracy given mod cues. Goal 4: Pt will answer low level Wh- questions with 75% accuracy with mod cues to assist the caregiver in obtaining important information regarding the patient's personal, medical, and safety needs.    DNT    Goal 5: Pt will demonstrate reading comprehension at the word  level to 60% acc with mod cues to promote pt's ability to utilize reading/writing. DNT    Short-term Goals DNT Dysphagia. Timeframe for Short-term Goals: 2-4/week  Goal 1: Pt will tolerate puree diet with nectar thick liquids with no overt s/s of aspiration. Goal 2: Pt will perform oral motor ROM/strength exercieses with max cues 5x/each. Goal 3: Pt will perform base of tongue exercises with max cues 10x/each. Goal 4: Pt will tolerate therapeutic trials of mechanical soft/thin as able, with no overt s/s of aspiration. Dysphagia Goals: The patient will tolerate recommended diet without observed clinical signs of aspiration  Compensatory Swallowing Strategies: Small bites/sips, Total feed, Upright as possible for all oral intake, Swallow 2 times per bite/sip, Eat/Feed slowly      Treatment/Activity Tolerance:  Patient tolerated treatment well    Plan:  Continue per POC    Pain:  Patient demonstrated no s/s of pain. Patient/Caregiver Education:  Patient educated on session and progression towards goals. and Education needs reinforcement. Safety Devices:  Chair alarm in place   1:1 present in room.      Comprehension:  2- Maximal assist    Expression:  2- Maximal assist    Problem Solvin- Total assist    Memory:  1- Total assist    Signature: Electronically signed by CIERRA Barclay on 2019 at 11:20 AM

## 2019-06-14 NOTE — PROGRESS NOTES
Occupational Therapy  Facility/Department: [facility]  Daily Treatment Note  NAME: Deshaun Meléndez  : 1934  MRN: [id number]    Date of Service: 2019    Discharge Recommendations:  Continue to assess pending progress       Assessment   Assessment: Pt demonstrated decreased attention to task. Pt still unable to sort familiar items such as silverware. Pt continues to benefit from skilled OT to maximize independence in safety and ADLs. REQUIRES OT FOLLOW UP: Yes  Activity Tolerance  Activity Tolerance: Patient Tolerated treatment well  Activity Tolerance: Fair  Safety Devices  Safety Devices in place: Yes  Type of devices: All fall risk precautions in place  Restraints  Initially in place: No         Patient Diagnosis(es): There were no encounter diagnoses. has a past medical history of Chronic back pain, Hyperlipidemia, and Hypertension. has a past surgical history that includes Cardiac catheterization; Coronary artery bypass graft; and craniotomy (Bilateral, 2019). Restrictions  Restrictions/Precautions  Restrictions/Precautions: Fall Risk, Seizure  Position Activity Restriction  Other position/activity restrictions: nectar thick   Subjective   General  Chart Reviewed: Yes  Patient assessed for rehabilitation services?: Yes  Response to previous treatment: Patient with no complaints from previous session  Family / Caregiver Present: No  Subjective  Subjective: Pt seated in W/C upon arrival. Pt on 3L of O2. General Comment  Comments: Observed pt repeating words/echolalia when attempting to communicate with OT. Pain Assessment  Pain Assessment: 0-10  Pain Level: 0  Vital Signs  Patient Currently in Pain: No   Orientation  Orientation  Overall Orientation Status: Within Functional Limits  Orientation Level: Oriented to person;Oriented to time;Oriented to situation  Objective        Pt offered utensils 1 at a time to sort to work on cognitive retraining.  Pt named silverware with 40% accuracy for 15 items. Pt was offered a choice between 2 utensils when having difficulty naming. Pt self corrected errors 50% of the time. Pt then placed into pile by type with 25% accuracy. Pt required max verbal cues to sort the utensils. Pt required increased time to complete. Pt had good attention to task. Pt stood while folding wash cloths using B UEs to work on standing balance during transfers and ADLs. Pt used table for balance purposes. Pt stood for 10 mins with CGA and had no LOB. Pt required min verbal cues for organization and completed activity with good endurance. Pt completed transfers with min A and pt was not impulsive. Pt placed bullets in plastic costa with L UE to improve fine motor coordination during self care tasks. Pt filled 4 columns and required significant increased time to complete task. Pt required mod verbal cues to redirect attention. Pt continues to become easily distracted during therapy sessions. Plan   Plan  Times per week: 5-7   Plan weeks: 4  Current Treatment Recommendations: Strengthening, ROM, Balance Training, Functional Mobility Training, Endurance Training, Safety Education & Training, Cognitive Reorientation, Patient/Caregiver Education & Training, Equipment Evaluation, Education, & procurement, Self-Care / ADL, Cognitive/Perceptual Training, Positioning  Plan Comment: Continue with OT POC  G-Code     OutComes Score    AM-PAC Score    Goals  Patient Goals   Patient goals : Patient is unable to report a goal at this time due to global aphasia       Therapy Time   Individual Concurrent Group Co-treatment   Time In 1100         Time Out 1200         Minutes 60           This therapy session was supervised by Kaya Rubi MS OTR/L   . Electronically signed by César Allen on 6/14/2019 at 5:12 PM      César Allen

## 2019-06-14 NOTE — PROGRESS NOTES
Patient continues to be impulsive and trying to get oob frequently. Patient is not easily redirected and continues with 1:1 nursing.

## 2019-06-14 NOTE — PROGRESS NOTES
[address] [facility]   Acute  Rehabilitation  MUSIC THERAPY      Date:  6/14/2019        Patient Name: Deshaun Meléndez       MRN: [id number]        YOB: 1934 (80 y.o.)       Gender: male          RESTRICTIONS/PRECAUTIONS:  Restrictions/Precautions: Fall Risk, Seizure  Vision: Impaired  Hearing: Exceptions to [facility]  Hearing Exceptions: Bilateral hearing aid, Hard of hearing/hearing concerns      TIME OF SESSION: 2:35pm - 2:55pm     SUBJECTIVE: \"yeah\"      OBJECTIVE:        [x] To Improve Mood     [x] To Increase Social Well-Being  [] To Increase Focus   [x] To Increase Emotional Well-Being  [] To Increase Eye Contact    [] To Increase Spiritual Well-Being   [] To Decrease Anxiety   [x] To Increase Relaxation   [] To Decrease Pain    [] To Increase Communication  [] To Increase Movement to Music     MUSIC INTERVENTION PROVIDED:     [x] Live Music on Voice  [] Recorded Music   [x] Live Music on Guitar  [x] Discussion Related to Music   [] Live Music on Q-chord  [x] Discussion Related to Pt Experience   [] Live Music on Percussion      PARTICIPATION LEVEL OF PATIENT:     [x] Active with discussion   [] Passive with discussion   [x] Active with singing    [] Passive with singing   [] Active with instrument playing  [] Passive with instrument playing   [x] Actively listening to music   [] Passively listening to music. OUTCOMES OBSERVED:      [x] Improved Mood   [x] Increased Social Well-Being  [] Increased Focus   [x] Increased Emotional Well-Being  [] Increased Eye Contact    [] Increased Spiritual Well-Being   [] Decreased Anxiety   [] Increased Relaxation   [] Decreased Pain    [] Increased Communication   [] Increased Movement to Music         ASSESSMENT/OBSERVATIONS:     Patient tolerated todays treatment session:      [x] Good          [] Fair          [] Poor        Comment(s): Pt sitting up in wheelchair, alert and oriented with no s/s of pain or discomfort.  Pt actively engaged in music therapy session by making brief comments about the music, moving his mouth to the lyrics at times, and actively listening to the music. Pt responded positively to the music therapy as evidence by increased positive facial affect, improved mood and making positive comments about the music - \"That's beautiful. \" Pt did often become tearful at times throughout the music therapy visit. Pt and pt's friend interested in having music therapy again. PLAN:      [x] Pt interested in having music therapy again. [x] [facility] will attempt to see pt again another day, if time allows. [] Pt's planned d/c date is before [facility] is scheduled on unit again. [] Pt NOT interested in having music therapy again.             TARIQ Rodriguez    6/14/2019  Electronically signed by Benji Guzman on 6/14/2019 at 3:25 PM

## 2019-06-14 NOTE — PROGRESS NOTES
Occupational Therapy  Facility/Department: [facility]  Daily Treatment Note  NAME: Kaden Carpio  : 1934  MRN: [id number]    Date of Service: 2019    Discharge Recommendations:  Continue to assess pending progress       Assessment   Assessment: Pt requires multiple cues to follow commands. Pt demonstrates decreased gross motor coordination. Pt continues to benefit from skilled OT to maximize independence in safety and ADLs. REQUIRES OT FOLLOW UP: Yes  Activity Tolerance  Activity Tolerance: Patient Tolerated treatment well  Activity Tolerance: Fair  Safety Devices  Safety Devices in place: Yes  Type of devices: All fall risk precautions in place  Restraints  Initially in place: No         Patient Diagnosis(es): There were no encounter diagnoses. has a past medical history of Chronic back pain, Hyperlipidemia, and Hypertension. has a past surgical history that includes Cardiac catheterization; Coronary artery bypass graft; and craniotomy (Bilateral, 2019). Restrictions  Restrictions/Precautions  Restrictions/Precautions: Fall Risk, Seizure  Position Activity Restriction  Other position/activity restrictions: nectar thick   Subjective   General  Chart Reviewed: Yes  Patient assessed for rehabilitation services?: Yes  Response to previous treatment: Patient with no complaints from previous session  Family / Caregiver Present: No  Subjective  Subjective: Pt seated in W/C upon arrival. Pt on 3L of O2. General Comment  Comments: Observed pt repeating words/echolalia when attempting to communicate with OT. Pain Assessment  Pain Assessment: 0-10  Pain Level: 0  Vital Signs  Patient Currently in Pain: No   Orientation  Orientation  Overall Orientation Status: Impaired  Orientation Level: Oriented to time;Oriented to situation  Objective        Pt engaged in B UE strengthening and gross motor coordination activity placing wooden blocks onto dowels of varying heights.  Pt required max verbal cues to

## 2019-06-15 PROCEDURE — 6370000000 HC RX 637 (ALT 250 FOR IP): Performed by: INTERNAL MEDICINE

## 2019-06-15 PROCEDURE — 6370000000 HC RX 637 (ALT 250 FOR IP): Performed by: NEUROLOGICAL SURGERY

## 2019-06-15 PROCEDURE — 1180000000 HC REHAB R&B

## 2019-06-15 PROCEDURE — 99232 SBSQ HOSP IP/OBS MODERATE 35: CPT | Performed by: PHYSICAL MEDICINE & REHABILITATION

## 2019-06-15 PROCEDURE — 6370000000 HC RX 637 (ALT 250 FOR IP): Performed by: PHYSICAL MEDICINE & REHABILITATION

## 2019-06-15 RX ADMIN — ACETAMINOPHEN 500 MG: 325 TABLET ORAL at 21:09

## 2019-06-15 RX ADMIN — FAMOTIDINE 20 MG: 20 TABLET ORAL at 21:10

## 2019-06-15 RX ADMIN — MAGNESIUM OXIDE TAB 400 MG (241.3 MG ELEMENTAL MG) 400 MG: 400 (241.3 MG) TAB at 10:29

## 2019-06-15 RX ADMIN — DILTIAZEM HYDROCHLORIDE 120 MG: 120 CAPSULE, COATED, EXTENDED RELEASE ORAL at 10:31

## 2019-06-15 RX ADMIN — PRAVASTATIN SODIUM 40 MG: 40 TABLET ORAL at 21:09

## 2019-06-15 RX ADMIN — ACETAMINOPHEN 500 MG: 325 TABLET ORAL at 10:30

## 2019-06-15 RX ADMIN — ACETAMINOPHEN 500 MG: 325 TABLET ORAL at 15:02

## 2019-06-15 RX ADMIN — FAMOTIDINE 20 MG: 20 TABLET ORAL at 10:29

## 2019-06-15 RX ADMIN — ZOLPIDEM TARTRATE 5 MG: 5 TABLET ORAL at 21:10

## 2019-06-15 RX ADMIN — DILTIAZEM HYDROCHLORIDE 120 MG: 120 CAPSULE, COATED, EXTENDED RELEASE ORAL at 21:09

## 2019-06-15 ASSESSMENT — PAIN SCALES - GENERAL
PAINLEVEL_OUTOF10: 0

## 2019-06-15 NOTE — PROGRESS NOTES
Pt incontinent of urine, gown and pad changed. Pt continues to be restless and impulsive. RN remians at bedside to maintain patient safety.

## 2019-06-15 NOTE — PROGRESS NOTES
Pt changed, incontinent of urine,. Linen, gown and brief  changed. Pt continues to be restless, occasionally attempts to get out of bed. Easily redirected at this time. This RN remains at bedside to maintain patient safety.

## 2019-06-15 NOTE — PROGRESS NOTES
Subjective: The patient complains of severe  acute  confusion secondary to subdural hematoma partially relieved by PT OT, speech-language pathology, recent decompression of the subdural hematomas and exacerbated by fatigue, exhaustion, dehydration. I am concerned about patients impulsivity and have added a video monitoring side and placed him in the [place] [facility] Final with frequent nursing checks. I am concerned about his impulsivity and as related to his incontinence at night. I will see if he is willing to accept a condom catheter at night. ROS x   10: The patient also complains of severely impaired mobility and activities of daily living. Otherwise no new problems with vision, hearing, nose, mouth, throat, dermal, cardiovascular, GI, , pulmonary, musculoskeletal, psychiatric or neurological. See Rehab H&P on Rehab chart dated . Vital signs:  /68   Pulse 72   Temp 97 °F (36.1 °C) (Oral)   Resp 18   Ht 6' 0.05\" (1.83 m)   Wt 174 lb 2.6 oz (79 kg)   SpO2 91%   BMI 23.59 kg/m²   I/O:   PO/Intake:  fair PO intake,   mechanical soft with thin liquids    Bowel/Bladder:  Incontinent, to trial condom catheter at night  General:  Patient is well developed, adequately nourished, non-obese and     well kempt. HEENT:    PERRLA, hearing intact to loud voice fall out frequently, external inspection of ear     and nose benign. Inspection of lips, tongue and gums dry  Musculoskeletal: No significant change in strength or tone. All joints stable. Inspection and palpation of digits and nails show no clubbing,       cyanosis or inflammatory conditions. Neuro/Psychiatric: Affect: flat but pleasant. Echolalia. Alert and oriented to person, place and     Situation-with max cues. No significant change in deep tendon reflexes or     Sensation-poor judgment reasoning and insight. Lungs:  Diminished, CTA-B. Respiration effort is normal at rest.     Heart:   S1 = S2, RRR.   No loud frequet cues. Occupational therapy: FIMS:  Eatin - Feeds self with setup/supervision/cues and/or requires only setup/supervision/cues to perform tube feedings  Groomin - Able to perform 3-4 tasks with touching help  Bathin - Able to bathe 3-4 areas  Dressing-Upper: 2 - Requires assist with 3 tasks  Dressing-Lower: 2 - Requires assist with 4-5 parts of dressing  Toiletin - Able to perform 1 task only (e.g. hygiene)  Toilet Transfer: 4 - Requires steadying assistance only < 25% assist  Shower Transfer: 0 - Activity does not occur,  , Assessment: Pt requires multiple cues to follow commands. Pt demonstrates decreased gross motor coordination. Pt continues to benefit from skilled OT to maximize independence in safety and ADLs. Speech therapy: FIMS: Comprehension: 2 - Patient understands basic needs 25-49% of the time  Expression: 2 - Expresses basic ideas/needs 25-49% of the time  Social Interaction: 1 - Patient appropriate < 25% of the time  Problem Solvin - Patient solves simple/routine tasks < 25%  Memory: 1 - Patient remembers < 25% of the time      Lab/X-ray studies reviewed, analyzed and discussed with patient and staff:   No results found for this or any previous visit (from the past 24 hour(s)). Xr Chest  2019   Areas of atelectasis, patchy groundglass infiltrate in the bases. Right greater than left . Trace/ small right pleural effusion    Ct Head   2019  Impression: Bilateral frontal and parietal subdural hematomas. The amount of extra-axial air has slightly decreased since the prior study. No new areas of hemorrhage are identified. All CT scans at this facility use dose modulation, iterative reconstruction, and/or weight based dosing when appropriate to reduce radiation dose to as low as reasonably achievable. Ct Head Wo  : 2019   DECREASING-SIZED BILATERAL CEREBRAL CONVEXITY SUBDURAL HEMATOMAS AFTER INTERVAL REMOVAL OF BOTH DRAINAGE CATHETERS.           Ct Head Wo : 5/24/2019   UP TO 1.8 CM ACUTE CHRONIC SUBDURAL HEMATOMAS OVER BOTH CEREBRAL CONVEXITIES    Ct Cervical Spine  5/24/2019   . Mild to moderate hypertrophic facet changes are present, predominantly on the right at C3-4, and on the left at C2-C3 and C5-6. There is no fracture, significant subluxation, or acute paraspinous soft tissue abnormalities identified. NO FRACTURE OR EVIDENCE OF CERVICAL SPINE INJURY IDENTIFIED. CERVICAL SPONDYLOSIS. Ct Lumbar   5/24/2019  Acute on chronic fracture, L1, with avulsion of the anterior portion of the L1 vertebral body. Findings suspicious for mild to moderate central stenosis at the L3-L4 level. Us Dup Upper Extremity Right Eric 5/31/2019  NO FINDINGS OF DEEP VENOUS THROMBUS IN  THE VISUALIZED VESSELS OF THE RIGHT UPPER EXTREMITY. Fl Modified Barium   5/31/2019  EXAMINATION: FL MODIFIED BARIUM SWALLOW W VIDEO: DATE AND TIME: 5/30/2019 at 10:45 AM. CLINICAL HISTORY:    MODERATE ORAL AND PHARYNGEAL DYSFUNCTION WITHOUT ASPIRATION. PLEASE REFER TO SPEECH PATHOLOGY  REPORT FOR RECOMMENDATIONS. Us Dup Lower Extremities Bilateral V 5/29/2019  NO DVT IDENTIFIED IN EITHER LOWER EXTREMITY. Previous extensive, complex labs, notes and diagnostics reviewed and analyzed. ALLERGIES:    Allergies as of 05/31/2019 - Review Complete 05/31/2019   Allergen Reaction Noted    Honey bee venom [bee venom] Swelling 10/08/2015    Penicillins Swelling 10/08/2015      (please also verify by checking MAR)       I have encouraged patient to attend their adjustment to rehabilitation support group with rec therapy and rehabilitation psychology in order to improve their adjustments, well-being, and help their spiritual and cognitive recovery. Complex Physical Medicine & Rehab Issues Assess & Plan:   1. Severe abnormality of gait and mobility and impaired self-care and ADL's secondary to progressive subdural hematomas .   Functional and medical status reassessed regarding patients ability to participate in therapies and patient found to be able to participate in acute intensive comprehensive inpatient rehabilitation program including PT/OT to improve balance, ambulation, ADLs, and to improve the P/AROM. Therapeutic modifications regarding activities in therapies, place, amount of time per day and intensity of therapy made daily. In bed therapies or bedside therapies prn.   2. Bowel and Bladder dysfunction including incontinence : Trial condom catheter at night frequent toileting, ambulate to bathroom with assistance, check post void residuals. Check for C.difficile x1 if >2 loose stools in 24 hours, continue bowel & bladder program.  Monitor bowel and bladder function. Lactinex 2 PO every AC. MOM prn, Brown Bomb prn, Glycerin suppository prn, enema prn.  3. Severe postoperative craniotomy pain generalized OA painAcute on chronic fracture, L1, with avulsion of the anterior portion of the L1 vertebral body. Findings suspicious for mild to moderate central stenosis at the L3-L4 level.: reassess pain every shift and prior to and after each therapy session, give prn Tylenol and schedull and consider Norco, modalities prn in therapy, Lidoderm, K-pad prn.   4. Skin healing and breakdown risk: Side-to-side turns add CoQ10 continue pressure relief program.  Daily skin exams and reports from nursing. 5. Severe Fatigue due to nutritional and hydration deficiency:    continue to monitor I&Os, calorie counts prn, dietary consult prn. Discontinue nocturnal IVs as patient is now on mechanical soft and thin liquids  6. Acute episodic insomnia with situational adjustment disorder:  prn Ambien, monitor for day time sedation. 7. Falls risk elevated: Patient is a one-on-one as needed. We will make sure his hearing aids are in we will post signs or visual cues so that he does not get up on his own and keep reminding him to use the call light.   Patient to use call light to get nursing assistance to get up, bed and chair alarm. 8. Elevated DVT risk: progressive activities in PT, continue prophylaxis JOSE DE JESUS hose, elevation and  patient is not on a blood thinner secondary to recent subdural hematomas . 9. Complex discharge planning:  Discharge 6/28/19 skilled level and then eventually long-term care at a wheelchair level. He will likely need 24-hour supervision at discharge. His significant other will help him apply for Medicaid as he will likely need care for the rest of his life. Weekly team meeting every Monday to assess progress towards goals, discuss and address social, psychological and medical comorbidities and to address difficulties they may be having progressing in therapy. Patient and family education is in progress. The patient is to follow-up with their family physician after discharge. Complex Active General Medical Issues that complicate care Assess & Plan:    1. Afib,   Essential hypertension,   Hyperlipidemia,   S/P CABG (coronary artery bypass graft), S/P PTCA (percutaneous transluminal coronary angioplasty),   RBBB,   CAD (coronary artery disease), long-term previous use of blood thinners telemetry to monitor for bradycardia blood pressure checks every shift dose and titrate cardiac medications, consult hospitals for backup medical and recheck CBC and BMP as needed -  2. Subdural hematoma-status post evacuation with , hold blood thinners-vital signs every shift, dose and titrate cardiac medications, recheck BMP CBC and consult hospitalist for backup medical  3.   prostatic hyperplasia with urinary frequency,   Prostate cancer -check postvoid residuals check stat UA  4. Facet arthropathy, lumbosacral,   Chronic back pain,   Spondylolisthesis of lumbar region-add Lidoderm, Tylenol, Norco as needed  5. Gross hematuria, Long term current use of anticoagulant-check UA monitor urine for blood  6.    Long-term use of aspirin therapy and other blood thinners on hold at present because of recent subdural hematoma  7. SHAYY on CPAP-add CPAP from home if available is not available add CPAP from the hospital  8. Prediabetes-recheck BMP, low carb diet  9. Vitamin D deficiency-high-dose vitamin D, add Tums at daily dose of vitamin D after high doses done recheck as an outpatient  10. Aphasia, and oropharyngeal dysphagia-recently which pathology consult, add e-stim, add supplementation to diet push oral fluids  11. Poorly nourished due to poor nutrition and previous alcohol use BMI 26.0-26.9,adult--add protein supplementation vitamin B12 vitamin D CoQ10,  to stop drinking alcohol as an outpatient and at chemical dependency counseling when patient is able to participate  12. Complex social situation-patient has a girlfriend he lives alone. He is estranged from his several children. Continue to work on improving social interactions.         Liyah Stephens D.O., PM&R     Attending    [address]

## 2019-06-15 NOTE — PLAN OF CARE
Problem: Falls - Risk of:  Goal: Will remain free from falls  Description  Will remain free from falls  Outcome: Ongoing  Goal: Absence of physical injury  Description  Absence of physical injury  Outcome: Ongoing     Problem: Risk for Impaired Skin Integrity  Goal: Tissue integrity - skin and mucous membranes  Description  Structural intactness and normal physiological function of skin and  mucous membranes.   Outcome: Ongoing     Problem: Pain Control  Goal: Maintain pain level at or below patient's acceptable level (or 5 if patient is unable to determine acceptable level)  Outcome: Ongoing  Goal: Improvement in pain related behaviors BP/HR WNL  Outcome: Ongoing     Problem: Neurological  Goal: Maximum potential motor/sensory/cognitive function  Outcome: Ongoing     Problem: Respiratory  Goal: No pulmonary complications  Outcome: Ongoing  Goal: O2 Sat > 90%  Outcome: Ongoing     Problem: GI  Goal: No bowel complications  Outcome: Ongoing     Problem:   Goal: No urinary complication  Outcome: Ongoing     Problem: Skin Integrity/Risk  Goal: No skin breakdown during hospitalization  Outcome: Ongoing     Problem: Musculor/Skeletal Functional Status  Goal: Absence of falls  Outcome: Ongoing     Problem: IP COMMUNICATION/DYSARTHRIA  Goal: LTG - patient will improve expressive language skills to allow for communication of wants and needs in daily activities  Outcome: Ongoing     Problem: IP SWALLOWING  Goal: LTG - patient will tolerate the least restrictive diet consistency to allow for safe consumption of daily meals  Outcome: Ongoing     Problem: IP BALANCE  Goal: LTG - Patient will maintain balance to allow for safe/functional mobility  Outcome: Ongoing     Problem: Pain:  Goal: Pain level will decrease  Description  Pain level will decrease  Outcome: Ongoing  Goal: Control of acute pain  Description  Control of acute pain  Outcome: Ongoing  Goal: Control of chronic pain  Description  Control of chronic pain  Outcome: Ongoing

## 2019-06-15 NOTE — PROGRESS NOTES
Pt is alert and oriented x1. He was able to state his name. He denies pain. No concerns at this time.  Electronically signed by Starr Jones LPN on 7/03/2297 at 3:67 PM

## 2019-06-15 NOTE — PROGRESS NOTES
Patient is doing much better,redirectable. .  Patient is sitting at desk. . With 1:1 assist  PER S.PEARL. Viola Baron

## 2019-06-16 PROCEDURE — 97112 NEUROMUSCULAR REEDUCATION: CPT

## 2019-06-16 PROCEDURE — 1180000000 HC REHAB R&B

## 2019-06-16 PROCEDURE — 6370000000 HC RX 637 (ALT 250 FOR IP): Performed by: INTERNAL MEDICINE

## 2019-06-16 PROCEDURE — 97116 GAIT TRAINING THERAPY: CPT

## 2019-06-16 PROCEDURE — 6370000000 HC RX 637 (ALT 250 FOR IP): Performed by: NEUROLOGICAL SURGERY

## 2019-06-16 PROCEDURE — 6370000000 HC RX 637 (ALT 250 FOR IP): Performed by: PHYSICAL MEDICINE & REHABILITATION

## 2019-06-16 PROCEDURE — 99232 SBSQ HOSP IP/OBS MODERATE 35: CPT | Performed by: PHYSICAL MEDICINE & REHABILITATION

## 2019-06-16 RX ADMIN — ACETAMINOPHEN 500 MG: 325 TABLET ORAL at 20:53

## 2019-06-16 RX ADMIN — DILTIAZEM HYDROCHLORIDE 120 MG: 120 CAPSULE, COATED, EXTENDED RELEASE ORAL at 09:13

## 2019-06-16 RX ADMIN — FAMOTIDINE 20 MG: 20 TABLET ORAL at 09:09

## 2019-06-16 RX ADMIN — MAGNESIUM OXIDE TAB 400 MG (241.3 MG ELEMENTAL MG) 400 MG: 400 (241.3 MG) TAB at 09:09

## 2019-06-16 RX ADMIN — DILTIAZEM HYDROCHLORIDE 120 MG: 120 CAPSULE, COATED, EXTENDED RELEASE ORAL at 20:53

## 2019-06-16 RX ADMIN — ACETAMINOPHEN 500 MG: 325 TABLET ORAL at 09:09

## 2019-06-16 RX ADMIN — MAGNESIUM HYDROXIDE 30 ML: 400 SUSPENSION ORAL at 16:55

## 2019-06-16 RX ADMIN — ZOLPIDEM TARTRATE 5 MG: 5 TABLET ORAL at 20:53

## 2019-06-16 RX ADMIN — FAMOTIDINE 20 MG: 20 TABLET ORAL at 20:53

## 2019-06-16 RX ADMIN — PRAVASTATIN SODIUM 40 MG: 40 TABLET ORAL at 20:53

## 2019-06-16 RX ADMIN — ACETAMINOPHEN 500 MG: 325 TABLET ORAL at 16:51

## 2019-06-16 ASSESSMENT — PAIN SCALES - GENERAL
PAINLEVEL_OUTOF10: 0

## 2019-06-16 NOTE — PROGRESS NOTES
Pt denies pain when asked by nodding no. At well for breakfast and is eating lunch at this time. One on one in room. Electronically signed by Holland Barajas LPN on 9/84/3895 at 09:63 PM

## 2019-06-16 NOTE — PROGRESS NOTES
Subjective: The patient complains of severe  acute  confusion secondary to subdural hematoma partially relieved by PT OT, speech-language pathology, recent decompression of the subdural hematomas and exacerbated by fatigue, exhaustion, dehydration. I am concerned about patients impulsivity  -he continues to be a one-on-one supervision because of his impulsivity. He is very sociable and is hard of hearing I am hoping that he could perhaps would do better out at the nurses station during the day. Also concerned about his heart rate and would like nursing to recheck his elevated heart rate. He has a history of atrial fibrillation. ROS x   10: The patient also complains of severely impaired mobility and activities of daily living. Otherwise no new problems with vision, hearing, nose, mouth, throat, dermal, cardiovascular, GI, , pulmonary, musculoskeletal, psychiatric or neurological. See Rehab H&P on Rehab chart dated . Vital signs:  /82   Pulse 106   Temp 98 °F (36.7 °C) (Oral)   Resp 18   Ht 6' 0.05\" (1.83 m)   Wt 174 lb 2.6 oz (79 kg)   SpO2 95%   BMI 23.59 kg/m²   I/O:   PO/Intake:  fair PO intake,   mechanical soft with thin liquids    Bowel/Bladder:  Incontinent, to trial condom catheter at night  General:  Patient is well developed, adequately nourished, non-obese and     well kempt. HEENT:    PERRLA, hearing intact to loud voice fall out frequently, external inspection of ear     and nose benign. Inspection of lips, tongue and gums dry  Musculoskeletal: No significant change in strength or tone. All joints stable. Inspection and palpation of digits and nails show no clubbing,       cyanosis or inflammatory conditions. Neuro/Psychiatric: Affect: flat but pleasant. Echolalia. Alert and oriented to person, place and     Situation-with max cues.   No significant change in deep tendon reflexes or     Sensation-poor judgment reasoning and propulsion this tx session. Pt highly distracted this tx session. Pt slow processing time and required frequet cues. Occupational therapy: FIMS:  Eatin - Feeds self with adaptive equipment/dentures and/or feeds self with modified diet and/or performs own tube feeding  Groomin - Able to perform 3-4 tasks with touching help  Bathin - Able to bathe 3-4 areas  Dressing-Upper: 2 - Requires assist with 3 tasks  Dressing-Lower: 2 - Requires assist with 4-5 parts of dressing  Toiletin - Able to perform 1 task only (e.g. hygiene)  Toilet Transfer: 5 - Requires setup/supervision/cues  Shower Transfer: 0 - Activity does not occur,  , Assessment: Pt requires multiple cues to follow commands. Pt demonstrates decreased gross motor coordination. Pt continues to benefit from skilled OT to maximize independence in safety and ADLs. Speech therapy: FIMS: Comprehension: 2 - Patient understands basic needs 25-49% of the time  Expression: 2 - Expresses basic ideas/needs 25-49% of the time  Social Interaction: 1 - Patient appropriate < 25% of the time  Problem Solvin - Patient solves simple/routine tasks < 25%  Memory: 1 - Patient remembers < 25% of the time      Lab/X-ray studies reviewed, analyzed and discussed with patient and staff:   No results found for this or any previous visit (from the past 24 hour(s)). Xr Chest  2019   Areas of atelectasis, patchy groundglass infiltrate in the bases. Right greater than left . Trace/ small right pleural effusion    Ct Head   2019  Impression: Bilateral frontal and parietal subdural hematomas. The amount of extra-axial air has slightly decreased since the prior study. No new areas of hemorrhage are identified. All CT scans at this facility use dose modulation, iterative reconstruction, and/or weight based dosing when appropriate to reduce radiation dose to as low as reasonably achievable.     Ct Head Wo  : 2019   DECREASING-SIZED BILATERAL CEREBRAL CONVEXITY SUBDURAL HEMATOMAS AFTER INTERVAL REMOVAL OF BOTH DRAINAGE CATHETERS. Ct Cervical Spine  5/24/2019  Mild to moderate hypertrophic facet changes are present, predominantly on the right at C3-4, and on the left at C2-C3 and C5-6. There is no fracture, significant subluxation, or acute paraspinous soft tissue abnormalities identified. NO FRACTURE OR EVIDENCE OF CERVICAL SPINE INJURY IDENTIFIED. CERVICAL SPONDYLOSIS. Ct Lumbar   5/24/2019  Acute on chronic fracture, L1, with avulsion of the anterior portion of the L1 vertebral body. Findings suspicious for mild to moderate central stenosis at the L3-L4 level. Fl Modified Barium   5/31/2019  EXAMINATION: FL MODIFIED BARIUM SWALLOW W VIDEO: DATE AND TIME: 5/30/2019 at 10:45 AM. CLINICAL HISTORY:    MODERATE ORAL AND PHARYNGEAL DYSFUNCTION WITHOUT ASPIRATION. PLEASE REFER TO SPEECH PATHOLOGY  REPORT FOR RECOMMENDATIONS. Us Dup Lower Extremities Bilateral V 5/29/2019  NO DVT IDENTIFIED IN EITHER LOWER EXTREMITY. Previous extensive, complex labs, notes and diagnostics reviewed and analyzed. ALLERGIES:    Allergies as of 05/31/2019 - Review Complete 05/31/2019   Allergen Reaction Noted    Honey bee venom [bee venom] Swelling 10/08/2015    Penicillins Swelling 10/08/2015      (please also verify by checking MAR)       I have encouraged patient to attend their adjustment to rehabilitation support group with rec therapy and rehabilitation psychology in order to improve their adjustments, well-being, and help their spiritual and cognitive recovery. Complex Physical Medicine & Rehab Issues Assess & Plan:   1. Severe abnormality of gait and mobility and impaired self-care and ADL's secondary to progressive subdural hematomas .   Functional and medical status reassessed regarding patients ability to participate in therapies and patient found to be able to participate in acute intensive comprehensive inpatient rehabilitation program including PT/OT to improve balance, ambulation, ADLs, and to improve the P/AROM. Therapeutic modifications regarding activities in therapies, place, amount of time per day and intensity of therapy made daily. In bed therapies or bedside therapies prn.   2. Bowel and Bladder dysfunction including incontinence : Trial condom catheter at night frequent toileting, ambulate to bathroom with assistance, check post void residuals. Check for C.difficile x1 if >2 loose stools in 24 hours, continue bowel & bladder program.  Monitor bowel and bladder function. Lactinex 2 PO every AC. MOM prn, Brown Bomb prn, Glycerin suppository prn, enema prn.  3. Severe postoperative craniotomy pain generalized OA painAcute on chronic fracture, L1, with avulsion of the anterior portion of the L1 vertebral body. Findings suspicious for mild to moderate central stenosis at the L3-L4 level.: reassess pain every shift and prior to and after each therapy session, give prn Tylenol and schedull and consider Norco, modalities prn in therapy, Lidoderm, K-pad prn.   4. Skin healing bilateral upper extremity bruising and breakdown risk: Side-to-side turns add CoQ10 continue pressure relief program.  Daily skin exams and reports from nursing. 5. Severe Fatigue due to nutritional and hydration deficiency:    continue to monitor I&Os, calorie counts prn, dietary consult prn. Discontinue nocturnal IVs as patient is now on mechanical soft and thin liquids  6. Acute episodic insomnia with situational adjustment disorder:   Add scheduled sleeping medication Ambien, monitor for day time sedation. 7. Falls risk elevated: Patient is a one-on-one as needed. We will make sure his hearing aids are in we will post signs or visual cues so that he does not get up on his own and keep reminding him to use the call light. Patient to use call light to get nursing assistance to get up, bed and chair alarm.   8. Elevated DVT risk: progressive activities in PT, continue prophylaxis JOSE DE JESUS hose, elevation and  patient is not on a blood thinner secondary to recent subdural hematomas . 9. Complex discharge planning:  Discharge 6/28/19 skilled level and then eventually long-term care at a wheelchair level. He will likely need 24-hour supervision at discharge. His significant other will help him apply for Medicaid as he will likely need care for the rest of his life. Weekly team meeting every Monday to assess progress towards goals, discuss and address social, psychological and medical comorbidities and to address difficulties they may be having progressing in therapy. Patient and family education is in progress. The patient is to follow-up with their family physician after discharge. Complex Active General Medical Issues that complicate care Assess & Plan:    1. Afib with occasional RVR,   Essential hypertension,   Hyperlipidemia,   S/P CABG (coronary artery bypass graft), S/P PTCA (percutaneous transluminal coronary angioplasty),   RBBB,   CAD (coronary artery disease), long-term previous use of blood thinners telemetry to monitor for bradycardia blood pressure checks every shift dose and titrate cardiac medications, consult hospitals for backup medical and recheck CBC and BMP as needed -  2. Subdural hematoma-status post evacuation with , hold blood thinners-vital signs every shift, dose and titrate cardiac medications, recheck BMP CBC and consult hospitalist for backup medical  3.   prostatic hyperplasia with urinary frequency,   Prostate cancer -check postvoid residuals check stat UA  4. Facet arthropathy, lumbosacral,   Chronic back pain,   Spondylolisthesis of lumbar region-add Lidoderm, Tylenol, Norco as needed  5. Gross hematuria, Long term current use of anticoagulant-check UA monitor urine for blood  6. Long-term use of aspirin therapy and other blood thinners on hold at present because of recent subdural hematoma  7.    SHAYY on CPAP-add CPAP from home if available is not available add CPAP from the hospital  8. Prediabetes-recheck BMP, low carb diet  9. Vitamin D deficiency-high-dose vitamin D, add Tums at daily dose of vitamin D after high doses done recheck as an outpatient  10. Aphasia, and oropharyngeal dysphagia-recently which pathology consult, add e-stim, add supplementation to diet push oral fluids  11. Poorly nourished due to poor nutrition and previous alcohol use BMI 26.0-26.9,adult--add protein supplementation vitamin B12 vitamin D CoQ10,  to stop drinking alcohol as an outpatient and at chemical dependency counseling when patient is able to participate  12. Complex social situation-patient has a girlfriend he lives alone. He is estranged from his several children. Continue to work on improving social interactions.         Camryn Deras D.O., PM&R     Attending    [facility]

## 2019-06-16 NOTE — PROGRESS NOTES
Pt is resting in bed. VSS. Assessment complete. Pt has a 1 on 1 sitter with him. Scheduled Ambien was given. Pt has no complaints or is in no distress. Call light in reach. Will continue to monitor.

## 2019-06-16 NOTE — PROGRESS NOTES
Physical Therapy Rehab Treatment Note  Facility/Department[facility]  [facility]       NAME: Mariana Solitario  : 1934 (80 y.o.)  MRN: [id number]  CODE STATUS: Full Code    Date of Service: 2019  Chart Reviewed: Yes  Family / Caregiver Present: No    Restrictions:  Restrictions/Precautions: Fall Risk, Seizure  Position Activity Restriction  Other position/activity restrictions: nectar thick        SUBJECTIVE: Subjective: Pt states' no pain,\"Names birthdate and name  Pain Screening  Patient Currently in Pain: Denies       Post Treatment Pain Screening:denies       OBJECTIVE:      Neuromuscular Education  PNF: alt toe taps with CGA, 1 ue support, max cues to perform  Neuromuscular Comments: obstacle management with rollator, min A with simple cues for directional changes      Bed mobility  Bridging: Minimal assistance  Rolling to Left: Minimal assistance  Rolling to Right: Minimal assistance  Supine to Sit: Minimal assistance  Sit to Supine: Stand by assistance  Comment: Mat mobility, max cues,simple commands easily followed    Transfers  Sit to Stand: Minimal Assistance  Stand to sit: Minimal Assistance  Comment: heavy cues for safety and technique with chair transfers, proper hand placement    Ambulation  Ambulation?: Yes  More Ambulation?: Yes  Ambulation 1  Surface: carpet;level tile  Device: Rollator  Assistance: Contact guard assistance  Quality of Gait: NBOS with inconsistant stride lengths, VCs to attempt correction with poor carryover  Distance: 140 ft  Ambulation 2  Surface - 2: carpet;level tile  Device 2: Rollator  Assistance 2: Contact guard assistance;Minimal assistance  Quality of Gait 2: difficulty turning on approach to mat, min A to navigate Rollator  Stairs/Curb  Stairs?: Yes   Stairs  Stairs Height: 6\"  Rails: Bilateral  Assistance: Contact guard assistance  Comment: reciprocal ascending, non recip descending               ASSESSMENT/COMMENTS:  Body structures, Functions, Activity

## 2019-06-16 NOTE — FLOWSHEET NOTE
Patient had his legs out of the bed and the alarm was going off. RN tried to have the I:I out of the room for awhile. He can be very quick when he moves and also doesn't know how to coordinate his arms and legs when getting into or out of his wheelchair.  I believe he needs to continue the 1:1.

## 2019-06-16 NOTE — FLOWSHEET NOTE
Assessment completed. Patient remains 1:1 nursing with Kaiden Danielle LPN with patient. Lungs clear. No edema noted. Abdomen soft and nontender with good bowel sound. Up in the wheel chair around nurses station. Denies any pain or nausea.

## 2019-06-17 ENCOUNTER — APPOINTMENT (OUTPATIENT)
Dept: GENERAL RADIOLOGY | Age: 84
DRG: 949 | End: 2019-06-17
Attending: PHYSICAL MEDICINE & REHABILITATION
Payer: MEDICARE

## 2019-06-17 ENCOUNTER — APPOINTMENT (OUTPATIENT)
Dept: CT IMAGING | Age: 84
DRG: 949 | End: 2019-06-17
Attending: PHYSICAL MEDICINE & REHABILITATION
Payer: MEDICARE

## 2019-06-17 LAB
GLUCOSE BLD-MCNC: 124 MG/DL (ref 60–115)
PERFORMED ON: ABNORMAL

## 2019-06-17 PROCEDURE — 73030 X-RAY EXAM OF SHOULDER: CPT

## 2019-06-17 PROCEDURE — 72125 CT NECK SPINE W/O DYE: CPT

## 2019-06-17 PROCEDURE — 97535 SELF CARE MNGMENT TRAINING: CPT

## 2019-06-17 PROCEDURE — 6370000000 HC RX 637 (ALT 250 FOR IP): Performed by: INTERNAL MEDICINE

## 2019-06-17 PROCEDURE — 1180000000 HC REHAB R&B

## 2019-06-17 PROCEDURE — 6370000000 HC RX 637 (ALT 250 FOR IP): Performed by: NEUROLOGICAL SURGERY

## 2019-06-17 PROCEDURE — 70450 CT HEAD/BRAIN W/O DYE: CPT

## 2019-06-17 PROCEDURE — 92526 ORAL FUNCTION THERAPY: CPT

## 2019-06-17 PROCEDURE — 73521 X-RAY EXAM HIPS BI 2 VIEWS: CPT

## 2019-06-17 PROCEDURE — 92507 TX SP LANG VOICE COMM INDIV: CPT

## 2019-06-17 PROCEDURE — 97116 GAIT TRAINING THERAPY: CPT

## 2019-06-17 PROCEDURE — 6370000000 HC RX 637 (ALT 250 FOR IP): Performed by: PHYSICAL MEDICINE & REHABILITATION

## 2019-06-17 PROCEDURE — 99233 SBSQ HOSP IP/OBS HIGH 50: CPT | Performed by: PHYSICAL MEDICINE & REHABILITATION

## 2019-06-17 RX ADMIN — PRAVASTATIN SODIUM 40 MG: 40 TABLET ORAL at 20:24

## 2019-06-17 RX ADMIN — DILTIAZEM HYDROCHLORIDE 120 MG: 120 CAPSULE, COATED, EXTENDED RELEASE ORAL at 20:24

## 2019-06-17 RX ADMIN — ACETAMINOPHEN 500 MG: 325 TABLET ORAL at 20:24

## 2019-06-17 RX ADMIN — DILTIAZEM HYDROCHLORIDE 120 MG: 120 CAPSULE, COATED, EXTENDED RELEASE ORAL at 08:28

## 2019-06-17 RX ADMIN — ZOLPIDEM TARTRATE 5 MG: 5 TABLET ORAL at 20:24

## 2019-06-17 RX ADMIN — MAGNESIUM OXIDE TAB 400 MG (241.3 MG ELEMENTAL MG) 400 MG: 400 (241.3 MG) TAB at 08:28

## 2019-06-17 RX ADMIN — LIDOCAINE HYDROCHLORIDE: 20 SOLUTION ORAL; TOPICAL at 08:28

## 2019-06-17 RX ADMIN — FAMOTIDINE 20 MG: 20 TABLET ORAL at 08:28

## 2019-06-17 RX ADMIN — ACETAMINOPHEN 500 MG: 325 TABLET ORAL at 14:30

## 2019-06-17 RX ADMIN — FAMOTIDINE 20 MG: 20 TABLET ORAL at 20:24

## 2019-06-17 RX ADMIN — ACETAMINOPHEN 500 MG: 325 TABLET ORAL at 08:28

## 2019-06-17 ASSESSMENT — PAIN SCALES - GENERAL
PAINLEVEL_OUTOF10: 2
PAINLEVEL_OUTOF10: 0
PAINLEVEL_OUTOF10: 4
PAINLEVEL_OUTOF10: 0

## 2019-06-17 NOTE — PROGRESS NOTES
Occupational Therapy  [facility]    Date: 2019  Patient Name: Damaris Stoner        MRN: [id number]  Account: [de-identified]   : 1934  (80 y.o.)      Pt. Current Self Care Status:    ADL  Feeding: Setup  Grooming: Setup  UE Bathing: Supervision  LE Bathing: Minimal assistance, Verbal cueing(with max verbal cues)  UE Dressing: Moderate assistance  LE Dressing: Maximum assistance  Toileting: Unable to assess(comment)(Pt did not need to go)  Additional Comments: Pt required max verbal cues throughout ADL  Toilet Transfers  Toilet - Technique: Ambulating  Equipment Used: Grab bars  Toilet Transfer: Minimal assistance(verbal cues)  Toilet Transfers Comments: Pt completed UE and LE dressing while seated on commode     Shower Transfers  Shower - Transfer From: Walker  Shower - Transfer Type: To and From  Shower - Transfer To: Shower seat with back  Shower - Technique: Ambulating  Shower Transfers: Minimal assistance  Shower Transfers Comments: NT for safety     New LTGs due to decreased improvement and continued impulsivity and safety deficits    Within 1.5 Weeks Pt. will be:    Feeding: Mod I  Grooming: S  Bathing: S  UE Dressing: S  LE Dressing: Min A  Toileting: Supervision  Toilet Transfers: Supervision  Tub Transfers: Supervision    Electronically signed by:     GAVIN Rollins  2019, 1:13 PM

## 2019-06-17 NOTE — PROGRESS NOTES
I got a call from RN that pt has fell down, witnessed fall , as per RN pt did not hit the head  HEENT: mild redness small on the right ant frontal head  Lung:clear  Heart: s1/s2 wnl w/o s3  Ext: bruisng note on the left side elbow and left leg  No fractures noted

## 2019-06-17 NOTE — PROGRESS NOTES
[facility] Occupational Therapy      Date: 2019  Patient Name: Damaris Stoner        MRN: [id number]  Account: [de-identified]   : 1934  (80 y.o.)  Room: [place] [address]    Chart reviewed, attempted OT at 1500 for 60 minute tx. Patient not seen 2° to:    [x] Hold per nsg request- pt. pending medical testing this PM and results not yet back following fall earlier this PM. Collaborated with RN but hold until results back. [] Pt declined     [] Pt. off floor for test/procedure. Spoke to [place], RN aware. Will attempt again when able.     Electronically signed by KATARINA Rollins/L on 2019 at 3:16 PM

## 2019-06-17 NOTE — PROGRESS NOTES
Occupational Therapy  Facility/Department: [facility]  Daily Treatment Note  NAME: David Dnig  : 1934  MRN: [id number]    Date of Service: 2019    Discharge Recommendations:  Continue to assess pending progress       Assessment   Assessment: Pt demonstrated improving independence while completing ADL this morning. Pt continues to require max verbal cues throughout. Pt required cues during feeding for safety to remind to chew and swallow before taking another bite. Pt continues to benefit from skilled OT to maximize independence in safety and ADLs. REQUIRES OT FOLLOW UP: Yes  Activity Tolerance  Activity Tolerance: Patient Tolerated treatment well  Activity Tolerance: Fair  Safety Devices  Safety Devices in place: Yes  Type of devices: All fall risk precautions in place  Restraints  Initially in place: No         Patient Diagnosis(es): There were no encounter diagnoses. has a past medical history of Chronic back pain, Hyperlipidemia, and Hypertension. has a past surgical history that includes Cardiac catheterization; Coronary artery bypass graft; and craniotomy (Bilateral, 2019).     Restrictions  Restrictions/Precautions  Restrictions/Precautions: Fall Risk, Seizure  Position Activity Restriction  Other position/activity restrictions: nectar thick   Subjective   General  Chart Reviewed: Yes  Patient assessed for rehabilitation services?: Yes  Response to previous treatment: Patient with no complaints from previous session  Family / Caregiver Present: Yes  Subjective  Subjective:      General Comment  Comments:      Pain Assessment  Pain Assessment: 0-10  Pain Level: 0  Vital Signs  Patient Currently in Pain: Denies   Orientation  Orientation  Overall Orientation Status: Impaired  Orientation Level: Oriented to time;Oriented to situation  Objective    ADL  Feeding: Setup  Grooming: Setup  UE Bathing: Supervision  LE Bathing: Minimal assistance;Verbal cueing(with max verbal cues)  UE Dressing: Moderate assistance  LE Dressing: Maximum assistance  Toileting: Unable to assess(comment)(Pt did not need to go)  Additional Comments: Pt required max verbal cues throughout ADL        Balance  Sitting Balance: Contact guard assistance  Standing Balance: Contact guard assistance  Functional Mobility  Functional - Mobility Device: Rolling Walker  Activity: To/from bathroom  Assist Level: Minimal assistance  Functional Mobility Comments: Pt tends to cross his legs when ambulating. L LE LOB during ambulatin. Toilet Transfers  Toilet - Technique: Ambulating  Equipment Used: Grab bars  Toilet Transfer: Minimal assistance(verbal cues)  Toilet Transfers Comments: Pt completed UE and LE dressing while seated on commode  Shower Transfers  Shower - Transfer From: Court Romance - Transfer Type: To and From  Shower - Transfer To: Shower seat with back  Shower - Technique: Ambulating  Shower Transfers: 2 Person assistance;Dependent(for safety due to L LE lean)     Transfers  Sit to stand: Contact guard assistance  Stand to sit: Contact guard assistance                       Cognition  Cognition Comment: Comp 2 Express 2 Social 2 Prob 2 Mem 2      Pt caregiver present during treatment. Pt completed feeding at end of ADL. Pt's girlfriend opened juice. Pt given fork to use in order to feed himself. Pt required verbal and tactile cues to refrain from taking another bite until he is finished swallowing food in his mouth for safety purposes. Pt provided with mug for soup. Pt did not attempt eating soup during therapy. Pt's girlfriend and PCA educated to put soup in mug if pt has difficulty with spoon.                                    Plan   Plan  Times per week: 5-7   Plan weeks: 4  Current Treatment Recommendations: Strengthening, ROM, Balance Training, Functional Mobility Training, Endurance Training, Safety Education & Training, Cognitive Reorientation, Patient/Caregiver Education & Training, Equipment Evaluation, Education, & procurement, Self-Care / ADL, Cognitive/Perceptual Training, Positioning  Plan Comment: Continue with OT POC  G-Code     OutComes Score    AM-PAC Score    Goals  Patient Goals   Patient goals : Patient is unable to report a goal at this time due to global aphasia       Therapy Time   Individual Concurrent Group Co-treatment   Time In 1100         Time Out 1200         Minutes 60           This therapy session was supervised by Clay Lozano MS OTR/L    Electronically signed by Su Rose on 6/17/2019 at 4:46 PM      Su Rose

## 2019-06-17 NOTE — PROGRESS NOTES
Physical Therapy Rehab Treatment Note  Facility/Department: [facility]  [facility]       NAME: Ross Da Silva  : 1934 (80 y.o.)  MRN: [id number]  CODE STATUS: Full Code    Date of Service: 2019  Patient assessed for rehabilitation services?: Yes    Restrictions:  Restrictions/Precautions: Fall Risk, Seizure(thin liquids ok now)  Position Activity Restriction  Other position/activity restrictions: nectar thick        SUBJECTIVE:            Pre and Post Treatment Pain Screenin/10       OBJECTIVE:                      Bed mobility  Rolling to Left: Stand by assistance  Rolling to Right: Stand by assistance  Supine to Sit: Stand by assistance  Sit to Supine: Stand by assistance  Comment: Pt requires no physical assistance however verbal cues required for task completion, quality and safety with regards to edge of bed    Transfers  Sit to Stand: Minimal Assistance;Stand by assistance  Stand to sit: Minimal Assistance;Stand by assistance  Bed to Chair: Minimal assistance(assist for intermittent guiding of hands as well as balance maintenance)    Ambulation  Ambulation?: Yes  Ambulation 1  Surface: carpet  Device: Rollator  Assistance: Minimal assistance; Moderate assistance  Quality of Gait: variable SANJAY and step length, variable wlaker use, poor maneuvering in environment and ability to plan path to avoid objects, poor planning of approach to the chair  Distance: 100 feet  Comments: Pt able to ambulate with ww however pt required mod- max assist for walker management - gait safest with rollator at this time; overaall pt more distractible and required greater assist with this task this date  Stairs/Curb  Stairs?: Yes   Stairs  # Steps : 4  Rails: Bilateral  Assistance: Minimal assistance; Moderate assistance  Comment: reciprocal ascending, non recip descending; pt required second person to assist with hand placment due to pt difficulty with following directions once on the stairs    Activity Tolerance  Activity Tolerance: Patient Tolerated treatment well  Activity Tolerance: no SOB or sigs of fatigue noted          ASSESSMENT/COMMENTS:  Assessment: Pt demonstrated greater difficulty with following directions. He was more distractible overall this date as well. When distracted pt demonstrated greater loss of balance requiring greater recovery assistance. Long term goals  Long term goal 1: Pt to complete bed mobility with SBA  Long term goal 2: Pt to complete functional transfers (STS and bed<>chair) with SBA  Long term goal 3: Pt to ambulate 150ft with rollator and CGA  Long term goal 4: CGA 4 stairs with rails  Long term goal 5: Pt able to stand 2 min with CGA     PLAN OF CARE/Safety:   Safety Devices  Type of devices: Chair alarm in place; Left in chair;Gait belt      Therapy Time:   Individual   Time In 0850   Time Out 0930   Minutes 40     Minutes:      Transfer/Bed mobility trainin      Gait trainin        Tarun Cedeno PT, 19 at 1:24 PM

## 2019-06-17 NOTE — PROGRESS NOTES
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 [facility] admit 05/31/19. Active Problems:    Afib (HCC)    Subdural hematoma (HCC)    Benign prostatic hyperplasia with urinary frequency    Essential hypertension    Facet arthropathy, lumbosacral    Gross hematuria    Hyperlipidemia    Long term current use of anticoagulant    Long-term use of aspirin therapy    SHAYY on CPAP    Prediabetes    Prostate cancer (HCC)    S/P CABG (coronary artery bypass graft)    S/P PTCA (percutaneous transluminal coronary angioplasty)    Spondylolisthesis of lumbar region    Vitamin D deficiency    RBBB    Aphasia    CAD (coronary artery disease)    BMI 26.0-26.9,adult    Chronic back pain    Abnormality of gait and mobility  Resolved Problems:    * No resolved hospital problems. *    Blood pressure 129/86, pulse 76, temperature 97 °F (36.1 °C), temperature source Oral, resp. rate 18, height 6' 0.05\" (1.83 m), weight 179 lb 10.8 oz (81.5 kg), SpO2 94 %. Subjective  Objective:  General Appearance: In no acute distress. Vital signs: (most recent): Blood pressure 129/86, pulse 76, temperature 97 °F (36.1 °C), temperature source Oral, resp. rate 18, height 6' 0.05\" (1.83 m), weight 179 lb 10.8 oz (81.5 kg), SpO2 94 %. Lungs:  Normal effort. Heart: Normal rate. Regular rhythm. S1 normal.    Abdomen: Abdomen is soft. There is no epigastric area tenderness. There is splenomegaly. Extremities: There is no deformity. Pulses: Distal pulses are intact.

## 2019-06-17 NOTE — PLAN OF CARE
Problem: Falls - Risk of:  Goal: Will remain free from falls  Description  Will remain free from falls  Outcome: Ongoing  Goal: Absence of physical injury  Description  Absence of physical injury  Outcome: Ongoing     Problem: Risk for Impaired Skin Integrity  Goal: Tissue integrity - skin and mucous membranes  Description  Structural intactness and normal physiological function of skin and  mucous membranes.   Outcome: Ongoing     Problem: Pain Control  Goal: Maintain pain level at or below patient's acceptable level (or 5 if patient is unable to determine acceptable level)  Outcome: Ongoing  Goal: Improvement in pain related behaviors BP/HR WNL  Outcome: Ongoing     Problem: Neurological  Goal: Maximum potential motor/sensory/cognitive function  Outcome: Ongoing     Problem: Respiratory  Goal: No pulmonary complications  Outcome: Ongoing  Goal: O2 Sat > 90%  Outcome: Ongoing     Problem: GI  Goal: No bowel complications  Outcome: Ongoing     Problem:   Goal: No urinary complication  Outcome: Ongoing     Problem: Skin Integrity/Risk  Goal: No skin breakdown during hospitalization  Outcome: Ongoing     Problem: Musculor/Skeletal Functional Status  Goal: Absence of falls  Outcome: Ongoing     Problem: IP COMMUNICATION/DYSARTHRIA  Goal: LTG - patient will improve expressive language skills to allow for communication of wants and needs in daily activities  Outcome: Ongoing     Problem: IP SWALLOWING  Goal: LTG - patient will tolerate the least restrictive diet consistency to allow for safe consumption of daily meals  Outcome: Ongoing     Problem: IP BALANCE  Goal: LTG - Patient will maintain balance to allow for safe/functional mobility  Outcome: Ongoing none

## 2019-06-17 NOTE — PROGRESS NOTES
Discontinue the one to one. Will place steven in the room. Will continue to monitor. Patient stood and fell one to one reinstated. Electronically signed by Luisa Agarwal RN on 6/17/2019 at 1:25 PM  vicki Lang into see patient. New orders written. Jagruti Link into see patient. Dr. Juan Mehta was called and message left with office   Call placed to Dr. Alberto Lang regarding the results of the CT of the head. Dr Alberto Lang requests DR. Michael Haider is made aware. Call placed to DR. Michael Haider office regarding the results of the CT of the head. Waiting for a return call. Electronically signed by Luisa Agarwal RN on 6/17/2019 at 5:10 PM  Dr Kayla Gonsalves into see patient. Azam Bravo for patient to continue with rehab. Bilateral chronic subdural hematomas demonstrating interval improvement in size when compared to July 1, 2019 however hyperdense material within the subdural hematoma on the right as above is compatible with acute on chronic right subdural hematoma. No midline shift.

## 2019-06-17 NOTE — PROGRESS NOTES
Requested to see patient today because of a new CT scan of the brain showing a streak of more acute bleeding on the right side. However the overall size are less than on previous studies. The patient did have a fall today but he did not strike his head. CT of the cervical spine negative for fracture. Patient status post 5/25/2019 bilateral anatomies for evacuation subdural hematoma. He is waxed and waned since then. At this time he is saying a few words but he whispers he can only speak in order to the time he does not. He does have gross motor movement in the upper and lower extremities. He is inappropriate and cannot express for himself. He has not changed neurologically in the last several weeks. Continue rehabilitation.

## 2019-06-17 NOTE — PROGRESS NOTES
Subjective: The patient complains of severe  acute  confusion secondary to subdural hematoma partially relieved by PT OT, speech-language pathology, recent decompression of the subdural hematomas and exacerbated by fatigue, exhaustion, dehydration. I am concerned about patients impulsivity -and falls risks he is a one-on-one here he will not be able to be a one-on-one in any other setting including skilled facility including home and including hospice. There is indeed a hospice consult on him as he has a limited life expectancy and we are trying to improve the quality of his last years. I have asked nursing and nursing supervisor to do the best to get him off of the one-on-one safely. He needs to have his hearing aids and so he can hear the redirection that the video-assisted monitor and/or nursing is giving him. ROS x   10: The patient also complains of severely impaired mobility and activities of daily living. Otherwise no new problems with vision, hearing, nose, mouth, throat, dermal, cardiovascular, GI, , pulmonary, musculoskeletal, psychiatric or neurological. See Rehab H&P on Rehab chart dated . Vital signs:  /86   Pulse 76   Temp 97 °F (36.1 °C) (Oral)   Resp 18   Ht 6' 0.05\" (1.83 m)   Wt 179 lb 10.8 oz (81.5 kg)   SpO2 94%   BMI 24.34 kg/m²   I/O:   PO/Intake:  fair PO intake,   mechanical soft with thin liquids    Bowel/Bladder:  Incontinent, to trial condom catheter at night  General:  Patient is well developed, adequately nourished, non-obese and     well kempt. HEENT:    PERRLA, hearing intact to loud voice fall out frequently, external inspection of ear     and nose benign. Inspection of lips, tongue and gums dry  Musculoskeletal: No significant change in strength or tone. All joints stable. Inspection and palpation of digits and nails show no clubbing,       cyanosis or inflammatory conditions. Neuro/Psychiatric: Affect: flat but pleasant. Echolalia. Alert and oriented to person, place and     Situation-with max cues. No significant change in deep tendon reflexes or     Sensation-poor judgment reasoning and insight. Lungs:  Diminished, CTA-B. Respiration effort is normal at rest.     Heart:   S1 = S2, occasionally high heart rate history of A. fib with RVR. No loud murmurs. Abdomen:  Soft, non-tender, no enlargement of liver or spleen. Extremities:  No significant lower extremity edema or tenderness. Skin:   Intact to general survey,  craniotomy incisions healing. Rehabilitation:  Physical therapy: FIMS:  Bed Mobility: Scooting: Moderate assistance    Transfers: Sit to Stand: Minimal Assistance  Stand to sit: Minimal Assistance  Bed to Chair: Minimal assistance  Squat Pivot Transfers: Maximum Assistance(assistance guiding hips , simple commands used with poor comprehension), Ambulation 1  Surface: carpet, level tile  Device: Rollator  Other Apparatus: (no O2)  Assistance: Contact guard assistance  Quality of Gait: NBOS with inconsistant stride lengths, VCs to attempt correction with poor carryover  Distance: 140 ft  Comments: pt requires verbal cues and assist with rollator fro safe maneuvering - pt is distractible and demonstrates poor attention to environmental barriers. , Stairs  # Steps : 4  Stairs Height: 6\"  Rails: Bilateral  Assistance: Contact guard assistance  Comment: reciprocal ascending, non recip descending    FIMS: Bed, Chair, Wheel Chair: 5 - Requires setup/supervision/cues  Walk: 4 - Contact Guard/Minimal Assistance Requires up to Contact Guard or Minimal Assistance to walk at least 150 feet  Distance Walked: Brijesh Alexander 96: 0 - Activity Not Assessed/Does Not Occur  Distance Traveled in Wheel Chair: 15'  Stairs: 2- Maximal Assistance Performs 25-49% of the effort to go up and down 4 to 6 stairs and requires the assistance of one person only,  , Assessment: Pt responded best with simple cues.  Min A for transfers d/t cueing for technique and increased safety    Occupational therapy: FIMS:  Eatin - Feeds self with setup/supervision/cues and/or requires only setup/supervision/cues to perform tube feedings  Groomin - Able to perform 3-4 tasks with touching help  Bathin - Able to bathe 3-4 areas  Dressing-Upper: 2 - Requires assist with 3 tasks  Dressing-Lower: 2 - Requires assist with 4-5 parts of dressing  Toiletin - Able to perform 1 task only (e.g. hygiene)  Toilet Transfer: 5 - Requires setup/supervision/cues  Shower Transfer: 0 - Activity does not occur,  , Assessment: Pt requires multiple cues to follow commands. Pt demonstrates decreased gross motor coordination. Pt continues to benefit from skilled OT to maximize independence in safety and ADLs. Speech therapy: FIMS: Comprehension: 2 - Patient understands basic needs 25-49% of the time  Expression: 2 - Expresses basic ideas/needs 25-49% of the time  Social Interaction: 1 - Patient appropriate < 25% of the time  Problem Solvin - Patient solves simple/routine tasks < 25%  Memory: 1 - Patient remembers < 25% of the time      Lab/X-ray studies reviewed, analyzed and discussed with patient and staff:   No results found for this or any previous visit (from the past 24 hour(s)). Xr Chest  2019   Areas of atelectasis, patchy groundglass infiltrate in the bases. Right greater than left . Trace/ small right pleural effusion    Ct Head   2019  Impression: Bilateral frontal and parietal subdural hematomas. The amount of extra-axial air has slightly decreased since the prior study. No new areas of hemorrhage are identified. All CT scans at this facility use dose modulation, iterative reconstruction, and/or weight based dosing when appropriate to reduce radiation dose to as low as reasonably achievable. Ct Head Wo  : 2019   DECREASING-SIZED BILATERAL CEREBRAL CONVEXITY SUBDURAL HEMATOMAS AFTER INTERVAL REMOVAL OF BOTH DRAINAGE CATHETERS.       Ct Cervical aware of the team discussion regarding their progress. Complex Physical Medicine & Rehab Issues Assess & Plan:   1. Severe abnormality of gait and mobility and impaired self-care and ADL's secondary to progressive subdural hematomas . Functional and medical status reassessed regarding patients ability to participate in therapies and patient found to be able to participate in acute intensive comprehensive inpatient rehabilitation program including PT/OT to improve balance, ambulation, ADLs, and to improve the P/AROM. Therapeutic modifications regarding activities in therapies, place, amount of time per day and intensity of therapy made daily. In bed therapies or bedside therapies prn.   2. Bowel and Bladder dysfunction including incontinence : Trial condom catheter at night frequent toileting, ambulate to bathroom with assistance, check post void residuals. Check for C.difficile x1 if >2 loose stools in 24 hours, continue bowel & bladder program.  Monitor bowel and bladder function. Lactinex 2 PO every AC. MOM prn, Brown Bomb prn, Glycerin suppository prn, enema prn.  3. Severe postoperative craniotomy pain generalized OA painAcute on chronic fracture, L1, with avulsion of the anterior portion of the L1 vertebral body. Findings suspicious for mild to moderate central stenosis at the L3-L4 level.: reassess pain every shift and prior to and after each therapy session, give prn Tylenol and schedull and consider Norco, modalities prn in therapy, Lidoderm, K-pad prn.   4. Skin healing bilateral upper extremity bruising and breakdown risk: Side-to-side turns add CoQ10 continue pressure relief program.  Daily skin exams and reports from nursing. 5. Severe Fatigue due to nutritional and hydration deficiency:    continue to monitor I&Os, calorie counts prn, dietary consult prn. Discontinue nocturnal IVs as patient is now on mechanical soft and thin liquids  6.  Acute episodic insomnia with situational adjustment bypass graft), S/P PTCA (percutaneous transluminal coronary angioplasty),   RBBB,   CAD (coronary artery disease), long-term previous use of blood thinners telemetry to monitor for bradycardia blood pressure checks every shift dose and titrate cardiac medications, consult hospitals for backup medical and recheck CBC and BMP as needed -  2. Subdural hematoma-status post evacuation with , hold blood thinners-vital signs every shift, dose and titrate cardiac medications, recheck BMP CBC and consult hospitalist for backup medical  3.   prostatic hyperplasia with urinary frequency,   Prostate cancer -check postvoid residuals check stat UA  4. Facet arthropathy, lumbosacral,   Chronic back pain,   Spondylolisthesis of lumbar region-add Lidoderm, Tylenol, Norco as needed  5. Gross hematuria, Long term current use of anticoagulant-check UA monitor urine for blood  6. Long-term use of aspirin therapy and other blood thinners on hold at present because of recent subdural hematoma  7. SHAYY on CPAP-add CPAP from home if available is not available add CPAP from the hospital  8. Prediabetes-recheck BMP, low carb diet  9. Vitamin D deficiency-high-dose vitamin D, add Tums at daily dose of vitamin D after high doses done recheck as an outpatient  10. Aphasia, and oropharyngeal dysphagia-recently which pathology consult, add e-stim, add supplementation to diet push oral fluids  11. Poorly nourished due to poor nutrition and previous alcohol use BMI 26.0-26.9,adult--add protein supplementation vitamin B12 vitamin D CoQ10,  to stop drinking alcohol as an outpatient and at chemical dependency counseling when patient is able to participate  12. Complex social situation-patient has a girlfriend he lives alone. He is estranged from his several children. Continue to work on improving social interactions.         Reshma Moon D.O., PM&R     Attending    [address] [facility]

## 2019-06-17 NOTE — PROGRESS NOTES
Speech Language Pathology Treatment Note  Facility/Department: [facility]  6/17/2019    Rehab Dx/Hx: Abnormality of gait and mobility [R26.9]    Precautions: falls and aspirations    ST Dx: Aphasia, Dysphagia and Cognitive Impairment     Date of Admit: 5/31/2019  Room #: R240/R240-01    Time in: 0930  Time out: 1000  Dysphagia: [id number]  Speech/Language: [id number]    Subjective:  Alert, Cooperative, Lethargic, Distractible and Confused        Interventions used this date:  Expressive Language, Receptive Language, Dysphagia Treatment and Oral Motor Treatment    Objective/Assessment:  Patient progressing towards goals:  Short-term Goals for Speech & Language POC:   Goal 1: Pt will follow 1 step directions given orally with 75% accuracy with mod cues to increase the pt's ability to follow directions provided by caregivers for safe follow through with ADLs. Consistent verbal, visual, and tactile cues required this date    Goal 2: Pt will identify objects/pictures within a field of 2-3 with 75% accuracy with mod cues in order to increase his understanding of objects in his environment for safer and more independent completion of ADLs. ID pictures in field of 2 with full hand over hand assist    Goal 3: Pt will answer low level yes/no questions with 85% accuracy with mod cues to assist the caregiver in obtaining important information regarding the patient's personal, medical, and safety needs. <50% acc    Goal 4: Pt will answer low level Wh- questions with 75% accuracy with mod cues to assist the caregiver in obtaining important information regarding the patient's personal, medical, and safety needs. <25% acc    Goal 5: Pt will demonstrate reading comprehension at the word  level to 60% acc with mod cues to promote pt's ability to utilize reading/writing.  0/5      Pt was able to label single digit numbers 3/10 independently which increased to 9/10 with moderate cues and 10/10 with maximum cues.  He was able to sustain attention to complete a simple numbers puzzle with consistent moderate verbal/visual cues and occasional tactile prompts. Short-term Goals for Dysphagia POC:  Goal 1: Pt will tolerate puree diet with nectar thick liquids with no overt s/s of aspiration. Goal met. Goal 2: Pt will perform oral motor ROM/strength exercieses with max cues 5x/each. Pt unable to complete any lingual or labial exercises without full physical assist secondary to decreased ability to follow directions and suspected apraxia    Goal 3: Pt will perform base of tongue exercises with max cues 10x/each.  /k,g/ words complete x 25 with fair contact observed    Goal 4: Pt will tolerate therapeutic trials of mechanical soft/thin as able, with no overt s/s of aspiration. Goal met. Treatment/Activity Tolerance:  Patient limited by fatigue    Plan:  Continue per POC    Pain:  Patient demonstrated no s/s of pain. Patient/Caregiver Education:  Patient educated on session and progression towards goals. and Education needs reinforcement.     Safety Devices:  Chair alarm in place     Comprehension:  2- Maximal assist    Expression:  2- Maximal assist    Problem Solvin- Total assist    Memory:  1- Total assist    Signature: Electronically signed by CIERRA Manrique on 2019 at 9:57 AM

## 2019-06-18 ENCOUNTER — TELEPHONE (OUTPATIENT)
Dept: PHARMACY | Age: 84
End: 2019-06-18

## 2019-06-18 DIAGNOSIS — I48.91 ATRIAL FIBRILLATION, UNSPECIFIED TYPE (HCC): ICD-10-CM

## 2019-06-18 PROCEDURE — 97112 NEUROMUSCULAR REEDUCATION: CPT

## 2019-06-18 PROCEDURE — 97116 GAIT TRAINING THERAPY: CPT

## 2019-06-18 PROCEDURE — 6370000000 HC RX 637 (ALT 250 FOR IP): Performed by: INTERNAL MEDICINE

## 2019-06-18 PROCEDURE — 92507 TX SP LANG VOICE COMM INDIV: CPT

## 2019-06-18 PROCEDURE — 6370000000 HC RX 637 (ALT 250 FOR IP): Performed by: PHYSICAL MEDICINE & REHABILITATION

## 2019-06-18 PROCEDURE — 6370000000 HC RX 637 (ALT 250 FOR IP): Performed by: NEUROLOGICAL SURGERY

## 2019-06-18 PROCEDURE — 97127 HC OT THER IVNTJ W/FOCUS COG FUNCJ: CPT

## 2019-06-18 PROCEDURE — 1180000000 HC REHAB R&B

## 2019-06-18 PROCEDURE — 99233 SBSQ HOSP IP/OBS HIGH 50: CPT | Performed by: PHYSICAL MEDICINE & REHABILITATION

## 2019-06-18 PROCEDURE — 97530 THERAPEUTIC ACTIVITIES: CPT

## 2019-06-18 PROCEDURE — 97535 SELF CARE MNGMENT TRAINING: CPT

## 2019-06-18 PROCEDURE — 92526 ORAL FUNCTION THERAPY: CPT

## 2019-06-18 PROCEDURE — 97110 THERAPEUTIC EXERCISES: CPT

## 2019-06-18 RX ADMIN — ACETAMINOPHEN 500 MG: 325 TABLET ORAL at 14:51

## 2019-06-18 RX ADMIN — FAMOTIDINE 20 MG: 20 TABLET ORAL at 20:54

## 2019-06-18 RX ADMIN — MAGNESIUM HYDROXIDE 30 ML: 400 SUSPENSION ORAL at 20:57

## 2019-06-18 RX ADMIN — MAGNESIUM OXIDE TAB 400 MG (241.3 MG ELEMENTAL MG) 400 MG: 400 (241.3 MG) TAB at 08:02

## 2019-06-18 RX ADMIN — PRAVASTATIN SODIUM 40 MG: 40 TABLET ORAL at 20:54

## 2019-06-18 RX ADMIN — ZOLPIDEM TARTRATE 5 MG: 5 TABLET ORAL at 20:54

## 2019-06-18 RX ADMIN — DILTIAZEM HYDROCHLORIDE 120 MG: 120 CAPSULE, COATED, EXTENDED RELEASE ORAL at 20:57

## 2019-06-18 RX ADMIN — FAMOTIDINE 20 MG: 20 TABLET ORAL at 08:02

## 2019-06-18 RX ADMIN — ACETAMINOPHEN 500 MG: 325 TABLET ORAL at 20:54

## 2019-06-18 RX ADMIN — DILTIAZEM HYDROCHLORIDE 120 MG: 120 CAPSULE, COATED, EXTENDED RELEASE ORAL at 08:03

## 2019-06-18 RX ADMIN — ACETAMINOPHEN 500 MG: 325 TABLET ORAL at 08:03

## 2019-06-18 ASSESSMENT — PAIN SCALES - GENERAL
PAINLEVEL_OUTOF10: 0

## 2019-06-18 ASSESSMENT — PAIN SCALES - WONG BAKER: WONGBAKER_NUMERICALRESPONSE: 0

## 2019-06-18 NOTE — CARE COORDINATION
Message left for Kamilah to discuss discharge and hospice consult per request. Awaiting return call at this time. Electronically signed by Shahbaz Banuelos RN on 6/18/2019 at 11:23 AM

## 2019-06-18 NOTE — PROGRESS NOTES
avoid objects, poor planning of approach to the chair  Distance: 100 feet  Comments: Pt able to ambulate with ww however pt required mod- max assist for walker management - gait safest with rollator at this time; overaall pt more distractible and required greater assist with this task this date, Stairs  # Steps : 4  Stairs Height: 6\"  Rails: Bilateral  Assistance: Minimal assistance, Moderate assistance  Comment: reciprocal ascending, non recip descending; pt required second person to assist with hand placment due to pt difficulty with following directions once on the stairs    FIMS: Bed, Chair, Wheel Chair: 4 - Requires steadying assistance only <25% assist  and/or requires assist with one leg only  Walk: 2 - Maximal Assistance Requires up to [address] requires assistance of one person to walk between  feet (Patient performs 25-49% of locomotion effort or goes between  feet)  Distance Walked: 100  Wheel Chair: 0 - Activity Not Assessed/Does Not Occur  Distance Traveled in Wheel Chair: 15'  Stairs: 1- Total Assistance perfoms less than 25% of the effort, or requirs the assistance of two people, or goes up and down fewer than 4 stairs,  , Assessment: Pt demonstrated greater difficulty with following directions. He was more distractible overall this date as well. When distracted pt demonstrated greater loss of balance requiring greater recovery assistance.      Occupational therapy: FIMS:  Eatin - Feeds self with setup/supervision/cues and/or requires only setup/supervision/cues to perform tube feedings  Groomin - Requires setup/cues to do all tasks  Bathin - Able to bathe 8-9 areas  Dressing-Upper: 3 - Requires assist with 2 tasks  Dressing-Lower: 2 - Requires assist with 4-5 parts of dressing  Toiletin - Did not occur  Toilet Transfer: 4 - Requires steadying assistance only < 25% assist  Shower Transfer: 1 - Total assistance, pt. expends less than 25% effort,  , Assessment: Pt demonstrated improving independence while completing ADL this morning. Pt continues to require max verbal cues throughout. Pt required cues during feeding for safety to remind to chew and swallow before taking another bite. Pt continues to benefit from skilled OT to maximize independence in safety and ADLs. Speech therapy: FIMS: Comprehension: 2 - Patient understands basic needs 25-49% of the time  Expression: 2 - Expresses basic ideas/needs 25-49% of the time  Social Interaction: 1 - Patient appropriate < 25% of the time  Problem Solvin - Patient solves simple/routine tasks 25%-49%  Memory: 2 - Patient remembers 25%-49% of the time      Lab/X-ray studies reviewed, analyzed and discussed with patient and staff:   Recent Results (from the past 24 hour(s))   POCT Glucose    Collection Time: 19  8:18 PM   Result Value Ref Range    POC Glucose 124 (H) 60 - 115 mg/dl    Performed on ACCU-CHEK        Xr Chest  2019   Areas of atelectasis, patchy groundglass infiltrate in the bases. Right greater than left . Trace/ small right pleural effusion    Ct Head   2019  Impression: Bilateral frontal and parietal subdural hematomas. The amount of extra-axial air has slightly decreased since the prior study. No new areas of hemorrhage are identified. All CT scans at this facility use dose modulation, iterative reconstruction, and/or weight based dosing when appropriate to reduce radiation dose to as low as reasonably achievable. Ct Head Wo  : 2019   DECREASING-SIZED BILATERAL CEREBRAL CONVEXITY SUBDURAL HEMATOMAS AFTER INTERVAL REMOVAL OF BOTH DRAINAGE CATHETERS. Ct Cervical Spine  2019  Mild to moderate hypertrophic facet changes are present, predominantly on the right at C3-4, and on the left at C2-C3 and C5-6. There is no fracture, significant subluxation, or acute paraspinous soft tissue abnormalities identified. NO FRACTURE OR EVIDENCE OF CERVICAL SPINE INJURY IDENTIFIED.  CERVICAL SPONDYLOSIS. Ct Lumbar   5/24/2019  Acute on chronic fracture, L1, with avulsion of the anterior portion of the L1 vertebral body. Findings suspicious for mild to moderate central stenosis at the L3-L4 level. Fl Modified Barium   5/31/2019  EXAMINATION: FL MODIFIED BARIUM SWALLOW W VIDEO: DATE AND TIME: 5/30/2019 at 10:45 AM. CLINICAL HISTORY:    MODERATE ORAL AND PHARYNGEAL DYSFUNCTION WITHOUT ASPIRATION. PLEASE REFER TO SPEECH PATHOLOGY  REPORT FOR RECOMMENDATIONS. Us Dup Lower Extremities Bilateral V 5/29/2019  NO DVT IDENTIFIED IN EITHER LOWER EXTREMITY. CT of the brain without contrast       HISTORY: Fall. History of craniotomy. Ataxia.       COMPARISON: CT brain from June 1, 2019       TECHNIQUE: Multiple contiguous axial images were obtained of the brain from the skull base through the vertex. Multiplanar reformats were obtained.       FINDINGS:       Prominence of the sulci and ventricles compatible with moderate generalized parenchymal volume loss. Gray-white matter differentiation is preserved. Areas of bilateral supratentorial white matter hypoattenuation are nonspecific but most likely related to    chronic small vessel ischemic changes in a patient of this age.       Postsurgical changes of bilateral frontal craniotomy Again identified are extra-axial fluid collections compatible with chronic subdural hematomas along the frontoparietal convexities bilaterally. Areas of increased density along the right frontal    convexity as seen on axial series 2 image 20 compatible with acute on chronic subdural hemorrhage. The greatest thickness of the subdural hematoma in the right measures approximately 10 mm and the greatest thickness of the subdural, on the left measures    approximately 11 mm, mildly improved when compared to prior examination. There is still mass effect upon the subjacent brain parenchyma. Pneumocephalus has resolved. Basal cisterns are patent.  No midline shift.      P/AROM. Therapeutic modifications regarding activities in therapies, place, amount of time per day and intensity of therapy made daily. In bed therapies or bedside therapies prn.   2. Bowel and Bladder dysfunction including incontinence : Trial condom catheter at night frequent toileting, ambulate to bathroom with assistance, check post void residuals. Check for C.difficile x1 if >2 loose stools in 24 hours, continue bowel & bladder program.  Monitor bowel and bladder function. Lactinex 2 PO every AC. MOM prn, Brown Bomb prn, Glycerin suppository prn, enema prn.  3. Severe postoperative craniotomy pain generalized OA painAcute on chronic fracture, L1, with avulsion of the anterior portion of the L1 vertebral body. Findings suspicious for mild to moderate central stenosis at the L3-L4 level.: reassess pain every shift and prior to and after each therapy session, give prn Tylenol and schedull and consider Norco, modalities prn in therapy, Lidoderm, K-pad prn.   4. Skin healing bilateral upper extremity bruising and breakdown risk: Side-to-side turns add CoQ10 continue pressure relief program.  Daily skin exams and reports from nursing. 5. Severe Fatigue due to nutritional and hydration deficiency:    continue to monitor I&Os, calorie counts prn, dietary consult prn. Discontinue nocturnal IVs as patient is now on mechanical soft and thin liquids  6. Acute episodic insomnia with situational adjustment disorder:   Add scheduled sleeping medication Ambien, monitor for day time sedation. 7. Falls risk elevated: Patient is vacillating between needing a one-on-one and video monitoring with frequent rounding. Patient is a one-on-one as needed. We will make sure his hearing aids are in we will post signs or visual cues so that he does not get up on his own and keep reminding him to use the call light. Patient to use call light to get nursing assistance to get up, bed and chair alarm.   8. Elevated DVT risk: progressive activities in PT, continue prophylaxis JOSE DE JESUS hose, elevation and  patient is not on a blood thinner secondary to recent subdural hematomas . 9. Complex discharge planning:  Discharge 6/28/19 skilled level and then eventually long-term care at a wheelchair level. He will likely need 24-hour supervision at discharge. His significant other will help him apply for Medicaid as he will likely need care for the rest of his life. I am concerned about patients impulsivity -and falls risks he is a one-on-one here he will not be able to be a one-on-one in any other setting including skilled facility including home and including hospice. There is indeed a hospice consult on him as he has a limited life expectancy and we are trying to improve the quality of his last years. I have asked nursing and nursing supervisor to do the best to get him off of the one-on-one safely. He needs to have his hearing aids and so he can hear the redirection that the video-assisted monitor and/or nursing is giving him. Weekly team meeting every Monday to assess progress towards goals, discuss and address social, psychological and medical comorbidities and to address difficulties they may be having progressing in therapy. Patient and family education is in progress. The patient is to follow-up with their family physician after discharge. Complex Active General Medical Issues that complicate care Assess & Plan:    1. Afib with occasional RVR,   Essential hypertension,   Hyperlipidemia,   S/P CABG (coronary artery bypass graft), S/P PTCA (percutaneous transluminal coronary angioplasty),   RBBB,   CAD (coronary artery disease), long-term previous use of blood thinners telemetry to monitor for bradycardia blood pressure checks every shift dose and titrate cardiac medications, consult hospitals for backup medical and recheck CBC and BMP as needed -  2.    Subdural hematoma-status post evacuation with , hold blood thinners-vital signs every shift, dose and titrate cardiac medications, recheck BMP CBC and consult hospitalist for backup medical  3.   prostatic hyperplasia with urinary frequency,   Prostate cancer -check postvoid residuals check stat UA  4. Facet arthropathy, lumbosacral,   Chronic back pain,   Spondylolisthesis of lumbar region-add Lidoderm, Tylenol, Norco as needed  5. Gross hematuria, Long term current use of anticoagulant-check UA monitor urine for blood  6. Long-term use of aspirin therapy and other blood thinners on hold at present because of recent subdural hematoma  7. SHAYY on CPAP-add CPAP from home if available is not available add CPAP from the hospital  8. Prediabetes-recheck BMP, low carb diet  9. Vitamin D deficiency-high-dose vitamin D, add Tums at daily dose of vitamin D after high doses done recheck as an outpatient  10. Aphasia, and oropharyngeal dysphagia-recently which pathology consult, add e-stim, add supplementation to diet push oral fluids  11. Poorly nourished due to poor nutrition and previous alcohol use BMI 26.0-26.9,adult--add protein supplementation vitamin B12 vitamin D CoQ10,  to stop drinking alcohol as an outpatient and at chemical dependency counseling when patient is able to participate  12. Complex social situation-patient has a girlfriend he lives alone. He is estranged from his several children. Continue to work on improving social interactions. Palliative care versus hospice services while in the skilled facility. Patient and family to decide whether either of these services would benefit. 13. No trauma after fall on 6/17/2019. Long discussions with nursing regarding keeping him safe in the room see above.         Alicia Evans D.O., PM&R     Attending    [address]

## 2019-06-18 NOTE — PROGRESS NOTES
Pt denies any pain. Slept on and off, restless throughout the night. Incontinent of urine x3. 2L ox at HS via NC, pt removed multiple times. Neuro checks done q 4 hours, no changes. VSS q 4 hours. Will continue to monitor.  Electronically signed by Moe Aldana RN on 6/18/2019 at 3:38 AM

## 2019-06-18 NOTE — PROGRESS NOTES
instructions. He continues to demonstrate poor carry over of instructions between tasks and requires verbal cues for safety at these time    PLAN OF CARE/Safety:   Safety Devices  Type of devices: Chair alarm in place; Left in chair;Gait belt      Therapy Time:   Individual   Time In 0855   Time Out 0930   Minutes 35     Minutes:      Transfer/Bed mobility training: 10      Gait training:15      Neuro re education:10           Jennifer Bell PT, 06/18/19 at 1:27 PM

## 2019-06-18 NOTE — PROGRESS NOTES
Occupational Therapy  Facility/Department: [facility]  Daily Treatment Note  NAME: Kaden Carpio  : 1934  MRN: [id number]    Date of Service: 2019    Discharge Recommendations:  Continue to assess pending progress       Assessment   Assessment: Pt demonstrated improving ability to sort silverware but continues to require verbal cues when errors are made in order to correct. Pt demonstrated good activity tolerance. However, pt with decreased endurance this session. Pt continues to benefit from skilled OT to maximize independence in safety and ADLs. REQUIRES OT FOLLOW UP: Yes  Activity Tolerance  Activity Tolerance: Patient Tolerated treatment well  Activity Tolerance: good  Safety Devices  Safety Devices in place: Yes  Type of devices: All fall risk precautions in place  Restraints  Initially in place: No         Patient Diagnosis(es): There were no encounter diagnoses. has a past medical history of Chronic back pain, Hyperlipidemia, and Hypertension. has a past surgical history that includes Cardiac catheterization; Coronary artery bypass graft; and craniotomy (Bilateral, 2019). Restrictions  Restrictions/Precautions  Restrictions/Precautions: Fall Risk, Seizure  Position Activity Restriction  Other position/activity restrictions: nectar thick   Subjective   General  Chart Reviewed: Yes  Patient assessed for rehabilitation services?: Yes  Response to previous treatment: Patient with no complaints from previous session  Family / Caregiver Present: No  Subjective  Subjective:      General Comment  Comments:      Pain Assessment  Pain Assessment: 0-10  Pain Level: 0  Vital Signs  Patient Currently in Pain: Denies   Orientation  Orientation  Overall Orientation Status: Within Functional Limits  Objective          Attempted to sort money but pt unable to consistently name bills. Pt stated \"you are confusing me\". Therapy student graded task down to concrete objects.  Pt offered utensils 1 at a time to sort to work on cognitive retraining. Pt named silverware with 40% accuracy for 15 items. Pt was offered a choice between 2 utensils when having difficulty naming. Pt self corrected errors 50% of the time. Pt then placed into pile by type with 40% accuracy. Pt required max verbal cues to sort the utensils. Pt required increased time to complete. Pt had good attention to task. Pt demonstrated improving ability to sort into piles but still has difficulty naming correct utensil. Pt placed clothespins onto wooden stick using B UEs and 1lb wrist weights to improve strength and functional reaching during ADLs and IADLs. Pt required min verbal cues to redirect. Pt required min verbal cues to remove one at a time. Pt able to complete task with good endurance. Pt independently removed 8 beads from orange theraputty to work on finger strength during self care tasks. Pt required increased time to complete the task. Pt had good activity tolerance. Pt completed all task while seated. Pt issued lap tray at end of session for positioning purposes per verbal from Dr. Regi Monahan.      Plan   Plan  Times per week: 5-7   Plan weeks: 4  Current Treatment Recommendations: Strengthening, ROM, Balance Training, Functional Mobility Training, Endurance Training, Safety Education & Training, Cognitive Reorientation, Patient/Caregiver Education & Training, Equipment Evaluation, Education, & procurement, Self-Care / ADL, Cognitive/Perceptual Training, Positioning  Plan Comment: Continue with OT POC  G-Code     OutComes Score    AM-PAC Score    Goals  Patient Goals   Patient goals : Patient is unable to report a goal at this time due to global aphasia       Therapy Time   Individual Concurrent Group Co-treatment   Time In 1100         Time Out 1200         Minutes 60           This therapy session was supervised by Dae Kumar MS OTR/L    Electronically signed by Maury Gomes on 6/18/2019 at 4:35 PM      Maury Gomes

## 2019-06-18 NOTE — DISCHARGE SUMMARY
Discharge summary  This patient Cristo Jensen was admitted to the hospital on 5/24/2019  after undergoing Procedure(s) (LRB):  CRANIOTOMY HEMATOMA EVACUATION (Bilateral) on 4/69 without complications that morning. Hospital course  Patient tolerated surgical procedure well and there was no complications. Patient progressed adequtly through their recovery during hospital stay including PT and rehabilitation. Patient was then D/C on 5/31/2019 to Rehab  in stable condition. Patient was instructed on the use of pain medications, the signs and symptoms of infection, and was given our number to call should they have any questions or concerns following discharge. CT HEAD WO CONTRAST : 5/25/2019       CLINICAL HISTORY:  INTRACRANIAL HEMORRHAGE .  Recent subdural hematoma drainage surgery.       COMPARISON: Earlier 5/25/2019.       TECHNIQUE: Spiral unenhanced images were obtained of the head, with routine reconstructions performed.       All CT scans at this facility use dose modulation, iterative reconstruction, and/or weight based dosing when appropriate to reduce radiation dose to as low as reasonably achievable.           FINDINGS:       Since the prior study, bilateral superior parietal craniotomies and subdural drainage catheters have been placed, which are otherwise unremarkable.       There is pneumocephaly and significantly decreasing-sized heterogeneous subdural hematomas bilaterally, which are overall mildly decreased in size and mass effect from the earlier study.       There is approximately 2 to 3 mm of left-to-right midline shift, without herniation, hydrocephalus, or other significant changes identified.                   Impression       INTERVAL BILATERAL CRANIOTOMIES AND SUBDURAL HEMATOMA DRAINAGE CATHETER PLACEMENT SURGERY.       PNEUMOCEPHALY AND SIGNIFICANTLY DECREASING-SIZED HETEROGENEOUS SUBDURAL HEMATOMAS.       APPROXIMATELY 2 TO 3 MM OF LEFT-TO-RIGHT MIDLINE SHIFT WITHOUT HERNIATION, HYDROCEPHALUS, OR OTHER SIGNIFICANT CHANGES IDENTIFIED.

## 2019-06-18 NOTE — PLAN OF CARE
New intervention to use lap tray when up on his wheel chair. Encourage fluids to maintain intact skin integrity   Patient denies pain. Tylenol scheduled TID.

## 2019-06-18 NOTE — PROGRESS NOTES
Physical Therapy Rehab Treatment Note  Facility/Department: [facility]  Room: R2/R240-01       NAME: Cristo Jensen  : 1934 (80 y.o.)  MRN: [id number]  CODE STATUS: Full Code    Date of Service: 2019  Patient assessed for rehabilitation services?: Yes    Restrictions:  Restrictions/Precautions: Fall Risk, Seizure       SUBJECTIVE: Subjective: Pt states feel ok. Response To Previous Treatment: Patient with no complaints from previous session. Pain Screening  Patient Currently in Pain: Denies  Pre Treatment Pain Screening  Pain at present: 0  Scale Used: Numeric Score  Intervention List: Patient able to continue with treatment    Post Treatment Pain Screening:  Pain Assessment  Pain Assessment: 0-10  Pain Level: 0    OBJECTIVE:          Bed mobility  Bridging: Stand by assistance  Rolling to Left: Stand by assistance  Rolling to Right: Stand by assistance  Supine to Sit: Stand by assistance  Sit to Supine: Stand by assistance    Transfers  Sit to Stand: Stand by assistance;Contact guard assistance  Stand to sit: Stand by assistance;Contact guard assistance    Ambulation  More Ambulation?: Yes  Ambulation 1  Surface: carpet  Device: Rollator  Assistance: Contact guard assistance;Minimal assistance  Quality of Gait: flexed posture with NBOS  Gait Deviations: Decreased step length;Decreased step height  Distance: 150ft; 40 ft. Comments: 2nd ambulation mat to w/c with emphasis of following verbal cues vs distance. Pt looked at chair as requested when sitting on mat. Able to follow cues to ambulate to chair. Required min assist to turn to line up to chair. Activity Tolerance  Activity Tolerance: Patient Tolerated treatment well    Exercises  Heelslides: x15  Hip Flexion: x15  Hip Abduction: RTB; ball for hip adduction x 15ea  Knee Long Arc Quad: x15  Ankle Pumps: x15  Core Strengthening: LTR and bridging x 10ea.   Comments: verbal and visual cues with ther ex for technique and to count reps

## 2019-06-18 NOTE — PROGRESS NOTES
Speech Language Pathology Treatment Note  Facility/Department: [facility]  6/18/2019    Rehab Dx/Hx: Abnormality of gait and mobility [R26.9]    Precautions: falls and aspirations    ST Dx: Aphasia, Dysphagia and Cognitive Impairment     Date of Admit: 5/31/2019  Room #: R240/R240-01    Time in: 0930  Time out: 1000  Dysphagia: [id number]  Speech/Language: 3991-6835    Subjective:  Alert, Cooperative, Distractible and Confused        Interventions used this date:  Expressive Language, Receptive Language, Dysphagia Treatment and Oral Motor Treatment    Objective/Assessment:  Patient progressing towards goals:  Short-term Goals for Speech & Language POC:   Goal 1: Pt will follow 1 step directions given orally with 75% accuracy with mod cues to increase the pt's ability to follow directions provided by caregivers for safe follow through with ADLs. 50% acc independently (given extra time to complete the task) for simple 1 step directions! Much improved performance. Accuracy increased to 60% with visual demonstration and 100% with full physical assist.     Goal 2: Pt will identify objects/pictures within a field of 2-3 with 75% accuracy with mod cues in order to increase his understanding of objects in his environment for safer and more independent completion of ADLs. ID pictures in field of 2-3 with full hand over hand assist    Goal 3: Pt will answer low level yes/no questions with 85% accuracy with mod cues to assist the caregiver in obtaining important information regarding the patient's personal, medical, and safety needs. 50% acc given 1-2 repetitions and extra time time complete the task. Accuracy increased to 70% with max visual cues    Goal 4: Pt will answer low level Wh- questions with 75% accuracy with mod cues to assist the caregiver in obtaining important information regarding the patient's personal, medical, and safety needs.    Not targeted    Goal 5: Pt will demonstrate reading comprehension at the word  level to 60% acc with mod cues to promote pt's ability to utilize reading/writing. Not targeted    Automatic speech-  Pt able to count 1-10 independently. Required moderate cues to state 11-20 in tandem with SLP. Days of the week: Pt stated 6/7 independently after SLP initiated task. Months of the year: Pt stated 11/12 with only mild visual cues after SLP initiated task. Pt was able to label single digit numbers 8/10 independently which increased to 9/10 with minimal cues and 10/10 with moderate cues. He was able to sustain attention to complete a simple numbers puzzle with frequent-occasional mild visual cues. Much improvement since previous date. Pt continues to exhibit responses that are mostly echolalic in nature. Short-term Goals for Dysphagia POC:  Goal 1: Pt will tolerate puree diet with nectar thick liquids with no overt s/s of aspiration. Goal met. Goal 2: Pt will perform oral motor ROM/strength exercieses with max cues 5x/each. Pt unable to complete any lingual or labial exercises without full physical assist secondary to decreased ability to follow directions and suspected apraxia    Attempted vocal fold adduction exercises. Pt able to produce adequate vocal quality (beyond a whisper) x 2/10 given attempts    Goal 3: Pt will perform base of tongue exercises with max cues 10x/each. Goal 4: Pt will tolerate therapeutic trials of mechanical soft/thin as able, with no overt s/s of aspiration. Goal met. Treatment/Activity Tolerance:  Patient tolerated treatment well    Plan:  Continue per POC    Pain:  Patient demonstrated no s/s of pain. Patient/Caregiver Education:  Patient educated on session and progression towards goals. and Education needs reinforcement.     Safety Devices:  Chair alarm in place     Comprehension:  2- Maximal assist    Expression:  2- Maximal assist    Problem Solvin- Total assist    Memory:  1- Total assist    Signature: Electronically signed by CIERRA Castro on 6/18/2019 at 10:00 AM

## 2019-06-19 PROCEDURE — 97535 SELF CARE MNGMENT TRAINING: CPT

## 2019-06-19 PROCEDURE — 6370000000 HC RX 637 (ALT 250 FOR IP): Performed by: NEUROLOGICAL SURGERY

## 2019-06-19 PROCEDURE — 97127 HC OT THER IVNTJ W/FOCUS COG FUNCJ: CPT

## 2019-06-19 PROCEDURE — 1180000000 HC REHAB R&B

## 2019-06-19 PROCEDURE — 6370000000 HC RX 637 (ALT 250 FOR IP): Performed by: INTERNAL MEDICINE

## 2019-06-19 PROCEDURE — 6370000000 HC RX 637 (ALT 250 FOR IP): Performed by: PHYSICAL MEDICINE & REHABILITATION

## 2019-06-19 PROCEDURE — 97530 THERAPEUTIC ACTIVITIES: CPT

## 2019-06-19 PROCEDURE — 97116 GAIT TRAINING THERAPY: CPT

## 2019-06-19 PROCEDURE — 2700000000 HC OXYGEN THERAPY PER DAY

## 2019-06-19 PROCEDURE — 92526 ORAL FUNCTION THERAPY: CPT

## 2019-06-19 PROCEDURE — 99232 SBSQ HOSP IP/OBS MODERATE 35: CPT | Performed by: PHYSICAL MEDICINE & REHABILITATION

## 2019-06-19 PROCEDURE — 92507 TX SP LANG VOICE COMM INDIV: CPT

## 2019-06-19 PROCEDURE — 97112 NEUROMUSCULAR REEDUCATION: CPT

## 2019-06-19 RX ORDER — LACTULOSE 10 G/15ML
20 SOLUTION ORAL 2 TIMES DAILY
Status: DISPENSED | OUTPATIENT
Start: 2019-06-19 | End: 2019-06-21

## 2019-06-19 RX ADMIN — FAMOTIDINE 20 MG: 20 TABLET ORAL at 22:34

## 2019-06-19 RX ADMIN — ACETAMINOPHEN 500 MG: 325 TABLET ORAL at 22:32

## 2019-06-19 RX ADMIN — ACETAMINOPHEN 500 MG: 325 TABLET ORAL at 13:58

## 2019-06-19 RX ADMIN — PRAVASTATIN SODIUM 40 MG: 40 TABLET ORAL at 22:35

## 2019-06-19 RX ADMIN — DILTIAZEM HYDROCHLORIDE 120 MG: 120 CAPSULE, COATED, EXTENDED RELEASE ORAL at 08:01

## 2019-06-19 RX ADMIN — FAMOTIDINE 20 MG: 20 TABLET ORAL at 08:01

## 2019-06-19 RX ADMIN — ZOLPIDEM TARTRATE 5 MG: 5 TABLET ORAL at 22:36

## 2019-06-19 RX ADMIN — MAGNESIUM OXIDE TAB 400 MG (241.3 MG ELEMENTAL MG) 400 MG: 400 (241.3 MG) TAB at 08:01

## 2019-06-19 RX ADMIN — LACTULOSE 20 G: 20 SOLUTION ORAL at 18:27

## 2019-06-19 RX ADMIN — DILTIAZEM HYDROCHLORIDE 120 MG: 120 CAPSULE, COATED, EXTENDED RELEASE ORAL at 22:33

## 2019-06-19 RX ADMIN — ACETAMINOPHEN 500 MG: 325 TABLET ORAL at 08:00

## 2019-06-19 ASSESSMENT — PAIN SCALES - GENERAL
PAINLEVEL_OUTOF10: 0

## 2019-06-19 ASSESSMENT — PAIN DESCRIPTION - LOCATION: LOCATION: CHEST

## 2019-06-19 NOTE — PROGRESS NOTES
Speech Language Pathology Treatment Note  Facility/Department: [facility]  6/19/2019    Rehab Dx/Hx: Abnormality of gait and mobility [R26.9]    Precautions: falls and aspirations    ST Dx: Aphasia, Dysphagia and Cognitive Impairment     Date of Admit: 5/31/2019  Room #: R240/R240-01    Time in: 0930  Time out: 1000  Dysphagia: 2306-2871  Speech/Language: 6723-9334    Subjective:  Alert, Cooperative, Distractible and Confused        Interventions used this date:  Expressive Language, Receptive Language, Dysphagia Treatment and Oral Motor Treatment    Objective/Assessment:  Patient progressing towards goals:  Short-term Goals for Speech & Language POC:   Goal 1: Pt will follow 1 step directions given orally with 75% accuracy with mod cues to increase the pt's ability to follow directions provided by caregivers for safe follow through with ADLs. Full physical assist required this date    Goal 2: Pt will identify objects/pictures within a field of 2-3 with 75% accuracy with mod cues in order to increase his understanding of objects in his environment for safer and more independent completion of ADLs. ID pictures in field of 2-3 with full hand over hand assist     Goal 3: Pt will answer low level yes/no questions with 85% accuracy with mod cues to assist the caregiver in obtaining important information regarding the patient's personal, medical, and safety needs. 10% acc given 1-2 repetitions and extra time time complete the task. Accuracy increased to 60% with max visual cues    Goal 4: Pt will answer low level Wh- questions with 75% accuracy with mod cues to assist the caregiver in obtaining important information regarding the patient's personal, medical, and safety needs. 0/5. Please d/c goal secondary to difficulty and plateau of progress    Goal 5: Pt will demonstrate reading comprehension at the word  level to 60% acc with mod cues to promote pt's ability to utilize reading/writing.   Not

## 2019-06-19 NOTE — PROGRESS NOTES
Physical Therapy Rehab Treatment Note  Facility/Department: [facility]  [facility]       NAME: Deshaun Meléndez  : 1934 (80 y.o.)  MRN: [id number]  CODE STATUS: Full Code    Date of Service: 2019  Chart Reviewed: Yes  Patient assessed for rehabilitation services?: Yes  Family / Caregiver Present: No    Restrictions:  Restrictions/Precautions: Fall Risk, Seizure       SUBJECTIVE: Subjective: \"Good day\"   Pain Screening  Patient Currently in Pain: Denies       Post Treatment Pain Screenin/10       OBJECTIVE:                           Transfers  Sit to Stand: Minimal Assistance  Stand to sit: Minimal Assistance  Car Transfer: Stand by assistance  Comment: Multiple sit-stands performed WC and Mat table with VCs for hand placment with pt performing correctly 25% of the time. SBA with VCs for Car Transfers with VCs and tactile cues for proper technique     Ambulation  Ambulation?: Yes  Ambulation 1  Surface: carpet;level tile  Device: Rollator  Assistance: Contact guard assistance;Minimal assistance  Distance: 250ft   Stairs/Curb  Stairs?: Yes   Stairs  # Steps : 4  Stairs Height: 6\"  Rails: Bilateral  Assistance: Minimal assistance  Comment: Verbal and Tactile cues given for safety. ASSESSMENT/COMMENTS:  Body structures, Functions, Activity limitations: Decreased functional mobility ; Decreased safe awareness;Decreased balance;Decreased coordination;Decreased vision/visual deficit; Decreased endurance;Decreased strength;Decreased cognition;Decreased ADL status  Assessment: Pt with fair+ AD control with VCs for right/left turns. Pt able to maintain on task for the majority of tx session. Pt required high level of cues for reaching back with stand-sit. PLAN OF CARE/Safety:   Safety Devices  Type of devices: Chair alarm in place; Left in chair;Gait belt      Therapy Time:   Individual   Time In 1400   Time Out 1430   Minutes 30     Minutes:30      Transfer/Bed mobility training:15      Gait training:15      Neuro re education:     Therapeutic ex:      Yohannes Esquivel  06/19/19 at 3:09 PM

## 2019-06-19 NOTE — PROGRESS NOTES
Patient admitted with abnormality of gait. Patient ambulates with walker and SBA, needs verbal cues for safety.

## 2019-06-19 NOTE — PROGRESS NOTES
Patient remains 1:1 staff. Patient attended his afternoon therapies with no complications reported. Patients interactions with staff increasing. Patient spoke two sentences today in proper tense which he initiated on his own. Patient was given the chronulac that was ordered. He was also impacted with hard stool which required removal by nurse. Patient then was able to expell large amount of stool on the toilet. He ambulated with the walker and assist of nurse to the bathroom gait alittle unsteady. Patient now resting in bed and offers no complaints. Will notify oncoming staff of the above.   Electronically signed by Stephie Ramos RN on 6/19/2019 at 7:06 PM

## 2019-06-19 NOTE — PROGRESS NOTES
BRIDGER Mccarty is providing 1:1 assist to patient this shift. She has stated patient unable to process requests with using walker ect. She states he can be redirected at times but, he doesn't always follow requests or demands. For example when he is trying to get out of bed and is directed to stay in the bed he still attempts to get out of bed without regard to safety. He ate 75% of breakfast. He was incontinent and voided for her shortly after she arrived. He has attended his therapies thus far. He is calm and cooperating with her. Will continue to monitor. Will discuss with casemanager and staff a stratagy to remove the 1:1 staff observation as soon as possible. Patient will be given bowel prep to help him move his bowels today not sure of last observed BM.    Electronically signed by Marlon Klein RN on 6/19/2019 at 6:59 PM

## 2019-06-19 NOTE — PROGRESS NOTES
placed pennies on top of the pegs to work on fine motor coordination. Pt required significant increased time to complete the task. Pt completed 1/4 of board in 25 minutes. Pt with good attention to task. Pt threaded large beads onto string to work on fine motor coordination. Pt required min assist for task sequencing. Therapy student placed bead onto table and pt able to thread bead onto string. Pt required min verbal cues to push string all the way through hole in bead. Pt required significant increased time to complete task. Pt able to thread 7 beads onto string in 15 minutes. Pt cleaned foam  off of table to improve independence in IADLs. Pt used B UEs to completed the task. Pt with good attention to task  Pt demonstrated good functional reaching during task. Pt engaged in dynamic standing balance activity throwing ping pong balls into container to improve standing tolerance during ADLs and room mobility. Pt required min assist when reaching outside of SANJAY. Pt required CGA for all transfers. Pt required min verbal cues for proper hand placement on wheelchair for safety purposes. Pt had poor understanding. Pt required max verbal cues to grab balls one at a time before throwing but pt demonstrated poor carryover. Pt had no LOB during task.          Balance  Standing Balance: Minimal assistance     Transfers  Sit to stand: Stand by assistance  Stand to sit: Stand by assistance  Transfer Comments: Pt required min verbal cues to place hands on arm rests before descending into w/c for safety                                                                 Plan   Plan  Times per week: 5-7   Plan weeks: 4  Current Treatment Recommendations: Strengthening, ROM, Balance Training, Functional Mobility Training, Endurance Training, Safety Education & Training, Cognitive Reorientation, Patient/Caregiver Education & Training, Equipment Evaluation, Education, & procurement, Self-Care / ADL, Cognitive/Perceptual Training, Positioning  Plan Comment: Continue with OT POC  G-Code     OutComes Score    AM-PAC Score    Goals  Patient Goals   Patient goals : Patient is unable to report a goal at this time due to global aphasia       Therapy Time   Individual Concurrent Group Co-treatment   Time In 1430         Time Out 1540         Minutes 70          Additional 10 minutes due to missed medical on 6/17/19    This therapy session was supervised by Ashley Gracia MS OTR/[facility]IT[facility]

## 2019-06-19 NOTE — PROGRESS NOTES
Occupational Therapy  Facility/Department: [facility]  Daily Treatment Note  NAME: Ruthie Clayton  : 1934  MRN: [id number]    Date of Service: 2019    Discharge Recommendations:  Continue to assess pending progress       Assessment   Assessment: Pt demonstrated improving sequencing when given verbal cues. Pt requires max assist to complete handwriting task. Pt continues to benefit from skilled OT to maximize independence in safety and ADLs. REQUIRES OT FOLLOW UP: Yes  Activity Tolerance  Activity Tolerance: Patient Tolerated treatment well  Activity Tolerance: good  Safety Devices  Safety Devices in place: Yes  Type of devices: All fall risk precautions in place  Restraints  Initially in place: No         Patient Diagnosis(es): There were no encounter diagnoses. has a past medical history of Chronic back pain, Hyperlipidemia, and Hypertension. has a past surgical history that includes Cardiac catheterization; Coronary artery bypass graft; and craniotomy (Bilateral, 2019). Restrictions  Restrictions/Precautions  Restrictions/Precautions: Fall Risk, Seizure  Position Activity Restriction  Other position/activity restrictions: nectar thick   Subjective   General  Chart Reviewed: Yes  Patient assessed for rehabilitation services?: Yes  Response to previous treatment: Patient with no complaints from previous session  Family / Caregiver Present: No  Subjective  Subjective:      General Comment  Comments:      Pain Assessment  Pain Assessment: 0-10  Pain Level: 0  Vital Signs  Patient Currently in Pain: Denies   Orientation  Orientation  Overall Orientation Status: Impaired  Orientation Level: Oriented to time;Oriented to situation  Objective        Educated LPN about need for lap tray while seated when picking pt up from room before OT session. Pt engaged in [address] task to work on cognitive retraining. Pt sequenced the months of the year while seated in wheelchair.  Pt offered a choice between two months to put in correct order. Therapy student said the months in order that pt already had in place as a verbal cue to assist pt with next choice. Pt required mod assist to complete the task. Pt able to correct say the next month in order 80% of the time. Pt engaged in handwriting task using L UE to improve independence in IADLs and fine motor coordination. Pt given a written model \"Jose\" to copy below on lined paper. Pt began writing \"F\" but could not complete the rest of the letters independently. Therapy student graded task down and pt then copied directly over the model with 90% letter alignment. Pt required one verbal cue to redirect.          Plan   Plan  Times per week: 5-7   Plan weeks: 4  Current Treatment Recommendations: Strengthening, ROM, Balance Training, Functional Mobility Training, Endurance Training, Safety Education & Training, Cognitive Reorientation, Patient/Caregiver Education & Training, Equipment Evaluation, Education, & procurement, Self-Care / ADL, Cognitive/Perceptual Training, Positioning  Plan Comment: Continue with OT POC  G-Code     OutComes Score    AM-PAC Score    Goals  Patient Goals   Patient goals : Patient is unable to report a goal at this time due to global aphasia       Therapy Time   Individual Concurrent Group Co-treatment   Time In 1000         Time Out 1030         Minutes 30           This therapy session was supervised by Ade Byers MS OTR/L    Electronically signed by Jennifer Monday on 6/19/2019 at 1:25 PM      Jennifer Monday

## 2019-06-19 NOTE — PROGRESS NOTES
Pt is relaxed in bed with eyes open. One on one in place. Denies pain. Left elbow skin tear cleaned with betadine, bandaid applied. Scant sanguinous drainage. Left shin abrasion HECTOR, no drainage. Pt is calm at this time.  Electronically signed by Starr Jones LPN on 0/48/5414 at 36:03 AM

## 2019-06-19 NOTE — PROGRESS NOTES
Pt stood while sorting cones based on color to work on standing balance and cognition. Pt completed with B UEs. Pt able to sort 3 colors of cones with min assist when completing on R side. Therapy student graded task up and had pt sort 4 colors of cones on the L side. Pt required max assist to complete and max verbal cues. Pt offered a choice between two colors and pt still unable to place cones on the correct colored cone. Pt with significant increased time to complete the task. Pt required CGA to complete task. Pt required SBA to complete all transfers. Pt required 1 rest break during task. Pt engaged in endurance training to improve activity tolerance during ADLs and IADLs. Pt pedaled on arm bike for 10 minutes with 3 rest breaks. Pt demonstrated good attention to task.       Plan   Plan  Times per week: 5-7   Plan weeks: 4  Current Treatment Recommendations: Strengthening, ROM, Balance Training, Functional Mobility Training, Endurance Training, Safety Education & Training, Cognitive Reorientation, Patient/Caregiver Education & Training, Equipment Evaluation, Education, & procurement, Self-Care / ADL, Cognitive/Perceptual Training, Positioning  Plan Comment: Continue with OT POC  G-Code     OutComes Score    AM-PAC Score    Goals  Patient Goals   Patient goals : Patient is unable to report a goal at this time due to global aphasia       Therapy Time   Individual Concurrent Group Co-treatment   Time In 1130         Time Out 1200         Minutes 30           This therapy session was supervised by Rigo Rogel MS OTR/L  Electronically signed by Nikhil Guy on 6/19/2019 at 1:25 PM         Nikhil Guy

## 2019-06-19 NOTE — PROGRESS NOTES
need to be direct and simple. Pts motor performance of tasks is improved overall    PLAN OF CARE/Safety:   Safety Devices  Type of devices: Chair alarm in place; Left in chair;Gait belt      Therapy Time:   Individual   Time In 0850   Time Out 925   Minutes 35     Minutes:      Transfer/Bed mobility training: 15      Gait trainin          Alma Delia Montalvo PT, 19 at 1:40 PM

## 2019-06-19 NOTE — PROGRESS NOTES
Heart:   S1 = S2, occasionally high heart rate history of A. fib with RVR. No loud murmurs. Abdomen:  Soft, non-tender, no enlargement of liver or spleen. Extremities:  No significant lower extremity edema or tenderness. Skin:   Intact to general survey,  craniotomy incisions healing. Rehabilitation:  Physical therapy: FIMS:  Bed Mobility: Scooting: Moderate assistance    Transfers: Sit to Stand: Stand by assistance, Contact guard assistance  Stand to sit: Stand by assistance, Contact guard assistance  Bed to Chair: Minimal assistance  Squat Pivot Transfers: Maximum Assistance(assistance guiding hips , simple commands used with poor comprehension), Ambulation 1  Surface: carpet  Device: Rollator  Other Apparatus: (no O2)  Assistance: Contact guard assistance, Minimal assistance  Quality of Gait: flexed posture with NBOS  Gait Deviations: Decreased step length, Decreased step height  Distance: 150ft; 40 ft. Comments: 2nd ambulation mat to w/c with emphasis of following verbal cues vs distance. Pt looked at chair as requested when sitting on mat. Able to follow cues to ambulate to chair. Required min assist to turn to line up to chair. , Stairs  # Steps : 4  Stairs Height: 6\"  Rails: Bilateral  Assistance: Minimal assistance  Comment: one person required with pt able to follow instructions at an improved level    FIMS: Bed, Chair, Wheel Chair: 4 - Requires steadying assistance only <25% assist  and/or requires assist with one leg only  Walk: 4 - Contact Guard/Minimal Assistance Requires up to Contact Guard or Minimal Assistance to walk at least 150 feet  Distance Walked: 150  Wheel Chair: 0 - Activity Not Assessed/Does Not Occur  Distance Traveled in Wheel Chair: 15'  Stairs: 2- Maximal Assistance Performs 25-49% of the effort to go up and down 4 to 6 stairs and requires the assistance of one person only,  , Assessment: Pt with increase ability to follow verbal cues for functional acitivities and exercises this date. Mild delay in response to verbal cues. Continues to demonstrate decrease safety awareness. Occupational therapy: FIMS:  Eatin - Requires help steadying cup or utensil/check mouth for pocketing  Groomin - Did not occur  Bathin - Did not occur  Dressing-Upper: 0 - Did not occur  Dressing-Lower: 0 - Did not occur  Toiletin - Able to perform 1 task only (e.g. hygiene)  Toilet Transfer: 4 - Requires steadying assistance only < 25% assist  Shower Transfer: 1 - Total assistance, pt. expends less than 25% effort,  , Assessment: Pt demonstrated improving problem solving skills during IADL task. Pt appears to improve task performance with multiple repititions. Pt continues to benefit from skilled OT to maximize independence in safety and ADLs. Speech therapy: FIMS: Comprehension: 3 - Patient understands basic needs 50-74% of the time  Expression: 3 - Expresses basic ideas/needs 50-74% of the time  Social Interaction: 1 - Patient appropriate < 25% of the time  Problem Solvin - Patient solves simple/routine tasks < 25%  Memory: 2 - Patient remembers 25%-49% of the time      Lab/X-ray studies reviewed, analyzed and discussed with patient and staff:   No results found for this or any previous visit (from the past 24 hour(s)). Xr Chest  2019   Areas of atelectasis, patchy groundglass infiltrate in the bases. Right greater than left . Trace/ small right pleural effusion    Ct Head   2019  Impression: Bilateral frontal and parietal subdural hematomas. The amount of extra-axial air has slightly decreased since the prior study. No new areas of hemorrhage are identified. All CT scans at this facility use dose modulation, iterative reconstruction, and/or weight based dosing when appropriate to reduce radiation dose to as low as reasonably achievable.     Ct Head Wo  : 2019   DECREASING-SIZED BILATERAL CEREBRAL CONVEXITY SUBDURAL HEMATOMAS AFTER INTERVAL REMOVAL OF BOTH DRAINAGE CATHETERS. Ct Cervical Spine  5/24/2019  Mild to moderate hypertrophic facet changes are present, predominantly on the right at C3-4, and on the left at C2-C3 and C5-6. There is no fracture, significant subluxation, or acute paraspinous soft tissue abnormalities identified. NO FRACTURE OR EVIDENCE OF CERVICAL SPINE INJURY IDENTIFIED. CERVICAL SPONDYLOSIS. Ct Lumbar   5/24/2019  Acute on chronic fracture, L1, with avulsion of the anterior portion of the L1 vertebral body. Findings suspicious for mild to moderate central stenosis at the L3-L4 level. Fl Modified Barium   5/31/2019  EXAMINATION: FL MODIFIED BARIUM SWALLOW W VIDEO: DATE AND TIME: 5/30/2019 at 10:45 AM. CLINICAL HISTORY:    MODERATE ORAL AND PHARYNGEAL DYSFUNCTION WITHOUT ASPIRATION. PLEASE REFER TO SPEECH PATHOLOGY  REPORT FOR RECOMMENDATIONS. Us Dup Lower Extremities Bilateral V 5/29/2019  NO DVT IDENTIFIED IN EITHER LOWER EXTREMITY. CT of the brain without contrast       HISTORY: Fall. History of craniotomy. Ataxia.       COMPARISON: CT brain from June 1, 2019       TECHNIQUE: Multiple contiguous axial images were obtained of the brain from the skull base through the vertex. Multiplanar reformats were obtained.       FINDINGS:       Prominence of the sulci and ventricles compatible with moderate generalized parenchymal volume loss. Gray-white matter differentiation is preserved. Areas of bilateral supratentorial white matter hypoattenuation are nonspecific but most likely related to    chronic small vessel ischemic changes in a patient of this age.       Postsurgical changes of bilateral frontal craniotomy Again identified are extra-axial fluid collections compatible with chronic subdural hematomas along the frontoparietal convexities bilaterally. Areas of increased density along the right frontal    convexity as seen on axial series 2 image 20 compatible with acute on chronic subdural hemorrhage.  The greatest FINDINGS: No acute fracture is identified. There is cephalad subluxation of the humeral head in association narrowing of the subacromial space indicating rotator cuff tear/degeneration. There are periarticular ossifications lateral to the acromion. There    is glenohumeral osteoarthritis with humeral head marginal osteophytosis. There is cervical and thoracic spondylosis. Sternotomy wires are noted.           Impression   LEFT SHOULDER OSTEOARTHRITIS WITH PERIARTICULAR SOFT TISSUE OSSIFICATIONS AND ROTATOR CUFF TEAR/DEGENERATION. NEGATIVE FOR FRACTURE. Previous extensive, complex labs, notes and diagnostics reviewed and analyzed. ALLERGIES:    Allergies as of 05/31/2019 - Review Complete 05/31/2019   Allergen Reaction Noted    Honey bee venom [bee venom] Swelling 10/08/2015    Penicillins Swelling 10/08/2015      (please also verify by checking MAR)      I reviewed her [facility] prescription monitoring service data sheets in hopes of eliminating polypharmacy and weaning to the lowest effective dose of pain medications and eliminating the concomitant use of benzodiazepines. I see no medications of concern. I see no habits of combining sedatives and narcotics. Complex Physical Medicine & Rehab Issues Assess & Plan:   1. Severe abnormality of gait and mobility and impaired self-care and ADL's secondary to progressive subdural hematomas . Functional and medical status reassessed regarding patients ability to participate in therapies and patient found to be able to participate in acute intensive comprehensive inpatient rehabilitation program including PT/OT to improve balance, ambulation, ADLs, and to improve the P/AROM. Therapeutic modifications regarding activities in therapies, place, amount of time per day and intensity of therapy made daily.   In bed therapies or bedside therapies prn.   2. Bowel and Bladder dysfunction including incontinence : Trial condom catheter at night frequent toileting, ambulate to bathroom with assistance, check post void residuals. Check for C.difficile x1 if >2 loose stools in 24 hours, continue bowel & bladder program.  Monitor bowel and bladder function. Lactinex 2 PO every AC. MOM prn, Brown Bomb prn, Glycerin suppository prn, enema prn.  3. Severe postoperative craniotomy pain generalized OA painAcute on chronic fracture, L1, with avulsion of the anterior portion of the L1 vertebral body. Findings suspicious for mild to moderate central stenosis at the L3-L4 level.: reassess pain every shift and prior to and after each therapy session, give prn Tylenol and schedull and consider Norco, modalities prn in therapy, Lidoderm, K-pad prn.   4. Skin healing bilateral upper extremity bruising and breakdown risk: Side-to-side turns add CoQ10 continue pressure relief program.  Daily skin exams and reports from nursing. 5. Severe Fatigue due to nutritional and hydration deficiency:    continue to monitor I&Os, calorie counts prn, dietary consult prn. Discontinue nocturnal IVs as patient is now on mechanical soft and thin liquids  6. Acute episodic insomnia with situational adjustment disorder:   Add scheduled sleeping medication Ambien, monitor for day time sedation. 7. Falls risk elevated: Patient is vacillating between needing a one-on-one and video monitoring with frequent rounding. Patient is a one-on-one as needed. We will make sure his hearing aids are in we will post signs or visual cues so that he does not get up on his own and keep reminding him to use the call light. Patient to use call light to get nursing assistance to get up, bed and chair alarm. 8. Elevated DVT risk: progressive activities in PT, continue prophylaxis JOSE DE JESUS hose, elevation and  patient is not on a blood thinner secondary to recent subdural hematomas . 9. Complex discharge planning:  Discharge 6/28/19 skilled level and then eventually long-term care at a wheelchair level.   He will likely need Spondylolisthesis of lumbar region-add Lidoderm, Tylenol, Norco as needed  5. Gross hematuria, Long term current use of anticoagulant-check UA monitor urine for blood  6. Long-term use of aspirin therapy and other blood thinners on hold at present because of recent subdural hematoma  7. SHAYY on CPAP-add CPAP from home if available is not available add CPAP from the hospital  8. Prediabetes-recheck BMP, low carb diet  9. Vitamin D deficiency-high-dose vitamin D, add Tums at daily dose of vitamin D after high doses done recheck as an outpatient  10. Aphasia, and oropharyngeal dysphagia-recently which pathology consult, add e-stim, add supplementation to diet push oral fluids  11. Poorly nourished due to poor nutrition and previous alcohol use BMI 26.0-26.9,adult--add protein supplementation vitamin B12 vitamin D CoQ10,  to stop drinking alcohol as an outpatient and at chemical dependency counseling when patient is able to participate  12. Complex social situation-patient has a girlfriend he lives alone. He is estranged from his several children. Continue to work on improving social interactions. Palliative care versus hospice services while in the skilled facility. Patient and family to decide whether either of these services would benefit. 13. No trauma after fall on 6/17/2019. Long discussions with nursing regarding keeping him safe in the room see above.         Jaqueline Solis D.O., PM&R     Attending    [address]

## 2019-06-20 PROCEDURE — 97112 NEUROMUSCULAR REEDUCATION: CPT

## 2019-06-20 PROCEDURE — 97530 THERAPEUTIC ACTIVITIES: CPT

## 2019-06-20 PROCEDURE — 6370000000 HC RX 637 (ALT 250 FOR IP): Performed by: INTERNAL MEDICINE

## 2019-06-20 PROCEDURE — 6370000000 HC RX 637 (ALT 250 FOR IP): Performed by: NEUROLOGICAL SURGERY

## 2019-06-20 PROCEDURE — 2700000000 HC OXYGEN THERAPY PER DAY

## 2019-06-20 PROCEDURE — 97116 GAIT TRAINING THERAPY: CPT

## 2019-06-20 PROCEDURE — 92507 TX SP LANG VOICE COMM INDIV: CPT

## 2019-06-20 PROCEDURE — 97127 HC OT THER IVNTJ W/FOCUS COG FUNCJ: CPT

## 2019-06-20 PROCEDURE — 6370000000 HC RX 637 (ALT 250 FOR IP): Performed by: PHYSICAL MEDICINE & REHABILITATION

## 2019-06-20 PROCEDURE — 99232 SBSQ HOSP IP/OBS MODERATE 35: CPT | Performed by: PHYSICAL MEDICINE & REHABILITATION

## 2019-06-20 PROCEDURE — 1180000000 HC REHAB R&B

## 2019-06-20 RX ADMIN — DILTIAZEM HYDROCHLORIDE 120 MG: 120 CAPSULE, COATED, EXTENDED RELEASE ORAL at 20:32

## 2019-06-20 RX ADMIN — LACTULOSE 20 G: 20 SOLUTION ORAL at 20:34

## 2019-06-20 RX ADMIN — FAMOTIDINE 20 MG: 20 TABLET ORAL at 20:33

## 2019-06-20 RX ADMIN — PRAVASTATIN SODIUM 40 MG: 40 TABLET ORAL at 20:34

## 2019-06-20 RX ADMIN — MAGNESIUM OXIDE TAB 400 MG (241.3 MG ELEMENTAL MG) 400 MG: 400 (241.3 MG) TAB at 08:40

## 2019-06-20 RX ADMIN — DILTIAZEM HYDROCHLORIDE 120 MG: 120 CAPSULE, COATED, EXTENDED RELEASE ORAL at 08:40

## 2019-06-20 RX ADMIN — FAMOTIDINE 20 MG: 20 TABLET ORAL at 08:40

## 2019-06-20 RX ADMIN — ACETAMINOPHEN 500 MG: 325 TABLET ORAL at 08:40

## 2019-06-20 RX ADMIN — LACTULOSE 20 G: 20 SOLUTION ORAL at 08:40

## 2019-06-20 RX ADMIN — ACETAMINOPHEN 500 MG: 325 TABLET ORAL at 13:16

## 2019-06-20 RX ADMIN — ZOLPIDEM TARTRATE 5 MG: 5 TABLET ORAL at 20:34

## 2019-06-20 RX ADMIN — ACETAMINOPHEN 500 MG: 325 TABLET ORAL at 20:32

## 2019-06-20 ASSESSMENT — PAIN SCALES - GENERAL
PAINLEVEL_OUTOF10: 0

## 2019-06-20 NOTE — PROGRESS NOTES
speech automatics (1-20, OMAR, CARLTON) with min cues with 85% accuracy to help the patient express his basic wants and needs. DOTW with initial choral practice x1. Given a visual aide, 100% acc with min cues. Without a visual aide, 85% acc with min cues. 100% acc counting 1-10 independently. 85% acc counting 1-20 given min cues. Short-term Goals for Dysphagia POC:  Goal 1: Pt will demonstrate small bites/sips for safe and efficient swallow of mechanical soft and trials of soft/regular consistencies, given cues as needed, in all given opportunities. Treatment/Activity Tolerance:  Patient limited by fatigue    Plan:  Continue per POC    Pain:  Patient demonstrated no s/s of pain. - pt denied     Patient/Caregiver Education:  Patient educated on session and progression towards goals. and Education needs reinforcement.     Safety Devices:  Chair alarm in place     Comprehension:  2- Maximal assist    Expression:  2- Maximal assist    Problem Solvin- Total assist    Memory:  1- Total assist    Signature: Electronically signed by CIERRA Noriega on 2019 at 11:03 AM

## 2019-06-20 NOTE — PROGRESS NOTES
Subjective: The patient complains of severe  acute  confusion secondary to subdural hematoma partially relieved by PT OT, speech-language pathology, recent decompression of the subdural hematomas and exacerbated by fatigue, exhaustion, dehydration. I am concerned that patient is still impulsive and therefore still on a one-on-one. This is not a natural environment to be on a one-on-one I am instructing nurses to try to achieve safety and privacy without the one-on-one if and when possible as this is not a sustainable true life situation he will never again have a one-on-one even at home even if he were to have hired caregivers round-the-clock one-on-one is not feasible in any other setting therefore we need to prepare him for safety and security when that service is no longer available. ROS x   10: The patient also complains of severely impaired mobility and activities of daily living. Otherwise no new problems with vision, hearing, nose, mouth, throat, dermal, cardiovascular, GI, , pulmonary, musculoskeletal, psychiatric or neurological. See Rehab H&P on Rehab chart dated . Vital signs:  /76   Pulse 52   Temp 97 °F (36.1 °C)   Resp 18   Ht 6' 0.05\" (1.83 m)   Wt 172 lb 2.9 oz (78.1 kg)   SpO2 99%   BMI 23.32 kg/m²   I/O:   PO/Intake:  fair PO intake,   mechanical soft with thin liquids-one-on-one for feeds    Bowel/Bladder:  Incontinent, to trial condom catheter at night  General:  Patient is well developed, adequately nourished, non-obese and     well kempt. HEENT:    PERRLA, hearing intact to loud voice fall out frequently, external inspection of ear     and nose benign. Inspection of lips, tongue and gums dry  Musculoskeletal: No significant change in strength or tone. All joints stable. Inspection and palpation of digits and nails show no clubbing,       cyanosis or inflammatory conditions. Neuro/Psychiatric: Affect: flat but pleasant. Echolalia. Impulsive. Alert and oriented to person, place and     Situation-with max cues. No significant change in deep tendon reflexes or     Sensation-poor judgment reasoning and insight. Lungs:  Diminished, CTA-B. Respiration effort is normal at rest.     Heart:   S1 = S2, occasionally high heart rate history of A. fib with RVR. No loud murmurs. Abdomen:  Soft, non-tender, no enlargement of liver or spleen. Extremities:  No significant lower extremity edema or tenderness. Skin:   Intact to general survey,  craniotomy incisions healing. Rehabilitation:  Physical therapy: FIMS:  Bed Mobility: Scooting: Moderate assistance    Transfers: Sit to Stand: Minimal Assistance  Stand to sit: Minimal Assistance  Bed to Chair: Minimal assistance, Moderate assistance(mod assist for low chair - min assist from bed and w/c)  Squat Pivot Transfers: Maximum Assistance(assistance guiding hips , simple commands used with poor comprehension), Ambulation 1  Surface: carpet, level tile  Device: Rollator  Other Apparatus: (no O2)  Assistance: Contact guard assistance, Minimal assistance  Quality of Gait: flexed posture with NBOS  Gait Deviations: Decreased step length, Decreased step height  Distance: 250ft   Comments: 2nd ambulation mat to w/c with emphasis of following verbal cues vs distance. Pt looked at chair as requested when sitting on mat. Able to follow cues to ambulate to chair. Required min assist to turn to line up to chair. , Stairs  # Steps : 4  Stairs Height: 6\"  Rails: Bilateral  Assistance: Minimal assistance  Comment: Verbal and Tactile cues given for safety.      FIMS: Bed, Chair, Wheel Chair: 4 - Requires steadying assistance only <25% assist  and/or requires assist with one leg only  Walk: 4 - Contact Guard/Minimal Assistance Requires up to Contact Guard or Minimal Assistance to walk at least 150 feet  Distance Walked: [address]: 0 - Activity Not Assessed/Does Not Occur  Distance Traveled in Wheel Chair: 15'  Stairs: 2- Maximal Assistance Performs 25-49% of the effort to go up and down 4 to 6 stairs and requires the assistance of one person only,  , Assessment: Pt with fair+ AD control with VCs for right/left turns. Pt able to maintain on task for the majority of tx session. Pt required high level of cues for reaching back with stand-sit. Occupational therapy: FIMS:  Eatin - Feeds self with setup/supervision/cues and/or requires only setup/supervision/cues to perform tube feedings  Groomin - Did not occur  Bathin - Did not occur  Dressing-Upper: 0 - Did not occur  Dressing-Lower: 0 - Did not occur  Toiletin - Requires steadying assistance only  Toilet Transfer: 4 - Requires steadying assistance only < 25% assist  Shower Transfer: 1 - Total assistance, pt. expends less than 25% effort,  , Assessment: Pt requires increased time to complete simple tasks. Pt with poor safety awareness when completing transfers. Pt continues to demonstrate decreased cognition and requires mod to max verbal cues. Pt continues to benefit from skilled OT to maximize independence in safety and ADLs. Speech therapy: FIMS: Comprehension: 2 - Patient understands basic needs 25-49% of the time  Expression: 2 - Expresses basic ideas/needs 25-49% of the time  Social Interaction: 3 - Patient appropriate  50%-74% of the time  Problem Solvin - Patient solves simple/routine tasks < 25%  Memory: 1 - Patient remembers < 25% of the time      Lab/X-ray studies reviewed, analyzed and discussed with patient and staff:   No results found for this or any previous visit (from the past 24 hour(s)). Xr Chest  2019   Areas of atelectasis, patchy groundglass infiltrate in the bases. Right greater than left . Trace/ small right pleural effusion    Ct Head   2019  Impression: Bilateral frontal and parietal subdural hematomas. The amount of extra-axial air has slightly decreased since the prior study.  No new areas of hemorrhage are cervicothoracic spine degenerative changes. Sternotomy wires are noted.           Impression   MILD DEGENERATIVE CHANGES OF THE RIGHT SHOULDER. NEGATIVE FOR ACUTE FRACTURE OR DISLOCATION.         EXAMINATION: XR SHOULDER LEFT (MIN 3 VIEWS)       CLINICAL HISTORY:  s/p fall        COMPARISONS: None available.       FINDINGS: No acute fracture is identified. There is cephalad subluxation of the humeral head in association narrowing of the subacromial space indicating rotator cuff tear/degeneration. There are periarticular ossifications lateral to the acromion. There    is glenohumeral osteoarthritis with humeral head marginal osteophytosis. There is cervical and thoracic spondylosis. Sternotomy wires are noted.           Impression   LEFT SHOULDER OSTEOARTHRITIS WITH PERIARTICULAR SOFT TISSUE OSSIFICATIONS AND ROTATOR CUFF TEAR/DEGENERATION. NEGATIVE FOR FRACTURE. Previous extensive, complex labs, notes and diagnostics reviewed and analyzed. ALLERGIES:    Allergies as of 05/31/2019 - Review Complete 05/31/2019   Allergen Reaction Noted    Honey bee venom [bee venom] Swelling 10/08/2015    Penicillins Swelling 10/08/2015      (please also verify by checking MAR)      Part of patient's rehabilitation program I am encouraging them to attend game day today under recreational therapy to improve their dexterity, cognition, socialization and endurance. Complex Physical Medicine & Rehab Issues Assess & Plan:   1. Severe abnormality of gait and mobility and impaired self-care and ADL's secondary to progressive subdural hematomas . Functional and medical status reassessed regarding patients ability to participate in therapies and patient found to be able to participate in acute intensive comprehensive inpatient rehabilitation program including PT/OT to improve balance, ambulation, ADLs, and to improve the P/AROM.   Therapeutic modifications regarding activities in therapies, place, amount of time per hospitalist for backup medical  3.   prostatic hyperplasia with urinary frequency,   Prostate cancer -check postvoid residuals check stat UA  4. Facet arthropathy, lumbosacral,   Chronic back pain,   Spondylolisthesis of lumbar region-add Lidoderm, Tylenol, Norco as needed  5. Gross hematuria, Long term current use of anticoagulant-check UA monitor urine for blood  6. Long-term use of aspirin therapy and other blood thinners on hold at present because of recent subdural hematoma  7. SHAYY on CPAP-add CPAP from home if available is not available add CPAP from the hospital  8. Prediabetes-recheck BMP, low carb diet  9. Vitamin D deficiency-high-dose vitamin D, add Tums at daily dose of vitamin D after high doses done recheck as an outpatient  10. Aphasia, and oropharyngeal dysphagia-recently which pathology consult, add e-stim, add supplementation to diet push oral fluids  11. Poorly nourished due to poor nutrition and previous alcohol use BMI 26.0-26.9,adult--add protein supplementation vitamin B12 vitamin D CoQ10,  to stop drinking alcohol as an outpatient and at chemical dependency counseling when patient is able to participate  12. Complex social situation-patient has a girlfriend he lives alone. He is estranged from his several children. Continue to work on improving social interactions. Palliative care versus hospice services while in the skilled facility. Patient and family to decide whether either of these services would benefit. 13. No trauma after fall on 6/17/2019. Long discussions with nursing regarding keeping him safe in the room see above. Patient's family his girlfriend and staff to work together to have patient to be safe without one-on-one as this is not a sustainable type of a scenario. He needs to learn how to be safe without having somebody constantly. By his side because this will not be a real life scenario.         Nitin Jones, ZOHAIB.O., PM&R     Attending [address]

## 2019-06-20 NOTE — PROGRESS NOTES
Occupational Therapy  Facility/Department: [facility]  Daily Treatment Note  NAME: Leigh Garay  : 1934  MRN: [id number]    Date of Service: 2019    Discharge Recommendations:  Continue to assess pending progress       Assessment   Assessment: Pt demonstrates improving cognitive abilities during sorting task this morning. Pt continues to require cues for redirection. Pt continues to benefit from skilled OT to maximize independence in safety and ADLs. REQUIRES OT FOLLOW UP: Yes  Activity Tolerance  Activity Tolerance: Patient Tolerated treatment well  Activity Tolerance: good  Safety Devices  Safety Devices in place: Yes  Type of devices: All fall risk precautions in place  Restraints  Initially in place: No         Patient Diagnosis(es): There were no encounter diagnoses. has a past medical history of Chronic back pain, Hyperlipidemia, and Hypertension. has a past surgical history that includes Cardiac catheterization; Coronary artery bypass graft; and craniotomy (Bilateral, 2019). Restrictions  Restrictions/Precautions  Restrictions/Precautions: Fall Risk, Seizure  Position Activity Restriction  Other position/activity restrictions: nectar thick   Subjective   General  Chart Reviewed: Yes  Patient assessed for rehabilitation services?: Yes  Response to previous treatment: Patient with no complaints from previous session  Family / Caregiver Present: No  Subjective  Subjective:      General Comment  Comments:      Pain Assessment  Pain Assessment: 0-10  Pain Level: 0  Vital Signs  Patient Currently in Pain: No   Orientation  Orientation  Overall Orientation Status: Within Functional Limits  Orientation Level: Oriented to time;Oriented to situation;Oriented to person  Objective        Pt placed clothespins onto strings using B UEs with 2lb wrist weights to improve strength during ADLs and IADLs. Pt required mod verbal cues for sequencing task.  Pt began removing clothespins that were already placed onto string instead of continuing on with activity and grabbing a new clothespin. Pt able to complete 1/2 of string board in 25 minutes. Pt required significant increased time to complete the task. Pt with good attention to task. Pt sorted Casa cards into color coded piles using L UE to work on cognitive retraining. Pt initially given a yellow and blue pile to sort cards into. Pt able to sort cards with 2 errors but pt able to self correct. Therapy student graded task up to 3 piles including yellow, blue, and red to increase complexity. Pt able to sort cards with 3 errors but pt able to self correct. Pt required 10% verbal cues during task to correct errors. Pt demonstrated improving ability to sort cards this date which indicates improvements in cognition. Pt removed nuts and bolts from metal board using L UE to work on fine motor coordination. Attempted Elem assist to place hands on bolts but still unable to complete. Pt unable to comprehend that he needed to hold the nut underneath the board in order to unscrew the bolt. Therapy student graded task down and held the nut underneath the board while pt unscrewed the bolt. Pt required mod verbal cues to place the bolt into the container. Pt then began placing bolts into therapy student's hand after given visual cue. Pt required mod assist to complete the task. Pt with good attention to task and good endurance. Pt's overall distractibility is decreased but he is still impulsive.                                                                              Plan   Plan  Times per week: 5-7   Plan weeks: 4  Current Treatment Recommendations: Strengthening, ROM, Balance Training, Functional Mobility Training, Endurance Training, Safety Education & Training, Cognitive Reorientation, Patient/Caregiver Education & Training, Equipment Evaluation, Education, & procurement, Self-Care / ADL, Cognitive/Perceptual Training, Positioning  Plan Comment: Continue with OT

## 2019-06-20 NOTE — FLOWSHEET NOTE
15:45 6/20/19 PATIENT SITTING NURSES STATION ,RN, EUNICE,RED GARDNER PEARL 1:1  NOTED PATIENT  ADJUSTING SELF, JUMPED UP SUDDENLY LANDED ON HANDS TO POSITION SELF ,AS ALL 3 STAFF MEMBERS RIGHT THERE  TO ASSIST IN CARE. David ARREOLA WAS NOTIFIED AS WELL AS MANAGER ELIJAH. David Shea PER S .PEARL. Davdi Shea

## 2019-06-20 NOTE — PROGRESS NOTES
Physical Therapy Rehab Treatment Note  Facility/Department: [facility]  Room: R240/R240-01       NAME: Jean Hair  : 1934 (80 y.o.)  MRN: [id number]  CODE STATUS: Full Code    Date of Service: 2019  Patient assessed for rehabilitation services?: Yes    Restrictions:  Restrictions/Precautions: Fall Risk, Seizure  Position Activity Restriction  Other position/activity restrictions: nectar thick        SUBJECTIVE:            Pre and Post Treatment Pain Screenin/10       OBJECTIVE:               Neuromuscular Education  PNF: stand balance facilitation with varying SANJAY and UE support utilized  - incorporated  tossing object and varying placing of LE tasks. He demonstrated LOB with crossing over midline and with narrow SANJAY tasks  Neuromuscular Comments: short distance gait with rollator with focus on path taking and maneuvering around objects. Pt improved with fewer cues required           Transfers  Sit to Stand: Stand by assistance  Stand to sit: Minimal Assistance(hand over hand assist required for safe performance)  Bed to Chair: Minimal assistance    Ambulation  Ambulation?: Yes  Ambulation 1  Surface: carpet;level tile  Device: Rollator  Assistance: Contact guard assistance;Minimal assistance  Quality of Gait: flexed posture with NBOS, less frequent cues required for avoiding objects however pt continues to require multiple cues for path taking particularly with approach to chairs  Gait Deviations: Decreased step length;Decreased step height  Distance: 250ft   Comments: walker control assist required for descent of ramp   Stairs  # Steps : 4  Stairs Height: 6\"  Rails: Bilateral  Assistance: Minimal assistance;Contact guard assistance  Comment: inermittent Verbal and Tactile cues given for safety.  - mildly unsteady - improved ability to follow instrcutions noted and less LOB this session    Activity Tolerance  Activity Tolerance: Patient Tolerated treatment well ASSESSMENT/COMMENTS:  Assessment: Overall pt demonstrates improved following of instructions however delay remains present which poses risk. Pt is frequently inaccurate with gait and maneuvering instructions, which results in inconsistent LOB which poses safety risk. PLAN OF CARE/Safety:   Safety Devices  Type of devices: Chair alarm in place; Left in chair;Gait belt      Therapy Time:   Individual   Time In 1400   Time Out 1430   Minutes 30     Minutes:      Transfer/Bed mobility trainin      Gait training:10      Neuro re education:15           Zebedee Dancer, PT, 19 at 2:47 PM

## 2019-06-20 NOTE — PROGRESS NOTES
poses safety risk. PLAN OF CARE/Safety:   Safety Devices  Type of devices: Chair alarm in place; Left in chair;Gait belt      Therapy Time:   Individual   Time In 930   Time Out 1000   Minutes 30     Minutes:      Transfer/Bed mobility trainin      Gait training: 10      Neuro re education: P.OArabella Colon , [place], 19 at 2:40 PM

## 2019-06-20 NOTE — PROGRESS NOTES
Damaris Stoner is a 80 y.o. male patient.  Pt was seen and evaluated, no overnight events,  ROS: could not be obtained bc of acuity of condition    Current Facility-Administered Medications   Medication Dose Route Frequency Provider Last Rate Last Dose    lactulose (CHRONULAC) 10 GM/15ML solution 20 g  20 g Oral BID Gi Scullin, DO   20 g at 06/20/19 0840    zolpidem (AMBIEN) tablet 5 mg  5 mg Oral Nightly Gi Scullin, DO   5 mg at 06/19/19 2236    diltiazem (CARDIZEM CD) extended release capsule 120 mg  120 mg Oral BID Bharath Quinn MD   120 mg at 06/20/19 0840    magnesium oxide (MAG-OX) tablet 400 mg  400 mg Oral Daily Bharath Quinn MD   400 mg at 06/20/19 0840    metoprolol (LOPRESSOR) injection 2.5 mg  2.5 mg Intravenous Q2H PRN Bharath Quinn MD        ipratropium-albuterol (DUONEB) nebulizer solution 1 ampule  1 ampule Inhalation Q4H PRN Kevin Minors, DO        HYDROcodone-acetaminophen (NORCO) 5-325 MG per tablet 1 tablet  1 tablet Oral Q4H PRN Gi Scullin, DO   1 tablet at 06/12/19 2030    acetaminophen (TYLENOL) tablet 500 mg  500 mg Oral TID Gi Scullin, DO   500 mg at 06/20/19 1316    lidocaine viscous hcl (XYLOCAINE) 30 mL, diphenhydrAMINE (BENADRYL) 12.5 MG/5ML 75 mg, aluminum-magnesium hydroxide 200-200 MG/5ML 30 mL compounded oral suspension   Oral 4x Daily PRN Gi Scullin, DO        acetaminophen (TYLENOL) tablet 650 mg  650 mg Oral Q4H PRN Gi Scullin, DO        magnesium hydroxide (MILK OF MAGNESIA) 400 MG/5ML suspension 30 mL  30 mL Oral Daily PRN Gi Scullin, DO   30 mL at 06/18/19 2057    glucose (GLUTOSE) 40 % oral gel 15 g  15 g Oral PRN Dong Okeefe PA-C        dextrose 50 % solution 12.5 g  12.5 g Intravenous PRN Dong Okeefe PA-C        glucagon (rDNA) injection 1 mg  1 mg Intramuscular PRN Dong Okeefe PA-C        ondansetron (ZOFRAN) injection 4 mg  4 mg Intravenous Q6H PRN Carole Shields MD        famotidine (PEPCID) tablet 20 mg  20 mg Oral BID Rubén Li MD   20 mg at 06/20/19 0840    nitroGLYCERIN (NITROSTAT) SL tablet 0.4 mg  0.4 mg Sublingual Q5 Min PRN Rubén Li MD        pravastatin (PRAVACHOL) tablet 40 mg  40 mg Oral Nightly Rubén Li MD   40 mg at 06/19/19 2235     Allergies   Allergen Reactions    Honey Bee Venom [Bee Venom] Swelling    Penicillins Swelling     Principal Problem:    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 [facility] admit 05/31/19. Active Problems:    Afib (HCC)    Subdural hematoma (HCC)    Benign prostatic hyperplasia with urinary frequency    Essential hypertension    Facet arthropathy, lumbosacral    Gross hematuria    Hyperlipidemia    Long term current use of anticoagulant    Long-term use of aspirin therapy    SHAYY on CPAP    Prediabetes    Prostate cancer (HCC)    S/P CABG (coronary artery bypass graft)    S/P PTCA (percutaneous transluminal coronary angioplasty)    Spondylolisthesis of lumbar region    Vitamin D deficiency    RBBB    Aphasia    CAD (coronary artery disease)    BMI 26.0-26.9,adult    Chronic back pain    Abnormality of gait and mobility  Resolved Problems:    * No resolved hospital problems. *    Blood pressure 129/76, pulse 52, temperature 97 °F (36.1 °C), resp. rate 18, height 6' 0.05\" (1.83 m), weight 172 lb 2.9 oz (78.1 kg), SpO2 99 %. Subjective  Objective:  General Appearance: In no acute distress. Vital signs: (most recent): Blood pressure 129/76, pulse 52, temperature 97 °F (36.1 °C), resp. rate 18, height 6' 0.05\" (1.83 m), weight 172 lb 2.9 oz (78.1 kg), SpO2 99 %. Lungs:  Normal effort. Heart: Normal rate. Regular rhythm. S1 normal.    Abdomen: Abdomen is soft. There is no epigastric area tenderness. There is splenomegaly. Extremities: There is no deformity. Pulses: Distal pulses are intact. Skin:  Warm and dry.       Past Medical History:   Diagnosis Date    Chronic back pain     Hyperlipidemia     Hypertension        Assessment & Plan  1) s/p BL craniotomy d/t BL subdural hematomas 2/2 repeated falls  C/w pt/ot  2) ataxia  C/w pt/ot  3) HTN stable  4) CAD   Needs nursing home placement      Joaquin Escobedo MD  6/20/2019

## 2019-06-20 NOTE — PROGRESS NOTES
Occupational Therapy  Facility/Department: [facility]  Daily Treatment Note  NAME: Roger Florez  : 1934  MRN: [id number]    Date of Service: 2019    Discharge Recommendations:  Continue to assess pending progress       Assessment   Assessment:   REQUIRES OT FOLLOW UP: Yes  Activity Tolerance  Activity Tolerance: Patient Tolerated treatment well  Activity Tolerance: good  Safety Devices  Safety Devices in place: Yes  Type of devices: All fall risk precautions in place  Restraints  Initially in place: No         Patient Diagnosis(es): There were no encounter diagnoses. has a past medical history of Chronic back pain, Hyperlipidemia, and Hypertension. has a past surgical history that includes Cardiac catheterization; Coronary artery bypass graft; and craniotomy (Bilateral, 2019). Restrictions  Restrictions/Precautions  Restrictions/Precautions: Fall Risk, Seizure  Position Activity Restriction  Other position/activity restrictions: nectar thick   Subjective   General  Chart Reviewed: Yes  Patient assessed for rehabilitation services?: Yes  Response to previous treatment: Patient with no complaints from previous session  Family / Caregiver Present: No  Subjective  Subjective:      General Comment  Comments:      Pain Assessment  Pain Assessment: 0-10  Pain Level: 0  Vital Signs  Patient Currently in Pain: No   Orientation  Orientation  Overall Orientation Status: Within Functional Limits  Orientation Level: Oriented to time;Oriented to situation;Oriented to person  Objective     While standing, pt sorted 10 wooden cylinders of various sizes into slots using B UEs to work on cognitive retraining and standing tolerance. Pt required mod verbal cues to accurately sort the cylinders. Pt able to sort 4/10 cylinders independently. Pt demonstrated fair problem solving skills when sorting the last 3 cylinders. Pt completed all transfers with SBA.  Pt required verbal cue to place hands on w/c before descending. Pt stood for 10 minutes with no LOB and good endurance. While seated, pt placed whiffle balls into container using tongs and B UEs to work on coordination, reaching, and crossing midline to improve independence in ADLs and IADLs. Pt had no difficulty completing the task but required max verbal cues to use the R UE only after completing the activity with the L UE. Therapy student had to hold pt's L UE down in order for pt to complete the task using the R UE. Pt had good attention to task. While seated, pt placed colored eggs onto wooden dowels using the L UE to work on UE reaching and endurance. Pt required mod verbal cues in order to slide the loop onto the dowel. Pt with fair understanding. Pt required significant increased time to complete the task. Pt completed 3/4 of the task in 20 minutes. Pt with good endurance throughout task.                  Transfers  Sit to stand: Stand by assistance  Stand to sit: Stand by assistance  Transfer Comments: Pt required min verbal cues to place hands on arm rests before descending into w/c for safety     Plan   Plan  Times per week: 5-7   Plan weeks: 4  Current Treatment Recommendations: Strengthening, ROM, Balance Training, Functional Mobility Training, Endurance Training, Safety Education & Training, Cognitive Reorientation, Patient/Caregiver Education & Training, Equipment Evaluation, Education, & procurement, Self-Care / ADL, Cognitive/Perceptual Training, Positioning  Plan Comment: Continue with OT POC  G-Code     OutComes Score    AM-PAC Score    Goals  Patient Goals   Patient goals : Patient is unable to report a goal at this time due to global aphasia       Therapy Time   Individual Concurrent Group Co-treatment   Time In 1300         Time Out 1400         Minutes 60           This therapy session was supervised by Candelaria Pearson MS OTR/L    Electronically signed by Nishant Carroll on 6/20/2019 at 4:45 PM      Nishant Carroll

## 2019-06-20 NOTE — PROGRESS NOTES
This nurse put safecare in since pt's nurse , Jonathan RN, was not present for incident. This nurse was present for incident. Jonathan RN was notified about fall. Anisha Manager was notified as well.

## 2019-06-20 NOTE — PROGRESS NOTES
Patient having witnessed fall to floor. Was sitting at nurse desk with 1:1 staff and two nurses. Patient was shifting weight and the wheelchair tipped over causing the patient to go to his knees. Patient denies pain at this time, did have area of small broken skin to right knee. This nurse did not witness event, other member of staff to complete safecare per unit manager. Unit manager aware, message left with Dr. Jayda Chow. Electronically signed by Gisele Spurling, RN on 6/20/19 at 4:07 PM      Dotty, girlfriend updated.  Electronically signed by Gisele Spurling, RN on 6/20/19 at 4:13 PM

## 2019-06-21 PROCEDURE — 1180000000 HC REHAB R&B

## 2019-06-21 PROCEDURE — 97127 HC OT THER IVNTJ W/FOCUS COG FUNCJ: CPT

## 2019-06-21 PROCEDURE — 97116 GAIT TRAINING THERAPY: CPT

## 2019-06-21 PROCEDURE — 6370000000 HC RX 637 (ALT 250 FOR IP): Performed by: INTERNAL MEDICINE

## 2019-06-21 PROCEDURE — 6370000000 HC RX 637 (ALT 250 FOR IP): Performed by: NEUROLOGICAL SURGERY

## 2019-06-21 PROCEDURE — 6370000000 HC RX 637 (ALT 250 FOR IP): Performed by: PHYSICAL MEDICINE & REHABILITATION

## 2019-06-21 PROCEDURE — 97530 THERAPEUTIC ACTIVITIES: CPT

## 2019-06-21 PROCEDURE — 97112 NEUROMUSCULAR REEDUCATION: CPT

## 2019-06-21 PROCEDURE — 97535 SELF CARE MNGMENT TRAINING: CPT

## 2019-06-21 PROCEDURE — 99232 SBSQ HOSP IP/OBS MODERATE 35: CPT | Performed by: PHYSICAL MEDICINE & REHABILITATION

## 2019-06-21 PROCEDURE — 92507 TX SP LANG VOICE COMM INDIV: CPT

## 2019-06-21 RX ADMIN — HYDROCODONE BITARTRATE AND ACETAMINOPHEN 1 TABLET: 5; 325 TABLET ORAL at 23:53

## 2019-06-21 RX ADMIN — ACETAMINOPHEN 500 MG: 325 TABLET ORAL at 17:18

## 2019-06-21 RX ADMIN — PRAVASTATIN SODIUM 40 MG: 40 TABLET ORAL at 22:18

## 2019-06-21 RX ADMIN — FAMOTIDINE 20 MG: 20 TABLET ORAL at 08:02

## 2019-06-21 RX ADMIN — ACETAMINOPHEN 500 MG: 325 TABLET ORAL at 22:17

## 2019-06-21 RX ADMIN — ACETAMINOPHEN 500 MG: 325 TABLET ORAL at 08:02

## 2019-06-21 RX ADMIN — DILTIAZEM HYDROCHLORIDE 120 MG: 120 CAPSULE, COATED, EXTENDED RELEASE ORAL at 22:17

## 2019-06-21 RX ADMIN — ZOLPIDEM TARTRATE 5 MG: 5 TABLET ORAL at 22:18

## 2019-06-21 RX ADMIN — DILTIAZEM HYDROCHLORIDE 120 MG: 120 CAPSULE, COATED, EXTENDED RELEASE ORAL at 08:02

## 2019-06-21 RX ADMIN — FAMOTIDINE 20 MG: 20 TABLET ORAL at 22:18

## 2019-06-21 RX ADMIN — MAGNESIUM OXIDE TAB 400 MG (241.3 MG ELEMENTAL MG) 400 MG: 400 (241.3 MG) TAB at 08:02

## 2019-06-21 ASSESSMENT — PAIN SCALES - GENERAL
PAINLEVEL_OUTOF10: 0
PAINLEVEL_OUTOF10: 0
PAINLEVEL_OUTOF10: 3
PAINLEVEL_OUTOF10: 0
PAINLEVEL_OUTOF10: 7

## 2019-06-21 NOTE — PROGRESS NOTES
Received report from nightshift 1:1. She stated she bathed and dressed patient. Hearing aids and dentures in. This nurse assisted pt to bathroom where he brushed his teeth. He's eating breakfast at this time with no assistance but set up. Electronically signed by Gianna Valerio LPN on 8/32/9166 at 7:13 AM    Pt stated \"find hearing aid\" and I noticed it was missing from his left ear. Looked all over room and located it in his shirt that he had worn for the day. He currently has a hearing aid in each ear.  Electronically signed by Gianna Valerio LPN on 0/27/3794 at 9:79 PM

## 2019-06-21 NOTE — PROGRESS NOTES
Occupational Therapy  [facility]: [facility]  Daily Treatment Note  NAME: Leigh Garay  : 1934  MRN: [id number]    Date of Service: 2019    Discharge Recommendations:  Continue to assess pending progress       Assessment   Assessment: Pt demonstrates improving cognition during cutout task thid morning. Pt continues to require mod verbal cues to follow commands. Pt continues to benefit from skilled OT to maximize independence in safety and ADLs. REQUIRES OT FOLLOW UP: Yes  Activity Tolerance  Activity Tolerance: Patient Tolerated treatment well  Activity Tolerance: good  Safety Devices  Safety Devices in place: Yes  Type of devices: All fall risk precautions in place  Restraints  Initially in place: No         Patient Diagnosis(es): There were no encounter diagnoses. has a past medical history of Chronic back pain, Hyperlipidemia, and Hypertension. has a past surgical history that includes Cardiac catheterization; Coronary artery bypass graft; and craniotomy (Bilateral, 2019).     Restrictions  Restrictions/Precautions  Restrictions/Precautions: (pt now on thin liquids)  Position Activity Restriction  Other position/activity restrictions: nectar thick   Subjective   General  Chart Reviewed: Yes  Patient assessed for rehabilitation services?: Yes  Response to previous treatment: Patient with no complaints from previous session  Family / Caregiver Present: No  Subjective  Subjective:      General Comment  Comments:      Pain Assessment  Pain Assessment: 0-10  Pain Level: 0  Vital Signs  Patient Currently in Pain: Denies   Orientation  Orientation  Overall Orientation Status: Within Functional Limits  Orientation Level: Oriented to time;Oriented to situation;Oriented to person  Objective              Functional Mobility  Functional - Mobility Device: Rolling Walker  Activity: To/from bathroom  Assist Level: Contact guard assistance  Functional Mobility Comments: Pt ambulated to the bathroom to practice toilet transfer. Pt ambulated from wheelchair to toilet using [employer] to increase independence in ADLs and room mobility. Pt required mod verbal cues for safety. Pt impulsive during transfers. Pt required CGA while ambulating. Pt completed all transfers with supervision. Pt had no LOB during task. Balance  Sitting Balance: Contact guard assistance  Standing Balance: Contact guard assistance    Toilet Transfers  Toilet - Technique: Ambulating  Equipment Used: Grab bars  Toilet Transfer: Contact guard assistance     Transfers  Sit to stand: Supervision  Stand to sit: Supervision  Transfer Comments: Pt required mod verbal cues for hand placement during transfers. Pt assembled 10, one piece cutouts of familiar objects such as a cat or a house to work on cognitive retraining. Pt given 5 cutouts at a time to grade the task down. Pt able to self correct errors 100% of the time. Pt with good attention to task. While standing. pt engaged in B UE fine motor coordination activity to improve independence in self care. Pt placed marbles onto foam peg board using his L UE then removed the marbles from the board with his R UE. Pt required mod verbal cues for safe foot placement with a good SANJAY. Therapy student held onto pts L UE as a tactile cue to remind pt to only  marbles with his R UE when returning them to the container. Pt also required mod verbal cues when completing the task with his R UE. Pt required max verbal cues to  marbles one at a time with his R UE but pt did not follow commands. Pt stood for 10 minutes and completed the remainder of the task while seated. Pt required min verbal cues for upright posture. Pt required significant increase time to complete activity. Pt with fair attention to task. Pt given pop beads to create a long string to improve  strength during self care. Pt with increased difficulty to complete task. Pt able to pop 4 beads together in 5 minutes.  Pt required significant increased time to perform task. Pt completed feeding at end therapy session. Pt transferred into standard arm chair instead of wheelchair for lunch to improve safety. Pt min A for transfer due to impulsivity and LOB. Pt picked up fork independently in order to feed himself. Pt required verbal and tactile cues to refrain from taking another bite until he is finished swallowing food in his mouth for safety purposes.  PCA notified to be aware of whether he swallowed prior to another bite and provide verbal and tactile cues for safety                                                                 Plan   Plan  Times per week: 5-7   Plan weeks: 4  Current Treatment Recommendations: Strengthening, ROM, Balance Training, Functional Mobility Training, Endurance Training, Safety Education & Training, Cognitive Reorientation, Patient/Caregiver Education & Training, Equipment Evaluation, Education, & procurement, Self-Care / ADL, Cognitive/Perceptual Training, Positioning  Plan Comment: Continue with OT POC  G-Code     OutComes Score                                                  AM-PAC Score             Goals  Patient Goals   Patient goals : Patient is unable to report a goal at this time due to global aphasia       Therapy Time   Individual Concurrent Group Co-treatment   Time In 1100         Time Out 1209         Minutes 69           This therapy session was supervised by Lesli Arana MS OTR/L    Electronically signed by Noe Ro on 6/21/2019 at 4:57 PM    Noe Ro

## 2019-06-21 NOTE — PROGRESS NOTES
Speech Language Pathology Treatment Note  Facility/Department: [facility]  2019    Rehab Dx/Hx: Abnormality of gait and mobility [R26.9]    Precautions: falls and aspirations    ST Dx: Aphasia, Dysphagia and Cognitive Impairment     Date of Admit: 2019  Room #: R240/R240-01    Time in: 10:30 AM Time out: 11:00 AM    Subjective:  Alert, Cooperative and Pleasant        Interventions used this date:  Expressive Language and Receptive Language    Objective/Assessment:  Patient progressing towards goals:  Short-term Goals for Speech & Language POC:     Goal 1: Pt will follow 1 step directions given orally with 75% accuracy with mod cues to increase the pt's ability to follow directions provided by caregivers for safe follow through with ADLs. Followed verbal one step directions independently 50%/x; which increased to 70% acc with mod-max cues. Goal 2: Pt will identify objects/pictures within a field of 2-3 with 75% accuracy with mod cues in order to increase his understanding of objects in his environment for safer and more independent completion of ADLs. Not addressed     Goal 3: Pt will answer low level yes/no questions with 85% accuracy with mod cues to assist the caregiver in obtaining important information regarding the patient's personal, medical, and safety needs. 70% acc given yes/no visuals; occasionally answered yes and no to questions    Pt able to answer simple wh-questions about images and himself with 70% acc independently! He was also able to independently state name and  accurately. Goal 4: Pt will complete confrontational naming tasks with 90% accuracy with mild verbal cues to help the patient express his/her basic wants and needs. 75% acc independently, increasing to 90% acc given mod verbal cues! Responded well to phonemic cues on this date  Pt able to name actions in images at the phrase level with 70% acc independently!        Goal 5: Pt will perform speech automatics (1-20, CARLTON NELSON) with min cues with 85% accuracy to help the patient express his basic wants and needs. Short-term Goals for Dysphagia POC:  Goal 1: Pt will demonstrate small bites/sips for safe and efficient swallow of mechanical soft and trials of soft/regular consistencies, given cues as needed, in all given opportunities. Treatment/Activity Tolerance:  Patient limited by fatigue    Plan:  Continue per POC    Pain:  Patient demonstrated no s/s of pain. - pt denied     Patient/Caregiver Education:  Patient educated on session and progression towards goals. and Education needs reinforcement.     Safety Devices:  Chair alarm in place     Comprehension:  2- Maximal assist    Expression:  2- Maximal assist - 3 mod asst     Problem Solvin- Total assist    Memory:  1- Total assist    Signature:  Electronically signed by CIERRA Finch on 19 at 11:05 AM

## 2019-06-21 NOTE — PROGRESS NOTES
Occupational Therapy  Facility/Department: [facility]  Daily Treatment Note  NAME: Kaden Carpio  : 1934  MRN: [id number]    Date of Service: 2019    Discharge Recommendations:  Continue to assess pending progress       Assessment   Assessment: Pt demonstrated improving functional reaching this afternoon. Pt demonstates inconsistency with cognitive tasks. Pt continues to benefit from skilled OT to benefit from skilled OT to maximize independence in safety and ADLs. REQUIRES OT FOLLOW UP: Yes  Activity Tolerance  Activity Tolerance: Patient Tolerated treatment well  Activity Tolerance: good  Safety Devices  Safety Devices in place: Yes  Type of devices: All fall risk precautions in place  Restraints  Initially in place: No         Patient Diagnosis(es): There were no encounter diagnoses. has a past medical history of Chronic back pain, Hyperlipidemia, and Hypertension. has a past surgical history that includes Cardiac catheterization; Coronary artery bypass graft; and craniotomy (Bilateral, 2019). Restrictions  Restrictions/Precautions  Restrictions/Precautions: (pt now on thin liquids)  Position Activity Restriction  Other position/activity restrictions: nectar thick   Subjective   General  Chart Reviewed: Yes  Patient assessed for rehabilitation services?: Yes  Response to previous treatment: Patient with no complaints from previous session  Family / Caregiver Present: No  Subjective  Subjective:      General Comment  Comments:      Pain Assessment  Pain Assessment: 0-10  Pain Level: 0  Vital Signs  Patient Currently in Pain: No   Orientation  Orientation  Overall Orientation Status: Within Functional Limits  Orientation Level: Oriented to time;Oriented to situation;Oriented to person  Objective             Balance  Sitting Balance: Contact guard assistance    Transfers  Sit to stand: CGA  Stand to sit: CGA  Transfer Comments: Pt required mod verbal cues for hand placement during transfers. Pt with poor safety and impulsive     Pt transferred from wheelchair to standard arm chair in order to complete functional reaching task for safety purposes. Pt completed all transfers with supervision. While seated in chair, pt placed rings around a wooden dowel using B UEs to improve functional reaching. Dowel was placed in front of patient. Patient flexed trunk and placed all rings around dowel with the L UE then with the R UE. Therapy student then moved the dowel to the R side of pt for him to reach with the L UE to promote crossing midline. Then therapy student moved the dowel to the L side of pt for him to reach with the R UE to promote crossing midline. Therapy student had pt grab rings from various planes before placing them around dowel. Therapy student held onto L UE when pt was completing the task with the R UE as a tactile cue to use the R UE only. Pt required min verbal cues for safe foot placement for increased stability to prevent from tipping out of chair. Pt with fair understanding. Pt with increased endurance to complete the task. Pt sorted colored wooden blocks onto dowels using B UEs with 1lb wrist weights to work on cognitive retraining. Pt started with 10 different colors to sort but became frustrated and was unable to complete. Therapy student graded task down to 4 colors to make task less complex. Pt with less difficulty to sort blocks. Pt made 4 errors out of 25 blocks. Pt self corrected errors 75% of the time. Pt then removed the blocks from the dowels to work on endurance and UE strengthening. Pt demonstrated increased difficulty to remove blocks from taller dowels. Therapy student modified task and moved the blocks to shorter rods to decrease amount of difficulty. Therapy student removed L UE wrist weight due to UE fatigue. Pt completed task with significant increased time. Pt with good attention to task.                                                             Plan   Plan  Times per week: 5-7   Plan weeks: 4  Current Treatment Recommendations: Strengthening, ROM, Balance Training, Functional Mobility Training, Endurance Training, Safety Education & Training, Cognitive Reorientation, Patient/Caregiver Education & Training, Equipment Evaluation, Education, & procurement, Self-Care / ADL, Cognitive/Perceptual Training, Positioning  Plan Comment: Continue with OT POC  G-Code     OutComes Score                                                  AM-PAC Score             Goals  Patient Goals   Patient goals : Patient is unable to report a goal at this time due to global aphasia       Therapy Time   Individual Concurrent Group Co-treatment   Time In 1300         Time Out 1400         Minutes 60           This therapy session was supervised by Maggy Santillan MS OTR/L  Electronically signed by Marie Gordillo on 6/21/2019 at 5:01 PM         Marie Gordillo

## 2019-06-21 NOTE — PROGRESS NOTES
Physical Therapy Rehab Treatment Note  Facility/Department: [facility]  [facility]       NAME: Pedro Edge  : 1934 (80 y.o.)  MRN: [id number]  CODE STATUS: Full Code    Date of Service: 2019  Chart Reviewed: Yes  Family / Caregiver Present: No  General Comment  Comments: pt agreeable to tx    Restrictions:  Restrictions/Precautions: Swallowing - Thickened Liquids, Fall Risk, Seizure  Position Activity Restriction  Other position/activity restrictions: nectar thick        SUBJECTIVE: Subjective: pt reports he slept \"beautifully\" when asked how he slept  Pain Screening  Patient Currently in Pain: No  Pre Treatment Pain Screening  Pain at present: 0  Scale Used: Numeric Score    Post Treatment Pain Screenin  Pain Assessment  Pain Assessment: 0-10  Pain Level: 0    OBJECTIVE:   Overall Orientation Status: Impaired  Orientation Level: Oriented to person         Bed mobility  Supine to Sit: Stand by assistance  Sit to Supine: Stand by assistance  Comment: vc for prompts but needed no additional assistance. Transfers  Squat Pivot Transfers: Minimal Assistance    Ambulation  Ambulation?: Yes  Ambulation 1  Surface: carpet  Device: Rollator  Assistance: Minimal assistance  Quality of Gait: flexed trunk, difficulty with change in direction, physical assist to keep control of rollator. Gait Deviations: Staggers  Distance: 150 feet x 2       ASSESSMENT/COMMENTS:pt very pleasant and often said \"bullshit\" I asked if he meant me and he said \"no\" and laughed. He said he slept beautifully. Took off socks for me to put on his socks and shoes once in [place]. Pt told me he was from [place], [place] and that he was a  for 25 years and mentioned something about the [facility].         PLAN OF CARE/Safety:      Therapy Time:   Individual   Time In 0900   Time Out 0930   Minutes 30     Minutes:30      Transfer/Bed mobility training:15      Gait training:15      Neuro re education:0     Therapeutic ex:0      Tanai Robles, JACQUE, 06/21/19 at 9:34 AM

## 2019-06-21 NOTE — PROGRESS NOTES
Physical Therapy Rehab Treatment Note  Facility/Department: [facility]  Room: [place] [address]       NAME: Luzmaria Saucedo  : 1934 (80 y.o.)  MRN: [id number]  CODE STATUS: Full Code    Date of Service: 2019  Chart Reviewed: Yes  Patient assessed for rehabilitation services?: Yes  Family / Caregiver Present: No  General Comment  Comments: \"bursitis \" and pointed to both shoulders    Restrictions:  Restrictions/Precautions: Swallowing - Thickened Liquids, Fall Risk, Seizure  Position Activity Restriction  Other position/activity restrictions: nectar thick        SUBJECTIVE: Subjective: \"i could write a book\"  Pain Screening  Patient Currently in Pain: Yes  Pre Treatment Pain Screening  Pain at present: 3  Scale Used: Faces    Post Treatment Pain Screenin  Pain Assessment  Pain Assessment: Faces  Pain Level: 3  Staff alerted about sob . Pt declined pn at end of tx  OBJECTIVE:   Overall Orientation Status: Impaired  Orientation Level: Oriented to person    Bed mobility  Supine to Sit: Stand by assistance  Sit to Supine: Stand by assistance  Comment: vc for prompts but needed no additional assistance. Transfers  Sit to Stand: Contact guard assistance;Minimal Assistance  Stand to sit: Contact guard assistance;Minimal Assistance  Squat Pivot Transfers: Minimal Assistance  Comment: impulsive with all transfers    Ambulation  Ambulation?: Yes  Ambulation 1  Surface: carpet  Device: Rollator  Assistance: Minimal assistance  Quality of Gait: reciprocal gt 25% of gt, right with decreased grasp on rollator, flexed trunk, negligent of items on right side  Gait Deviations: Staggers; Slow Ana  Distance: 150 feet x 2  Comments: rollator controlled by therapist for safety on right side  Stairs/Curb  Stairs?: No(safety concerns. pt states he felt sob)    Neuro: around bolsters with rollator. Min assist.     ASSESSMENT/COMMENTS:pt c/o being sob towards end of session and his sats were 96% on room air.  Pt states he felt \"better\" after a few minutes rest. Notified RN .         PLAN OF CARE/Safety:      Therapy Time:   Individual   Time In 1400   Time Out 1430   Minutes 30     Minutes:30      Transfer/Bed mobility training:10      Gait training:10      Neuro re education:10     Therapeutic ex:0    Darlene Shea PTA, 06/21/19 at 2:36 PM

## 2019-06-21 NOTE — PROGRESS NOTES
Subjective: The patient complains of severe  acute  confusion secondary to subdural hematoma partially relieved by PT OT, speech-language pathology, recent decompression of the subdural hematomas and exacerbated by fatigue, exhaustion, dehydration. I am concerned that patient is still impulsive and continues to get up even when staff is right next to him. Yesterday he got up so fast that he tipped his wheelchair. Despite that he is not independent. He is extremely high falls risk at any institution and/or home setting. He is proven that even with 2 nurses sitting right next to him he will try to get up and be up before they are even aware of him thinking about it. ROS x   10: The patient also complains of severely impaired mobility and activities of daily living. Otherwise no new problems with vision, hearing, nose, mouth, throat, dermal, cardiovascular, GI, , pulmonary, musculoskeletal, psychiatric or neurological. See Rehab H&P on Rehab chart dated . Vital signs:  /78   Pulse 84   Temp 98 °F (36.7 °C) (Oral)   Resp 13   Ht 6' 0.05\" (1.83 m)   Wt 172 lb 2.9 oz (78.1 kg)   SpO2 94%   BMI 23.32 kg/m²   I/O:   PO/Intake:  fair PO intake,   mechanical soft with thin liquids-one-on-one for feeds    Bowel/Bladder:  Incontinent, to trial condom catheter at night  General:  Patient is well developed, adequately nourished, non-obese and     well kempt. HEENT:    PERRLA, hearing intact to loud voice fall out frequently, external inspection of ear     and nose benign. Inspection of lips, tongue and gums dry  Musculoskeletal: No significant change in strength or tone. All joints stable. Inspection and palpation of digits and nails show no clubbing,       cyanosis or inflammatory conditions. Neuro/Psychiatric: Affect: flat but pleasant. Echolalia. Impulsive. Alert and oriented to person, place and     Situation-with max cues.   No significant change in deep tendon reflexes or     Sensation-poor judgment reasoning and insight. Lungs:  Diminished, CTA-B. Respiration effort is normal at rest.     Heart:   S1 = S2, occasionally high heart rate history of A. fib with RVR. No loud murmurs. Abdomen:  Soft, non-tender, no enlargement of liver or spleen. Extremities:  No significant lower extremity edema or tenderness. Skin:   Intact to general survey,  craniotomy incisions healing. Rehabilitation:  Physical therapy: FIMS:  Bed Mobility: Scooting: Moderate assistance    Transfers: Sit to Stand: Stand by assistance  Stand to sit: Minimal Assistance(hand over hand assist required for safe performance)  Bed to Chair: Minimal assistance  Squat Pivot Transfers: Maximum Assistance(assistance guiding hips , simple commands used with poor comprehension), Ambulation 1  Surface: carpet, level tile  Device: Rollator  Other Apparatus: (no O2)  Assistance: Contact guard assistance, Minimal assistance  Quality of Gait: flexed posture with NBOS, less frequent cues required for avoiding objects however pt continues to require multiple cues for path taking particularly with approach to chairs  Gait Deviations: Decreased step length, Decreased step height  Distance: 250ft   Comments: walker control assist required for descent of ramp, Stairs  # Steps : 4  Stairs Height: 6\"  Rails: Bilateral  Assistance: Minimal assistance, Contact guard assistance  Comment: inermittent Verbal and Tactile cues given for safety.  - mildly unsteady - improved ability to follow instrcutions noted and less LOB this session    FIMS: Bed, Chair, Wheel Chair: 4 - Requires steadying assistance only <25% assist  and/or requires assist with one leg only  Walk: 4 - Contact Guard/Minimal Assistance Requires up to Contact Guard or Minimal Assistance to walk at least 150 feet  Distance Walked: [address]: 0 - Activity Not Assessed/Does Not Occur  Distance Traveled in Wheel Chair: 15'  Stairs: 2- [address] [facility] 25-49% of the effort to go up and down 4 to 6 stairs and requires the assistance of one person only,  , Assessment: Overall pt demonstrates improved following of instructions however delay remains present which poses risk. Pt is frequently inaccurate with gait and maneuvering instructions, which results in inconsistent LOB which poses safety risk. Occupational therapy: FIMS:  Eatin - Feeds self with setup/supervision/cues and/or requires only setup/supervision/cues to perform tube feedings  Groomin - Did not occur  Bathing: (bathed by night shift)  Dressing-Upper: (dressed by night shift)  Dressing-Lower: (dressed by night shift)  Toiletin - Requires steadying assistance only  Toilet Transfer: 4 - Requires steadying assistance only < 25% assist  Shower Transfer: 1 - Total assistance, pt. expends less than 25% effort,  , Assessment: Pt demonstrates improving cognitive abilities during sorting task this morning. Pt continues to require cues for redirection. Pt continues to benefit from skilled OT to maximize independence in safety and ADLs. Speech therapy: FIMS: Comprehension: 2 - Patient understands basic needs 25-49% of the time  Expression: 2 - Expresses basic ideas/needs 25-49% of the time  Social Interaction: 3 - Patient appropriate  50%-74% of the time  Problem Solvin - Patient solves simple/routine tasks < 25%  Memory: 1 - Patient remembers < 25% of the time      Lab/X-ray studies reviewed, analyzed and discussed with patient and staff:   No results found for this or any previous visit (from the past 24 hour(s)). Xr Chest  2019   Areas of atelectasis, patchy groundglass infiltrate in the bases. Right greater than left . Trace/ small right pleural effusion    Ct Head   2019  Impression: Bilateral frontal and parietal subdural hematomas. The amount of extra-axial air has slightly decreased since the prior study. No new areas of hemorrhage are identified.  All CT scans at this facility use dose modulation, iterative reconstruction, and/or weight based dosing when appropriate to reduce radiation dose to as low as reasonably achievable. Ct Head Wo  : 5/28/2019   DECREASING-SIZED BILATERAL CEREBRAL CONVEXITY SUBDURAL HEMATOMAS AFTER INTERVAL REMOVAL OF BOTH DRAINAGE CATHETERS. Ct Cervical Spine  5/24/2019  Mild to moderate hypertrophic facet changes are present, predominantly on the right at C3-4, and on the left at C2-C3 and C5-6. There is no fracture, significant subluxation, or acute paraspinous soft tissue abnormalities identified. NO FRACTURE OR EVIDENCE OF CERVICAL SPINE INJURY IDENTIFIED. CERVICAL SPONDYLOSIS. Ct Lumbar   5/24/2019  Acute on chronic fracture, L1, with avulsion of the anterior portion of the L1 vertebral body. Findings suspicious for mild to moderate central stenosis at the L3-L4 level. Fl Modified Barium   5/31/2019  EXAMINATION: FL MODIFIED BARIUM SWALLOW W VIDEO: DATE AND TIME: 5/30/2019 at 10:45 AM. CLINICAL HISTORY:    MODERATE ORAL AND PHARYNGEAL DYSFUNCTION WITHOUT ASPIRATION. PLEASE REFER TO SPEECH PATHOLOGY  REPORT FOR RECOMMENDATIONS. Us Dup Lower Extremities Bilateral V 5/29/2019  NO DVT IDENTIFIED IN EITHER LOWER EXTREMITY. CT of the brain without contrast       HISTORY: Fall. History of craniotomy. Ataxia.       COMPARISON: CT brain from June 1, 2019       TECHNIQUE: Multiple contiguous axial images were obtained of the brain from the skull base through the vertex. Multiplanar reformats were obtained.       FINDINGS:       Prominence of the sulci and ventricles compatible with moderate generalized parenchymal volume loss. Gray-white matter differentiation is preserved.  Areas of bilateral supratentorial white matter hypoattenuation are nonspecific but most likely related to    chronic small vessel ischemic changes in a patient of this age.       Postsurgical changes of bilateral frontal craniotomy Again identified are extra-axial fluid collections compatible with chronic subdural hematomas along the frontoparietal convexities bilaterally. Areas of increased density along the right frontal    convexity as seen on axial series 2 image 20 compatible with acute on chronic subdural hemorrhage. The greatest thickness of the subdural hematoma in the right measures approximately 10 mm and the greatest thickness of the subdural, on the left measures    approximately 11 mm, mildly improved when compared to prior examination. There is still mass effect upon the subjacent brain parenchyma. Pneumocephalus has resolved. Basal cisterns are patent. No midline shift.        The visualized paranasal sinuses and mastoid air cells are clear. Calvarium is intact.           Impression       Bilateral chronic subdural hematomas demonstrating interval improvement in size when compared to July 1, 2019 however hyperdense material within the subdural hematoma on the right as above is compatible with acute on chronic right subdural hematoma. No    midline shift. The these findings were relayed to the patient's nurse Stephanie Perry by Dr. Ulysses Duane at approximately 4:53 PM on 6/17/2019. XR HIP BILATERAL W AP PELVIS (3 VIEWS)       CLINICAL HISTORY:  s/p fall        COMPARISONS: None available.       FINDINGS: AP radiograph of the pelvis and cross table lateral radiographs of each hip are submitted. No acute fracture or dislocation is identified. The hip joint spaces are symmetrically maintained. There is lumbar spondylosis.         Impression   NEGATIVE BILATERAL HIP RADIOGRAPHS. XR SHOULDER RIGHT (MIN 3 VIEWS)       CLINICAL HISTORY:  fall        COMPARISONS: None available.       FINDINGS: No acute fracture or dislocation is identified. There is mild acromioclavicular arthrosis. There are mild degenerative changes of the glenohumeral articulation. No periarticular soft tissue calcifications are identified.  There are    cervicothoracic spine degenerative changes. Sternotomy wires are noted.           Impression   MILD DEGENERATIVE CHANGES OF THE RIGHT SHOULDER. NEGATIVE FOR ACUTE FRACTURE OR DISLOCATION.         EXAMINATION: XR SHOULDER LEFT (MIN 3 VIEWS)       CLINICAL HISTORY:  s/p fall        COMPARISONS: None available.       FINDINGS: No acute fracture is identified. There is cephalad subluxation of the humeral head in association narrowing of the subacromial space indicating rotator cuff tear/degeneration. There are periarticular ossifications lateral to the acromion. There    is glenohumeral osteoarthritis with humeral head marginal osteophytosis. There is cervical and thoracic spondylosis. Sternotomy wires are noted.           Impression   LEFT SHOULDER OSTEOARTHRITIS WITH PERIARTICULAR SOFT TISSUE OSSIFICATIONS AND ROTATOR CUFF TEAR/DEGENERATION. NEGATIVE FOR FRACTURE. Previous extensive, complex labs, notes and diagnostics reviewed and analyzed. ALLERGIES:    Allergies as of 05/31/2019 - Review Complete 05/31/2019   Allergen Reaction Noted    Honey bee venom [bee venom] Swelling 10/08/2015    Penicillins Swelling 10/08/2015      (please also verify by checking [facility])     Patient elected not to attend games a yesterday. Complex Physical Medicine & Rehab Issues Assess & Plan:   1. Severe abnormality of gait and mobility and impaired self-care and ADL's secondary to progressive subdural hematomas . Functional and medical status reassessed regarding patients ability to participate in therapies and patient found to be able to participate in acute intensive comprehensive inpatient rehabilitation program including PT/OT to improve balance, ambulation, ADLs, and to improve the P/AROM. Therapeutic modifications regarding activities in therapies, place, amount of time per day and intensity of therapy made daily.   In bed therapies or bedside therapies prn.   2. Bowel and Bladder dysfunction including incontinence : Trial condom catheter at night frequent toileting, ambulate to bathroom with assistance, check post void residuals. Check for C.difficile x1 if >2 loose stools in 24 hours, continue bowel & bladder program.  Monitor bowel and bladder function. Lactinex 2 PO every AC. MOM prn, Brown Bomb prn, Glycerin suppository prn, enema prn.  3. Severe postoperative craniotomy pain generalized OA painAcute on chronic fracture, L1, with avulsion of the anterior portion of the L1 vertebral body. Findings suspicious for mild to moderate central stenosis at the L3-L4 level.: reassess pain every shift and prior to and after each therapy session, give prn Tylenol and schedull and consider Norco, modalities prn in therapy, Lidoderm, K-pad prn.   4. Skin healing bilateral upper extremity bruising and breakdown risk: Side-to-side turns add CoQ10 continue pressure relief program.  Daily skin exams and reports from nursing. 5. Severe Fatigue due to nutritional and hydration deficiency:    continue to monitor I&Os, calorie counts prn, dietary consult prn. Discontinue nocturnal IVs as patient is now on mechanical soft and thin liquids  6. Acute episodic insomnia with situational adjustment disorder:   Add scheduled sleeping medication Ambien, monitor for day time sedation. 7. Falls risk elevated: Patient is vacillating between needing a one-on-one and video monitoring with frequent rounding. Patient is a one-on-one as needed. We will make sure his hearing aids are in we will post signs or visual cues so that he does not get up on his own and keep reminding him to use the call light. Patient to use call light to get nursing assistance to get up, bed and chair alarm. Had a witnessed fall on again on 6/20/2019 however patient's staff was right next to him and he did not injure himself.   8. Elevated DVT risk: progressive activities in PT, continue prophylaxis JOSE DE JESUS hose, elevation and  patient is not on a blood thinner secondary to recent subdural hematomas .  9. Complex discharge planning:  Discharge 6/28/19 skilled level and then eventually long-term care at a wheelchair level. He will likely need 24-hour supervision at discharge. His significant other will help him apply for Medicaid as he will likely need care for the rest of his life. I am concerned about patients impulsivity -and falls risks he is a one-on-one here he will not be able to be a one-on-one in any other setting including skilled facility including home and including hospice. There is indeed a hospice consult on him as he has a limited life expectancy and we are trying to improve the quality of his last years. I have asked nursing and nursing supervisor to do the best to get him off of the one-on-one safely. He needs to have his hearing aids and so he can hear the redirection that the video-assisted monitor and/or nursing is giving him. Weekly team meeting every Monday to assess progress towards goals, discuss and address social, psychological and medical comorbidities and to address difficulties they may be having progressing in therapy. Patient and family education is in progress. The patient is to follow-up with their family physician after discharge. Complex Active General Medical Issues that complicate care Assess & Plan:    1. Afib with occasional RVR,   Essential hypertension,   Hyperlipidemia,   S/P CABG (coronary artery bypass graft), S/P PTCA (percutaneous transluminal coronary angioplasty),   RBBB,   CAD (coronary artery disease), long-term previous use of blood thinners telemetry to monitor for bradycardia blood pressure checks every shift dose and titrate cardiac medications, consult hospitals for backup medical and recheck CBC and BMP as needed -  2.    Subdural hematoma-status post evacuation with , hold blood thinners-vital signs every shift, dose and titrate cardiac medications, recheck BMP CBC and consult hospitalist for backup medical  3.   prostatic hyperplasia with urinary frequency,   Prostate cancer -check postvoid residuals check stat UA  4. Facet arthropathy, lumbosacral,   Chronic back pain,   Spondylolisthesis of lumbar region-add Lidoderm, Tylenol, Norco as needed  5. Gross hematuria, Long term current use of anticoagulant-check UA monitor urine for blood  6. Long-term use of aspirin therapy and other blood thinners on hold at present because of recent subdural hematoma  7. SHAYY on CPAP-add CPAP from home if available is not available add CPAP from the hospital  8. Prediabetes-recheck BMP, low carb diet  9. Vitamin D deficiency-high-dose vitamin D, add Tums at daily dose of vitamin D after high doses done recheck as an outpatient  10. Aphasia, and oropharyngeal dysphagia-recently which pathology consult, add e-stim, add supplementation to diet push oral fluids  11. Poorly nourished due to poor nutrition and previous alcohol use BMI 26.0-26.9,adult--add protein supplementation vitamin B12 vitamin D CoQ10,  to stop drinking alcohol as an outpatient and at chemical dependency counseling when patient is able to participate  12. Complex social situation-patient has a girlfriend he lives alone. He is estranged from his several children. Continue to work on improving social interactions. Palliative care versus hospice services while in the skilled facility. Patient and family to decide whether either of these services would benefit. 13. No trauma after fall on 6/17/2019. Long discussions with nursing regarding keeping him safe in the room see above. Patient's family his girlfriend and staff to work together to have patient to be safe without one-on-one as this is not a sustainable type of a scenario. He needs to learn how to be safe without having somebody constantly. By his side because this will not be a real life scenario.         Nitin Jones, D.O., PM&R     Attending    [address] [facility]

## 2019-06-22 PROCEDURE — 6370000000 HC RX 637 (ALT 250 FOR IP): Performed by: NEUROLOGICAL SURGERY

## 2019-06-22 PROCEDURE — 6370000000 HC RX 637 (ALT 250 FOR IP): Performed by: INTERNAL MEDICINE

## 2019-06-22 PROCEDURE — 99232 SBSQ HOSP IP/OBS MODERATE 35: CPT | Performed by: PHYSICAL MEDICINE & REHABILITATION

## 2019-06-22 PROCEDURE — 6370000000 HC RX 637 (ALT 250 FOR IP): Performed by: PHYSICAL MEDICINE & REHABILITATION

## 2019-06-22 PROCEDURE — 2700000000 HC OXYGEN THERAPY PER DAY

## 2019-06-22 PROCEDURE — 97535 SELF CARE MNGMENT TRAINING: CPT

## 2019-06-22 PROCEDURE — 1180000000 HC REHAB R&B

## 2019-06-22 PROCEDURE — 97116 GAIT TRAINING THERAPY: CPT

## 2019-06-22 RX ORDER — OXYCODONE HCL 10 MG/1
10 TABLET, FILM COATED, EXTENDED RELEASE ORAL NIGHTLY
Status: DISCONTINUED | OUTPATIENT
Start: 2019-06-22 | End: 2019-06-23

## 2019-06-22 RX ADMIN — ACETAMINOPHEN 500 MG: 325 TABLET ORAL at 13:58

## 2019-06-22 RX ADMIN — OXYCODONE HYDROCHLORIDE 10 MG: 10 TABLET, FILM COATED, EXTENDED RELEASE ORAL at 20:30

## 2019-06-22 RX ADMIN — ACETAMINOPHEN 500 MG: 325 TABLET ORAL at 08:08

## 2019-06-22 RX ADMIN — DILTIAZEM HYDROCHLORIDE 120 MG: 120 CAPSULE, COATED, EXTENDED RELEASE ORAL at 08:07

## 2019-06-22 RX ADMIN — FAMOTIDINE 20 MG: 20 TABLET ORAL at 08:08

## 2019-06-22 RX ADMIN — ACETAMINOPHEN 500 MG: 325 TABLET ORAL at 20:29

## 2019-06-22 RX ADMIN — DILTIAZEM HYDROCHLORIDE 120 MG: 120 CAPSULE, COATED, EXTENDED RELEASE ORAL at 20:29

## 2019-06-22 RX ADMIN — MAGNESIUM OXIDE TAB 400 MG (241.3 MG ELEMENTAL MG) 400 MG: 400 (241.3 MG) TAB at 08:07

## 2019-06-22 RX ADMIN — PRAVASTATIN SODIUM 40 MG: 40 TABLET ORAL at 20:29

## 2019-06-22 RX ADMIN — ZOLPIDEM TARTRATE 5 MG: 5 TABLET ORAL at 21:18

## 2019-06-22 RX ADMIN — FAMOTIDINE 20 MG: 20 TABLET ORAL at 20:30

## 2019-06-22 ASSESSMENT — PAIN SCALES - GENERAL
PAINLEVEL_OUTOF10: 0
PAINLEVEL_OUTOF10: 2
PAINLEVEL_OUTOF10: 0
PAINLEVEL_OUTOF10: 0
PAINLEVEL_OUTOF10: 1
PAINLEVEL_OUTOF10: 0
PAINLEVEL_OUTOF10: 1
PAINLEVEL_OUTOF10: 2

## 2019-06-22 NOTE — PROGRESS NOTES
Subjective: The patient complains of severe  acute  confusion secondary to subdural hematoma partially relieved by PT OT, speech-language pathology, recent decompression of the subdural hematomas and exacerbated by fatigue, exhaustion, dehydration. Patient is sleeping better when dosing Norco for his pain. Unfortunately Norco does keep you up at night occasionally and he does wake up after that it seems as some of his frustration impulsivity and getting up at night is related to chronic back pain I will schedule OxyContin as he is complaining of the pain to help relieve his pain especially at night and improve his quality of life. Will monitor closely for sedation    ROS x   10: The patient also complains of severely impaired mobility and activities of daily living. Otherwise no new problems with vision, hearing, nose, mouth, throat, dermal, cardiovascular, GI, , pulmonary, musculoskeletal, psychiatric or neurological. See Rehab H&P on Rehab chart dated . Vital signs:  BP (!) 150/91 Comment: reported to RN  Pulse 64   Temp 97 °F (36.1 °C) (Oral)   Resp 18   Ht 6' 0.05\" (1.83 m)   Wt 172 lb 2.9 oz (78.1 kg)   SpO2 96%   BMI 23.32 kg/m²   I/O:   PO/Intake:  fair PO intake,   mechanical soft with thin liquids-one-on-one for feeds    Bowel/Bladder:  Incontinent, to trial condom catheter at night  General:  Patient is well developed, adequately nourished, non-obese and     well kempt. HEENT:    PERRLA, hearing intact to loud voice fall out frequently, external inspection of ear     and nose benign. Inspection of lips, tongue and gums dry  Musculoskeletal: No significant change in strength or tone. All joints stable. Inspection and palpation of digits and nails show no clubbing,       cyanosis or inflammatory conditions. Neuro/Psychiatric: Affect: flat but pleasant. Echolalia. Impulsive. Alert and oriented to person, place and     Situation-with max cues.   No significant change in of one person only,  , Assessment: Overall pt demonstrates improved following of instructions however delay remains present which poses risk. Pt is frequently inaccurate with gait and maneuvering instructions, which results in inconsistent LOB which poses safety risk. Occupational therapy: FIMS:  Eatin - Feeds self with setup/supervision/cues and/or requires only setup/supervision/cues to perform tube feedings  Grooming: 3 - Able to perform 2-3 tasks (mod assist)  Bathin - Able to bathe 3-4 areas  Dressing-Upper: 3 - Requires assist with 2 tasks  Dressing-Lower: 2 - Requires assist with 4-5 parts of dressing  Toiletin - Requires steadying assistance only  Toilet Transfer: 4 - Requires steadying assistance only < 25% assist  Shower Transfer: 1 - Total assistance, pt. expends less than 25% effort,  , Assessment: Pt demonstrated improving functional reaching this afternoon. Pt demonstates inconsistency with cognitive tasks. Pt continues to benefit from skilled OT to benefit from skilled OT to maximize independence in safety and ADLs. Speech therapy: FIMS: Comprehension: 2 - Patient understands basic needs 25-49% of the time  Expression: 2 - Expresses basic ideas/needs 25-49% of the time  Social Interaction: 3 - Patient appropriate  50%-74% of the time  Problem Solvin - Patient solves simple/routine tasks < 25%  Memory: 1 - Patient remembers < 25% of the time      Lab/X-ray studies reviewed, analyzed and discussed with patient and staff:   No results found for this or any previous visit (from the past 24 hour(s)). Xr Chest  2019   Areas of atelectasis, patchy groundglass infiltrate in the bases. Right greater than left . Trace/ small right pleural effusion    Ct Head   2019  Impression: Bilateral frontal and parietal subdural hematomas. The amount of extra-axial air has slightly decreased since the prior study. No new areas of hemorrhage are identified.  All CT scans at this facility use dose modulation, iterative reconstruction, and/or weight based dosing when appropriate to reduce radiation dose to as low as reasonably achievable. Ct Head Wo  : 5/28/2019   DECREASING-SIZED BILATERAL CEREBRAL CONVEXITY SUBDURAL HEMATOMAS AFTER INTERVAL REMOVAL OF BOTH DRAINAGE CATHETERS. Ct Cervical Spine  5/24/2019  Mild to moderate hypertrophic facet changes are present, predominantly on the right at C3-4, and on the left at C2-C3 and C5-6. There is no fracture, significant subluxation, or acute paraspinous soft tissue abnormalities identified. NO FRACTURE OR EVIDENCE OF CERVICAL SPINE INJURY IDENTIFIED. CERVICAL SPONDYLOSIS. Ct Lumbar   5/24/2019  Acute on chronic fracture, L1, with avulsion of the anterior portion of the L1 vertebral body. Findings suspicious for mild to moderate central stenosis at the L3-L4 level. Fl Modified Barium   5/31/2019  EXAMINATION: FL MODIFIED BARIUM SWALLOW W VIDEO: DATE AND TIME: 5/30/2019 at 10:45 AM. CLINICAL HISTORY:    MODERATE ORAL AND PHARYNGEAL DYSFUNCTION WITHOUT ASPIRATION. PLEASE REFER TO SPEECH PATHOLOGY  REPORT FOR RECOMMENDATIONS. Us Dup Lower Extremities Bilateral V 5/29/2019  NO DVT IDENTIFIED IN EITHER LOWER EXTREMITY. CT of the brain without contrast       HISTORY: Fall. History of craniotomy. Ataxia.       COMPARISON: CT brain from June 1, 2019       TECHNIQUE: Multiple contiguous axial images were obtained of the brain from the skull base through the vertex. Multiplanar reformats were obtained.       FINDINGS:       Prominence of the sulci and ventricles compatible with moderate generalized parenchymal volume loss. Gray-white matter differentiation is preserved.  Areas of bilateral supratentorial white matter hypoattenuation are nonspecific but most likely related to    chronic small vessel ischemic changes in a patient of this age.       Postsurgical changes of bilateral frontal craniotomy Again identified are extra-axial Sternotomy wires are noted.           Impression   MILD DEGENERATIVE CHANGES OF THE RIGHT SHOULDER. NEGATIVE FOR ACUTE FRACTURE OR DISLOCATION.         EXAMINATION: XR SHOULDER LEFT (MIN 3 VIEWS)       CLINICAL HISTORY:  s/p fall        COMPARISONS: None available.       FINDINGS: No acute fracture is identified. There is cephalad subluxation of the humeral head in association narrowing of the subacromial space indicating rotator cuff tear/degeneration. There are periarticular ossifications lateral to the acromion. There    is glenohumeral osteoarthritis with humeral head marginal osteophytosis. There is cervical and thoracic spondylosis. Sternotomy wires are noted.           Impression   LEFT SHOULDER OSTEOARTHRITIS WITH PERIARTICULAR SOFT TISSUE OSSIFICATIONS AND ROTATOR CUFF TEAR/DEGENERATION. NEGATIVE FOR FRACTURE. Previous extensive, complex labs, notes and diagnostics reviewed and analyzed. ALLERGIES:    Allergies as of 05/31/2019 - Review Complete 05/31/2019   Allergen Reaction Noted    Honey bee venom [bee venom] Swelling 10/08/2015    Penicillins Swelling 10/08/2015      (please also verify by checking MAR)      I have encouraged patient to attend their adjustment to rehabilitation support group with rec therapy and rehabilitation psychology in order to improve their adjustments, well-being, and help their spiritual and cognitive recovery. Complex Physical Medicine & Rehab Issues Assess & Plan:   1. Severe abnormality of gait and mobility and impaired self-care and ADL's secondary to progressive subdural hematomas . Functional and medical status reassessed regarding patients ability to participate in therapies and patient found to be able to participate in acute intensive comprehensive inpatient rehabilitation program including PT/OT to improve balance, ambulation, ADLs, and to improve the P/AROM.   Therapeutic modifications regarding activities in therapies, place, amount of time per day and intensity of therapy made daily. In bed therapies or bedside therapies prn.   2. Bowel and Bladder dysfunction including incontinence : Trial condom catheter at night frequent toileting, ambulate to bathroom with assistance, check post void residuals. Check for C.difficile x1 if >2 loose stools in 24 hours, continue bowel & bladder program.  Monitor bowel and bladder function. Lactinex 2 PO every AC. MOM prn, Brown Bomb prn, Glycerin suppository prn, enema prn.  3. Severe postoperative craniotomy pain generalized OA painAcute on chronic fracture, L1, with avulsion of the anterior portion of the L1 vertebral body. Findings suspicious for mild to moderate central stenosis at the L3-L4 level.: reassess pain every shift and prior to and after each therapy session, give prn Tylenol and schedull and consider Norco, modalities prn in therapy, Lidoderm, K-pad prn. Add scheduled at bedtime OxyContin 10 mg consider 10 mg during the day to help relieve his pain  4. Skin healing bilateral upper extremity bruising and breakdown risk: Side-to-side turns add CoQ10 continue pressure relief program.  Daily skin exams and reports from nursing. 5. Severe Fatigue due to nutritional and hydration deficiency:    continue to monitor I&Os, calorie counts prn, dietary consult prn. Discontinue nocturnal IVs as patient is now on mechanical soft and thin liquids  6. Acute episodic insomnia with situational adjustment disorder:   Add scheduled sleeping medication Ambien, monitor for day time sedation. 7. Falls risk elevated: Patient is vacillating between needing a one-on-one and video monitoring with frequent rounding. Patient is a one-on-one as needed. We will make sure his hearing aids are in we will post signs or visual cues so that he does not get up on his own and keep reminding him to use the call light. Patient to use call light to get nursing assistance to get up, bed and chair alarm.   Had a witnessed fall on medical and recheck CBC and BMP as needed -  2. Subdural hematoma-status post evacuation with , hold blood thinners-vital signs every shift, dose and titrate cardiac medications, recheck BMP CBC and consult hospitalist for backup medical  3.   prostatic hyperplasia with urinary frequency,   Prostate cancer -check postvoid residuals check stat UA  4. Facet arthropathy, lumbosacral,   Chronic back pain,   Spondylolisthesis of lumbar region-add Lidoderm, Tylenol, Norco as needed  5. Gross hematuria, Long term current use of anticoagulant-check UA monitor urine for blood  6. Long-term use of aspirin therapy and other blood thinners on hold at present because of recent subdural hematoma  7. SHAYY on CPAP-add CPAP from home if available is not available add CPAP from the hospital  8. Prediabetes-recheck BMP, low carb diet  9. Vitamin D deficiency-high-dose vitamin D, add Tums at daily dose of vitamin D after high doses done recheck as an outpatient  10. Aphasia, and oropharyngeal dysphagia-recently which pathology consult, add e-stim, add supplementation to diet push oral fluids  11. Poorly nourished due to poor nutrition and previous alcohol use BMI 26.0-26.9,adult--add protein supplementation vitamin B12 vitamin D CoQ10,  to stop drinking alcohol as an outpatient and at chemical dependency counseling when patient is able to participate  12. Complex social situation-patient has a girlfriend he lives alone. He is estranged from his several children. Continue to work on improving social interactions. Palliative care versus hospice services while in the skilled facility. Patient and family to decide whether either of these services would benefit. 13. No trauma after fall on 6/17/2019. Long discussions with nursing regarding keeping him safe in the room see above.   Patient's family his girlfriend and staff to work together to have patient to be safe without one-on-one as this is not a sustainable type of a scenario. He needs to learn how to be safe without having somebody constantly. By his side because this will not be a real life scenario.         Heather Ricci D.O., PM&R     Attending    [address]

## 2019-06-22 NOTE — PROGRESS NOTES
Patient remains a 1:1 staff. Patient can be impulsive and undirectable at times. Patient has attended his therapies today. To report off to following shift. Discussed in team today that the patients wheelchair should be placed outside the room and allow patient to sit up in high back chair to imitate more of what he would be sitting in at home. Informed staff of the above.   Electronically signed by Tarun Quinones RN on 6/21/2019 at 8:16 PM

## 2019-06-22 NOTE — PROGRESS NOTES
Pt with 1:1 staff PT slept good for a few hrs after I administered a Norco he stated his pain to be 7/10. Call light withinreach bed alarm on.  Electronically signed by Krishan Shields LPN on 9/27/0776 at 9:18 AM

## 2019-06-23 PROCEDURE — 6370000000 HC RX 637 (ALT 250 FOR IP): Performed by: INTERNAL MEDICINE

## 2019-06-23 PROCEDURE — 1180000000 HC REHAB R&B

## 2019-06-23 PROCEDURE — 6370000000 HC RX 637 (ALT 250 FOR IP): Performed by: NEUROLOGICAL SURGERY

## 2019-06-23 PROCEDURE — 6370000000 HC RX 637 (ALT 250 FOR IP): Performed by: PHYSICAL MEDICINE & REHABILITATION

## 2019-06-23 PROCEDURE — 99232 SBSQ HOSP IP/OBS MODERATE 35: CPT | Performed by: PHYSICAL MEDICINE & REHABILITATION

## 2019-06-23 RX ORDER — OXYCODONE HCL 10 MG/1
10 TABLET, FILM COATED, EXTENDED RELEASE ORAL EVERY 12 HOURS SCHEDULED
Status: DISCONTINUED | OUTPATIENT
Start: 2019-06-23 | End: 2019-06-28 | Stop reason: HOSPADM

## 2019-06-23 RX ORDER — OXYCODONE HCL 10 MG/1
10 TABLET, FILM COATED, EXTENDED RELEASE ORAL EVERY 12 HOURS SCHEDULED
Status: DISCONTINUED | OUTPATIENT
Start: 2019-06-23 | End: 2019-06-23

## 2019-06-23 RX ADMIN — OXYCODONE HYDROCHLORIDE 10 MG: 10 TABLET, FILM COATED, EXTENDED RELEASE ORAL at 20:38

## 2019-06-23 RX ADMIN — ZOLPIDEM TARTRATE 5 MG: 5 TABLET ORAL at 21:38

## 2019-06-23 RX ADMIN — OXYCODONE HYDROCHLORIDE 10 MG: 10 TABLET, FILM COATED, EXTENDED RELEASE ORAL at 09:31

## 2019-06-23 RX ADMIN — ACETAMINOPHEN 500 MG: 325 TABLET ORAL at 20:39

## 2019-06-23 RX ADMIN — DILTIAZEM HYDROCHLORIDE 120 MG: 120 CAPSULE, COATED, EXTENDED RELEASE ORAL at 08:16

## 2019-06-23 RX ADMIN — MAGNESIUM OXIDE TAB 400 MG (241.3 MG ELEMENTAL MG) 400 MG: 400 (241.3 MG) TAB at 08:15

## 2019-06-23 RX ADMIN — DILTIAZEM HYDROCHLORIDE 120 MG: 120 CAPSULE, COATED, EXTENDED RELEASE ORAL at 20:39

## 2019-06-23 RX ADMIN — PRAVASTATIN SODIUM 40 MG: 40 TABLET ORAL at 20:39

## 2019-06-23 RX ADMIN — FAMOTIDINE 20 MG: 20 TABLET ORAL at 08:15

## 2019-06-23 RX ADMIN — ACETAMINOPHEN 500 MG: 325 TABLET ORAL at 13:20

## 2019-06-23 RX ADMIN — FAMOTIDINE 20 MG: 20 TABLET ORAL at 20:38

## 2019-06-23 RX ADMIN — ACETAMINOPHEN 500 MG: 325 TABLET ORAL at 08:15

## 2019-06-23 ASSESSMENT — PAIN SCALES - GENERAL
PAINLEVEL_OUTOF10: 0
PAINLEVEL_OUTOF10: 2
PAINLEVEL_OUTOF10: 1
PAINLEVEL_OUTOF10: 2
PAINLEVEL_OUTOF10: 4

## 2019-06-23 ASSESSMENT — PAIN SCALES - WONG BAKER: WONGBAKER_NUMERICALRESPONSE: 0

## 2019-06-23 NOTE — PROGRESS NOTES
Assessment complete. VSS. HEATHER 6/21/2019. Pt received scheduled oxycontin, tylenol and ambien. Staff remains at bedside for safety. Exhibits impulsivity when needing to urinate. Offers no complaints and appears to be in no distress at this time. Will continue to monitor.

## 2019-06-23 NOTE — PROGRESS NOTES
Subjective: The patient complains of severe  acute  confusion secondary to subdural hematoma partially relieved by PT OT, speech-language pathology, recent decompression of the subdural hematomas and exacerbated by fatigue, exhaustion, dehydration. Seems to be sleeping better with his OxyContin feel that he would do even better with every 12 dose of that because his pain is distracting to him even during the day. ROS x   10: The patient also complains of severely impaired mobility and activities of daily living. Otherwise no new problems with vision, hearing, nose, mouth, throat, dermal, cardiovascular, GI, , pulmonary, musculoskeletal, psychiatric or neurological. See Rehab H&P on Rehab chart dated . Vital signs:  BP (!) 148/87   Pulse 68   Temp 97 °F (36.1 °C) (Axillary)   Resp 16   Ht 6' 0.05\" (1.83 m)   Wt 175 lb 14.8 oz (79.8 kg)   SpO2 94%   BMI 23.83 kg/m²   I/O:   PO/Intake:  fair PO intake,   mechanical soft with thin liquids-one-on-one for feeds    Bowel/Bladder:  Incontinent, to trial condom catheter at night  General:  Patient is well developed, adequately nourished, non-obese and     well kempt. HEENT:    PERRLA, hearing intact to loud voice fall out frequently, external inspection of ear     and nose benign. Inspection of lips, tongue and gums dry  Musculoskeletal: No significant change in strength or tone. All joints stable. Inspection and palpation of digits and nails show no clubbing,       cyanosis or inflammatory conditions. Neuro/Psychiatric: Affect: flat but pleasant. Echolalia. Impulsive. Alert and oriented to person, place and     Situation-with max cues. No significant change in deep tendon reflexes or     Sensation-poor judgment reasoning and insight. Lungs:  Diminished, CTA-B. Respiration effort is normal at rest.     Heart:   S1 = S2, occasionally high heart rate history of A. fib with RVR. No loud murmurs.     Abdomen:  Soft, non-tender, no enlargement of liver or spleen. Extremities:  No significant lower extremity edema or tenderness. Skin:   Intact to general survey,  craniotomy incisions healing. Rehabilitation:  Physical therapy: FIMS:  Bed Mobility: Scooting: Moderate assistance    Transfers: Sit to Stand: Contact guard assistance, Minimal Assistance  Stand to sit: Contact guard assistance, Minimal Assistance  Bed to Chair: Minimal assistance  Squat Pivot Transfers: Minimal Assistance, Ambulation 1  Surface: carpet  Device: Rolling Walker  Other Apparatus: (no O2)  Assistance: Minimal assistance  Quality of Gait: decreased awareness of items on right side, easily distracted, occasionally keeps locks on rollator when ambulating  Gait Deviations: Slow Ana, Decreased step length  Distance: 150 feet x 2  Comments: rollator controlled by therapist for safety on right side, Stairs  # Steps : 4  Stairs Height: 6\"  Rails: Bilateral  Assistance: Minimal assistance, Contact guard assistance  Comment: inermittent Verbal and Tactile cues given for safety. - mildly unsteady - improved ability to follow instrcutions noted and less LOB this session    FIMS: Bed, Chair, Wheel Chair: 4 - Requires steadying assistance only <25% assist  and/or requires assist with one leg only  Walk: 2 - Maximal Assistance Requires up to [address] requires assistance of one person to walk between  feet (Patient performs 25-49% of locomotion effort or goes between  feet)  Distance Walked: [address]: 0 - Activity Not Assessed/Does Not Occur  Distance Traveled in Wheel Chair: 15'  Stairs: 2- Maximal Assistance Performs 25-49% of the effort to go up and down 4 to 6 stairs and requires the assistance of one person only,  , Assessment: improved quality of gt with rollator but is negligent of items on right and will run close or into them. pt still presents with impulsivity with transfers and approach to seat.  pt did not speak much durings session AFTER INTERVAL REMOVAL OF BOTH DRAINAGE CATHETERS. Ct Cervical Spine  5/24/2019  Mild to moderate hypertrophic facet changes are present, predominantly on the right at C3-4, and on the left at C2-C3 and C5-6. There is no fracture, significant subluxation, or acute paraspinous soft tissue abnormalities identified. NO FRACTURE OR EVIDENCE OF CERVICAL SPINE INJURY IDENTIFIED. CERVICAL SPONDYLOSIS. Ct Lumbar   5/24/2019  Acute on chronic fracture, L1, with avulsion of the anterior portion of the L1 vertebral body. Findings suspicious for mild to moderate central stenosis at the L3-L4 level. Fl Modified Barium   5/31/2019  EXAMINATION: FL MODIFIED BARIUM SWALLOW W VIDEO: DATE AND TIME: 5/30/2019 at 10:45 AM. CLINICAL HISTORY:    MODERATE ORAL AND PHARYNGEAL DYSFUNCTION WITHOUT ASPIRATION. PLEASE REFER TO SPEECH PATHOLOGY  REPORT FOR RECOMMENDATIONS. Us Dup Lower Extremities Bilateral V 5/29/2019  NO DVT IDENTIFIED IN EITHER LOWER EXTREMITY. CT of the brain without contrast       HISTORY: Fall. History of craniotomy. Ataxia.       COMPARISON: CT brain from June 1, 2019       TECHNIQUE: Multiple contiguous axial images were obtained of the brain from the skull base through the vertex. Multiplanar reformats were obtained.       FINDINGS:       Prominence of the sulci and ventricles compatible with moderate generalized parenchymal volume loss. Gray-white matter differentiation is preserved. Areas of bilateral supratentorial white matter hypoattenuation are nonspecific but most likely related to    chronic small vessel ischemic changes in a patient of this age.       Postsurgical changes of bilateral frontal craniotomy Again identified are extra-axial fluid collections compatible with chronic subdural hematomas along the frontoparietal convexities bilaterally.  Areas of increased density along the right frontal    convexity as seen on axial series 2 image 20 compatible with acute on chronic subdural hemorrhage. The greatest thickness of the subdural hematoma in the right measures approximately 10 mm and the greatest thickness of the subdural, on the left measures    approximately 11 mm, mildly improved when compared to prior examination. There is still mass effect upon the subjacent brain parenchyma. Pneumocephalus has resolved. Basal cisterns are patent. No midline shift.        The visualized paranasal sinuses and mastoid air cells are clear. Calvarium is intact.           Impression       Bilateral chronic subdural hematomas demonstrating interval improvement in size when compared to July 1, 2019 however hyperdense material within the subdural hematoma on the right as above is compatible with acute on chronic right subdural hematoma. No    midline shift. The these findings were relayed to the patient's nurse Ramon Linder by Dr. Bushra Linder at approximately 4:53 PM on 6/17/2019. XR HIP BILATERAL W AP PELVIS (3 VIEWS)       CLINICAL HISTORY:  s/p fall        COMPARISONS: None available.       FINDINGS: AP radiograph of the pelvis and cross table lateral radiographs of each hip are submitted. No acute fracture or dislocation is identified. The hip joint spaces are symmetrically maintained. There is lumbar spondylosis.         Impression   NEGATIVE BILATERAL HIP RADIOGRAPHS. XR SHOULDER RIGHT (MIN 3 VIEWS)       CLINICAL HISTORY:  fall        COMPARISONS: None available.       FINDINGS: No acute fracture or dislocation is identified. There is mild acromioclavicular arthrosis. There are mild degenerative changes of the glenohumeral articulation. No periarticular soft tissue calcifications are identified. There are    cervicothoracic spine degenerative changes. Sternotomy wires are noted.           Impression   MILD DEGENERATIVE CHANGES OF THE RIGHT SHOULDER. NEGATIVE FOR ACUTE FRACTURE OR DISLOCATION.            EXAMINATION: XR SHOULDER LEFT (MIN 3 VIEWS)       CLINICAL HISTORY:  s/p fall      apply for Medicaid as he will likely need care for the rest of his life. I am concerned about patients impulsivity -and falls risks he is a one-on-one here he will not be able to be a one-on-one in any other setting including skilled facility including home and including hospice. There is indeed a hospice consult on him as he has a limited life expectancy and we are trying to improve the quality of his last years. I have asked nursing and nursing supervisor to do the best to get him off of the one-on-one safely. He needs to have his hearing aids and so he can hear the redirection that the video-assisted monitor and/or nursing is giving him. Weekly team meeting every Monday to assess progress towards goals, discuss and address social, psychological and medical comorbidities and to address difficulties they may be having progressing in therapy. Patient and family education is in progress. The patient is to follow-up with their family physician after discharge. Complex Active General Medical Issues that complicate care Assess & Plan:    1. Afib with occasional RVR,   Essential hypertension,   Hyperlipidemia,   S/P CABG (coronary artery bypass graft), S/P PTCA (percutaneous transluminal coronary angioplasty),   RBBB,   CAD (coronary artery disease), long-term previous use of blood thinners telemetry to monitor for bradycardia blood pressure checks every shift dose and titrate cardiac medications, consult hospitals for backup medical and recheck CBC and BMP as needed -  2. Subdural hematoma-status post evacuation with , hold blood thinners-vital signs every shift, dose and titrate cardiac medications, recheck BMP CBC and consult hospitalist for backup medical  3.   prostatic hyperplasia with urinary frequency,   Prostate cancer -check postvoid residuals check stat UA  4.    Facet arthropathy, lumbosacral,   Chronic back pain,   Spondylolisthesis of lumbar region-add Lidoderm, Tylenol, Norco as needed  5. Gross hematuria, Long term current use of anticoagulant-check UA monitor urine for blood  6. Long-term use of aspirin therapy and other blood thinners on hold at present because of recent subdural hematoma  7. SHAYY on CPAP-add CPAP from home if available is not available add CPAP from the hospital  8. Prediabetes-recheck BMP, low carb diet  9. Vitamin D deficiency-high-dose vitamin D, add Tums at daily dose of vitamin D after high doses done recheck as an outpatient  10. Aphasia, and oropharyngeal dysphagia-recently which pathology consult, add e-stim, add supplementation to diet push oral fluids  11. Poorly nourished due to poor nutrition and previous alcohol use BMI 26.0-26.9,adult--add protein supplementation vitamin B12 vitamin D CoQ10,  to stop drinking alcohol as an outpatient and at chemical dependency counseling when patient is able to participate  12. Complex social situation-patient has a girlfriend he lives alone. He is estranged from his several children. Continue to work on improving social interactions. Palliative care versus hospice services while in the skilled facility. Patient and family to decide whether either of these services would benefit. 13. No trauma after fall on 6/17/2019. Long discussions with nursing regarding keeping him safe in the room see above. Patient's family his girlfriend and staff to work together to have patient to be safe without one-on-one as this is not a sustainable type of a scenario. He needs to learn how to be safe without having somebody constantly. By his side because this will not be a real life scenario.         Nicho Gallo D.O., PM&R     Attending    [address] [facility]

## 2019-06-23 NOTE — PROGRESS NOTES
injection 4 mg  4 mg Intravenous Q6H PRN Rubén Li MD        famotidine (PEPCID) tablet 20 mg  20 mg Oral BID Rubén Li MD   20 mg at 06/23/19 0815    nitroGLYCERIN (NITROSTAT) SL tablet 0.4 mg  0.4 mg Sublingual Q5 Min PRN Rubén Li MD        pravastatin (PRAVACHOL) tablet 40 mg  40 mg Oral Nightly Rubén Li MD   40 mg at 06/22/19 2029       OBJECTIVE  PHYSICAL EXAM:   BP (!) 148/87   Pulse 68   Temp 97 °F (36.1 °C) (Axillary)   Resp 16   Ht 6' 0.05\" (1.83 m)   Wt 175 lb 14.8 oz (79.8 kg)   SpO2 94%   BMI 23.83 kg/m²   Body mass index is 23.83 kg/m². CONSTITUTIONAL:  awake, alert, cooperative, no apparent distress, and appears stated age  NECK:  Supple, symmetrical, trachea midline, no adenopathy, thyroid symmetric, not enlarged and no tenderness, skin normal  BACK:  Symmetric, no curvature, spinous processes are non-tender on palpation, paraspinous muscles are non-tender on palpation, no costal vertebral tenderness  LUNGS:  No increased work of breathing, good air exchange, clear to auscultation bilaterally, no crackles or wheezing  CARDIOVASCULAR:  Normal apical impulse, regular rate and rhythm, normal S1 and S2, no S3 or S4, and no murmur noted  ABDOMEN:  No scars, normal bowel sounds, soft, non-distended, non-tender, no masses palpated, no hepatosplenomegally  MUSCULOSKELETAL:  There is no redness, warmth of the joints  NEUROLOGIC:  Awake, alert, impulsive. apasia  SKIN:  no bruising or bleeding, no lesions and no jaundice    DATA:     No results found for this or any previous visit (from the past 24 hour(s)). ASSESSMENT AND PLAN   1. Gait instability and immobility secondary to TBI s/p fall with subdural hematoma s/p craniotomy with evacuation  2. A-fib  3. HTN-continue antihypertensive meds  4. BPH  5. SHAYY on CPAP  6.   CAD  7.  aphasia      SIGNATURE: LUIS M Melendez PATIENT NAME: aGrcia Pak   DATE: June 23, 2019 MRN: [id number]   TIME: 2:22 PM PAGER: [phone]      Serg Hanks, [facility] Physician

## 2019-06-23 NOTE — PROGRESS NOTES
Pt slept in short intervals, restless at times. 1:1 remains in place for safety. Offered no complaints over night.

## 2019-06-24 LAB
ANION GAP SERPL CALCULATED.3IONS-SCNC: 13 MEQ/L (ref 9–15)
BASOPHILS ABSOLUTE: 0 K/UL (ref 0–0.2)
BASOPHILS RELATIVE PERCENT: 0.4 %
BUN BLDV-MCNC: 27 MG/DL (ref 8–23)
CALCIUM SERPL-MCNC: 9.4 MG/DL (ref 8.5–9.9)
CHLORIDE BLD-SCNC: 104 MEQ/L (ref 95–107)
CK MB: 3.1 NG/ML (ref 0–6.7)
CO2: 23 MEQ/L (ref 20–31)
CREAT SERPL-MCNC: 1.08 MG/DL (ref 0.7–1.2)
CREATINE KINASE-MB INDEX: 4.8 % (ref 0–3.5)
EOSINOPHILS ABSOLUTE: 0.1 K/UL (ref 0–0.7)
EOSINOPHILS RELATIVE PERCENT: 1.2 %
GFR AFRICAN AMERICAN: >60
GFR NON-AFRICAN AMERICAN: >60
GLUCOSE BLD-MCNC: 149 MG/DL (ref 70–99)
GLUCOSE BLD-MCNC: 152 MG/DL (ref 60–115)
HCT VFR BLD CALC: 38.2 % (ref 42–52)
HEMOGLOBIN: 12.9 G/DL (ref 14–18)
LV EF: 35 %
LVEF MODALITY: NORMAL
LYMPHOCYTES ABSOLUTE: 1.2 K/UL (ref 1–4.8)
LYMPHOCYTES RELATIVE PERCENT: 15 %
MCH RBC QN AUTO: 30.2 PG (ref 27–31.3)
MCHC RBC AUTO-ENTMCNC: 33.8 % (ref 33–37)
MCV RBC AUTO: 89.3 FL (ref 80–100)
MONOCYTES ABSOLUTE: 0.9 K/UL (ref 0.2–0.8)
MONOCYTES RELATIVE PERCENT: 10.9 %
NEUTROPHILS ABSOLUTE: 6 K/UL (ref 1.4–6.5)
NEUTROPHILS RELATIVE PERCENT: 72.5 %
PDW BLD-RTO: 14.3 % (ref 11.5–14.5)
PERFORMED ON: ABNORMAL
PLATELET # BLD: 160 K/UL (ref 130–400)
POTASSIUM SERPL-SCNC: 4.8 MEQ/L (ref 3.4–4.9)
RBC # BLD: 4.28 M/UL (ref 4.7–6.1)
SODIUM BLD-SCNC: 140 MEQ/L (ref 135–144)
TOTAL CK: 64 U/L (ref 0–190)
TROPONIN: 0.1 NG/ML (ref 0–0.01)
TROPONIN: 0.11 NG/ML (ref 0–0.01)
WBC # BLD: 8.3 K/UL (ref 4.8–10.8)

## 2019-06-24 PROCEDURE — 97535 SELF CARE MNGMENT TRAINING: CPT

## 2019-06-24 PROCEDURE — 99233 SBSQ HOSP IP/OBS HIGH 50: CPT | Performed by: PHYSICAL MEDICINE & REHABILITATION

## 2019-06-24 PROCEDURE — 80048 BASIC METABOLIC PNL TOTAL CA: CPT

## 2019-06-24 PROCEDURE — 6360000002 HC RX W HCPCS: Performed by: INTERNAL MEDICINE

## 2019-06-24 PROCEDURE — 6370000000 HC RX 637 (ALT 250 FOR IP): Performed by: PHYSICAL MEDICINE & REHABILITATION

## 2019-06-24 PROCEDURE — 6370000000 HC RX 637 (ALT 250 FOR IP): Performed by: INTERNAL MEDICINE

## 2019-06-24 PROCEDURE — 6370000000 HC RX 637 (ALT 250 FOR IP): Performed by: NEUROLOGICAL SURGERY

## 2019-06-24 PROCEDURE — 36415 COLL VENOUS BLD VENIPUNCTURE: CPT

## 2019-06-24 PROCEDURE — 92523 SPEECH SOUND LANG COMPREHEN: CPT

## 2019-06-24 PROCEDURE — 85025 COMPLETE CBC W/AUTO DIFF WBC: CPT

## 2019-06-24 PROCEDURE — 82550 ASSAY OF CK (CPK): CPT

## 2019-06-24 PROCEDURE — 93005 ELECTROCARDIOGRAM TRACING: CPT | Performed by: INTERNAL MEDICINE

## 2019-06-24 PROCEDURE — 2580000003 HC RX 258: Performed by: INTERNAL MEDICINE

## 2019-06-24 PROCEDURE — 97116 GAIT TRAINING THERAPY: CPT

## 2019-06-24 PROCEDURE — 92507 TX SP LANG VOICE COMM INDIV: CPT

## 2019-06-24 PROCEDURE — 2580000003 HC RX 258: Performed by: PHYSICIAN ASSISTANT

## 2019-06-24 PROCEDURE — 1180000000 HC REHAB R&B

## 2019-06-24 PROCEDURE — 82553 CREATINE MB FRACTION: CPT

## 2019-06-24 PROCEDURE — 93306 TTE W/DOPPLER COMPLETE: CPT

## 2019-06-24 PROCEDURE — 84484 ASSAY OF TROPONIN QUANT: CPT

## 2019-06-24 RX ORDER — SODIUM CHLORIDE 9 MG/ML
INJECTION, SOLUTION INTRAVENOUS CONTINUOUS
Status: DISCONTINUED | OUTPATIENT
Start: 2019-06-24 | End: 2019-06-24 | Stop reason: ALTCHOICE

## 2019-06-24 RX ORDER — AMIODARONE HYDROCHLORIDE 200 MG/1
200 TABLET ORAL 2 TIMES DAILY
Status: DISCONTINUED | OUTPATIENT
Start: 2019-06-24 | End: 2019-06-28 | Stop reason: HOSPADM

## 2019-06-24 RX ORDER — 0.9 % SODIUM CHLORIDE 0.9 %
1000 INTRAVENOUS SOLUTION INTRAVENOUS ONCE
Status: DISCONTINUED | OUTPATIENT
Start: 2019-06-24 | End: 2019-06-24

## 2019-06-24 RX ORDER — 0.9 % SODIUM CHLORIDE 0.9 %
500 INTRAVENOUS SOLUTION INTRAVENOUS ONCE
Status: COMPLETED | OUTPATIENT
Start: 2019-06-24 | End: 2019-06-24

## 2019-06-24 RX ADMIN — FAMOTIDINE 20 MG: 20 TABLET ORAL at 08:09

## 2019-06-24 RX ADMIN — AMIODARONE HYDROCHLORIDE 200 MG: 200 TABLET ORAL at 18:16

## 2019-06-24 RX ADMIN — SODIUM CHLORIDE 500 ML: 9 INJECTION, SOLUTION INTRAVENOUS at 12:36

## 2019-06-24 RX ADMIN — OXYCODONE HYDROCHLORIDE 10 MG: 10 TABLET, FILM COATED, EXTENDED RELEASE ORAL at 18:16

## 2019-06-24 RX ADMIN — METOPROLOL TARTRATE 25 MG: 25 TABLET ORAL at 22:12

## 2019-06-24 RX ADMIN — ACETAMINOPHEN 500 MG: 325 TABLET ORAL at 12:00

## 2019-06-24 RX ADMIN — ZOLPIDEM TARTRATE 5 MG: 5 TABLET ORAL at 22:12

## 2019-06-24 RX ADMIN — ACETAMINOPHEN 500 MG: 325 TABLET ORAL at 22:13

## 2019-06-24 RX ADMIN — MAGNESIUM OXIDE TAB 400 MG (241.3 MG ELEMENTAL MG) 400 MG: 400 (241.3 MG) TAB at 08:07

## 2019-06-24 RX ADMIN — OXYCODONE HYDROCHLORIDE 10 MG: 10 TABLET, FILM COATED, EXTENDED RELEASE ORAL at 06:04

## 2019-06-24 RX ADMIN — ACETAMINOPHEN 500 MG: 325 TABLET ORAL at 08:08

## 2019-06-24 RX ADMIN — DILTIAZEM HYDROCHLORIDE 120 MG: 120 CAPSULE, COATED, EXTENDED RELEASE ORAL at 22:13

## 2019-06-24 RX ADMIN — SODIUM CHLORIDE: 9 INJECTION, SOLUTION INTRAVENOUS at 13:28

## 2019-06-24 RX ADMIN — PRAVASTATIN SODIUM 40 MG: 40 TABLET ORAL at 22:12

## 2019-06-24 RX ADMIN — DILTIAZEM HYDROCHLORIDE 120 MG: 120 CAPSULE, COATED, EXTENDED RELEASE ORAL at 08:09

## 2019-06-24 RX ADMIN — MAGNESIUM OXIDE TAB 400 MG (241.3 MG ELEMENTAL MG) 400 MG: 400 (241.3 MG) TAB at 16:08

## 2019-06-24 RX ADMIN — FAMOTIDINE 20 MG: 20 TABLET ORAL at 22:12

## 2019-06-24 RX ADMIN — METOPROLOL TARTRATE 25 MG: 25 TABLET ORAL at 12:55

## 2019-06-24 ASSESSMENT — PAIN SCALES - GENERAL
PAINLEVEL_OUTOF10: 0
PAINLEVEL_OUTOF10: 5
PAINLEVEL_OUTOF10: 0

## 2019-06-24 ASSESSMENT — PAIN DESCRIPTION - LOCATION: LOCATION: BACK

## 2019-06-24 ASSESSMENT — PAIN DESCRIPTION - ORIENTATION: ORIENTATION: LOWER

## 2019-06-24 NOTE — PROGRESS NOTES
Mariana Solitario is a 80 y.o. male patient.  Rapid response was called for afib with RVR, now rate is in 80s with out given meds    Current Facility-Administered Medications   Medication Dose Route Frequency Provider Last Rate Last Dose    metoprolol tartrate (LOPRESSOR) tablet 25 mg  25 mg Oral BID Sasha Huang MD        0.9 % sodium chloride bolus  1,000 mL Intravenous Once Yadira Sauceda PA-C        oxyCODONE (OXYCONTIN) extended release tablet 10 mg  10 mg Oral 2 times per day Gi Scullin, DO   10 mg at 06/24/19 0604    zolpidem (AMBIEN) tablet 5 mg  5 mg Oral Nightly Gi Scullin, DO   5 mg at 06/23/19 2138    diltiazem (CARDIZEM CD) extended release capsule 120 mg  120 mg Oral BID Luzmaria Paz MD   120 mg at 06/24/19 0809    magnesium oxide (MAG-OX) tablet 400 mg  400 mg Oral Daily Luzmaria Paz MD   400 mg at 06/24/19 9301    metoprolol (LOPRESSOR) injection 2.5 mg  2.5 mg Intravenous Q2H PRN Luzmaria Paz MD        ipratropium-albuterol (DUONEB) nebulizer solution 1 ampule  1 ampule Inhalation Q4H PRN Ilana Baars, DO        HYDROcodone-acetaminophen (NORCO) 5-325 MG per tablet 1 tablet  1 tablet Oral Q4H PRN Ilana Baalida, DO   1 tablet at 06/21/19 6253    acetaminophen (TYLENOL) tablet 500 mg  500 mg Oral TID Gi Scullin, DO   500 mg at 06/24/19 1200    lidocaine viscous hcl (XYLOCAINE) 30 mL, diphenhydrAMINE (BENADRYL) 12.5 MG/5ML 75 mg, aluminum-magnesium hydroxide 200-200 MG/5ML 30 mL compounded oral suspension   Oral 4x Daily PRN Gi Scullin, DO        acetaminophen (TYLENOL) tablet 650 mg  650 mg Oral Q4H PRN Gi Scullin, DO        magnesium hydroxide (MILK OF MAGNESIA) 400 MG/5ML suspension 30 mL  30 mL Oral Daily PRN Gi Scullin, DO   30 mL at 06/18/19 2057    glucose (GLUTOSE) 40 % oral gel 15 g  15 g Oral PRN Yadira Sauceda PA-C        dextrose 50 % solution 12.5 g  12.5 g Intravenous PRN Yadira Sauceda PA-C        glucagon (rDNA) injection 1 mg  1 mg Intramuscular PRN Khanh Mayorga PA-C        ondansetron [facility]) injection 4 mg  4 mg Intravenous Q6H PRN Mauricio Cobb MD        famotidine (PEPCID) tablet 20 mg  20 mg Oral BID Mauricio Cobb MD   20 mg at 06/24/19 0809    nitroGLYCERIN (NITROSTAT) SL tablet 0.4 mg  0.4 mg Sublingual Q5 Min PRN Mauricio Cobb MD        pravastatin (PRAVACHOL) tablet 40 mg  40 mg Oral Nightly Mauricio Cobb MD   40 mg at 06/23/19 2039     Allergies   Allergen Reactions    Honey Bee Venom [Bee Venom] Swelling    Penicillins Swelling     Principal Problem:    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 [facility] admit 05/31/19. Active Problems:    Afib (HCC)    Subdural hematoma (HCC)    Benign prostatic hyperplasia with urinary frequency    Essential hypertension    Facet arthropathy, lumbosacral    Gross hematuria    Hyperlipidemia    Long term current use of anticoagulant    Long-term use of aspirin therapy    SHAYY on CPAP    Prediabetes    Prostate cancer (HCC)    S/P CABG (coronary artery bypass graft)    S/P PTCA (percutaneous transluminal coronary angioplasty)    Spondylolisthesis of lumbar region    Vitamin D deficiency    RBBB    Aphasia    CAD (coronary artery disease)    BMI 26.0-26.9,adult    Chronic back pain    Abnormality of gait and mobility  Resolved Problems:    * No resolved hospital problems. *    Blood pressure (!) 144/83, pulse 146, temperature 98 °F (36.7 °C), resp. rate 15, height 6' 0.05\" (1.83 m), weight 176 lb 12.9 oz (80.2 kg), SpO2 98 %. Subjective  Objective:  General Appearance: In no acute distress. Vital signs: (most recent): Blood pressure (!) 144/83, pulse 146, temperature 98 °F (36.7 °C), resp. rate 15, height 6' 0.05\" (1.83 m), weight 176 lb 12.9 oz (80.2 kg), SpO2 98 %. Lungs:  Normal effort. Heart: Normal rate. Regular rhythm. S1 normal.    Abdomen: Abdomen is soft.   There is no epigastric area tenderness. There is splenomegaly. Extremities: There is no deformity. Pulses: Distal pulses are intact. Skin:  Warm and dry.       Past Medical History:   Diagnosis Date    Chronic back pain     Hyperlipidemia     Hypertension        Assessment & Plan   afib with RVR resolved  Start lopressor  Cbc and bmp  Cardiology FU    tele  1) s/p BL craniotomy d/t BL subdural hematomas 2/2 repeated falls  C/w pt/ot  2) ataxia  C/w pt/ot  3) HTN stable  4) CAD   Needs nursing home placement    [id number]  She Farmer MD  6/24/2019

## 2019-06-24 NOTE — PROGRESS NOTES
Occupational Therapy  Facility/Department: [facility], [place]  Daily Treatment Note  NAME: Eduardo Khan  : 1934  MRN: [id number]    Date of Service: 2019    Discharge Recommendations:  Continue to assess pending progress       Assessment   Assessment: Pt demonstrated difficulty problem solving and following commands this morning. Pt required max verbal cues to complete ADL. Pt continues to benefit from skilled OT to maximize independence in safety and ADLs. REQUIRES OT FOLLOW UP: Yes  Activity Tolerance  Activity Tolerance: Patient Tolerated treatment well  Activity Tolerance: good  Safety Devices  Safety Devices in place: Yes  Type of devices: All fall risk precautions in place  Restraints  Initially in place: No         Patient Diagnosis(es): There were no encounter diagnoses. has a past medical history of Chronic back pain, Hyperlipidemia, and Hypertension. has a past surgical history that includes Cardiac catheterization; Coronary artery bypass graft; and craniotomy (Bilateral, 2019). Restrictions  Restrictions/Precautions  Restrictions/Precautions: Fall Risk  Position Activity Restriction  Other position/activity restrictions: nectar thick   Subjective   General  Chart Reviewed: Yes  Patient assessed for rehabilitation services?: Yes  Response to previous treatment: Patient with no complaints from previous session  Family / Caregiver Present: No  Subjective  Subjective:      General Comment  Comments:      Pain Assessment  Pain Level: (said pain multiple times)  Pain Location: Back  Pain Orientation: Lower  Vital Signs  Patient Currently in Pain: Yes   Orientation  Orientation  Overall Orientation Status: Impaired  Orientation Level: Disoriented to person;Disoriented to time;Disoriented to situation  Objective        Session started late due to therapist late from previous ADL.    ADL  Feeding: Setup  Grooming: Verbal cueing;Setup  UE Bathing: Verbal cueing;Stand by assistance(required min verbal cues to complete UE bathing)  LE Bathing: Verbal cueing;Contact guard assistance  UE Dressing: Verbal cueing; Moderate assistance  LE Dressing: Verbal cueing;Maximum assistance(requried max verbal cues to complete)  Toileting: Unable to assess(comment)(incontinent, therapy student asked pt if he needed to toilet and pt said no. Pt then peed in shower before ADL)  Additional Comments: Pt required max verbal cues throughout ADL        Balance  Sitting Balance: Contact guard assistance  Standing Balance: Contact guard assistance  Functional Mobility  Functional - Mobility Device: Rolling Walker  Activity: To/from bathroom  Assist Level: Contact guard assistance  Toilet Transfers  Toilet Transfer: Unable to assess  Toilet Transfers Comments: Pt did not toilet on commode, urinated in shower prior to ADL  Shower Transfers  Shower - Transfer From: [place] - Transfer Type: To and From  Shower - Transfer To: Shower seat with back  Shower - Technique: Ambulating  Shower Transfers: Contact Guard     Transfers  Sit to stand: Contact guard assistance  Stand to sit: Contact guard assistance  Transfer Comments: Pt required mod verbal cues for hand placement during transfers. Pt required increased problem solving cues and time compared to Friday treatment sessions. Pt with increased time for mobility.                   Cognition  Cognition Comment: Comp 2 Express 2 Social 2 Prob 1 Mem 2                                         Plan   Plan  Times per week: 5-7   Plan weeks: 4  Current Treatment Recommendations: Strengthening, ROM, Balance Training, Functional Mobility Training, Endurance Training, Safety Education & Training, Cognitive Reorientation, Patient/Caregiver Education & Training, Equipment Evaluation, Education, & procurement, Self-Care / ADL, Cognitive/Perceptual Training, Positioning  Plan Comment: Continue with OT POC  G-Code     OutComes Score    AM-PAC Score    Goals  Patient Goals   Patient goals :

## 2019-06-24 NOTE — PROGRESS NOTES
Occupational [facility] [facility] Occupational Therapy      Date: 2019  Patient Name: Michelle Goncalves        MRN: [id number]  Account: [de-identified]   : 1934  (80 y.o.)  Room: [place] [address]    Chart reviewed, attempted OT at 1300 for 60 minutes. Patient not seen 2° to:    [x] Hold per RN until cleared by cardiology. Spoke to [employer], RN aware. Will attempt again when able.     Electronically signed by Tara Sprague on 2019 at 1:23 PM

## 2019-06-24 NOTE — CARE COORDINATION
Spoke with Kamilah to discuss Arleena Sabrina transfer and plan. Message left for Argenis Bennett to update.   Electronically signed by Vanesa Jacobo RN on 6/24/2019 at 10:06 AM

## 2019-06-24 NOTE — PROGRESS NOTES
Physical Therapy Missed Treatment   Facility/Department: [address] [place] [address] Rehab R240/R240-01    NAME: Kaden Carpio    : 1934 (80 y.o.)  MRN: [id number]    Account: [de-identified]  Gender: male     Nursing request to hold therapy this PM. Awaiting clearance from cardiology due to episode of A-fib this AM.         Eleonora Werner, JACQUE, 19 at 2:23 PM

## 2019-06-24 NOTE — PROGRESS NOTES
Pt slept most of the night and continues to be a one on one. patient was medicated with scheduled oxycodone 10mg at 2038 last evening and 0600 this am as scheduled. Patient appears comfortable.  ADEOLA REYNOLDS

## 2019-06-24 NOTE — PROGRESS NOTES
[facility] Heart Progress Note      Patient: Jean Hair    Unit/Bed: Q246/M952-31  YOB: 1934  MRN: [id number]  Admit Date:  5/31/2019  Hospital Day: 24    Rounding Date: 6/24/2019    Rounding Cardiologist:  LEYDA Prieto MD    PRIMARY Cardiologist:  Julia Cheek    Subjective Complaint: We are asked to see the patient about atrial fibrillation. The patient is much improved. He was able to say that he has still some chest pressure. Prior to this, the patient, the last time I saw him, could not really communicate. So in that sense, it is an improvement. .     Physical Examination:     /72   Pulse 80   Temp 98 °F (36.7 °C)   Resp 15   Ht 6' 0.05\" (1.83 m)   Wt 176 lb 12.9 oz (80.2 kg)   SpO2 98%   BMI 23.95 kg/m²     No intake or output data in the 24 hours ending 06/24/19 [address] [place] [address] examined at bedside in in no apparent distress, cooperative and improved. Focused exam reveals:     Cardiac: Heart regular rate and rhythm     Lungs:  clear to auscultation bilaterally- no wheezes, rales or rhonchi, normal air movement, no respiratory distress    Extremities:   negative, none and  edema    Telemetry:      atrial fibrillation - controlled         LABS:   CBC:   Recent Labs     06/24/19  1243   WBC 8.3   HGB 12.9*         BMP:    Recent Labs     06/24/19  1243      K 4.8      CO2 23   BUN 27*   CREATININE 1.08   GLUCOSE 149*              Troponin: No results for input(s): TROPONINT in the last 72 hours. BNP: No results for input(s): PROBNP in the last 72 hours. INR: No results for input(s): INR in the last 72 hours. Mg: No results for input(s): MG in the last 72 hours.     Cardiac Testing:    none    Assessment:  Patient with a history of paroxysmal atrial fibrillation-went into atrial fibrillation today  No acute changes on the patient's EKG,  Mildly elevated troponins  History of coronary artery disease  History of intracranial bleed-unable to anticoagulate  Taking significant progress in rehab  Plan:  1. Amiodarone bolus  2. Magnesium bolus  3. Continue to follow electrolytes and troponins  4. Consider stress testing at some point, although patient at this point in his care, would not be an ideal candidate for any intervention given his intracranial hematoma  5.  See orders  Electronically signed by Merlin Cargo, MD on 6/24/2019 at 1:23 PM

## 2019-06-24 NOTE — PROGRESS NOTES
Pt's HR was fast and irregular so notified RN and she agreed. Rapid response called. EKG done. #22 heplock inserted to right FA with good blood return. Electronically signed by Joana Blunt LPN on 4/73/5340 at 68:44 PM    Called for qt interval = 0.37  Electronically signed by Joana Blunt LPN on 8/30/0163 at 3:90 PM    Latest vitals taken this shift are: 98, 56 SB per tele, 18, 112/63, 93 RA.  Electronically signed by Joana Blunt LPN on 6/51/6662 at 0:05 PM

## 2019-06-24 NOTE — PROGRESS NOTES
Speech Language Pathology Treatment Note  Facility/Department: [facility]  6/24/2019    Rehab Dx/Hx: No admission diagnoses are documented for this encounter. Precautions: falls and aspirations    ST Dx: Aphasia, Dysphagia and Cognitive Impairment     Date of Admit: 5/31/2019  Room #: R240/R240-01    Time in: 10:30 AM Time out: 11:00 AM    Subjective:  Alert, Cooperative and Pleasant        Interventions used this date:  Expressive Language and Receptive Language    Objective/Assessment:  Patient progressing towards goals:  Short-term Goals for Speech & Language POC:     Goal 1: Pt will follow 1 step directions given orally with 75% accuracy with mod cues to increase the pt's ability to follow directions provided by caregivers for safe follow through with ADLs. Followed verbal one step directions with 30% accuracy following max verbal cues. He increased to 50% accuracy following one clinician model. All other trials required [place]. Goal 2: Pt will identify objects/pictures within a field of 2-3 with 75% accuracy with mod cues in order to increase his understanding of objects in his environment for safer and more independent completion of ADLs. Not addressed     Goal 3: Pt will answer low level yes/no questions with 85% accuracy with mod cues to assist the caregiver in obtaining important information regarding the patient's personal, medical, and safety needs. Correctly answered yes/no questions with 90% accuracy with visuals. Goal 4: Pt will complete confrontational naming tasks with 90% accuracy with mild verbal cues to help the patient express his/her basic wants and needs. Completed confrontational naming tasks with 60% accuracy with mild verbal cues. Increased to 90% accuracy with phonemic cues provided. Goal 5: Pt will perform speech automatics (1-20, OMAR, CARLTON) with min cues with 85% accuracy to help the patient express his basic wants and needs.    Counted 1-20 with 70% accuracy independently. He increased to 100% accuracy with phonemic cues provided. Listed OMAR with 85% accuracy independently and increased to 100% accuracy with phonemic cues provided,  Listed CARLTON 67% accuracy independently and increased to 92% accuracy with phonemic cues provided. Short-term Goals for Dysphagia POC:  Goal 1: Pt will demonstrate small bites/sips for safe and efficient swallow of mechanical soft and trials of soft/regular consistencies, given cues as needed, in all given opportunities. Treatment/Activity Tolerance:  Patient tolerated treatment well    Plan:  Continue per POC    Pain:  Patient demonstrated no s/s of pain. - pt denied     Patient/Caregiver Education:  Patient educated on session and progression towards goals. and Education needs reinforcement.     Safety Devices:  Chair alarm in place     Comprehension:  2- Maximal assist    Expression:  2- Maximal assist    Problem Solvin- Total assist    Memory:  1- Total assist    Signature:Electronically signed by CIERRA Dela Cruz on 2019 at 12:36 PM

## 2019-06-24 NOTE — PROGRESS NOTES
Subjective: The patient complains of severe  acute  confusion secondary to subdural hematoma partially relieved by PT OT, speech-language pathology, recent decompression of the subdural hematomas and exacerbated by fatigue, exhaustion, dehydration. I am concerned about his severe impulsivity and falls risk he is already fallen twice because he is so quick to get up and he has a history of subdurals. I have added OxyContin because his quick movements seem to be related to his back pain and he does have a lot of arthritis seems to be in comfort uncomfortable which is making him want to change position and increasing his falls risk. He is seemingly better with the OxyContin so far no signs of overmedication or sedation. ROS x   10: The patient also complains of severely impaired mobility and activities of daily living. Otherwise no new problems with vision, hearing, nose, mouth, throat, dermal, cardiovascular, GI, , pulmonary, musculoskeletal, psychiatric or neurological. See Rehab H&P on Rehab chart dated . Vital signs:  BP (!) 144/83   Pulse 84   Temp 98 °F (36.7 °C)   Resp 15   Ht 6' 0.05\" (1.83 m)   Wt 176 lb 12.9 oz (80.2 kg)   SpO2 98%   BMI 23.95 kg/m²   I/O:   PO/Intake:  fair PO intake,   mechanical soft with thin liquids-one-on-one for feeds    Bowel/Bladder:  Incontinent, to trial condom catheter at night  General:  Patient is well developed, adequately nourished, non-obese and     well kempt. HEENT:    PERRLA, hearing intact to loud voice fall out frequently, external inspection of ear     and nose benign. Inspection of lips, tongue and gums dry  Musculoskeletal: No significant change in strength or tone. All joints stable. Inspection and palpation of digits and nails show no clubbing,       cyanosis or inflammatory conditions. Neuro/Psychiatric: Affect: flat but pleasant. Echolalia. Impulsive. Alert and oriented to person, place and     Situation-with max cues. down 4 to 6 stairs and requires the assistance of one person only,  , Assessment: improved quality of gt with rollator but is negligent of items on right and will run close or into them. pt still presents with impulsivity with transfers and approach to seat. pt did not speak much durings session but denied pain. Occupational therapy: FIMS:  Eatin - Feeds self with setup/supervision/cues and/or requires only setup/supervision/cues to perform tube feedings  Grooming: 3 - Able to perform 2-3 tasks (mod assist)  Bathin - Able to bathe 3-4 areas  Dressing-Upper: 3 - Requires assist with 2 tasks  Dressing-Lower: 2 - Requires assist with 4-5 parts of dressing  Toiletin - Able to perform 1 task only (e.g. hygiene)  Toilet Transfer: 4 - Requires steadying assistance only < 25% assist  Shower Transfer: 1 - Total assistance, pt. expends less than 25% effort,  , Assessment: Pt demonstrated improving functional reaching this afternoon. Pt demonstates inconsistency with cognitive tasks. Pt continues to benefit from skilled OT to benefit from skilled OT to maximize independence in safety and ADLs. Speech therapy: FIMS: Comprehension: 2 - Patient understands basic needs 25-49% of the time  Expression: 2 - Expresses basic ideas/needs 25-49% of the time  Social Interaction: 2 - Patient appropriate  25%-49% of the time  Problem Solvin - Patient solves simple/routine tasks < 25%  Memory: 1 - Patient remembers < 25% of the time      Lab/X-ray studies reviewed, analyzed and discussed with patient and staff:   No results found for this or any previous visit (from the past 24 hour(s)). Xr Chest  2019   Areas of atelectasis, patchy groundglass infiltrate in the bases. Right greater than left . Trace/ small right pleural effusion    Ct Head   2019  Impression: Bilateral frontal and parietal subdural hematomas. The amount of extra-axial air has slightly decreased since the prior study.  No new areas of There are    cervicothoracic spine degenerative changes. Sternotomy wires are noted.           Impression   MILD DEGENERATIVE CHANGES OF THE RIGHT SHOULDER. NEGATIVE FOR ACUTE FRACTURE OR DISLOCATION.         EXAMINATION: XR SHOULDER LEFT (MIN 3 VIEWS)       CLINICAL HISTORY:  s/p fall        COMPARISONS: None available.       FINDINGS: No acute fracture is identified. There is cephalad subluxation of the humeral head in association narrowing of the subacromial space indicating rotator cuff tear/degeneration. There are periarticular ossifications lateral to the acromion. There    is glenohumeral osteoarthritis with humeral head marginal osteophytosis. There is cervical and thoracic spondylosis. Sternotomy wires are noted.           Impression   LEFT SHOULDER OSTEOARTHRITIS WITH PERIARTICULAR SOFT TISSUE OSSIFICATIONS AND ROTATOR CUFF TEAR/DEGENERATION. NEGATIVE FOR FRACTURE. Previous extensive, complex labs, notes and diagnostics reviewed and analyzed. ALLERGIES:    Allergies as of 05/31/2019 - Review Complete 05/31/2019   Allergen Reaction Noted    Honey bee venom [bee venom] Swelling 10/08/2015    Penicillins Swelling 10/08/2015      (please also verify by checking MAR)    Today I evaluated this patient for periodic reassessment of medical and functional status. The patient was discussed in detail at the treatment team meeting focusing on current medical issues, progress in therapies, social issues, psychological issues, barriers to progress and strategies to address these barriers, and discharge planning. See the hand written addendum to rehab progress note. The patient continues to be high risk for future disability and their medical and rehabilitation prognosis continue to be good and therefore, we will continue the patient's rehabilitation course as planned. The patient's tentative discharge date was set. Patient and family education was discussed.   The patient was made aware of the team discussion regarding their progress. Complex Physical Medicine & Rehab Issues Assess & Plan:   1. Severe abnormality of gait and mobility and impaired self-care and ADL's secondary to progressive subdural hematomas . Functional and medical status reassessed regarding patients ability to participate in therapies and patient found to be able to participate in acute intensive comprehensive inpatient rehabilitation program including PT/OT to improve balance, ambulation, ADLs, and to improve the P/AROM. Therapeutic modifications regarding activities in therapies, place, amount of time per day and intensity of therapy made daily. In bed therapies or bedside therapies prn.   2. Bowel and Bladder dysfunction including incontinence : Trial condom catheter at night frequent toileting, ambulate to bathroom with assistance, check post void residuals. Check for C.difficile x1 if >2 loose stools in 24 hours, continue bowel & bladder program.  Monitor bowel and bladder function. Lactinex 2 PO every AC. MOM prn, Brown Bomb prn, Glycerin suppository prn, enema prn.  3. Severe postoperative craniotomy pain with severe low back pain and generalized OA painAcute on chronic fracture, L1, with avulsion of the anterior portion of the L1 vertebral body. Findings suspicious for mild to moderate central stenosis at the L3-L4 level.: reassess pain every shift and prior to and after each therapy session, give prn Tylenol and schedull and consider Norco, modalities prn in therapy, Lidoderm, K-pad prn. Titration of every 12 hour OxyContin 10 mg consider 10 mg during the day to help relieve his pain  4. Skin healing bilateral upper extremity bruising and breakdown risk: Side-to-side turns add CoQ10 continue pressure relief program.  Daily skin exams and reports from nursing. 5. Severe Fatigue due to nutritional and hydration deficiency:    continue to monitor I&Os, calorie counts prn, dietary consult prn.   Discontinue nocturnal IVs as patient is now on mechanical soft and thin liquids  6. Acute episodic insomnia with situational adjustment disorder:   Add scheduled sleeping medication Ambien, monitor for day time sedation. 7. Falls risk elevated: Patient is vacillating between needing a one-on-one and video monitoring with frequent rounding. Patient is a one-on-one as needed. We will make sure his hearing aids are in we will post signs or visual cues so that he does not get up on his own and keep reminding him to use the call light. Patient to use call light to get nursing assistance to get up, bed and chair alarm. Had a witnessed fall on again on 6/20/2019 however patient's staff was right next to him and he did not injure himself. 8. Elevated DVT risk: progressive activities in PT, continue prophylaxis JOSE DE JESUS hose, elevation and  patient is not on a blood thinner secondary to recent subdural hematomas . 9. Complex discharge planning:  Discharge 6/28/19 skilled level and then eventually long-term care at a wheelchair level. He will likely need 24-hour supervision at discharge. His significant other will help him apply for Medicaid as he will likely need care for the rest of his life. I am concerned about patients impulsivity -and falls risks he is a one-on-one here he will not be able to be a one-on-one in any other setting including skilled facility including home and including hospice. There is indeed a hospice consult on him as he has a limited life expectancy and we are trying to improve the quality of his last years. I have asked nursing and nursing supervisor to do the best to get him off of the one-on-one safely. He needs to have his hearing aids and so he can hear the redirection that the video-assisted monitor and/or nursing is giving him.  SP weekly team meeting every Monday to assess progress towards goals, discuss and address social, psychological and medical comorbidities and to address difficulties they may be having progressing in therapy. Patient and family education is in progress. The patient is to follow-up with their family physician after discharge. Complex Active General Medical Issues that complicate care Assess & Plan:    1. Afib with occasional RVR,   Essential hypertension,   Hyperlipidemia,   S/P CABG (coronary artery bypass graft), S/P PTCA (percutaneous transluminal coronary angioplasty),   RBBB,   CAD (coronary artery disease), long-term previous use of blood thinners telemetry to monitor for bradycardia blood pressure checks every shift dose and titrate cardiac medications, consult hospitals for backup medical and recheck CBC and BMP as needed -  2. Subdural hematoma-status post evacuation with , hold blood thinners-vital signs every shift, dose and titrate cardiac medications, recheck BMP CBC and consult hospitalist for backup medical  3.   prostatic hyperplasia with urinary frequency,   Prostate cancer -check postvoid residuals check stat UA  4. Facet arthropathy, lumbosacral,   Chronic back pain,   Spondylolisthesis of lumbar region-add Lidoderm, Tylenol, Norco as needed  5. Gross hematuria, Long term current use of anticoagulant-check UA monitor urine for blood  6. Long-term use of aspirin therapy and other blood thinners on hold at present because of recent subdural hematoma  7. SHAYY on CPAP-add CPAP from home if available is not available add CPAP from the hospital  8. Prediabetes-recheck BMP, low carb diet  9. Vitamin D deficiency-high-dose vitamin D, add Tums at daily dose of vitamin D after high doses done recheck as an outpatient  10. Aphasia, and oropharyngeal dysphagia-recently which pathology consult, add e-stim, add supplementation to diet push oral fluids  11.    Poorly nourished due to poor nutrition and previous alcohol use BMI 26.0-26.9,adult--add protein supplementation vitamin B12 vitamin D CoQ10,  to stop drinking alcohol as an outpatient and at chemical dependency counseling when patient is able to participate  12. Complex social situation-patient has a girlfriend he lives alone. He is estranged from his several children. Continue to work on improving social interactions. Palliative care versus hospice services while in the skilled facility. Patient and family to decide whether either of these services would benefit. 13. No trauma after fall on 6/17/2019. Long discussions with nursing regarding keeping him safe in the room see above. Patient's family his girlfriend and staff to work together to have patient to be safe without one-on-one as this is not a sustainable type of a scenario. He needs to learn how to be safe without having somebody constantly. By his side because this will not be a real life scenario.         Capri Templeton D.O., PM&R     Attending    [address]

## 2019-06-24 NOTE — PROGRESS NOTES
Patient was noted with rapid  irregular heart rate Rapid response called. Dr. Lucía Sparks wrote for labs and medication. however he would like for Dr. Radha Berger  to evaluate. Perfect served Dr. Radha Berger  Call to  Dr. Radha Berger office for follow up with cardiology per Dr. Lucía Sparks request.   Esther Maria Dolores will page the  On call in the hosptial to see patient. Telemetry placed on patient current rate 80  AFib. Normal saline bolus at 500 ml over 30 min. Then normal saline at 75 ml continuous. Electronically signed by Srinivas Elder RN on 6/24/2019 at 12:58 PM   New orders received from Dr. Abilio Garcia. Spoke with Rehab care manager regarding the approval to give   Amiodarone bolus on the rehab unit waitng for her input,  ok to give bolus amiodarone. Dr. Abilio Garcia called regarding the infusion of amiodarone. Was updated. Requested recent vitals. Patient went back into  67 sinus rhythm per telemetry monitor. Dr. Abilio Garcia discontinued the amiodarone infusion and ordered PO amiodarone first dose now. And EKG I the morning. Patient denies pain or discomfort. resting on his bed.  Electronically signed by Srinivas Elder RN on 6/24/2019 at 4:15 PM

## 2019-06-24 NOTE — PROGRESS NOTES
Physical Therapy Rehab Treatment Note  Facility/Department: [facility]  Room: R240/R240-01       NAME: Vitor Davenport  : 1934 (80 y.o.)  MRN: [id number]  CODE STATUS: Full Code    Date of Service: 2019    Chart Reviewed: Yes  Family / Caregiver Present: No  General Comment  Comments: PT initiated at bedside. Restrictions:  Restrictions/Precautions: Fall Risk  Position Activity Restriction  Other position/activity restrictions: nectar thick      SUBJECTIVE: Subjective: Pt alert     Pre Treatment Pain Screening  Comments / Details: denies    Post Treatment Pain Screening:  Pain Assessment  Pain Assessment: (unchanged)    OBJECTIVE:      Bed mobility  Comment: NT this AM - bed being changed    Transfers  Sit to Stand: Stand by assistance  Stand to sit: Contact guard assistance  Bed to Chair: Minimal assistance; Moderate assistance  Comment: Pt with significant motor apraxia this AM requiring heavy verbal and manual cueing for approach to chair and management of rollator. Multiple trials completed. Ambulation 1  Surface: carpet  Device: Rollator  Assistance: Moderate assistance  Quality of Gait: Pt maintains straight path, although closely hugs objects/walls to R side. Pt with significant struggle to initiate L hand turns requiring ModA to cue and facilitate safe negotiation of environment. Gait Deviations: Slow Ana  Distance: 100ft  Stairs/Curb  Stairs?: No(unsafe)        Exercises  Comments: Attempting L rotations with reaching for object. Providing assist and hand over hand to initiate motion. Pt stating \"no\" and resisting further. ASSESSMENT:  Assessment: Pt with poor attention and resistance to facilitated motion this AM.    PLAN OF CARE:   Plan Comment: Cont.  POC    Short term goals  Short term goal 1: Pt to complete HEP with verbal cues  Long term goals  Long term goal 1: Pt to complete bed mobility with SBA  Long term goal 2: Pt to complete functional transfers (STS and bed<>chair) with

## 2019-06-24 NOTE — PROGRESS NOTES
[facility] Occupational Therapy      Date: 2019  Patient Name: Lola Cedeno        MRN: [id number]  Account: [de-identified]   : 1934  (80 y.o.)  Room: [place] [address]    Pt. on hold remainder of PM, unable to attempt makeup minutes.      Electronically signed by GAVIN España on 2019 at 3:55 PM

## 2019-06-25 LAB
ANION GAP SERPL CALCULATED.3IONS-SCNC: 10 MEQ/L (ref 9–15)
BUN BLDV-MCNC: 23 MG/DL (ref 8–23)
CALCIUM SERPL-MCNC: 9.1 MG/DL (ref 8.5–9.9)
CHLORIDE BLD-SCNC: 107 MEQ/L (ref 95–107)
CO2: 25 MEQ/L (ref 20–31)
CREAT SERPL-MCNC: 0.96 MG/DL (ref 0.7–1.2)
EKG ATRIAL RATE: 50 BPM
EKG ATRIAL RATE: 62 BPM
EKG ATRIAL RATE: 73 BPM
EKG P AXIS: 38 DEGREES
EKG P AXIS: 40 DEGREES
EKG P-R INTERVAL: 124 MS
EKG P-R INTERVAL: 168 MS
EKG Q-T INTERVAL: 386 MS
EKG Q-T INTERVAL: 440 MS
EKG Q-T INTERVAL: 470 MS
EKG QRS DURATION: 146 MS
EKG QRS DURATION: 146 MS
EKG QRS DURATION: 154 MS
EKG QTC CALCULATION (BAZETT): 428 MS
EKG QTC CALCULATION (BAZETT): 446 MS
EKG QTC CALCULATION (BAZETT): 453 MS
EKG R AXIS: -25 DEGREES
EKG R AXIS: -43 DEGREES
EKG R AXIS: -48 DEGREES
EKG T AXIS: -1 DEGREES
EKG T AXIS: -13 DEGREES
EKG T AXIS: -16 DEGREES
EKG VENTRICULAR RATE: 50 BPM
EKG VENTRICULAR RATE: 62 BPM
EKG VENTRICULAR RATE: 83 BPM
GFR AFRICAN AMERICAN: >60
GFR NON-AFRICAN AMERICAN: >60
GLUCOSE BLD-MCNC: 95 MG/DL (ref 70–99)
MAGNESIUM: 2.4 MG/DL (ref 1.7–2.4)
POTASSIUM SERPL-SCNC: 4.6 MEQ/L (ref 3.4–4.9)
SODIUM BLD-SCNC: 142 MEQ/L (ref 135–144)
TROPONIN: 0.06 NG/ML (ref 0–0.01)
TROPONIN: 0.1 NG/ML (ref 0–0.01)
TSH REFLEX: 3.05 UIU/ML (ref 0.44–3.86)

## 2019-06-25 PROCEDURE — 97112 NEUROMUSCULAR REEDUCATION: CPT

## 2019-06-25 PROCEDURE — 92507 TX SP LANG VOICE COMM INDIV: CPT

## 2019-06-25 PROCEDURE — 93010 ELECTROCARDIOGRAM REPORT: CPT | Performed by: INTERNAL MEDICINE

## 2019-06-25 PROCEDURE — 97530 THERAPEUTIC ACTIVITIES: CPT

## 2019-06-25 PROCEDURE — 6370000000 HC RX 637 (ALT 250 FOR IP): Performed by: INTERNAL MEDICINE

## 2019-06-25 PROCEDURE — 93005 ELECTROCARDIOGRAM TRACING: CPT | Performed by: INTERNAL MEDICINE

## 2019-06-25 PROCEDURE — 6370000000 HC RX 637 (ALT 250 FOR IP): Performed by: PHYSICAL MEDICINE & REHABILITATION

## 2019-06-25 PROCEDURE — 97535 SELF CARE MNGMENT TRAINING: CPT

## 2019-06-25 PROCEDURE — 97116 GAIT TRAINING THERAPY: CPT

## 2019-06-25 PROCEDURE — 83735 ASSAY OF MAGNESIUM: CPT

## 2019-06-25 PROCEDURE — 99232 SBSQ HOSP IP/OBS MODERATE 35: CPT | Performed by: PHYSICAL MEDICINE & REHABILITATION

## 2019-06-25 PROCEDURE — 6370000000 HC RX 637 (ALT 250 FOR IP): Performed by: NEUROLOGICAL SURGERY

## 2019-06-25 PROCEDURE — 36415 COLL VENOUS BLD VENIPUNCTURE: CPT

## 2019-06-25 PROCEDURE — 84443 ASSAY THYROID STIM HORMONE: CPT

## 2019-06-25 PROCEDURE — 92523 SPEECH SOUND LANG COMPREHEN: CPT

## 2019-06-25 PROCEDURE — 84484 ASSAY OF TROPONIN QUANT: CPT

## 2019-06-25 PROCEDURE — 97127 HC OT THER IVNTJ W/FOCUS COG FUNCJ: CPT

## 2019-06-25 PROCEDURE — 1180000000 HC REHAB R&B

## 2019-06-25 PROCEDURE — 80048 BASIC METABOLIC PNL TOTAL CA: CPT

## 2019-06-25 RX ORDER — AMLODIPINE BESYLATE 2.5 MG/1
2.5 TABLET ORAL DAILY
Status: DISCONTINUED | OUTPATIENT
Start: 2019-06-25 | End: 2019-06-28 | Stop reason: HOSPADM

## 2019-06-25 RX ADMIN — ZOLPIDEM TARTRATE 5 MG: 5 TABLET ORAL at 20:48

## 2019-06-25 RX ADMIN — ACETAMINOPHEN 500 MG: 325 TABLET ORAL at 12:51

## 2019-06-25 RX ADMIN — AMLODIPINE BESYLATE 2.5 MG: 2.5 TABLET ORAL at 15:59

## 2019-06-25 RX ADMIN — OXYCODONE HYDROCHLORIDE 10 MG: 10 TABLET, FILM COATED, EXTENDED RELEASE ORAL at 17:33

## 2019-06-25 RX ADMIN — PRAVASTATIN SODIUM 40 MG: 40 TABLET ORAL at 20:49

## 2019-06-25 RX ADMIN — MAGNESIUM OXIDE TAB 400 MG (241.3 MG ELEMENTAL MG) 400 MG: 400 (241.3 MG) TAB at 08:26

## 2019-06-25 RX ADMIN — AMIODARONE HYDROCHLORIDE 200 MG: 200 TABLET ORAL at 20:48

## 2019-06-25 RX ADMIN — OXYCODONE HYDROCHLORIDE 10 MG: 10 TABLET, FILM COATED, EXTENDED RELEASE ORAL at 06:12

## 2019-06-25 RX ADMIN — FAMOTIDINE 20 MG: 20 TABLET ORAL at 08:27

## 2019-06-25 RX ADMIN — ACETAMINOPHEN 500 MG: 325 TABLET ORAL at 08:27

## 2019-06-25 RX ADMIN — HYDROCODONE BITARTRATE AND ACETAMINOPHEN 1 TABLET: 5; 325 TABLET ORAL at 12:06

## 2019-06-25 RX ADMIN — METOPROLOL TARTRATE 25 MG: 25 TABLET ORAL at 20:48

## 2019-06-25 RX ADMIN — MAGNESIUM HYDROXIDE 30 ML: 400 SUSPENSION ORAL at 08:34

## 2019-06-25 RX ADMIN — ACETAMINOPHEN 500 MG: 325 TABLET ORAL at 20:49

## 2019-06-25 RX ADMIN — FAMOTIDINE 20 MG: 20 TABLET ORAL at 20:49

## 2019-06-25 RX ADMIN — AMIODARONE HYDROCHLORIDE 200 MG: 200 TABLET ORAL at 10:31

## 2019-06-25 ASSESSMENT — PAIN DESCRIPTION - LOCATION
LOCATION: BACK

## 2019-06-25 ASSESSMENT — PAIN SCALES - GENERAL
PAINLEVEL_OUTOF10: 7
PAINLEVEL_OUTOF10: 6
PAINLEVEL_OUTOF10: 0
PAINLEVEL_OUTOF10: 6
PAINLEVEL_OUTOF10: 0

## 2019-06-25 ASSESSMENT — PAIN SCALES - WONG BAKER
WONGBAKER_NUMERICALRESPONSE: 0
WONGBAKER_NUMERICALRESPONSE: 0

## 2019-06-25 ASSESSMENT — PAIN DESCRIPTION - ORIENTATION: ORIENTATION: LOWER

## 2019-06-25 ASSESSMENT — PAIN DESCRIPTION - PAIN TYPE: TYPE: ACUTE PAIN

## 2019-06-25 NOTE — FLOWSHEET NOTE
Pt Troponin level at midnight was 0.097. Made Dr. Migel Baptiste aware, no new orders. Telemetry NSR pulse 89. Will continue to monitor.  Electronically signed by Neida Hudson RN on 6/25/2019 at 12:54 AM     06/25/19 0051   Provider Notification   Reason for Communication Critical Value (comment)  (Troponin level 0.097)   Provider Name Dr. Migel Baptiste   Provider Notification Physician   Method of Communication Secure Message   Response No new orders   Notification Time 0049   Shift Event Other (comment)  (critical value)

## 2019-06-25 NOTE — PROGRESS NOTES
Pt's bp/heartrate this AM is 120/69, 64. Spoke with RN about my concerns of giving all 3 meds and she stated to hold off on all 3 and she will contact Dr. Tanay Robledo signed by Dorina Wheatley LPN on 9/48/9569 at 3:82 AM    Pt given his AM amiodorone. cardizem was dc'd and metoprolol order changed from 3x's a day to 2x's a day. Pt denied pain this AM. Electronically signed by Dorina Wheatley LPN on 9/09/7377 at 50:71 AM    Suggested to one on one staff to please take pt to bathroom every 2 hours because it may help him be more relaxed when he is in bed.  Electronically signed by Dorina Wheatley LPN on 9/10/6607 at 0:54 PM

## 2019-06-25 NOTE — PROGRESS NOTES
Occupational Therapy  Facility/Department: [facility]  Daily Treatment Note  NAME: Sami Yanes  : 1934  MRN: [id number]    Date of Service: 2019    Discharge Recommendations:  Continue to assess pending progress       Assessment   Assessment: Pt. demonstrates improved naming and verbalization. Is also able to engage more in active reaching and ambulation with fewer safety cues. Pt. continues to be impulsive and would benefit from skilled OT to maximize safety and independence with ADLs. Activity Tolerance  Activity Tolerance: Patient Tolerated treatment well  Safety Devices  Safety Devices in place: Yes  Type of devices: All fall risk precautions in place         Patient Diagnosis(es): There were no encounter diagnoses. has a past medical history of Chronic back pain, Hyperlipidemia, and Hypertension. has a past surgical history that includes Cardiac catheterization; Coronary artery bypass graft; and craniotomy (Bilateral, 2019). Restrictions  Restrictions/Precautions  Restrictions/Precautions: Fall Risk  Position Activity Restriction  Other position/activity restrictions: nectar thick   Subjective   General  Chart Reviewed: Yes  Patient assessed for rehabilitation services?: Yes  Response to previous treatment: Patient with no complaints from previous session  Family / Caregiver Present: No  Subjective  Subjective:      General Comment  Comments:      Pain Assessment  Pain Assessment: Faces  Pain Level: 0  Shannon-Baker Pain Rating: No hurt(Appears comfortable and denies pain)  Vital Signs  Patient Currently in Pain: No   Orientation     Objective      Patient participated in following treatment session to complete 10 previously scheduled minutes from 19. Cognition  Cognition Comment: Pt. engaged in cognitive activity to improve memory and safety for ADLs. Pt. attempted to name colored shapes on flashcards to improve naming of objects and colors for ADLs and IADL tasks.  Pt. was able to name 8/9 colors with no verbal cues and corrected 1 error ('black'- pt. stated was 'brown') with 2 verbal cues to correct. Attempted to name shapes (Cachil DeHe, square, triangle) but was unable to name any shapes even wtih verbal cues. Pt. with difficulty repeating names of shapes when therapist verbalized them. Pt. was able to point to square 1 time after therapist visual cue of pointing to card and naming shape, then asking pt. to repeat. Continued into initially scheduled tx session as below. ADL  Toileting: Minimal assistance  Additional Comments: Verbal cues to initiate pulling down pants and min A to place hands appropriately. CGA for balance as pt. pulled pants down. Pt. required step-by-step cues to initiate washing hands with soap and water and only washed L hand. Requires cues to utilize B hands throughout tasks. Functional Mobility  Functional - Mobility Device: Rolling Walker  Activity: To/from bathroom  Assist Level: Contact guard assistance  Functional Mobility Comments: Pt. ambulated in room from chair to toilet, then from toilet to sink and sink to w/c. Pt. continues to be impulsive and to require verbal cues to scan to R side during mobility. Toilet Transfers  Toilet - Technique: Ambulating  Equipment Used: Grab bars  Toilet Transfer: Stand by assistance  Toilet Transfers Comments: Verbal cues to turn all the way around and min A to place hands onto grab bars. Pt. does not independently utilize bars and requires verbal cues and tactile cues to initiate. Transfers  Stand Pivot Transfers: Contact guard assistance(Verbal and tactile cues due to impulsivity and decreased sequencing. )  Sit to stand: Contact guard assistance  Stand to sit: Contact guard assistance  Transfer Comments: Pt. transferred to edge of mat at end of tx session; session continued by student OT. Spoke to BRIDGER Mayorga, pt's primary nursing, regarding pt's impulsivity.  Recommended scheduled 2 hour toileting for pt to attempt to decrease impulsivity as pt has a history of being more impulsive when he has to toilet. LPN reports she agrees and will speak to 1:1. Notified LPN that we toileted at 1:00. Called and left a voicemail for Dr. Deepali Cowan regarding the same.      Plan   Plan  Times per week: 5-7   Plan weeks: 4  Current Treatment Recommendations: Strengthening, ROM, Balance Training, Functional Mobility Training, Endurance Training, Safety Education & Training, Cognitive Reorientation, Patient/Caregiver Education & Training, Equipment Evaluation, Education, & procurement, Self-Care / ADL, Cognitive/Perceptual Training, Positioning  Plan Comment: Continue with OT POC  G-Code     OutComes Score  AM-PAC Score  Goals  Patient Goals   Patient goals : Patient is unable to report a goal at this time due to global aphasia       Therapy Time   Individual Concurrent Group Co-treatment   Time In 1250         Time Out 1313         Minutes 0777 Gabriela Garcia OTR/L Electronically signed by KATARINA Fabian/L on 6/25/2019 at 1:34 PM

## 2019-06-25 NOTE — PROGRESS NOTES
at rest.     Heart:   S1 = S2, occasionally high heart rate history of A. fib with RVR. No loud murmurs. Abdomen:  Soft, non-tender, no enlargement of liver or spleen. Extremities:  No significant lower extremity edema or tenderness. Skin:   Intact to general survey,  craniotomy incisions healing. Rehabilitation:  Physical therapy: FIMS:  Bed Mobility: Scooting: Moderate assistance    Transfers: Sit to Stand: Stand by assistance  Stand to sit: Contact guard assistance  Bed to Chair: Minimal assistance, Moderate assistance  Squat Pivot Transfers: Minimal Assistance, Ambulation 1  Surface: carpet  Device: Rollator  Other Apparatus: (no O2)  Assistance: Moderate assistance  Quality of Gait: Pt maintains straight path, although closely hugs objects/walls to R side. Pt with significant struggle to initiate L hand turns requiring ModA to cue and facilitate safe negotiation of environment. Gait Deviations: Slow Ana  Distance: 100ft  Comments: rollator controlled by therapist for safety on right side, Stairs  # Steps : 4  Stairs Height: 6\"  Rails: Bilateral  Assistance: Minimal assistance, Contact guard assistance  Comment: inermittent Verbal and Tactile cues given for safety.  - mildly unsteady - improved ability to follow instrcutions noted and less LOB this session    FIMS: Bed, Chair, Wheel Chair: 4 - Requires steadying assistance only <25% assist  and/or requires assist with one leg only  Walk: 2 - Maximal Assistance Requires up to [address] requires assistance of one person to walk between  feet (Patient performs 25-49% of locomotion effort or goes between  feet)  Distance Walked: [facility]: 0 - Activity Not Assessed/Does Not Occur  Distance Traveled in Wheel Chair: 15'  Stairs: 2- Maximal Assistance Performs 25-49% of the effort to go up and down 4 to 6 stairs and requires the assistance of one person only,  , Assessment: Pt with poor attention and resistance to facilitated motion this AM.    Occupational therapy: FIMS:  Eatin - Feeds self with setup/supervision/cues and/or requires only setup/supervision/cues to perform tube feedings  Grooming: (ADL)  Bathing: (ADL)  Dressing-Upper: (ADL)  Dressing-Lower: (ADL)  Toiletin - Able to perform 1 task only (e.g. hygiene)  Toilet Transfer: 4 - [address] assistance only < 25% assist  Shower Transfer: 4 - Minimal contact assistance, pt. expends 75% or more effort,  , Assessment: Pt demonstrated difficulty problem solving and following commands this morning. Pt required max verbal cues to complete ADL. Pt continues to benefit from skilled OT to maximize independence in safety and ADLs.     Speech therapy: FIMS: Comprehension: 2 - Patient understands basic needs 25-49% of the time  Expression: 2 - Expresses basic ideas/needs 25-49% of the time  Social Interaction: 2 - Patient appropriate  25%-49% of the time  Problem Solvin - Patient solves simple/routine tasks < 25%  Memory: 1 - Patient remembers < 25% of the time      Lab/X-ray studies reviewed, analyzed and discussed with patient and staff:   Recent Results (from the past 24 hour(s))   POCT Glucose    Collection Time: 19 12:13 PM   Result Value Ref Range    POC Glucose 152 (H) 60 - 115 mg/dl    Performed on ACCU-CHEK    EKG 12 Lead    Collection Time: 19 12:19 PM   Result Value Ref Range    Ventricular Rate 83 BPM    Atrial Rate 73 BPM    QRS Duration 146 ms    Q-T Interval 386 ms    QTc Calculation (Bazett) 453 ms    R Axis -48 degrees    T Axis -16 degrees   CBC Auto Differential    Collection Time: 19 12:43 PM   Result Value Ref Range    WBC 8.3 4.8 - 10.8 K/uL    RBC 4.28 (L) 4.70 - 6.10 M/uL    Hemoglobin 12.9 (L) 14.0 - 18.0 g/dL    Hematocrit 38.2 (L) 42.0 - 52.0 %    MCV 89.3 80.0 - 100.0 fL    MCH 30.2 27.0 - 31.3 pg    MCHC 33.8 33.0 - 37.0 %    RDW 14.3 11.5 - 14.5 %    Platelets 574 333 - 834 K/uL    Neutrophils % 72.5 % Lymphocytes % 15.0 %    Monocytes % 10.9 %    Eosinophils % 1.2 %    Basophils % 0.4 %    Neutrophils # 6.0 1.4 - 6.5 K/uL    Lymphocytes # 1.2 1.0 - 4.8 K/uL    Monocytes # 0.9 (H) 0.2 - 0.8 K/uL    Eosinophils # 0.1 0.0 - 0.7 K/uL    Basophils # 0.0 0.0 - 0.2 K/uL   Basic Metabolic Panel    Collection Time: 06/24/19 12:43 PM   Result Value Ref Range    Sodium 140 135 - 144 mEq/L    Potassium 4.8 3.4 - 4.9 mEq/L    Chloride 104 95 - 107 mEq/L    CO2 23 20 - 31 mEq/L    Anion Gap 13 9 - 15 mEq/L    Glucose 149 (H) 70 - 99 mg/dL    BUN 27 (H) 8 - 23 mg/dL    CREATININE 1.08 0.70 - 1.20 mg/dL    GFR Non-African American >60.0 >60    GFR  >60.0 >60    Calcium 9.4 8.5 - 9.9 mg/dL   Troponin    Collection Time: 06/24/19 12:43 PM   Result Value Ref Range    Troponin 0.103 (HH) 0.000 - 0.010 ng/mL   CK-MB Index    Collection Time: 06/24/19  1:50 PM   Result Value Ref Range    Total CK 64 0 - 190 U/L    CK-MB 3.1 0.0 - 6.7 ng/mL    CK-MB Index 4.8 (H) 0.0 - 3.5 %   Troponin    Collection Time: 06/24/19  5:48 PM   Result Value Ref Range    Troponin 0.110 (HH) 0.000 - 0.010 ng/mL   Troponin    Collection Time: 06/25/19 12:01 AM   Result Value Ref Range    Troponin 0.097 (HH) 0.000 - 0.010 ng/mL   TSH with Reflex    Collection Time: 06/25/19  5:25 AM   Result Value Ref Range    TSH 3.050 0.440 - 3.860 uIU/mL   Basic Metabolic Panel    Collection Time: 06/25/19  5:25 AM   Result Value Ref Range    Sodium 142 135 - 144 mEq/L    Potassium 4.6 3.4 - 4.9 mEq/L    Chloride 107 95 - 107 mEq/L    CO2 25 20 - 31 mEq/L    Anion Gap 10 9 - 15 mEq/L    Glucose 95 70 - 99 mg/dL    BUN 23 8 - 23 mg/dL    CREATININE 0.96 0.70 - 1.20 mg/dL    GFR Non-African American >60.0 >60    GFR  >60.0 >60    Calcium 9.1 8.5 - 9.9 mg/dL   Magnesium    Collection Time: 06/25/19  5:25 AM   Result Value Ref Range    Magnesium 2.4 1.7 - 2.4 mg/dL   EKG 12 Lead    Collection Time: 06/25/19  5:47 AM   Result Value Ref Range Ventricular Rate 50 BPM    Atrial Rate 50 BPM    P-R Interval 168 ms    QRS Duration 154 ms    Q-T Interval 470 ms    QTc Calculation (Bazett) 428 ms    P Axis 40 degrees    R Axis -25 degrees    T Axis -13 degrees       Xr Chest  5/30/2019   Areas of atelectasis, patchy groundglass infiltrate in the bases. Right greater than left . Trace/ small right pleural effusion    Ct Head   5/28/2019  Impression: Bilateral frontal and parietal subdural hematomas. The amount of extra-axial air has slightly decreased since the prior study. No new areas of hemorrhage are identified. All CT scans at this facility use dose modulation, iterative reconstruction, and/or weight based dosing when appropriate to reduce radiation dose to as low as reasonably achievable. Ct Head Wo  : 5/28/2019   DECREASING-SIZED BILATERAL CEREBRAL CONVEXITY SUBDURAL HEMATOMAS AFTER INTERVAL REMOVAL OF BOTH DRAINAGE CATHETERS. Ct Cervical Spine  5/24/2019  Mild to moderate hypertrophic facet changes are present, predominantly on the right at C3-4, and on the left at C2-C3 and C5-6. There is no fracture, significant subluxation, or acute paraspinous soft tissue abnormalities identified. NO FRACTURE OR EVIDENCE OF CERVICAL SPINE INJURY IDENTIFIED. CERVICAL SPONDYLOSIS. Ct Lumbar   5/24/2019  Acute on chronic fracture, L1, with avulsion of the anterior portion of the L1 vertebral body. Findings suspicious for mild to moderate central stenosis at the L3-L4 level. Fl Modified Barium   5/31/2019  EXAMINATION: FL MODIFIED BARIUM SWALLOW W VIDEO: DATE AND TIME: 5/30/2019 at 10:45 AM. CLINICAL HISTORY:    MODERATE ORAL AND PHARYNGEAL DYSFUNCTION WITHOUT ASPIRATION. PLEASE REFER TO SPEECH PATHOLOGY  REPORT FOR RECOMMENDATIONS. Us Dup Lower Extremities Bilateral V 5/29/2019  NO DVT IDENTIFIED IN EITHER LOWER EXTREMITY. CT of the brain without contrast       HISTORY: Fall. History of craniotomy.  Ataxia.       COMPARISON: CT brain from June 1, 2019       TECHNIQUE: Multiple contiguous axial images were obtained of the brain from the skull base through the vertex. Multiplanar reformats were obtained.       FINDINGS:       Prominence of the sulci and ventricles compatible with moderate generalized parenchymal volume loss. Gray-white matter differentiation is preserved. Areas of bilateral supratentorial white matter hypoattenuation are nonspecific but most likely related to    chronic small vessel ischemic changes in a patient of this age.       Postsurgical changes of bilateral frontal craniotomy Again identified are extra-axial fluid collections compatible with chronic subdural hematomas along the frontoparietal convexities bilaterally. Areas of increased density along the right frontal    convexity as seen on axial series 2 image 20 compatible with acute on chronic subdural hemorrhage. The greatest thickness of the subdural hematoma in the right measures approximately 10 mm and the greatest thickness of the subdural, on the left measures    approximately 11 mm, mildly improved when compared to prior examination. There is still mass effect upon the subjacent brain parenchyma. Pneumocephalus has resolved. Basal cisterns are patent. No midline shift.        The visualized paranasal sinuses and mastoid air cells are clear. Calvarium is intact.           Impression       Bilateral chronic subdural hematomas demonstrating interval improvement in size when compared to July 1, 2019 however hyperdense material within the subdural hematoma on the right as above is compatible with acute on chronic right subdural hematoma. No    midline shift. The these findings were relayed to the patient's nurse Michelle White by Dr. Jaciel Bernabe at approximately 4:53 PM on 6/17/2019.        XR HIP BILATERAL W AP PELVIS (3 VIEWS)       CLINICAL HISTORY:  s/p fall        COMPARISONS: None available.       FINDINGS: AP radiograph of the pelvis and cross table lateral radiographs of medical issues, progress in therapies, social issues, psychological issues, barriers to progress and strategies to address these barriers, and discharge planning. See the hand written addendum to rehab progress note. The patient continues to be high risk for future disability and their medical and rehabilitation prognosis continue to be good and therefore, we will continue the patient's rehabilitation course as planned. The patient's tentative discharge date was set. Patient and family education was discussed. The patient was made aware of the team discussion regarding their progress. Discharge plans were discussed along with barriers to progress and strategies to address these barriers, patient encouraged to continue to discuss discharge plans with . Complex Physical Medicine & Rehab Issues Assess & Plan:   1. Severe abnormality of gait and mobility and impaired self-care and ADL's secondary to progressive subdural hematomas . Functional and medical status reassessed regarding patients ability to participate in therapies and patient found to be able to participate in acute intensive comprehensive inpatient rehabilitation program including PT/OT to improve balance, ambulation, ADLs, and to improve the P/AROM. Therapeutic modifications regarding activities in therapies, place, amount of time per day and intensity of therapy made daily. In bed therapies or bedside therapies prn.   2. Bowel and Bladder dysfunction including incontinence : Trial condom catheter at night frequent toileting, ambulate to bathroom with assistance, check post void residuals. Check for C.difficile x1 if >2 loose stools in 24 hours, continue bowel & bladder program.  Monitor bowel and bladder function. Lactinex 2 PO every AC.   MOM prn, Brown Bomb prn, Glycerin suppository prn, enema prn.  3. Severe postoperative craniotomy pain with severe low back pain and generalized OA painAcute on chronic fracture, L1, with about patients impulsivity -and falls risks he is a one-on-one here he will not be able to be a one-on-one in any other setting including skilled facility including home and including hospice. There is indeed a hospice consult on him as he has a limited life expectancy and we are trying to improve the quality of his last years. I have asked nursing and nursing supervisor to do the best to get him off of the one-on-one safely. He needs to have his hearing aids and so he can hear the redirection that the video-assisted monitor and/or nursing is giving him. SP weekly team meeting every Monday to assess progress towards goals, discuss and address social, psychological and medical comorbidities and to address difficulties they may be having progressing in therapy. Patient and family education is in progress. The patient is to follow-up with their family physician after discharge. Complex Active General Medical Issues that complicate care Assess & Plan:    1. Afib with occasional RVR,   Essential hypertension,   Hyperlipidemia,   S/P CABG (coronary artery bypass graft), S/P PTCA (percutaneous transluminal coronary angioplasty),   RBBB,   CAD (coronary artery disease), long-term previous use of blood thinners telemetry to monitor for bradycardia blood pressure checks every shift dose and titrate cardiac medications to include mag oxide, Cardizem, amiodarone, Lopressor, Nitrostat, Pravachol he is not on blood thinners because of his frequent bleeds. , consult hospitals for backup medical and recheck CBC and BMP as needed -reconsult cardiology to evaluate elevated troponins. 2.   Subdural hematoma-status post evacuation with , hold blood thinners-vital signs every shift, dose and titrate cardiac medications, recheck BMP CBC and consult hospitalist for backup medical  3.   prostatic hyperplasia with urinary frequency,   Prostate cancer -check postvoid residuals check stat UA  4.    Facet arthropathy,

## 2019-06-25 NOTE — CARE COORDINATION
Spoke with Kamilah in regards to discharge planning.  Kamilah concerned about acceptance at [facility], second choice [facility] if needed Electronically signed by Chaz Bernstein RN on 6/25/2019 at 11:49 AM

## 2019-06-25 NOTE — CARE COORDINATION
Spoke with Kamilah in regards to discharge. Patient continues to be a 1:1 at this time. Bozena Jeong is concerned about patients medicaid, educated on SW at SNF that can help with LTC. [facility]ilah has chosen [facility]. Information given to [facility], message left for Dr Michael Garza.  Electronically signed by Paola Fisher RN on 6/25/2019 at 5:30 PM

## 2019-06-25 NOTE — PROGRESS NOTES
Speech Language Pathology Treatment Note  Facility/Department: [facility]  6/25/2019    Rehab Dx/Hx: No admission diagnoses are documented for this encounter. Precautions: falls and aspirations    ST Dx: Aphasia, Dysphagia and Cognitive Impairment     Date of Admit: 5/31/2019  Room #: R240/R240-01    Time in: 10:30 AM Time out: 11:00 AM    Subjective:  Alert, Cooperative and Pleasant        Interventions used this date:  Expressive Language and Receptive Language    Objective/Assessment:  Patient progressing towards goals:  Short-term Goals for Speech & Language POC:     Goal 1: Pt will follow 1 step directions given orally with 75% accuracy with mod cues to increase the pt's ability to follow directions provided by caregivers for safe follow through with ADLs. Not addressed    Goal 2: Pt will identify objects/pictures within a field of 2-3 with 75% accuracy with mod cues in order to increase his understanding of objects in his environment for safer and more independent completion of ADLs. [place] required for increased accuracy. Pt ID pictures with 20% acc. given mod-max cues and extended response time. Goal 3: Pt will answer low level yes/no questions with 85% accuracy with mod cues to assist the caregiver in obtaining important information regarding the patient's personal, medical, and safety needs. Pt correctly answered yes/no questions with 75% accuracy with visuals and mod cues. Goal 4: Pt will complete confrontational naming tasks with 90% accuracy with mild verbal cues to help the patient express his/her basic wants and needs. Pt completed confrontational naming tasks with 50% accuracy with mod verbal cues and extended response time. Goal 5: Pt will perform speech automatics (1-20, OMAR, CARLTON) with min cues with 85% accuracy to help the patient express his basic wants and needs.    Not addressed    Short-term Goals for Dysphagia POC:  Goal 1: Pt will demonstrate small bites/sips for safe and efficient swallow of mechanical soft and trials of soft/regular consistencies, given cues as needed, in all given opportunities. Treatment/Activity Tolerance:  Patient tolerated treatment well    Plan:  Continue per POC    Pain:  Patient demonstrated no s/s of pain. - pt denied     Patient/Caregiver Education:  Patient educated on session and progression towards goals. and Education needs reinforcement.     Safety Devices:  Chair alarm in place     Comprehension:  2- Maximal assist    Expression:  2- Maximal assist    Problem Solvin- Total assist    Memory:  1- Total assist    Signature:Electronically signed by CIERRA Heard on 2019 at 12:00 PM

## 2019-06-25 NOTE — PROGRESS NOTES
[facility] Heart Progress Note      Patient: Sintia Martel    Unit/Bed: C437/Q763-77  YOB: 1934  MRN: [id number]  Admit Date:  5/31/2019  Hospital Day: 25    Rounding Date: 6/25/2019    Rounding Cardiologist:  LEYDA Sahu MD    PRIMARY Cardiologist:  Basil Fajardo    Subjective Complaint:   Sitting up in chair and eating lunch. No chest pain or shortness of breath. Converted back to NSR just before initial dose of amiodarone was given. Physical Examination:     /69   Pulse 56   Temp 97 °F (36.1 °C) (Oral)   Resp 14   Ht 6' 0.05\" (1.83 m)   Wt 177 lb 2 oz (80.3 kg)   SpO2 92%   BMI 23.99 kg/m²         Intake/Output Summary (Last 24 hours) at 6/25/2019 1311  Last data filed at 6/25/2019 0815  Gross per 24 hour   Intake 250 ml   Output --   Net 250 ml                  Sintia Martel examined at bedside in in no apparent distress, cooperative and improved. Focused exam reveals:     Cardiac: Heart regular rate and rhythm     Lungs:  clear to auscultation bilaterally- no wheezes, rales or rhonchi, normal air movement, no respiratory distress    Extremities:   negative, none and  edema    Telemetry:      sinus bradycardia       EKG:   Sinus bradycardia  Right bundle branch block  Abnormal ECG  When compared with ECG of 24-JUN-2019 12:19,  Sinus rhythm has replaced Atrial fibrillation  Vent. rate has decreased BY  33 BPM    Echocardiogram Summary:   Left ventricular ejection fraction is visually estimated at 35+/-5%.   Mildly dilated LV   Diffuse hypokinesis.   Inferolateral wall nearly akinetic.  Anteroseptal wall severely   hypokinetic.   No LV thrombus noted.   Severely dilated left atrium.   Normal right ventricle structure and function.   Normal right ventricle systolic pressure.   Normal valvular structure and function.   No significant regurgitant or stenotic valvular lesions.   Signature   ----------------------------------------------------------------   Electronically signed by

## 2019-06-25 NOTE — PROGRESS NOTES
Occupational Therapy  Facility/Department: [facility]  Daily Treatment Note  NAME: Evelyn Calix  : 1934  MRN: [id number]    Date of Service: 2019    Discharge Recommendations:  Continue to assess pending progress       Assessment   Assessment: Pt demonstrated significant increased time to complete task seated at edge of mat. Pt very distracted throughout treatment and required mod verbal cues to redirect. Pt continues to benefit from skilled OT to maximize independence and safety in ADL tasks. REQUIRES OT FOLLOW UP: Yes  Activity Tolerance  Activity Tolerance: Patient Tolerated treatment well  Activity Tolerance: good  Safety Devices  Safety Devices in place: Yes  Type of devices: All fall risk precautions in place  Restraints  Initially in place: No         Patient Diagnosis(es): There were no encounter diagnoses. has a past medical history of Chronic back pain, Hyperlipidemia, and Hypertension. has a past surgical history that includes Cardiac catheterization; Coronary artery bypass graft; and craniotomy (Bilateral, 2019). Restrictions  Restrictions/Precautions  Restrictions/Precautions: Fall Risk  Position Activity Restriction  Other position/activity restrictions: nectar thick   Subjective   General  Chart Reviewed: Yes  Patient assessed for rehabilitation services?: Yes  Response to previous treatment: Patient with no complaints from previous session  Family / Caregiver Present: No  Subjective  Subjective:      General Comment  Comments:      Pain Assessment  Pain Assessment: Faces  Pain Level: 0  Pain Type: Acute pain  Pain Location: Back  Pain Orientation: Lower  Vital Signs  Patient Currently in Pain: Yes Pt did not state location of pain. Pt declined medication from nurse at this time.   Orientation  Orientation  Overall Orientation Status: Within Functional Limits  Orientation Level: Oriented to person;Oriented to situation;Oriented to time  Objective             Balance  Sitting Balance: Supervision       Transfers  Sit to stand: Contact guard assistance  Stand to sit: Contact guard assistance  Transfer Comments: Pt transferred to edge of mat at beginning of treatment session and at the end of first activity. Pt observed transferring from wheelchair to edge of mat with CGA with OTR. Student then took over the session. Pt sat at edge of mat while removing wooden discs from dowels using B UEs to improve functional reaching and crossing midline. Pt then rotated trunk to the L and R sides in order to  the wooden discs and place them back onto the wooden dowels. Pt required significant increased time to complete the task. Pt required mod verbal cues to remove and return discs to board one at a time only. Pt required 4 rest breaks during task. Pt transferred back into wheelchair with CGA. While seated, pt placed small color pegs into peg board using L UE to improve fine motor coordination during self care. Pt with poor attention to task and required max verbal cues to redirect. While seated in wheelchair, pt engaged in ball toss activity using B UEs to promote gross motor coordination and dynamic sitting balance. Pt threw the ball in various planes. Pt demonstrated poor gross motor coordination during activity. Pt with fair attention and min cues to redirect due to distraction in room.        Plan   Plan  Times per week: 5-7   Plan weeks: 4  Current Treatment Recommendations: Strengthening, ROM, Balance Training, Functional Mobility Training, Endurance Training, Safety Education & Training, Cognitive Reorientation, Patient/Caregiver Education & Training, Equipment Evaluation, Education, & procurement, Self-Care / ADL, Cognitive/Perceptual Training, Positioning  Plan Comment: Continue with OT POC  G-Code     OutComes Score      AM-PAC Score    Goals  Patient Goals   Patient goals : Patient is unable to report a goal at this time due to global aphasia       Therapy Time Individual Concurrent Group Co-treatment   Time In 4905         Time Out 1400         Minutes 47           This therapy session was supervised by Kaya Rubi MS OTR/L    Electronically signed by César Allen on 6/25/2019 at 5:29 PM      César Allen

## 2019-06-25 NOTE — PROGRESS NOTES
dependent upon a quiet environment this date. Pt demonstrated significant need for verbal and physical facilitation with inconsistent effectiveness. safety is significantly impacted by this. PLAN OF CARE/Safety:   Safety Devices  Type of devices: Chair alarm in place; Left in chair;Gait belt      Therapy Time:   Individual   Time In 0   Time Out 1500   Minutes 30     Minutes:      Transfer/Bed mobility trainin      Gait training:10      Neuro re education:15           Amparo Rahman PT, 19 at 5:02 PM

## 2019-06-25 NOTE — PROGRESS NOTES
stuck. Toileting: Minimal assistance(pt toileted at beginning of OT session. urinated while seated)  Additional Comments: Verbal cues to initiate pulling pants down and required min A to pull pants up and adjust. CGA for balance while completing pants management. Pt washed hands without verbal cues. Balance  Sitting Balance: Supervision  Standing Balance: Contact guard assistance  Functional Mobility  Functional - Mobility Device: Wheelchair  Activity: To/from bathroom  Assist Level: Dependent/Total  Toilet Transfers  Toilet - Technique: Stand pivot  Equipment Used: Grab bars  Toilet Transfer: Contact guard assistance  Toilet Transfers Comments: Pt completed toilet transfer at beginning of therapy session in order to toilet. Pt impulsive during transfer and requires CGA to complete. Pt required verbal cues for safe hand placement on grab bars. Transfers  Sit to stand: Supervision  Stand to sit: Contact guard assistance  Transfer Comments: Pt impulsive during transfers and requires VCs for safety. Pt pedaled on arm bike using B UEs to improve endurance during transfers and ADLs. Pt pedaled for 10 minutes. Pt required mod verbal cues to redirect. Pt completed task with good endurance. Pt stood while placing graded clips onto vertical bar to promote standing tolerance during ADLs and IADLs. Pt completed sit to stand transfer with supervision. Pt required CGA for stand to sit transfer for hand placement and body alignment with the wheelchair. Pt stood for 17 minutes with good endurance. Pt engaged in feeding session at the beginning of lunch. Team report stated pt required supervision during feeding due to impulsivity. Pt required min assist to open containers. Pt able to feed himself but required min verbal cues to take a smaller bite. Pt demonstrated improving safety and swallowed efficiently before taking another bite. Left pt with BRIDGER Sanchez who is aware of pt's feeding strategies. Plan   Plan  Times per week: 5-7   Plan weeks: 4  Current Treatment Recommendations: Strengthening, ROM, Balance Training, Functional Mobility Training, Endurance Training, Safety Education & Training, Cognitive Reorientation, Patient/Caregiver Education & Training, Equipment Evaluation, Education, & procurement, Self-Care / ADL, Cognitive/Perceptual Training, Positioning  Plan Comment: Continue with OT POC  G-Code     OutComes Score    AM-PAC Score    Goals  Patient Goals   Patient goals : Patient is unable to report a goal at this time due to global aphasia       Therapy Time   Individual Concurrent Group Co-treatment   Time In 1100         Time Out 1215         Minutes 75           This therapy session was supervised by Lesli Arana MS OTR/L   Pt seen for 15 minutes of make up time for missed on 6/24/19.   Electronically signed by Noe Ro on 6/25/2019 at 5:23 PM    Noe Ro

## 2019-06-26 PROCEDURE — 97530 THERAPEUTIC ACTIVITIES: CPT

## 2019-06-26 PROCEDURE — 6370000000 HC RX 637 (ALT 250 FOR IP): Performed by: NEUROLOGICAL SURGERY

## 2019-06-26 PROCEDURE — 99232 SBSQ HOSP IP/OBS MODERATE 35: CPT | Performed by: PHYSICAL MEDICINE & REHABILITATION

## 2019-06-26 PROCEDURE — 92507 TX SP LANG VOICE COMM INDIV: CPT

## 2019-06-26 PROCEDURE — 6370000000 HC RX 637 (ALT 250 FOR IP): Performed by: INTERNAL MEDICINE

## 2019-06-26 PROCEDURE — 6370000000 HC RX 637 (ALT 250 FOR IP): Performed by: PHYSICAL MEDICINE & REHABILITATION

## 2019-06-26 PROCEDURE — 97116 GAIT TRAINING THERAPY: CPT

## 2019-06-26 PROCEDURE — 1180000000 HC REHAB R&B

## 2019-06-26 PROCEDURE — 97112 NEUROMUSCULAR REEDUCATION: CPT

## 2019-06-26 PROCEDURE — 97535 SELF CARE MNGMENT TRAINING: CPT

## 2019-06-26 RX ORDER — LACTULOSE 10 G/15ML
20 SOLUTION ORAL DAILY
Status: DISCONTINUED | OUTPATIENT
Start: 2019-06-26 | End: 2019-06-27

## 2019-06-26 RX ADMIN — AMLODIPINE BESYLATE 2.5 MG: 2.5 TABLET ORAL at 08:33

## 2019-06-26 RX ADMIN — OXYCODONE HYDROCHLORIDE 10 MG: 10 TABLET, FILM COATED, EXTENDED RELEASE ORAL at 18:09

## 2019-06-26 RX ADMIN — MAGNESIUM HYDROXIDE 30 ML: 400 SUSPENSION ORAL at 08:34

## 2019-06-26 RX ADMIN — OXYCODONE HYDROCHLORIDE 10 MG: 10 TABLET, FILM COATED, EXTENDED RELEASE ORAL at 05:25

## 2019-06-26 RX ADMIN — LACTULOSE 20 G: 20 SOLUTION ORAL at 15:44

## 2019-06-26 RX ADMIN — ACETAMINOPHEN 500 MG: 325 TABLET ORAL at 15:43

## 2019-06-26 RX ADMIN — MAGNESIUM OXIDE TAB 400 MG (241.3 MG ELEMENTAL MG) 400 MG: 400 (241.3 MG) TAB at 08:30

## 2019-06-26 RX ADMIN — ACETAMINOPHEN 500 MG: 325 TABLET ORAL at 08:33

## 2019-06-26 RX ADMIN — ZOLPIDEM TARTRATE 5 MG: 5 TABLET ORAL at 20:00

## 2019-06-26 RX ADMIN — METOPROLOL TARTRATE 25 MG: 25 TABLET ORAL at 08:32

## 2019-06-26 RX ADMIN — FAMOTIDINE 20 MG: 20 TABLET ORAL at 08:30

## 2019-06-26 RX ADMIN — AMIODARONE HYDROCHLORIDE 200 MG: 200 TABLET ORAL at 08:32

## 2019-06-26 RX ADMIN — ACETAMINOPHEN 500 MG: 325 TABLET ORAL at 20:00

## 2019-06-26 RX ADMIN — FAMOTIDINE 20 MG: 20 TABLET ORAL at 20:00

## 2019-06-26 RX ADMIN — METOPROLOL TARTRATE 25 MG: 25 TABLET ORAL at 20:00

## 2019-06-26 RX ADMIN — AMIODARONE HYDROCHLORIDE 200 MG: 200 TABLET ORAL at 20:00

## 2019-06-26 RX ADMIN — PRAVASTATIN SODIUM 40 MG: 40 TABLET ORAL at 20:00

## 2019-06-26 ASSESSMENT — PAIN SCALES - WONG BAKER
WONGBAKER_NUMERICALRESPONSE: 0

## 2019-06-26 ASSESSMENT — PAIN SCALES - GENERAL
PAINLEVEL_OUTOF10: 0
PAINLEVEL_OUTOF10: 2
PAINLEVEL_OUTOF10: 0

## 2019-06-26 NOTE — PROGRESS NOTES
Pt hasn't had a documented BM since 6/21. He was given MOM this AM, and prunes. He has a new order for lactulose and will be getting it when he comes back from therapy.  Electronically signed by Laura Forbes LPN on 8/93/1782 at 4:17 PM

## 2019-06-26 NOTE — PROGRESS NOTES
Occupational Therapy  [facility]/Department: [facility]  Daily Treatment Note  NAME: Sintia Martel  : 1934  MRN: [id number]    Date of Service: 2019    Discharge Recommendations:  Continue to assess pending progress    Assessment: Pt required Moderate visual and verbal cues to attend to left visual field. Pt required Minimal verbal and tactile cues to attend to task at hand. Activity Tolerance  Activity Tolerance: Patient Tolerated treatment well  Safety Devices  Safety Devices in place: Yes  Type of devices: All fall risk precautions in place         Patient Diagnosis(es): There were no encounter diagnoses. has a past medical history of Chronic back pain, Hyperlipidemia, and Hypertension. has a past surgical history that includes Cardiac catheterization; Coronary artery bypass graft; and craniotomy (Bilateral, 2019). Restrictions  Restrictions/Precautions  Restrictions/Precautions: Fall Risk  Position Activity Restriction  Other position/activity restrictions: nectar thick      Subjective   General  Chart Reviewed: Yes  Patient assessed for rehabilitation services?: Yes  Response to previous treatment: Patient with no complaints from previous session  Family / Caregiver Present: No  Subjective  Subjective:      General Comment  Comments:      Vital Signs  Patient Currently in Pain: No     Objective  Coordination  Fine Motor: ADL containers: Pt used bilateral hands to open/close various ADL containers as follows:   Large orange pharmacy container: Pt had Mod difficulty and required demonstration with verbal cues to problem solve pushing down and twisting. Small orange pharmacy container: Pt had Mod difficulty and required demonstration with verbal cues to problem solve pushing down and twisting. Small toothpaste container: Pt had no difficulty. Flip-top lotion container: Pt had no difficulty.    Push down and twist medicine container: Pt had Mod difficulty and required demonstration with

## 2019-06-26 NOTE — PROGRESS NOTES
Occupational Therapy  Facility/Department: [facility]  Daily Treatment Note  NAME: Ruthie Clayton  : 1934  MRN: [id number]    Date of Service: 2019    Discharge Recommendations:  Continue to assess pending progress    Assessment: Pt distractable throughout session requiring Moderate cues to attend to therapy session. Therapist pulled curtain partway through session to decreased distractions. Pt's attention span improved with curtain drawn. Activity Tolerance  Activity Tolerance: Patient Tolerated treatment well  Safety Devices  Safety Devices in place: Yes  Type of devices: All fall risk precautions in place         Patient Diagnosis(es): There were no encounter diagnoses. has a past medical history of Chronic back pain, Hyperlipidemia, and Hypertension. has a past surgical history that includes Cardiac catheterization; Coronary artery bypass graft; and craniotomy (Bilateral, 2019). Restrictions  Restrictions/Precautions  Restrictions/Precautions: Fall Risk  Position Activity Restriction  Other position/activity restrictions: nectar thick      Subjective  General  Chart Reviewed: Yes  Patient assessed for rehabilitation services?: Yes  Response to previous treatment: Patient with no complaints from previous session  Family / Caregiver Present: No  Subjective  Subjective:      General Comment  Comments:      Pain Assessment  Pain Assessment: 0-10  Pain Level: 2  Vital Signs  Patient Currently in Pain: Yes     Objective    Cognition  Overall Cognitive Status: Exceptions  Arousal/Alertness: Delayed responses to stimuli  Following Commands: Follows one step commands with repetition; Follows one step commands with increased time  Attention Span: Attends with cues to redirect  Safety Judgement: Decreased awareness of need for assistance  Problem Solving: Decreased awareness of errors  Insights: Decreased awareness of deficits  Initiation: Requires cues for all  Sequencing: Requires cues for all Perception  Overall Perceptual Status: Impaired  Unilateral Attention: Cues to attend right visual field  Initiation: Cues to initiate tasks     Upper Extremity Function  UE Strengthing: Pt wore 1# wrist weights Pt wore 1# wrist weights to don clothes pins onto string at various graded heights and distances. Pt had Mod difficulty with activity and worked at a significantly slow pace with rest breaks throughout. Pt required moderate tactile, visual, and verbal cues to use right hand. To increase BUE strength and endurance for improved transfers. Laundry: Pt used bilateral hands to fold various articles of laundry (towels, wash cloths, and pillow cases). Pt had Mod/Max difficulty with activity and required assistance with verbal and visual cues for each step of folding. To improve bilateral intagration and increase use of RUE for ADL/IADL completion.       Plan   Plan  Times per week: 5-7   Plan weeks: 4  Current Treatment Recommendations: Strengthening, ROM, Balance Training, Functional Mobility Training, Endurance Training, Safety Education & Training, Cognitive Reorientation, Patient/Caregiver Education & Training, Equipment Evaluation, Education, & procurement, Self-Care / ADL, Cognitive/Perceptual Training, Positioning  Plan Comment: Continue with OT POC    Goals  Patient Goals   Patient goals : Patient is unable to report a goal at this time due to global aphasia    Therapy Time   Individual Concurrent Group Co-treatment   Time In 1300         Time Out 1400         Minutes 60           Electronically signed by HECTOR Clarke on 6/26/2019 at 2:07 PM    HECTOR Clarke

## 2019-06-26 NOTE — PROGRESS NOTES
Subjective: The patient complains of severe  acute  confusion secondary to subdural hematoma partially relieved by PT OT, speech-language pathology, recent decompression of the subdural hematomas and exacerbated by fatigue, exhaustion, dehydration. I am concerned about his continued impulsivity and continued risk for falls here and it in a setting given the fact that he is so quick and impulsive with such poor balance. Is very poor insight. He seems less fidgety since his pain is under control however he still trying to get up I think is because he is trying to go to the bathroom and he is not able to verbalize his needs. Patient is to be assisted with toileting every 2 hours while awake and make sure that he is toileted prior to bedtime as soon as he is woken up in the morning. We are trying to prevent his falls and impulsivity as he is trying to get up to go to the bathroom. ROS x   10: The patient also complains of severely impaired mobility and activities of daily living. Otherwise no new problems with vision, hearing, nose, mouth, throat, dermal, cardiovascular, GI, , pulmonary, musculoskeletal, psychiatric or neurological. See Rehab H&P on Rehab chart dated . Vital signs:  BP (!) 144/78   Pulse 59   Temp 98 °F (36.7 °C) (Oral)   Resp 18   Ht 6' 0.05\" (1.83 m)   Wt 177 lb 2 oz (80.3 kg)   SpO2 94%   BMI 23.99 kg/m²   I/O:   PO/Intake:  fair PO intake,   mechanical soft with thin liquids-one-on-one for feeds    Bowel/Bladder:  Incontinent, to be toileted every 2 hours while awake  General:  Patient is well developed, adequately nourished, non-obese and     well kempt. HEENT:    PERRLA, hearing intact to loud voice fall out frequently, external inspection of ear     and nose benign. Inspection of lips, tongue and gums dry  Musculoskeletal: No significant change in strength or tone. All joints stable.       Inspection and palpation of digits and nails show no clubbing, cyanosis or inflammatory conditions. Neuro/Psychiatric: Affect: flat but pleasant. Echolalia. Impulsive. Alert and oriented to person, place and     Situation-with max cues. No significant change in deep tendon reflexes or     Sensation-poor judgment reasoning and insight. Lungs:  Diminished, CTA-B. Respiration effort is normal at rest.     Heart:   S1 = S2, occasionally high heart rate history of A. fib with RVR. No loud murmurs. Abdomen:  Soft, non-tender, no enlargement of liver or spleen. Extremities:  No significant lower extremity edema or tenderness. Skin:   Intact to general survey,  craniotomy incisions healing. Rehabilitation:  Physical therapy: FIMS:  Bed Mobility: Scooting: Moderate assistance    Transfers: Sit to Stand: Minimal Assistance  Stand to sit: Minimal Assistance  Bed to Chair: Minimal assistance, Moderate assistance  Squat Pivot Transfers: Minimal Assistance, Ambulation 1  Surface: carpet  Device: Rollator  Other Apparatus: (no O2)  Assistance: Moderate assistance, Minimal assistance  Quality of Gait: Pt with significant difficulties in effecient and safe use of rollator  with greater facilitory and inhibition techniques from therapist required. Pt with difficulty following verbal instructions and LOB is noted with inconsistency. This session pt demonstrated inconsistency throughout  Gait Deviations: (variable ace and LE placement tasks)  Distance: 75 ft x 2  Comments: rollator controlled by therapist for safety on right side, Stairs  # Steps : 4  Stairs Height: 6\"  Rails: Bilateral  Assistance: Minimal assistance, Contact guard assistance  Comment: inermittent Verbal and Tactile cues given for safety.  - mildly unsteady - improved ability to follow instrcutions noted and less LOB this session    FIMS: Bed, Chair, Wheel Chair: 4 - Requires steadying assistance only <25% assist  and/or requires assist with one leg only  Walk: 2 - Maximal Assistance Requires up to Maximal Assistance AND requires assistance of one person to walk between  feet (Patient performs 25-49% of locomotion effort or goes between  feet)  Distance Walked: 100  Wheel Chair: 0 - Activity Not Assessed/Does Not Occur  Distance Traveled in Wheel Chair: 15'  Stairs: 2- Maximal Assistance Performs 25-49% of the effort to go up and down 4 to 6 stairs and requires the assistance of one person only,  , Assessment: Overall pt presents with continued motor apraxia and inability to consistently follow directions. This is not dependent upon a quiet environment this date. Pt demonstrated significant need for verbal and physical facilitation with inconsistent effectiveness. safety is significantly impacted by this. Occupational therapy: FIMS:  Eatin - Feeds self with setup/supervision/cues and/or requires only setup/supervision/cues to perform tube feedings  Grooming: (ADL)  Bathing: (ADL)  Dressing-Upper: (ADL)  Dressing-Lower: (ADL)  Toileting: (pt wearing hospital depends)  Toilet Transfer: 4 - Requires steadying assistance only < 25% assist  Shower Transfer: 4 - Minimal contact assistance, pt. expends 75% or more effort,  , Assessment: Pt demonstrated significant increased time to complete task seated at edge of mat. Pt very distracted throughout treatment and required mod verbal cues to redirect. Pt continues to benefit from skilled OT to maximize independence and safety in ADL tasks.     Speech therapy: FIMS: Comprehension: 2 - Patient understands basic needs 25-49% of the time  Expression: 2 - Expresses basic ideas/needs 25-49% of the time  Social Interaction: 2 - Patient appropriate  25%-49% of the time  Problem Solvin - Patient solves simple/routine tasks < 25%  Memory: 1 - Patient remembers < 25% of the time      Lab/X-ray studies reviewed, analyzed and discussed with patient and staff:   Recent Results (from the past 24 hour(s))   Troponin    Collection Time: 19  2:08 PM   Result Value Ref Range    Troponin 0.055 (HH) 0.000 - 0.010 ng/mL       Xr Chest  5/30/2019   Areas of atelectasis, patchy groundglass infiltrate in the bases. Right greater than left . Trace/ small right pleural effusion    Ct Head   5/28/2019  Impression: Bilateral frontal and parietal subdural hematomas. The amount of extra-axial air has slightly decreased since the prior study. No new areas of hemorrhage are identified. All CT scans at this facility use dose modulation, iterative reconstruction, and/or weight based dosing when appropriate to reduce radiation dose to as low as reasonably achievable. Ct Head Wo  : 5/28/2019   DECREASING-SIZED BILATERAL CEREBRAL CONVEXITY SUBDURAL HEMATOMAS AFTER INTERVAL REMOVAL OF BOTH DRAINAGE CATHETERS. Ct Cervical Spine  5/24/2019  Mild to moderate hypertrophic facet changes are present, predominantly on the right at C3-4, and on the left at C2-C3 and C5-6. There is no fracture, significant subluxation, or acute paraspinous soft tissue abnormalities identified. NO FRACTURE OR EVIDENCE OF CERVICAL SPINE INJURY IDENTIFIED. CERVICAL SPONDYLOSIS. Ct Lumbar   5/24/2019  Acute on chronic fracture, L1, with avulsion of the anterior portion of the L1 vertebral body. Findings suspicious for mild to moderate central stenosis at the L3-L4 level. Fl Modified Barium   5/31/2019  EXAMINATION: FL MODIFIED BARIUM SWALLOW W VIDEO: DATE AND TIME: 5/30/2019 at 10:45 AM. CLINICAL HISTORY:    MODERATE ORAL AND PHARYNGEAL DYSFUNCTION WITHOUT ASPIRATION. PLEASE REFER TO SPEECH PATHOLOGY  REPORT FOR RECOMMENDATIONS. Us Dup Lower Extremities Bilateral V 5/29/2019  NO DVT IDENTIFIED IN EITHER LOWER EXTREMITY. CT of the brain without contrast       HISTORY: Fall. History of craniotomy. Ataxia.       COMPARISON: CT brain from June 1, 2019       TECHNIQUE: Multiple contiguous axial images were obtained of the brain from the skull base through the vertex.  Multiplanar reformats were obtained.       FINDINGS:       Prominence of the sulci and ventricles compatible with moderate generalized parenchymal volume loss. Gray-white matter differentiation is preserved. Areas of bilateral supratentorial white matter hypoattenuation are nonspecific but most likely related to    chronic small vessel ischemic changes in a patient of this age.       Postsurgical changes of bilateral frontal craniotomy Again identified are extra-axial fluid collections compatible with chronic subdural hematomas along the frontoparietal convexities bilaterally. Areas of increased density along the right frontal    convexity as seen on axial series 2 image 20 compatible with acute on chronic subdural hemorrhage. The greatest thickness of the subdural hematoma in the right measures approximately 10 mm and the greatest thickness of the subdural, on the left measures    approximately 11 mm, mildly improved when compared to prior examination. There is still mass effect upon the subjacent brain parenchyma. Pneumocephalus has resolved. Basal cisterns are patent. No midline shift.        The visualized paranasal sinuses and mastoid air cells are clear. Calvarium is intact.           Impression       Bilateral chronic subdural hematomas demonstrating interval improvement in size when compared to July 1, 2019 however hyperdense material within the subdural hematoma on the right as above is compatible with acute on chronic right subdural hematoma. No    midline shift. The these findings were relayed to the patient's nurse Maite Echols by Dr. Arabella Rdz at approximately 4:53 PM on 6/17/2019. XR HIP BILATERAL W AP PELVIS (3 VIEWS)       CLINICAL HISTORY:  s/p fall        COMPARISONS: None available.       FINDINGS: AP radiograph of the pelvis and cross table lateral radiographs of each hip are submitted. No acute fracture or dislocation is identified. The hip joint spaces are symmetrically maintained. There is lumbar spondylosis.          Issues Assess & Plan:   1. Severe abnormality of gait and mobility and impaired self-care and ADL's secondary to progressive subdural hematomas . Functional and medical status reassessed regarding patients ability to participate in therapies and patient found to be able to participate in acute intensive comprehensive inpatient rehabilitation program including PT/OT to improve balance, ambulation, ADLs, and to improve the P/AROM. Therapeutic modifications regarding activities in therapies, place, amount of time per day and intensity of therapy made daily. In bed therapies or bedside therapies prn.   2. Bowel and Bladder dysfunction including incontinence : Trial condom catheter at night frequent toileting, ambulate to bathroom with assistance, check post void residuals. Check for C.difficile x1 if >2 loose stools in 24 hours, continue bowel & bladder program.  Monitor bowel and bladder function. Lactinex 2 PO every AC. MOM prn, Brown Bomb prn, Glycerin suppository prn, enema prn.  3. Severe postoperative craniotomy pain with severe low back pain and generalized OA painAcute on chronic fracture, L1, with avulsion of the anterior portion of the L1 vertebral body. Findings suspicious for mild to moderate central stenosis at the L3-L4 level.: reassess pain every shift and prior to and after each therapy session, give prn Tylenol and schedull and consider Norco, modalities prn in therapy, Lidoderm, K-pad prn. Titration of every 12 hour OxyContin 10 mg consider 10 mg during the day to help relieve his pain  4. Skin healing bilateral upper extremity bruising and breakdown risk: Side-to-side turns add CoQ10 continue pressure relief program.  Daily skin exams and reports from nursing. 5. Severe Fatigue due to nutritional and hydration deficiency:    continue to monitor I&Os, calorie counts prn, dietary consult prn. Discontinue nocturnal IVs as patient is now on mechanical soft and thin liquids  6.  Acute episodic insomnia with situational adjustment disorder:   Add scheduled sleeping medication Ambien, monitor for day time sedation. 7. Falls risk elevated: Patient is to be assisted with toileting every 2 hours while awake and make sure that he is toileted prior to bedtime as soon as he is woken up in the morning. We are trying to prevent his falls and impulsivity as he is trying to get up to go to the bathroom. Patient is vacillating between needing a one-on-one and video monitoring with frequent rounding. Patient is a one-on-one as needed. We will make sure his hearing aids are in we will post signs or visual cues so that he does not get up on his own and keep reminding him to use the call light. Patient to use call light to get nursing assistance to get up, bed and chair alarm. Had a witnessed fall on again on 6/20/2019 however patient's staff was right next to him and he did not injure himself. 8. Elevated DVT risk: progressive activities in PT, continue prophylaxis JOSE DE JESUS hose, elevation and  patient is not on a blood thinner secondary to recent subdural hematomas . 9. Complex discharge planning:  Discharge anchor [facility] 6/28/19 skilled level and then eventually long-term care at a wheelchair level. He will likely need 24-hour supervision at discharge. His significant other will help him apply for Medicaid as he will likely need care for the rest of his life. I am concerned about patients impulsivity -and falls risks he is a one-on-one here he will not be able to be a one-on-one in any other setting including skilled facility including home and including hospice. There is indeed a hospice consult on him as he has a limited life expectancy and we are trying to improve the quality of his last years. I have asked nursing and nursing supervisor to do the best to get him off of the one-on-one safely.   He needs to have his hearing aids and so he can hear the redirection that the video-assisted monitor and/or nursing is giving him. SP weekly team meeting every Monday to assess progress towards goals, discuss and address social, psychological and medical comorbidities and to address difficulties they may be having progressing in therapy. Patient and family education is in progress. The patient is to follow-up with their family physician after discharge. Complex Active General Medical Issues that complicate care Assess & Plan:    1. Afib with occasional RVR,   Essential hypertension,   Hyperlipidemia,   S/P CABG (coronary artery bypass graft), S/P PTCA (percutaneous transluminal coronary angioplasty),   RBBB,   CAD (coronary artery disease), long-term previous use of blood thinners telemetry to monitor for bradycardia blood pressure checks every shift dose and titrate cardiac medications to include mag oxide, Cardizem, amiodarone, Lopressor, Nitrostat, Pravachol he is not on blood thinners because of his frequent bleeds. , consult hospitals for backup medical and recheck CBC and BMP as needed -reconsult cardiology to evaluate elevated troponins. 2.   Subdural hematoma-status post evacuation with , hold blood thinners-vital signs every shift, dose and titrate cardiac medications, recheck BMP CBC and consult hospitalist for backup medical  3.   prostatic hyperplasia with urinary frequency,   Prostate cancer -check postvoid residuals check stat UA  4. Facet arthropathy, lumbosacral,   Chronic back pain,   Spondylolisthesis of lumbar region-add Lidoderm, Tylenol, Norco as needed  5. Gross hematuria, Long term current use of anticoagulant-check UA monitor urine for blood  6. Long-term use of aspirin therapy and other blood thinners on hold at present because of recent subdural hematoma  7. SHAYY on CPAP-add CPAP from home if available is not available add CPAP from the hospital  8. Prediabetes-recheck BMP, low carb diet  9.    Vitamin D deficiency-high-dose vitamin D, add Tums at daily dose of vitamin D after high doses done recheck as an outpatient  10. Aphasia, and oropharyngeal dysphagia-recently which pathology consult, add e-stim, add supplementation to diet push oral fluids  11. Poorly nourished due to poor nutrition and previous alcohol use BMI 26.0-26.9,adult--add protein supplementation vitamin B12 vitamin D CoQ10,  to stop drinking alcohol as an outpatient and at chemical dependency counseling when patient is able to participate  12. Complex social situation-patient has a girlfriend he lives alone. He is estranged from his several children. Continue to work on improving social interactions. Palliative care versus hospice services while in the skilled facility. Patient and family to decide whether either of these services would benefit. 13. No trauma after fall on 6/17/2019. Long discussions with nursing regarding keeping him safe in the room see above. Patient's family his girlfriend and staff to work together to have patient to be safe without one-on-one as this is not a sustainable type of a scenario. He needs to learn how to be safe without having somebody constantly. By his side because this will not be a real life scenario.         Maribell Pride D.O., PM&R     Attending    [address] [facility]

## 2019-06-26 NOTE — PROGRESS NOTES
presents with improved abilit to focus and follow directions. He continues with poor carry over and poor anticipation of environmental influences    PLAN OF CARE/Safety:   Safety Devices  Type of devices: Chair alarm in place; Left in chair;Gait belt      Therapy Time:   Individual   Time In 843   Time Out 925   Minutes 42     Minutes:      Transfer/Bed mobility training: 15      Gait trainin      Neuro re education:12        Alma Delia Montalvo PT, 19 at 1:12 PM

## 2019-06-26 NOTE — PROGRESS NOTES
Dr. Marylee Showman updated regarding patient's constipation, LBM noted 6-21. Lactulose daily ordered. LPN aware. Bedside commode put in patient's room per Dr. Stephanie Palomino request. Focus assessment complete.  Electronically signed by Xiomy Babcock RN on 6/26/19 at 1:38 PM

## 2019-06-26 NOTE — PROGRESS NOTES
Assessment complete. VSS. LBM 6/21/2019. MOM and prunes given. Pt remains 1:1 for safety. At present time, pt sleeping. Medicated w/ scheduled ambien and tylenol. Voices no complaints and appears to be in no distress at this time. Will continue to monitor.

## 2019-06-27 PROCEDURE — 97116 GAIT TRAINING THERAPY: CPT

## 2019-06-27 PROCEDURE — 94762 N-INVAS EAR/PLS OXIMTRY CONT: CPT

## 2019-06-27 PROCEDURE — 6370000000 HC RX 637 (ALT 250 FOR IP): Performed by: PHYSICAL MEDICINE & REHABILITATION

## 2019-06-27 PROCEDURE — 97112 NEUROMUSCULAR REEDUCATION: CPT

## 2019-06-27 PROCEDURE — 97530 THERAPEUTIC ACTIVITIES: CPT

## 2019-06-27 PROCEDURE — 6370000000 HC RX 637 (ALT 250 FOR IP): Performed by: INTERNAL MEDICINE

## 2019-06-27 PROCEDURE — 1180000000 HC REHAB R&B

## 2019-06-27 PROCEDURE — 97535 SELF CARE MNGMENT TRAINING: CPT

## 2019-06-27 PROCEDURE — 92507 TX SP LANG VOICE COMM INDIV: CPT

## 2019-06-27 PROCEDURE — 6370000000 HC RX 637 (ALT 250 FOR IP): Performed by: NEUROLOGICAL SURGERY

## 2019-06-27 PROCEDURE — 99231 SBSQ HOSP IP/OBS SF/LOW 25: CPT | Performed by: PHYSICAL MEDICINE & REHABILITATION

## 2019-06-27 RX ORDER — ZOLPIDEM TARTRATE 5 MG/1
5 TABLET ORAL NIGHTLY
Qty: 14 TABLET | Refills: 0 | Status: SHIPPED | OUTPATIENT
Start: 2019-06-27 | End: 2019-07-11

## 2019-06-27 RX ORDER — LACTULOSE 10 G/15ML
20 SOLUTION ORAL DAILY PRN
Status: DISCONTINUED | OUTPATIENT
Start: 2019-06-27 | End: 2019-06-28 | Stop reason: HOSPADM

## 2019-06-27 RX ORDER — OXYCODONE HCL 10 MG/1
10 TABLET, FILM COATED, EXTENDED RELEASE ORAL EVERY 12 HOURS SCHEDULED
Qty: 60 EACH | Refills: 0 | Status: SHIPPED | OUTPATIENT
Start: 2019-06-27 | End: 2019-07-11

## 2019-06-27 RX ORDER — HYDROCODONE BITARTRATE AND ACETAMINOPHEN 5; 325 MG/1; MG/1
1 TABLET ORAL EVERY 4 HOURS PRN
Qty: 20 TABLET | Refills: 0 | Status: SHIPPED | OUTPATIENT
Start: 2019-06-27 | End: 2019-07-04

## 2019-06-27 RX ORDER — LACTULOSE 10 G/15ML
20 SOLUTION ORAL DAILY
Qty: 1 BOTTLE | Refills: 2 | Status: ON HOLD | OUTPATIENT
Start: 2019-06-27 | End: 2019-07-19 | Stop reason: HOSPADM

## 2019-06-27 RX ADMIN — AMIODARONE HYDROCHLORIDE 200 MG: 200 TABLET ORAL at 21:29

## 2019-06-27 RX ADMIN — AMIODARONE HYDROCHLORIDE 200 MG: 200 TABLET ORAL at 10:14

## 2019-06-27 RX ADMIN — METOPROLOL TARTRATE 25 MG: 25 TABLET ORAL at 21:29

## 2019-06-27 RX ADMIN — OXYCODONE HYDROCHLORIDE 10 MG: 10 TABLET, FILM COATED, EXTENDED RELEASE ORAL at 06:00

## 2019-06-27 RX ADMIN — PRAVASTATIN SODIUM 40 MG: 40 TABLET ORAL at 21:29

## 2019-06-27 RX ADMIN — ACETAMINOPHEN 500 MG: 325 TABLET ORAL at 21:29

## 2019-06-27 RX ADMIN — MAGNESIUM OXIDE TAB 400 MG (241.3 MG ELEMENTAL MG) 400 MG: 400 (241.3 MG) TAB at 10:14

## 2019-06-27 RX ADMIN — FAMOTIDINE 20 MG: 20 TABLET ORAL at 10:14

## 2019-06-27 RX ADMIN — AMLODIPINE BESYLATE 2.5 MG: 2.5 TABLET ORAL at 10:14

## 2019-06-27 RX ADMIN — FAMOTIDINE 20 MG: 20 TABLET ORAL at 21:29

## 2019-06-27 RX ADMIN — OXYCODONE HYDROCHLORIDE 10 MG: 10 TABLET, FILM COATED, EXTENDED RELEASE ORAL at 17:53

## 2019-06-27 RX ADMIN — ACETAMINOPHEN 500 MG: 325 TABLET ORAL at 15:18

## 2019-06-27 RX ADMIN — METOPROLOL TARTRATE 25 MG: 25 TABLET ORAL at 10:12

## 2019-06-27 RX ADMIN — ACETAMINOPHEN 500 MG: 325 TABLET ORAL at 10:13

## 2019-06-27 RX ADMIN — ZOLPIDEM TARTRATE 5 MG: 5 TABLET ORAL at 21:29

## 2019-06-27 ASSESSMENT — PAIN SCALES - GENERAL
PAINLEVEL_OUTOF10: 0
PAINLEVEL_OUTOF10: 3
PAINLEVEL_OUTOF10: 0

## 2019-06-27 ASSESSMENT — PAIN SCALES - WONG BAKER
WONGBAKER_NUMERICALRESPONSE: 0

## 2019-06-27 ASSESSMENT — PAIN DESCRIPTION - LOCATION
LOCATION: BACK
LOCATION: BACK

## 2019-06-27 ASSESSMENT — PAIN DESCRIPTION - PAIN TYPE: TYPE: ACUTE PAIN

## 2019-06-27 ASSESSMENT — PAIN DESCRIPTION - ORIENTATION: ORIENTATION: LOWER

## 2019-06-27 NOTE — PROGRESS NOTES
Occupational Therapy  Facility/Department: [facility]  Daily Treatment Note  NAME: Judi Davis  : 1934  MRN: [id number]    Date of Service: 2019    Discharge Recommendations:  Continue to assess pending progress       Assessment   Assessment: Pt demonstrates regression towards LTGs. Pt unable to follow one step commands consistently, initiate tasks, sequence tasks. Required max verbal cues this morning. Pt continues to benefit from skilled OT to maximize independence and safety in ADL tasks. REQUIRES OT FOLLOW UP: Yes  Activity Tolerance  Activity Tolerance: Patient Tolerated treatment well  Activity Tolerance: good  Safety Devices  Safety Devices in place: Yes  Type of devices: All fall risk precautions in place  Restraints  Initially in place: No         Patient Diagnosis(es): The primary encounter diagnosis was Subdural hematoma ([facility] Diagnoses of Closed compression fracture of first lumbar vertebra, initial encounter ([facility] and Insomnia secondary to chronic pain were also pertinent to this visit. has a past medical history of Chronic back pain, Hyperlipidemia, and Hypertension. has a past surgical history that includes Cardiac catheterization; Coronary artery bypass graft; and craniotomy (Bilateral, 2019).     Restrictions  Restrictions/Precautions  Restrictions/Precautions: Fall Risk  Position Activity Restriction  Other position/activity restrictions: nectar thick   Subjective   General  Chart Reviewed: Yes  Patient assessed for rehabilitation services?: Yes  Response to previous treatment: Patient with no complaints from previous session  Family / Caregiver Present: No  Subjective  Subjective:      General Comment  Comments:      Pain Assessment  Pain Assessment: 0-10  Pain Level: 0  Pain Location: Back  Vital Signs  Patient Currently in Pain: Yes   Orientation  Orientation  Overall Orientation Status: Within Functional Limits  Orientation Level: Oriented to person;Oriented to situation;Oriented to time  Objective    ADL  Feeding: Supervision  Grooming: Verbal cueing;Setup  UE Bathing: Verbal cueing;Stand by assistance(perseverates when washing face)  LE Bathing: Minimal assistance(thoroughness for buttocks)  UE Dressing: Verbal cueing; Moderate assistance  LE Dressing: Maximum assistance(oriented underpants and pants prior to putting them on, required assist to pull up pants)  Toileting: Moderate assistance(incontinent at beginning, pt began urinating before sitting down on toilet, pt stopped, then sat on toilet to continue urinating. Pull pants down and up)  Additional Comments: Pt with signiicant decreased progress towards LTG, poor initiation, poor sequencing compared to last week. Stood impulsively multiple times during shower and while dressing with difficulty to redirect. Balance  Sitting Balance: Supervision  Standing Balance: Contact guard assistance  Functional Mobility  Functional - Mobility Device: Rolling Walker  Activity: To/from bathroom  Assist Level: Contact guard assistance  Functional Mobility Comments: Pt continues to be impulsive while ambulating. Poor awareness of L side. Poor use of [employer]. Inconsistent recall of need to bring [employer] all the way to destination surface. Toilet Transfers  Toilet - Technique: Ambulating  Equipment Used: Grab bars  Toilet Transfer: Contact guard assistance  Toilet Transfers Comments: Pt required max verbal cues to complete transfer. Pt with poor ablity to follow commands  Shower Transfers  Shower - Transfer From: Mara Montes - Transfer Type: To and From  Shower - Transfer To:  Shower seat with back  Shower - Technique: Ambulating  Shower Transfers: Contact Guard     Transfers  Sit to stand: Contact guard assistance  Stand to sit: Contact guard assistance  Transfer Comments: Pt required verbal cues to complete all transfers                       Cognition  Cognition Comment: Comp 2 Express 2 Social 2 Prob 1 Memory 1 Plan   Plan  Times per week: 5-7   Plan weeks: 4  Current Treatment Recommendations: Strengthening, ROM, Balance Training, Functional Mobility Training, Endurance Training, Safety Education & Training, Cognitive Reorientation, Patient/Caregiver Education & Training, Equipment Evaluation, Education, & procurement, Self-Care / ADL, Cognitive/Perceptual Training, Positioning  Plan Comment: Continue with OT POC  G-Code     OutComes Score    AM-PAC Score    Goals  Patient Goals   Patient goals : Patient is unable to report a goal at this time due to global aphasia       Therapy Time   Individual Concurrent Group Co-treatment   Time In 0830         Time Out 0930         Minutes 60           This therapy session was supervised by Alysa Gipson MS OTR/L    Electronically signed by Jovon Connor on 6/27/2019 at 4:36 PM      Jovon Connor

## 2019-06-27 NOTE — PLAN OF CARE
Care plan updated. Pt is a one on one due to impulsiveness and frequent falls.  Electronically signed by Ling Crowley RN on 6/27/2019 at 11:34 AM

## 2019-06-27 NOTE — PROGRESS NOTES
Subjective: The patient complains of severe  acute  confusion secondary to subdural hematoma partially relieved by PT OT, speech-language pathology, recent decompression of the subdural hematomas and exacerbated by fatigue, exhaustion, dehydration. I am concerned about his continued risk for falls and impulsivity. We have started more aggressive toileting program to make sure that that is not the reason for his impulsivity  -also he seems more comfortable and more relaxed mellitus pain is under control. We have found that institution will be able to provide appropriate care and he is scheduled for discharge to [facility] in the care of their staff with emotional support and visiting from his significant other. The rest of his family and friends seem to be estranged from him. ROS x   10: The patient also complains of severely impaired mobility and activities of daily living. Otherwise no new problems with vision, hearing, nose, mouth, throat, dermal, cardiovascular, GI, , pulmonary, musculoskeletal, psychiatric or neurological. See Rehab H&P on Rehab chart dated . Vital signs:  /67   Pulse 50   Temp 97 °F (36.1 °C) (Oral)   Resp 18   Ht 6' 0.05\" (1.83 m)   Wt 177 lb 2 oz (80.3 kg)   SpO2 93%   BMI 23.99 kg/m²   I/O:   PO/Intake:  fair PO intake,   mechanical soft with thin liquids-one-on-one for feeds    Bowel/Bladder:  Incontinent, to be toileted every 2 hours while awake  General:  Patient is well developed, adequately nourished, non-obese and     well kempt. HEENT:    PERRLA, hearing intact to loud voice fall out frequently, external inspection of ear     and nose benign. Inspection of lips, tongue and gums dry  Musculoskeletal: No significant change in strength or tone. All joints stable. Inspection and palpation of digits and nails show no clubbing,       cyanosis or inflammatory conditions. Neuro/Psychiatric: Affect: flat but pleasant. Echolalia. Impulsive. Alert and oriented to person, place and     Situation-with max cues. No significant change in deep tendon reflexes or     Sensation-poor judgment reasoning and insight. Lungs:  Diminished, CTA-B. Respiration effort is normal at rest.     Heart:   S1 = S2, occasionally high heart rate history of A. fib with RVR. No loud murmurs. Abdomen:  Soft, non-tender, no enlargement of liver or spleen. Extremities:  No significant lower extremity edema or tenderness. Skin:   Intact to general survey,  craniotomy incisions healing. Rehabilitation:  Physical therapy: FIMS:  Bed Mobility: Scooting:  Moderate assistance    Transfers: Sit to Stand: Stand by assistance  Stand to sit: Stand by assistance  Bed to Chair: Minimal assistance  Squat Pivot Transfers: Minimal Assistance, Ambulation 1  Surface: level tile, carpet  Device: Rollator  Other Apparatus: (no O2)  Assistance: Minimal assistance  Quality of Gait: variable step length and motor control, variable ability to scan environment, variable safe use of walker  Gait Deviations: (variable ace and LE placement tasks)  Distance: 150 feet  Comments: pt with right neglect evident by walking close to items on his right - he did demonstrate improvement in not running into objects with only intermittent cueing , Stairs  # Steps : 4  Stairs Height: 6\"  Rails: Bilateral  Assistance: Minimal assistance, Contact guard assistance  Comment: physical and verbal cues required for task completion    FIMS: Bed, Chair, Wheel Chair: 4 - Requires steadying assistance only <25% assist  and/or requires assist with one leg only  Walk: 4 - Contact Guard/Minimal Assistance Requires up to Contact Guard or Minimal Assistance to walk at least 150 feet  Distance Walked: 200  Wheel Chair: 0 - Activity Not Assessed/Does Not Occur  Distance Traveled in Wheel Chair: 15'  Stairs: 2- Maximal Assistance Performs 25-49% of the effort to go up and down 4 to 6 stairs and requires the assistance of one person only,  , Assessment: Pt presents with improved ability to focus and follow directions. He continues with poor carry over and poor anticipation of environmental influences    Occupational therapy: FIMS:  Eatin - Feeds self with setup/supervision/cues and/or requires only setup/supervision/cues to perform tube feedings  Groomin - Requires setup/cues to do all tasks  Bathin - Able to bathe 3-4 areas  Dressing-Upper: 2 - Requires assist with 3 tasks  Dressing-Lower: 2 - Requires assist with 4-5 parts of dressing  Toileting: 3 - Able to perform 2 tasks  Toilet Transfer: 4 - Requires steadying assistance only < 25% assist  Shower Transfer: 4 - Minimal contact assistance, pt. expends 75% or more effort,  , Assessment: Pt demonstrated significant increased time to complete task seated at edge of mat. Pt very distracted throughout treatment and required mod verbal cues to redirect. Pt continues to benefit from skilled OT to maximize independence and safety in ADL tasks. Speech therapy: FIMS: Comprehension: 2 - Patient understands basic needs 25-49% of the time  Expression: 2 - Expresses basic ideas/needs 25-49% of the time  Social Interaction: 3 - Patient appropriate  50%-74% of the time  Problem Solvin - Patient solves simple/routine tasks < 25%  Memory: 1 - Patient remembers < 25% of the time      Lab/X-ray studies reviewed, analyzed and discussed with patient and staff:   No results found for this or any previous visit (from the past 24 hour(s)). Xr Chest  2019   Areas of atelectasis, patchy groundglass infiltrate in the bases. Right greater than left . Trace/ small right pleural effusion    Ct Head   2019  Impression: Bilateral frontal and parietal subdural hematomas. The amount of extra-axial air has slightly decreased since the prior study. No new areas of hemorrhage are identified.  All CT scans at this facility use dose modulation, iterative reconstruction, and/or weight based dosing when appropriate to reduce radiation dose to as low as reasonably achievable. Ct Head Wo  : 5/28/2019   DECREASING-SIZED BILATERAL CEREBRAL CONVEXITY SUBDURAL HEMATOMAS AFTER INTERVAL REMOVAL OF BOTH DRAINAGE CATHETERS. Ct Cervical Spine  5/24/2019  Mild to moderate hypertrophic facet changes are present, predominantly on the right at C3-4, and on the left at C2-C3 and C5-6. There is no fracture, significant subluxation, or acute paraspinous soft tissue abnormalities identified. NO FRACTURE OR EVIDENCE OF CERVICAL SPINE INJURY IDENTIFIED. CERVICAL SPONDYLOSIS. Ct Lumbar   5/24/2019  Acute on chronic fracture, L1, with avulsion of the anterior portion of the L1 vertebral body. Findings suspicious for mild to moderate central stenosis at the L3-L4 level. Fl Modified Barium   5/31/2019  EXAMINATION: FL MODIFIED BARIUM SWALLOW W VIDEO: DATE AND TIME: 5/30/2019 at 10:45 AM. CLINICAL HISTORY:    MODERATE ORAL AND PHARYNGEAL DYSFUNCTION WITHOUT ASPIRATION. PLEASE REFER TO SPEECH PATHOLOGY  REPORT FOR RECOMMENDATIONS. Us Dup Lower Extremities Bilateral V 5/29/2019  NO DVT IDENTIFIED IN EITHER LOWER EXTREMITY. CT of the brain without contrast       HISTORY: Fall. History of craniotomy. Ataxia.       COMPARISON: CT brain from June 1, 2019       TECHNIQUE: Multiple contiguous axial images were obtained of the brain from the skull base through the vertex. Multiplanar reformats were obtained.       FINDINGS:       Prominence of the sulci and ventricles compatible with moderate generalized parenchymal volume loss. Gray-white matter differentiation is preserved.  Areas of bilateral supratentorial white matter hypoattenuation are nonspecific but most likely related to    chronic small vessel ischemic changes in a patient of this age.       Postsurgical changes of bilateral frontal craniotomy Again identified are extra-axial fluid collections compatible with chronic subdural hematomas along the frontoparietal convexities bilaterally. Areas of increased density along the right frontal    convexity as seen on axial series 2 image 20 compatible with acute on chronic subdural hemorrhage. The greatest thickness of the subdural hematoma in the right measures approximately 10 mm and the greatest thickness of the subdural, on the left measures    approximately 11 mm, mildly improved when compared to prior examination. There is still mass effect upon the subjacent brain parenchyma. Pneumocephalus has resolved. Basal cisterns are patent. No midline shift.        The visualized paranasal sinuses and mastoid air cells are clear. Calvarium is intact.           Impression       Bilateral chronic subdural hematomas demonstrating interval improvement in size when compared to July 1, 2019 however hyperdense material within the subdural hematoma on the right as above is compatible with acute on chronic right subdural hematoma. No    midline shift. The these findings were relayed to the patient's nurse Joshua Kate by Dr. Sun Bland at approximately 4:53 PM on 6/17/2019. XR HIP BILATERAL W AP PELVIS (3 VIEWS)       CLINICAL HISTORY:  s/p fall        COMPARISONS: None available.       FINDINGS: AP radiograph of the pelvis and cross table lateral radiographs of each hip are submitted. No acute fracture or dislocation is identified. The hip joint spaces are symmetrically maintained. There is lumbar spondylosis.         Impression   NEGATIVE BILATERAL HIP RADIOGRAPHS. XR SHOULDER RIGHT (MIN 3 VIEWS)       CLINICAL HISTORY:  fall        COMPARISONS: None available.       FINDINGS: No acute fracture or dislocation is identified. There is mild acromioclavicular arthrosis. There are mild degenerative changes of the glenohumeral articulation. No periarticular soft tissue calcifications are identified. There are    cervicothoracic spine degenerative changes.  Sternotomy wires are noted.           Impression   MILD DEGENERATIVE time per day and intensity of therapy made daily. In bed therapies or bedside therapies prn.   2. Bowel and Bladder dysfunction including incontinence : Trial condom catheter at night frequent toileting, ambulate to bathroom with assistance, check post void residuals. Check for C.difficile x1 if >2 loose stools in 24 hours, continue bowel & bladder program.  Monitor bowel and bladder function. Lactinex 2 PO every AC. MOM prn, Brown Bomb prn, Glycerin suppository prn, enema prn.  3. Severe postoperative craniotomy pain with severe low back pain and generalized OA painAcute on chronic fracture, L1, with avulsion of the anterior portion of the L1 vertebral body. Findings suspicious for mild to moderate central stenosis at the L3-L4 level.: reassess pain every shift and prior to and after each therapy session, give prn Tylenol and schedull and consider Norco, modalities prn in therapy, Lidoderm, K-pad prn. Titration of every 12 hour OxyContin 10 mg consider 10 mg during the day to help relieve his pain  4. Skin healing bilateral upper extremity bruising and breakdown risk: Side-to-side turns add CoQ10 continue pressure relief program.  Daily skin exams and reports from nursing. 5. Severe Fatigue due to nutritional and hydration deficiency:    continue to monitor I&Os, calorie counts prn, dietary consult prn. Discontinue nocturnal IVs as patient is now on mechanical soft and thin liquids  6. Acute episodic insomnia with situational adjustment disorder:   Add scheduled sleeping medication Ambien, monitor for day time sedation. 7. Falls risk elevated: Patient is to be assisted with toileting every 2 hours while awake and make sure that he is toileted prior to bedtime as soon as he is woken up in the morning. We are trying to prevent his falls and impulsivity as he is trying to get up to go to the bathroom. Patient is vacillating between needing a one-on-one and video monitoring with frequent rounding.   Patient is a one-on-one as needed. We will make sure his hearing aids are in we will post signs or visual cues so that he does not get up on his own and keep reminding him to use the call light. Patient to use call light to get nursing assistance to get up, bed and chair alarm. Had a witnessed fall on again on 6/20/2019 however patient's staff was right next to him and he did not injure himself. 8. Elevated DVT risk: progressive activities in PT, continue prophylaxis JOSE DE JESUS hose, elevation and  patient is not on a blood thinner secondary to recent subdural hematomas . 9. Complex discharge planning:  Discharge [facility] 6/28/19 skilled level and then eventually long-term care at a wheelchair level. He will likely need 24-hour supervision at discharge. His significant other will help him apply for Medicaid as he will likely need care for the rest of his life. I am concerned about patients impulsivity -and falls risks he is a one-on-one here he will not be able to be a one-on-one in any other setting including skilled facility including home and including hospice. There is indeed a hospice consult on him as he has a limited life expectancy and we are trying to improve the quality of his last years. I have asked nursing and nursing supervisor to do the best to get him off of the one-on-one safely. He needs to have his hearing aids and so he can hear the redirection that the video-assisted monitor and/or nursing is giving him. SP weekly team meeting every Monday to assess progress towards goals, discuss and address social, psychological and medical comorbidities and to address difficulties they may be having progressing in therapy. Patient and family education is in progress. The patient is to follow-up with their family physician after discharge. Complex Active General Medical Issues that complicate care Assess & Plan:    1.   Afib with occasional RVR,   Essential hypertension,   Hyperlipidemia, S/P CABG (coronary artery bypass graft), S/P PTCA (percutaneous transluminal coronary angioplasty),   RBBB,   CAD (coronary artery disease), long-term previous use of blood thinners telemetry to monitor for bradycardia blood pressure checks every shift dose and titrate cardiac medications to include mag oxide, Cardizem, amiodarone, Lopressor, Nitrostat, Pravachol he is not on blood thinners because of his frequent bleeds. , consult hospitals for backup medical and recheck CBC and BMP as needed -reconsult cardiology to evaluate elevated troponins. 2.   Subdural hematoma-status post evacuation with , hold blood thinners-vital signs every shift, dose and titrate cardiac medications, recheck BMP CBC and consult hospitalist for backup medical  3.   prostatic hyperplasia with urinary frequency,   Prostate cancer -check postvoid residuals check stat UA  4. Facet arthropathy, lumbosacral,   Chronic back pain,   Spondylolisthesis of lumbar region-add Lidoderm, Tylenol, Norco as needed  5. Gross hematuria, Long term current use of anticoagulant-check UA monitor urine for blood  6. Long-term use of aspirin therapy and other blood thinners on hold at present because of recent subdural hematoma  7. SHAYY on CPAP-add CPAP from home if available is not available add CPAP from the hospital  8. Prediabetes-recheck BMP, low carb diet  9. Vitamin D deficiency-high-dose vitamin D, add Tums at daily dose of vitamin D after high doses done recheck as an outpatient  10. Aphasia, and oropharyngeal dysphagia-recently which pathology consult, add e-stim, add supplementation to diet push oral fluids  11. Poorly nourished due to poor nutrition and previous alcohol use BMI 26.0-26.9,adult--add protein supplementation vitamin B12 vitamin D CoQ10,  to stop drinking alcohol as an outpatient and at chemical dependency counseling when patient is able to participate  12.  Complex social situation-patient has a girlfriend he lives alone. He is estranged from his several children. Continue to work on improving social interactions. Palliative care versus hospice services while in the skilled facility. Patient and family to decide whether either of these services would benefit. 13. No trauma after fall on 6/17/2019. Long discussions with nursing regarding keeping him safe in the room see above. Patient's family his girlfriend and staff to work together to have patient to be safe without one-on-one as this is not a sustainable type of a scenario. He needs to learn how to be safe without having somebody constantly. By his side because this will not be a real life scenario.         Maria Esther Serrato D.O., PM&R     Attending    [address]

## 2019-06-27 NOTE — DISCHARGE INSTR - COC
Continuity of Care Form    Patient Name: Tiffanie Connelly   :  1934  MRN:  [id number]    Admit date:  2019  Discharge date:  19    Code Status Order: Full Code   Advance Directives:   [facility] Documentation     Date/Time Healthcare Directive Type of Healthcare Directive Copy in [address] Agent's Name Healthcare Agent's Phone Number    [phone]  No, patient does not have an advance directive for healthcare treatment -- -- -- -- --          Admitting Physician:  Juliann Butler DO  PCP: Kusum De La Fuente MD    Discharging Nurse: Electronically signed by Zoe Cantrell RN on 2019 at [address] [address] Unit/Room#: R240/R240-01  Discharging Unit Phone Number: [phone]    Emergency Contact:   Extended Emergency Contact Information  [facility] [address] Phone: [phone]  Relation: Other  Secondary Emergency Contact: [address] [facility] [address] [facility] Phone: [phone]  Relation: Child   needed? No    Past Surgical History:  Past Surgical History:   Procedure Laterality Date    CARDIAC CATHETERIZATION      3 stents    CORONARY ARTERY BYPASS GRAFT      CRANIOTOMY Bilateral 2019    CRANIOTOMY HEMATOMA EVACUATION performed by Nish Nuñez MD at [facility]       Immunization History: There is no immunization history on file for this patient.     Active Problems:  Patient Active Problem List   Diagnosis Code    Afib ([facility] [address] I48.91    Subdural hematoma (HCC) S06.5X9A    Anatomical narrow angle, bilateral H40.033    Angina pectoris (HCC) I20.9    Benign prostatic hyperplasia with urinary frequency N40.1, R35.0    Closed compression fracture of L1 lumbar vertebra S32.010A    Coronary atherosclerosis I25.10    Essential hypertension I10    Facet arthropathy, lumbosacral M47.817    Gross hematuria R31.0    Hyperlipidemia E78.5    Long term current use of anticoagulant Z79.01    implusive. Impairments/Disabilities:      Vision    Nutrition Therapy:  Current Nutrition Therapy:   - Oral Diet:  General, Dysphagia 2 mechanically altered and snack. Routes of Feeding: Oral  Liquids: Thin Liquids  Daily Fluid Restriction: no  Last Modified Barium Swallow with Video (Video Swallowing Test): done on 05/2019    Treatments at the Time of Hospital Discharge:   Respiratory Treatments: Duoneb every 4 hours, as needed. Oxygen Therapy:  Not o oxygen at home, failed nocturnal sleep study, oxygen at night while in the hospital.  Ventilator:    - No ventilator support    Rehab Therapies: Physical Therapy, Occupational Therapy and Speech/Language Therapy  Weight Bearing Status/Restrictions: No weight bearing restirctions  Other Medical Equipment (for information only, NOT a DME order):  wheelchair, walker and bath bench  Other Treatments: One on one for safety.     Patient's personal belongings (please select all that are sent with patient):  Glasses, Hearing Aides bilateral    RN SIGNATURE:  Electronically signed by Ha Anderson RN on 6/28/19 at 12:04 PM    CASE MANAGEMENT/SOCIAL WORK SECTION    Inpatient Status Date: Patient admitted to acute inpatient rehab on 5/31/19    Readmission Risk Assessment Score:  Readmission Risk              Risk of Unplanned Readmission:        22           Discharging to Facility/ Agency   · Name: [facility]  · Address:  · Phone:[phone]  · Fax:    Dialysis Facility (if applicable)   · Name:  · Address:  · Dialysis Schedule:  · Phone:  · Fax:    / signature: Electronically signed by Kae Beyer RN on 6/27/19 at 11:48 AM    PHYSICIAN SECTION    Prognosis: Good    Condition at Discharge: Stable    Rehab Potential (if transferring to Rehab): Good    Recommended Labs or Other Treatments After Discharge:     Physician Certification: I certify the above information and transfer of Beulah Bryant  is necessary for the continuing treatment of the diagnosis listed and that he requires [facility]     Update Admission H&P: No change in H&P    PHYSICIAN SIGNATURE:Dr Susi Arrington DO    Electronically signed by Garald Najjar, RN on 6/27/19 at 11:48 AM

## 2019-06-27 NOTE — PROGRESS NOTES
Pt resting Quietly before falling asleep patient was restless and kept trying to get out of bed, LBM 6/26 will continue to monitor.  Electronically signed by Danette Arndt LPN on 0/76/3116 at 2:35 AM

## 2019-06-27 NOTE — PROGRESS NOTES
Physical Therapy Rehab Treatment Note  Facility/Department: [facility]  [facility]       NAME: Tristan Scott  : 1934 (80 y.o.)  MRN: [id number]  CODE STATUS: Full Code    Date of Service: 2019      Restrictions:  Restrictions/Precautions: Fall Risk  Position Activity Restriction  Other position/activity restrictions: nectar thick        SUBJECTIVE:       Pre Treatment Pain Screening  Pain at present: 0    Post Treatment Pain Screening:  Pain Assessment  Pain Level: 0    OBJECTIVE:               Neuromuscular Education  PNF: stand balance facilitation with varying SANJAY, movement patterns and amount of cueing incorporated  Neuromuscular Comments: short distance gait within environment with focus on path taking - intermittent cues for safety required - distractibility remains present      Bed mobility  Bridging: Stand by assistance  Rolling to Left: Stand by assistance  Rolling to Right: Stand by assistance  Supine to Sit: Minimal assistance  Sit to Supine: Minimal assistance  Comment: min assist required for facilitation of task completion    Transfers  Sit to Stand: Stand by assistance;Minimal Assistance  Stand to sit: Stand by assistance;Minimal Assistance  Bed to Chair: Minimal assistance  Car Transfer: Minimal Assistance(assist required for safe performance and protection of head)  Comment: pt with variable approach and safety to the chair - often sitting in chair without regard to positioning - repositions self with cueing given    Ambulation  Ambulation?: Yes  Ambulation 1  Surface: level tile;carpet;ramp  Device: Rollator  Assistance: Contact guard assistance;Minimal assistance  Quality of Gait: variable step length and motor control, variable ability to scan environment, variable safe use of walker, variable follow thruu of directions during gait distance  Distance: 250 feet  Comments: pt with right neglect evident by walking close to items on his right - multiple short distance gait trials with focus on maneuverability and safety in environment - variable performance and cueing needed   Stairs/Curb  Stairs?: Yes   Stairs  # Steps : 4  Stairs Height: 6\"  Rails: Bilateral  Assistance: Minimal assistance  Comment: physical and verbal cues required for task completion    Activity Tolerance  Activity Tolerance: Patient Tolerated treatment well          ASSESSMENT/COMMENTS:  Assessment: Pt continues with variable ability to follow instructions and perform tasks safely to their completion. He requires hands on facilitation for safety in all mobility and intermittently to complete task. He presents with right neglect which impacts safety of gait. he continues with balance deficits with delayed balance reactions and often ineffective reactions. Pt requires intermittent recovery assist as a result    PLAN OF CARE/Safety:   Safety Devices  Type of devices: Chair alarm in place; Left in chair;Gait belt      Therapy Time:   Individual   Time In 1430   Time Out 1500   Minutes 30     Minutes:      Transfer/Bed mobility training: 10       Gait training:10      Neuro re education:10      Johnna Baker PT, 06/27/19 at 3:10 PM

## 2019-06-27 NOTE — PROGRESS NOTES
Occupational Therapy  Facility/Department: [facility]  Daily Treatment Note  NAME: Liliana Pitts  : 1934  MRN: [id number]    Date of Service: 2019    Discharge Recommendations:  Continue to assess pending progress       Assessment   Assessment: Pt demonstrated significant decreased ability to follow one step commands. Pt with significant poor attention to task. Pt continues to benefit from skilled OT to maximize independence and safety in ADL tasks. REQUIRES OT FOLLOW UP: Yes  Activity Tolerance  Activity Tolerance: Patient Tolerated treatment well  Activity Tolerance: good  Safety Devices  Safety Devices in place: Yes  Type of devices: All fall risk precautions in place  Restraints  Initially in place: No         Patient Diagnosis(es): The primary encounter diagnosis was Subdural hematoma ([facility] [address] Diagnoses of Closed compression fracture of first lumbar vertebra, initial encounter ([facility] [address] and Insomnia secondary to chronic pain were also pertinent to this visit. has a past medical history of Chronic back pain, Hyperlipidemia, and Hypertension. has a past surgical history that includes Cardiac catheterization; Coronary artery bypass graft; and craniotomy (Bilateral, 2019).     Restrictions  Restrictions/Precautions  Restrictions/Precautions: Fall Risk  Position Activity Restriction  Other position/activity restrictions: nectar thick   Subjective   General  Chart Reviewed: Yes  Patient assessed for rehabilitation services?: Yes  Response to previous treatment: Patient with no complaints from previous session  Family / Caregiver Present: No  Subjective  Subjective:      General Comment  Comments:      Pain Assessment  Pain Assessment: 0-10  Pain Level: 0  Pain Location: Back  Vital Signs  Patient Currently in Pain: No   Orientation  Orientation  Overall Orientation Status: Within Functional Limits  Orientation Level: Oriented to person;Oriented to situation;Oriented to time  Objective Balance  Sitting Balance: Supervision  Standing Balance: Contact guard assistance  Functional Mobility  Functional - Mobility Device: Rolling Walker  Activity: Pt ambulated to wheelchair from standard arm chair at the beginning of OT session. Pt had no LOB but required CGA to complete. Pt required min verbal cues to pivot body and square up to the wheelchair before descending. Assist Level: Contact guard assistance  Functional Mobility Comments: Pt continues to be impulsive while ambulating        Transfers  Sit to stand: Contact guard assistance  Stand to sit: Contact guard assistance  Transfer Comments: Pt required verbal cues to complete all transfers Pt very impulsive when completing transfers. While seated, pt placed rubber washers onto plastic dowels using B UEs and 1# wrist weights to improve UE strength during room mobility and transfers. Pt required significant increased time to complete. Pt required max verbal cues to place rubber washers onto dowels one at a time only. Pt very distracted and required mod verbal cues to redirect. Pt completed with good endurance.                                                            Plan   Plan  Times per week: 5-7   Plan weeks: 4  Current Treatment Recommendations: Strengthening, ROM, Balance Training, Functional Mobility Training, Endurance Training, Safety Education & Training, Cognitive Reorientation, Patient/Caregiver Education & Training, Equipment Evaluation, Education, & procurement, Self-Care / ADL, Cognitive/Perceptual Training, Positioning  Plan Comment: Continue POC and plan for discharge  G-Code     OutComes Score    AM-PAC Score    Goals  Patient Goals   Patient goals : Patient is unable to report a goal at this time due to global aphasia       Therapy Time   Individual Concurrent Group Co-treatment   Time In 1300         Time Out 1330         Minutes 30           This therapy session was supervised by Rigo Rogel MS OTR/L    Electronically signed by Edie De Paz on 6/27/2019 at 4:37 PM      Edie De Paz

## 2019-06-27 NOTE — PROGRESS NOTES
Speech Language Pathology Treatment Note  Facility/Department: [facility]  6/27/2019    Rehab Dx/Hx: SDH S/P CRANIOTOMY    Precautions: falls and aspirations    ST Dx: Aphasia, Dysphagia and Cognitive Impairment     Date of Admit: 5/31/2019  Room #: R240/R240-01    Time in: 1400  Time out: 1430      Subjective:  Alert, Cooperative and Pleasant        Interventions used this date:  Expressive Language and Receptive Language    Objective/Assessment:  Patient progressing towards goals:  Short-term Goals for Speech & Language POC:     Goal 1: Pt will follow 1 step directions given orally with 75% accuracy with mod cues to increase the pt's ability to follow directions provided by caregivers for safe follow through with ADLs. Not addressed    Goal 2: Pt will identify objects/pictures within a field of 2-3 with 75% accuracy with mod cues in order to increase his understanding of objects in his environment for safer and more independent completion of ADLs. Given a set of two pictures, 57% with mod cues, increasing to 71% with max cues and occasional [place] assist.     Goal 3: Pt will answer low level yes/no questions with 85% accuracy with mod cues to assist the caregiver in obtaining important information regarding the patient's personal, medical, and safety needs. 28% acc. given max cues - Pt benefits from visual cues    Goal 4: Pt will complete confrontational naming tasks with 90% accuracy with mild verbal cues to help the patient express his/her basic wants and needs. Pt completed confrontational naming tasks with 60% accuracy with mod cues, increasing to 70% with max cues. Goal 5: Pt will perform speech automatics (1-20, OMAR, CARLTON) with min cues with 85% accuracy to help the patient express his basic wants and needs.    Pt counted 1-20 with 1 cue to initiate   Pt stated OMAR and CARLTON with 1 cue to initiate       Short-term Goals for Dysphagia POC:  Goal 1: Pt will demonstrate small bites/sips for safe and efficient swallow of mechanical soft and trials of soft/regular consistencies, given cues as needed, in all given opportunities. Treatment/Activity Tolerance:  Patient tolerated treatment well    Plan:  Continue per POC    Pain:  Patient demonstrated no s/s of pain. Patient/Caregiver Education:  Patient educated on session and progression towards goals. and Education needs reinforcement.     Safety Devices:  Chair alarm in place     Comprehension:  2- Maximal assist    Expression:  3 Mod Assist    Problem Solvin- Total assist    Memory:  1- Total assist    Signature:   Electronically signed by CIERRA Duran on 2019 at 2:34 PM

## 2019-06-28 VITALS
WEIGHT: 177.13 LBS | DIASTOLIC BLOOD PRESSURE: 66 MMHG | OXYGEN SATURATION: 91 % | TEMPERATURE: 98 F | HEIGHT: 72 IN | BODY MASS INDEX: 23.99 KG/M2 | HEART RATE: 66 BPM | SYSTOLIC BLOOD PRESSURE: 127 MMHG | RESPIRATION RATE: 17 BRPM

## 2019-06-28 PROCEDURE — 99238 HOSP IP/OBS DSCHRG MGMT 30/<: CPT | Performed by: PHYSICAL MEDICINE & REHABILITATION

## 2019-06-28 PROCEDURE — 6370000000 HC RX 637 (ALT 250 FOR IP): Performed by: NEUROLOGICAL SURGERY

## 2019-06-28 PROCEDURE — 6370000000 HC RX 637 (ALT 250 FOR IP): Performed by: PHYSICAL MEDICINE & REHABILITATION

## 2019-06-28 PROCEDURE — 6370000000 HC RX 637 (ALT 250 FOR IP): Performed by: INTERNAL MEDICINE

## 2019-06-28 RX ORDER — AMLODIPINE BESYLATE 2.5 MG/1
2.5 TABLET ORAL DAILY
Qty: 30 TABLET | Refills: 3 | Status: ON HOLD | OUTPATIENT
Start: 2019-06-28 | End: 2019-07-19 | Stop reason: HOSPADM

## 2019-06-28 RX ORDER — AMIODARONE HYDROCHLORIDE 200 MG/1
200 TABLET ORAL DAILY
Qty: 30 TABLET | Refills: 3 | Status: SHIPPED | OUTPATIENT
Start: 2019-06-28

## 2019-06-28 RX ADMIN — AMIODARONE HYDROCHLORIDE 200 MG: 200 TABLET ORAL at 10:01

## 2019-06-28 RX ADMIN — MAGNESIUM OXIDE TAB 400 MG (241.3 MG ELEMENTAL MG) 400 MG: 400 (241.3 MG) TAB at 10:01

## 2019-06-28 RX ADMIN — FAMOTIDINE 20 MG: 20 TABLET ORAL at 10:00

## 2019-06-28 RX ADMIN — OXYCODONE HYDROCHLORIDE 10 MG: 10 TABLET, FILM COATED, EXTENDED RELEASE ORAL at 06:01

## 2019-06-28 RX ADMIN — AMLODIPINE BESYLATE 2.5 MG: 2.5 TABLET ORAL at 10:00

## 2019-06-28 RX ADMIN — METOPROLOL TARTRATE 25 MG: 25 TABLET ORAL at 10:00

## 2019-06-28 RX ADMIN — ACETAMINOPHEN 500 MG: 325 TABLET ORAL at 10:01

## 2019-06-28 ASSESSMENT — PAIN SCALES - GENERAL
PAINLEVEL_OUTOF10: 3
PAINLEVEL_OUTOF10: 0

## 2019-06-28 NOTE — DISCHARGE SUMMARY
15'  Stairs: 2- Maximal Assistance Performs 25-49% of the effort to go up and down 4 to 6 stairs and requires the assistance of one person only,  , Assessment: Pt continues with variable ability to follow instructions and perform tasks safely to their completion. He requires hands on facilitation for safety in all mobility and intermittently to complete task. He presents with right neglect which impacts safety of gait. he continues with balance deficits with delayed balance reactions and often ineffective reactions. Pt requires intermittent recovery assist as a result    Occupational therapy: FIMS:  Eatin - Feeds self with setup/supervision/cues and/or requires only setup/supervision/cues to perform tube feedings  Groomin - Requires setup/cues to do all tasks  Bathin - Able to bathe 8-9 areas  Dressing-Upper: 3 - Requires assist with 2 tasks  Dressing-Lower: 2 - Requires assist with 4-5 parts of dressing  Toiletin - Able to perform 1 task only (e.g. hygiene)  Toilet Transfer: 0 - Did not occur  Shower Transfer: 4 - Minimal contact assistance, pt. expends 75% or more effort,  , Assessment: Pt demonstrated significant decreased ability to follow one step commands. Pt with significant poor attention to task. Pt continues to benefit from skilled OT to maximize independence and safety in ADL tasks. Speech therapy: FIMS: Comprehension: 2 - Patient understands basic needs 25-49% of the time  Expression: 2 - Expresses basic ideas/needs 25-49% of the time  Social Interaction: 2 - Patient appropriate  25%-49% of the time  Problem Solvin - Patient solves simple/routine tasks < 25%  Memory: 1 - Patient remembers < 25% of the time      Lab/X-ray studies reviewed, analyzed and discussed with patient and staff:   No results found for this or any previous visit (from the past 24 hour(s)). Xr Chest  2019   Areas of atelectasis, patchy groundglass infiltrate in the bases. Right greater than left . Trace/ small right pleural effusion    Ct Head   5/28/2019  Impression: Bilateral frontal and parietal subdural hematomas. The amount of extra-axial air has slightly decreased since the prior study. No new areas of hemorrhage are identified. All CT scans at this facility use dose modulation, iterative reconstruction, and/or weight based dosing when appropriate to reduce radiation dose to as low as reasonably achievable. Ct Head Wo  : 5/28/2019   DECREASING-SIZED BILATERAL CEREBRAL CONVEXITY SUBDURAL HEMATOMAS AFTER INTERVAL REMOVAL OF BOTH DRAINAGE CATHETERS. Ct Cervical Spine  5/24/2019  Mild to moderate hypertrophic facet changes are present, predominantly on the right at C3-4, and on the left at C2-C3 and C5-6. There is no fracture, significant subluxation, or acute paraspinous soft tissue abnormalities identified. NO FRACTURE OR EVIDENCE OF CERVICAL SPINE INJURY IDENTIFIED. CERVICAL SPONDYLOSIS. Ct Lumbar   5/24/2019  Acute on chronic fracture, L1, with avulsion of the anterior portion of the L1 vertebral body. Findings suspicious for mild to moderate central stenosis at the L3-L4 level. Fl Modified Barium   5/31/2019  EXAMINATION: FL MODIFIED BARIUM SWALLOW W VIDEO: DATE AND TIME: 5/30/2019 at 10:45 AM. CLINICAL HISTORY:    MODERATE ORAL AND PHARYNGEAL DYSFUNCTION WITHOUT ASPIRATION. PLEASE REFER TO SPEECH PATHOLOGY  REPORT FOR RECOMMENDATIONS. Us Dup Lower Extremities Bilateral V 5/29/2019  NO DVT IDENTIFIED IN EITHER LOWER EXTREMITY. CT of the brain without contrast       HISTORY: Fall. History of craniotomy. Ataxia.       COMPARISON: CT brain from June 1, 2019       TECHNIQUE: Multiple contiguous axial images were obtained of the brain from the skull base through the vertex. Multiplanar reformats were obtained.       FINDINGS:       Prominence of the sulci and ventricles compatible with moderate generalized parenchymal volume loss.  Gray-white matter differentiation is preserved. Areas of bilateral supratentorial white matter hypoattenuation are nonspecific but most likely related to    chronic small vessel ischemic changes in a patient of this age.       Postsurgical changes of bilateral frontal craniotomy Again identified are extra-axial fluid collections compatible with chronic subdural hematomas along the frontoparietal convexities bilaterally. Areas of increased density along the right frontal    convexity as seen on axial series 2 image 20 compatible with acute on chronic subdural hemorrhage. The greatest thickness of the subdural hematoma in the right measures approximately 10 mm and the greatest thickness of the subdural, on the left measures    approximately 11 mm, mildly improved when compared to prior examination. There is still mass effect upon the subjacent brain parenchyma. Pneumocephalus has resolved. Basal cisterns are patent. No midline shift.        The visualized paranasal sinuses and mastoid air cells are clear. Calvarium is intact.           Impression       Bilateral chronic subdural hematomas demonstrating interval improvement in size when compared to July 1, 2019 however hyperdense material within the subdural hematoma on the right as above is compatible with acute on chronic right subdural hematoma. No    midline shift. The these findings were relayed to the patient's nurse Samantha Oscar by Dr. Eliazar Simmons at approximately 4:53 PM on 6/17/2019. XR HIP BILATERAL W AP PELVIS (3 VIEWS)       CLINICAL HISTORY:  s/p fall        COMPARISONS: None available.       FINDINGS: AP radiograph of the pelvis and cross table lateral radiographs of each hip are submitted. No acute fracture or dislocation is identified. The hip joint spaces are symmetrically maintained. There is lumbar spondylosis.         Impression   NEGATIVE BILATERAL HIP RADIOGRAPHS.      XR SHOULDER RIGHT (MIN 3 VIEWS)       CLINICAL HISTORY:  fall        COMPARISONS: None available.       FINDINGS: No acute fracture or dislocation is identified. There is mild acromioclavicular arthrosis. There are mild degenerative changes of the glenohumeral articulation. No periarticular soft tissue calcifications are identified. There are    cervicothoracic spine degenerative changes. Sternotomy wires are noted.           Impression   MILD DEGENERATIVE CHANGES OF THE RIGHT SHOULDER. NEGATIVE FOR ACUTE FRACTURE OR DISLOCATION.         EXAMINATION: XR SHOULDER LEFT (MIN 3 VIEWS)       CLINICAL HISTORY:  s/p fall        COMPARISONS: None available.       FINDINGS: No acute fracture is identified. There is cephalad subluxation of the humeral head in association narrowing of the subacromial space indicating rotator cuff tear/degeneration. There are periarticular ossifications lateral to the acromion. There    is glenohumeral osteoarthritis with humeral head marginal osteophytosis. There is cervical and thoracic spondylosis. Sternotomy wires are noted.           Impression   LEFT SHOULDER OSTEOARTHRITIS WITH PERIARTICULAR SOFT TISSUE OSSIFICATIONS AND ROTATOR CUFF TEAR/DEGENERATION. NEGATIVE FOR FRACTURE. Previous extensive, complex labs, notes and diagnostics reviewed and analyzed. ALLERGIES:    Allergies as of 05/31/2019 - Review Complete 05/31/2019   Allergen Reaction Noted    Honey bee venom [bee venom] Swelling 10/08/2015    Penicillins Swelling 10/08/2015      (please also verify by checking [facility])         Complex Physical Medicine & Rehab Issues Assess & Plan:   1. Severe abnormality of gait and mobility and impaired self-care and ADL's secondary to progressive subdural hematomas . Functional and medical status have improved status post acute rehab at [facility] therefore patient is scheduled for discharge [facility] 6/28/19 skilled level and then eventually long-term care at a wheelchair level. He will likely need 24-hour supervision at discharge.   His significant other will help him Prostate cancer -check postvoid residuals check stat UA  4. Facet arthropathy, lumbosacral,   Chronic back pain,   Spondylolisthesis of lumbar region-add Lidoderm, Tylenol, Norco as needed  5. Gross hematuria, Long term current use of anticoagulant-check UA monitor urine for blood  6. Long-term use of aspirin therapy and other blood thinners on hold at present because of recent subdural hematoma  7. SHAYY on CPAP-add CPAP from home if available is not available add CPAP from the hospital  8. Prediabetes-recheck BMP, low carb diet  9. Vitamin D deficiency-high-dose vitamin D, add Tums at daily dose of vitamin D after high doses done recheck as an outpatient  10. Aphasia, and oropharyngeal dysphagia-recently which pathology consult, add e-stim, add supplementation to diet push oral fluids  11. Poorly nourished due to poor nutrition and previous alcohol use BMI 26.0-26.9,adult--add protein supplementation vitamin B12 vitamin D CoQ10,  to stop drinking alcohol as an outpatient and at chemical dependency counseling when patient is able to participate  12. Complex social situation-patient has a girlfriend he lives alone. He is estranged from his several children. Continue to work on improving social interactions. Palliative care versus hospice services while in the skilled facility. Patient and family to decide whether either of these services would benefit. 13. No trauma after fall on 6/17/2019. Long discussions with nursing regarding keeping him safe in the room see above. Patient's family his girlfriend and staff to work together to have patient to be safe without one-on-one as this is not a sustainable type of a scenario. He needs to learn how to be safe without having somebody constantly. By his side because this will not be a real life scenario.         Jossy Mercado D.O., PM&R     Attending    [address] [facility]

## 2019-06-28 NOTE — PROGRESS NOTES
Dr. Bao Perez cardiologist at bedside. No changes to cardiac medications at this time. All cardiology medications have been reconciled at this time. Electronically signed by Doron Antonio RN on 6/28/2019 at 10:54 AM    Gave pt D/C instructions and answered all questions before D/C. Pt is going to [employer] and report was called to the nursing staff. Pt traveled w/belongings & chart in W/C transportation. Pt is in no distress at this time. Pt transferred to W/C and was taken via [facility].  Electronically signed by Doron Antonio RN on 6/28/2019 at 1:51 PM

## 2019-06-28 NOTE — PROGRESS NOTES
[facility] Heart Progress Note      Patient: Mariana Solitario    Unit/Bed: S537/K494-18  YOB: 1934  MRN: [id number]  Admit Date:  5/31/2019  Hospital Day: 29    Rounding Date: 6/28/2019    Rounding Cardiologist:  LEYDA Holcomb MD    PRIMARY Cardiologist:  Jayna Koenig    Subjective Complaint:   Resting comfortably. No chest pain or shortness of breath. Remains in NSR. Brief episode of bradycardia while sleeping last night. Physical Examination:     /66   Pulse 66   Temp 98 °F (36.7 °C) (Oral)   Resp 17   Ht 6' 0.05\" (1.83 m)   Wt 177 lb 2 oz (80.3 kg)   SpO2 91%   BMI 23.99 kg/m²         Intake/Output Summary (Last 24 hours) at 6/28/2019 1002  Last data filed at 6/27/2019 2045  Gross per 24 hour   Intake --   Output 400 ml   Net -400 ml                  Mariana Solitario examined at bedside in in no apparent distress, cooperative and improved. Focused exam reveals:     Cardiac: Heart regular rate and rhythm     Lungs:  clear to auscultation bilaterally- no wheezes, rales or rhonchi, normal air movement, no respiratory distress    Extremities:   negative, none and  edema    Telemetry:      sinus bradycardia       EKG:   Sinus bradycardia  Right bundle branch block  Abnormal ECG  When compared with ECG of 24-JUN-2019 12:19,  Sinus rhythm has replaced Atrial fibrillation  Vent. rate has decreased BY  33 BPM    Echocardiogram Summary:   Left ventricular ejection fraction is visually estimated at 35+/-5%.   Mildly dilated LV   Diffuse hypokinesis.   Inferolateral wall nearly akinetic.  Anteroseptal wall severely   hypokinetic.   No LV thrombus noted.   Severely dilated left atrium.   Normal right ventricle structure and function.   Normal right ventricle systolic pressure.   Normal valvular structure and function.   No significant regurgitant or stenotic valvular lesions.   Signature   ----------------------------------------------------------------   Electronically signed by Tiffanie Gibson Usama FRAZIER(Interpreting   [id number]) on 06/24/2019 05:45 P      LABS:    Assessment:    Patient with a history of paroxysmal atrial fibrillation and recurrence. Reverted back to NSR/sinus valeria before initial dose of amiodarone was given. Remains in sinus valeria rhythm. No acute changes on the patient's EKG. Borderline elevated troponins. Doubt ACS. No angina pectoris. History of coronary artery disease with stable symptoms. Status post previous CABG. Mild ischemia by myoview study 11/2017. History of intracranial bleed-unable to anticoagulate. Status post drainage of subdural hematoma. History of multiple falls. Making significant progress in rehab. Plan:    1. Continue oral amiodarone at 200 mg daily. 2. Patient received IV MgSO4. Changed to oral Mg Oxide 400 mg daily. 3. Cardizem discontinued. 4. Low dose amlodipine added for HTN. 5. Metoprolol dose decreased. 6. Conservative cardiac care. 7. OK to discharge from rehab to SNF. 8. Follow-up as scheduled.        Electronically signed by Taniya Zazueta MD on 6/28/2019 at 10:02 AM

## 2019-06-28 NOTE — PLAN OF CARE
Continue to educate on safe transfers, pt has cognitive deficit from recent injury before hospitalization. Care plan updated.  Electronically signed by Grecia Larson RN on 6/28/2019 at 11:01 AM

## 2019-06-28 NOTE — PROGRESS NOTES
[facility] OCCUPATIONAL THERAPY DISCHARGE SUMMARY- REHAB     Date: 2019  Patient Name: Dahiana Thurman        MRN: [id number]  Account: [de-identified]   : 1934  (80 y.o.)  Room: [place] [address]    Diagnosis:       Past Medical History:   Diagnosis Date    Chronic back pain     Hyperlipidemia     Hypertension      Past Surgical History:   Procedure Laterality Date    CARDIAC CATHETERIZATION      3 stents    CORONARY ARTERY BYPASS GRAFT      CRANIOTOMY Bilateral 2019    CRANIOTOMY HEMATOMA EVACUATION performed by Ginger Escalera MD at [facility]       Precautions:   Restrictions/Precautions: Fall Risk  Other position/activity restrictions: nectar thick      Social/Functional History:  Social/Functional History  Type of Home: House  Home Layout: Two level, 1/2 bath on main level, Bed/Bath upstairs  Home Access: Stairs to enter with rails  Entrance Stairs - Number of Steps: 3  Bathroom Shower/Tub: Tub/Shower unit  Home Equipment: Rolling walker  ADL Assistance: Independent  Homemaking Assistance: Independent  Ambulation Assistance: Independent(with [employer])  Transfer Assistance: Independent  Occupation: Retired  Type of occupation:   Additional Comments: Pt unable to verbalize. Per chart review, pt's S.O. provided PLOF. Per her report, pt has been falling very frequently over past 6months. She has had to pick him up multiple times. Pt's impaired speech has also been long standing per her report. Current Functional Status:  ADL  Feeding: Supervision  Grooming: Verbal cueing, Setup  UE Bathing: Verbal cueing, Stand by assistance(perseverates when washing face)  LE Bathing: Minimal assistance(thoroughness for buttocks)  UE Dressing: Verbal cueing, Moderate assistance  LE Dressing: Maximum assistance(oriented underpants and pants prior to putting them on, required assist to pull up pants)  Toileting:  Moderate assistance(incontinent at beginning, pt began urinating before sitting down on toilet, pt

## 2019-06-29 ENCOUNTER — APPOINTMENT (OUTPATIENT)
Dept: GENERAL RADIOLOGY | Age: 84
End: 2019-06-29
Payer: MEDICARE

## 2019-06-29 ENCOUNTER — APPOINTMENT (OUTPATIENT)
Dept: CT IMAGING | Age: 84
End: 2019-06-29
Payer: MEDICARE

## 2019-06-29 ENCOUNTER — HOSPITAL ENCOUNTER (EMERGENCY)
Age: 84
Discharge: HOME | End: 2019-06-29
Payer: MEDICARE

## 2019-06-29 VITALS
WEIGHT: 170 LBS | RESPIRATION RATE: 19 BRPM | TEMPERATURE: 98 F | DIASTOLIC BLOOD PRESSURE: 80 MMHG | BODY MASS INDEX: 25.76 KG/M2 | OXYGEN SATURATION: 96 % | HEART RATE: 55 BPM | HEIGHT: 68 IN | SYSTOLIC BLOOD PRESSURE: 136 MMHG

## 2019-06-29 DIAGNOSIS — I48.92 PAROXYSMAL ATRIAL FLUTTER (HCC): ICD-10-CM

## 2019-06-29 DIAGNOSIS — S09.90XA CLOSED HEAD INJURY, INITIAL ENCOUNTER: Primary | ICD-10-CM

## 2019-06-29 DIAGNOSIS — S00.81XA FACIAL ABRASION, INITIAL ENCOUNTER: ICD-10-CM

## 2019-06-29 LAB
EKG ATRIAL RATE: 278 BPM
EKG ATRIAL RATE: 57 BPM
EKG ATRIAL RATE: 58 BPM
EKG P AXIS: 38 DEGREES
EKG P AXIS: 6 DEGREES
EKG P AXIS: 67 DEGREES
EKG P-R INTERVAL: 166 MS
EKG P-R INTERVAL: 172 MS
EKG Q-T INTERVAL: 362 MS
EKG Q-T INTERVAL: 464 MS
EKG Q-T INTERVAL: 468 MS
EKG QRS DURATION: 148 MS
EKG QRS DURATION: 152 MS
EKG QRS DURATION: 152 MS
EKG QTC CALCULATION (BAZETT): 455 MS
EKG QTC CALCULATION (BAZETT): 455 MS
EKG QTC CALCULATION (BAZETT): 466 MS
EKG R AXIS: -16 DEGREES
EKG R AXIS: -27 DEGREES
EKG R AXIS: -28 DEGREES
EKG T AXIS: -11 DEGREES
EKG T AXIS: 1 DEGREES
EKG T AXIS: 7 DEGREES
EKG VENTRICULAR RATE: 100 BPM
EKG VENTRICULAR RATE: 57 BPM
EKG VENTRICULAR RATE: 58 BPM

## 2019-06-29 PROCEDURE — 96374 THER/PROPH/DIAG INJ IV PUSH: CPT

## 2019-06-29 PROCEDURE — 99284 EMERGENCY DEPT VISIT MOD MDM: CPT

## 2019-06-29 PROCEDURE — 70450 CT HEAD/BRAIN W/O DYE: CPT

## 2019-06-29 PROCEDURE — 2500000003 HC RX 250 WO HCPCS: Performed by: PHYSICIAN ASSISTANT

## 2019-06-29 PROCEDURE — 72050 X-RAY EXAM NECK SPINE 4/5VWS: CPT

## 2019-06-29 RX ORDER — DILTIAZEM HYDROCHLORIDE 5 MG/ML
10 INJECTION INTRAVENOUS ONCE
Status: COMPLETED | OUTPATIENT
Start: 2019-06-29 | End: 2019-06-29

## 2019-06-29 RX ORDER — DILTIAZEM HYDROCHLORIDE 5 MG/ML
15 INJECTION INTRAVENOUS ONCE
Status: DISCONTINUED | OUTPATIENT
Start: 2019-06-29 | End: 2019-06-29 | Stop reason: HOSPADM

## 2019-06-29 RX ADMIN — DILTIAZEM HYDROCHLORIDE 10 MG: 5 INJECTION INTRAVENOUS at 16:00

## 2019-06-29 ASSESSMENT — ENCOUNTER SYMPTOMS
COLOR CHANGE: 0
CONSTIPATION: 0
SHORTNESS OF BREATH: 0
ABDOMINAL PAIN: 0
EYE DISCHARGE: 0
SORE THROAT: 0
RHINORRHEA: 0
ABDOMINAL DISTENTION: 0

## 2019-06-29 NOTE — ED NOTES
Bed: 04  Expected date: 6/29/19  Expected time:   Means of arrival:   Comments:  80 male ,+ for numerous fall in the last 3 months. Per nursing home staff pt threw himself out of wheelchair . Has laceration on outer corner of right eye. Oriented to self.       Idalia Rene RN  06/29/19 4132

## 2019-06-29 NOTE — ED NOTES
Called the NH and gave update. Pt had to receive a 2nd ekg d/t increase HR and rhythm change medication was administered as well.      Dora Rubin RN  06/29/19 1998

## 2019-07-01 ENCOUNTER — OFFICE VISIT (OUTPATIENT)
Dept: GERIATRIC MEDICINE | Age: 84
End: 2019-07-01
Payer: MEDICARE

## 2019-07-01 DIAGNOSIS — I62.03 CHRONIC SUBDURAL HEMATOMA (HCC): Primary | ICD-10-CM

## 2019-07-01 DIAGNOSIS — R26.9 ABNORMALITY OF GAIT AND MOBILITY: ICD-10-CM

## 2019-07-01 DIAGNOSIS — M54.50 CHRONIC MIDLINE LOW BACK PAIN WITHOUT SCIATICA: ICD-10-CM

## 2019-07-01 DIAGNOSIS — G89.29 CHRONIC MIDLINE LOW BACK PAIN WITHOUT SCIATICA: ICD-10-CM

## 2019-07-01 DIAGNOSIS — M62.81 GENERALIZED MUSCLE WEAKNESS: ICD-10-CM

## 2019-07-01 PROCEDURE — 99306 1ST NF CARE HIGH MDM 50: CPT | Performed by: FAMILY MEDICINE

## 2019-07-01 PROCEDURE — 1123F ACP DISCUSS/DSCN MKR DOCD: CPT | Performed by: FAMILY MEDICINE

## 2019-07-01 RX ORDER — IPRATROPIUM BROMIDE AND ALBUTEROL SULFATE 2.5; .5 MG/3ML; MG/3ML
1 SOLUTION RESPIRATORY (INHALATION) EVERY 4 HOURS PRN
COMMUNITY

## 2019-07-02 ENCOUNTER — APPOINTMENT (OUTPATIENT)
Dept: CT IMAGING | Age: 84
End: 2019-07-02
Payer: MEDICARE

## 2019-07-02 ENCOUNTER — APPOINTMENT (OUTPATIENT)
Dept: GENERAL RADIOLOGY | Age: 84
End: 2019-07-02
Payer: MEDICARE

## 2019-07-02 ENCOUNTER — TELEPHONE (OUTPATIENT)
Dept: PHARMACY | Age: 84
End: 2019-07-02

## 2019-07-02 ENCOUNTER — HOSPITAL ENCOUNTER (EMERGENCY)
Age: 84
Discharge: ANOTHER HOSPITAL | End: 2019-07-03
Payer: MEDICARE

## 2019-07-02 VITALS
DIASTOLIC BLOOD PRESSURE: 69 MMHG | RESPIRATION RATE: 16 BRPM | WEIGHT: 165 LBS | HEIGHT: 66 IN | OXYGEN SATURATION: 93 % | HEART RATE: 78 BPM | TEMPERATURE: 97.7 F | SYSTOLIC BLOOD PRESSURE: 103 MMHG | BODY MASS INDEX: 26.52 KG/M2

## 2019-07-02 DIAGNOSIS — I48.91 ATRIAL FIBRILLATION, UNSPECIFIED TYPE (HCC): ICD-10-CM

## 2019-07-02 DIAGNOSIS — F99 INAPPROPRIATE BEHAVIOR: ICD-10-CM

## 2019-07-02 DIAGNOSIS — R45.1 AGITATION: ICD-10-CM

## 2019-07-02 DIAGNOSIS — R29.6 FREQUENT FALLS: ICD-10-CM

## 2019-07-02 DIAGNOSIS — I62.03 CHRONIC INTRACRANIAL SUBDURAL HEMATOMA (HCC): Primary | ICD-10-CM

## 2019-07-02 LAB
ALBUMIN SERPL-MCNC: 3.8 G/DL (ref 3.5–4.6)
ALP BLD-CCNC: 76 U/L (ref 35–104)
ALT SERPL-CCNC: 14 U/L (ref 0–41)
AMMONIA: 23 UMOL/L (ref 16–60)
AMPHETAMINE SCREEN, URINE: ABNORMAL
ANION GAP SERPL CALCULATED.3IONS-SCNC: 12 MEQ/L (ref 9–15)
APTT: 25.8 SEC (ref 21.6–35.4)
AST SERPL-CCNC: 16 U/L (ref 0–40)
BACTERIA: ABNORMAL /HPF
BARBITURATE SCREEN URINE: ABNORMAL
BASOPHILS ABSOLUTE: 0 K/UL (ref 0–0.2)
BASOPHILS RELATIVE PERCENT: 0.3 %
BENZODIAZEPINE SCREEN, URINE: ABNORMAL
BILIRUB SERPL-MCNC: 0.4 MG/DL (ref 0.2–0.7)
BILIRUBIN URINE: NEGATIVE
BLOOD, URINE: ABNORMAL
BUN BLDV-MCNC: 25 MG/DL (ref 8–23)
CALCIUM SERPL-MCNC: 9.3 MG/DL (ref 8.5–9.9)
CANNABINOID SCREEN URINE: ABNORMAL
CHLORIDE BLD-SCNC: 102 MEQ/L (ref 95–107)
CLARITY: CLEAR
CO2: 24 MEQ/L (ref 20–31)
COCAINE METABOLITE SCREEN URINE: ABNORMAL
COLOR: YELLOW
CREAT SERPL-MCNC: 1.02 MG/DL (ref 0.7–1.2)
EKG ATRIAL RATE: 55 BPM
EKG P AXIS: 43 DEGREES
EKG P-R INTERVAL: 166 MS
EKG Q-T INTERVAL: 486 MS
EKG QRS DURATION: 152 MS
EKG QTC CALCULATION (BAZETT): 464 MS
EKG R AXIS: -34 DEGREES
EKG T AXIS: -12 DEGREES
EKG VENTRICULAR RATE: 55 BPM
EOSINOPHILS ABSOLUTE: 0.1 K/UL (ref 0–0.7)
EOSINOPHILS RELATIVE PERCENT: 0.6 %
EPITHELIAL CELLS, UA: ABNORMAL /HPF
ETHANOL PERCENT: NORMAL G/DL
ETHANOL: <10 MG/DL (ref 0–0.08)
GFR AFRICAN AMERICAN: >60
GFR NON-AFRICAN AMERICAN: >60
GLOBULIN: 2.8 G/DL (ref 2.3–3.5)
GLUCOSE BLD-MCNC: 143 MG/DL (ref 70–99)
GLUCOSE URINE: NEGATIVE MG/DL
HCT VFR BLD CALC: 38 % (ref 42–52)
HEMOGLOBIN: 13.1 G/DL (ref 14–18)
INR BLD: 1.2
KETONES, URINE: NEGATIVE MG/DL
LEUKOCYTE ESTERASE, URINE: NEGATIVE
LYMPHOCYTES ABSOLUTE: 1.7 K/UL (ref 1–4.8)
LYMPHOCYTES RELATIVE PERCENT: 18.9 %
Lab: ABNORMAL
MCH RBC QN AUTO: 31 PG (ref 27–31.3)
MCHC RBC AUTO-ENTMCNC: 34.4 % (ref 33–37)
MCV RBC AUTO: 90 FL (ref 80–100)
MONOCYTES ABSOLUTE: 1.3 K/UL (ref 0.2–0.8)
MONOCYTES RELATIVE PERCENT: 14.2 %
NEUTROPHILS ABSOLUTE: 5.9 K/UL (ref 1.4–6.5)
NEUTROPHILS RELATIVE PERCENT: 66 %
NITRITE, URINE: NEGATIVE
OPIATE SCREEN URINE: POSITIVE
PDW BLD-RTO: 14.2 % (ref 11.5–14.5)
PH UA: 6 (ref 5–9)
PHENCYCLIDINE SCREEN URINE: ABNORMAL
PLATELET # BLD: 200 K/UL (ref 130–400)
POTASSIUM SERPL-SCNC: 4.1 MEQ/L (ref 3.4–4.9)
PROTEIN UA: ABNORMAL MG/DL
PROTHROMBIN TIME: 11.7 SEC (ref 9–11.5)
RBC # BLD: 4.21 M/UL (ref 4.7–6.1)
RBC UA: ABNORMAL /HPF (ref 0–2)
SODIUM BLD-SCNC: 138 MEQ/L (ref 135–144)
SPECIFIC GRAVITY UA: 1.02 (ref 1–1.03)
TOTAL CK: 180 U/L (ref 0–190)
TOTAL PROTEIN: 6.6 G/DL (ref 6.3–8)
TSH REFLEX: 3.02 UIU/ML (ref 0.44–3.86)
URINE REFLEX TO CULTURE: YES
UROBILINOGEN, URINE: 0.2 E.U./DL
WBC # BLD: 8.9 K/UL (ref 4.8–10.8)
WBC UA: ABNORMAL /HPF (ref 0–5)

## 2019-07-02 PROCEDURE — 85610 PROTHROMBIN TIME: CPT

## 2019-07-02 PROCEDURE — 99285 EMERGENCY DEPT VISIT HI MDM: CPT

## 2019-07-02 PROCEDURE — 70450 CT HEAD/BRAIN W/O DYE: CPT

## 2019-07-02 PROCEDURE — 82550 ASSAY OF CK (CPK): CPT

## 2019-07-02 PROCEDURE — 85730 THROMBOPLASTIN TIME PARTIAL: CPT

## 2019-07-02 PROCEDURE — 72125 CT NECK SPINE W/O DYE: CPT

## 2019-07-02 PROCEDURE — 82140 ASSAY OF AMMONIA: CPT

## 2019-07-02 PROCEDURE — 84443 ASSAY THYROID STIM HORMONE: CPT

## 2019-07-02 PROCEDURE — 80307 DRUG TEST PRSMV CHEM ANLYZR: CPT

## 2019-07-02 PROCEDURE — G0480 DRUG TEST DEF 1-7 CLASSES: HCPCS

## 2019-07-02 PROCEDURE — 80299 QUANTITATIVE ASSAY DRUG: CPT

## 2019-07-02 PROCEDURE — 87086 URINE CULTURE/COLONY COUNT: CPT

## 2019-07-02 PROCEDURE — 81001 URINALYSIS AUTO W/SCOPE: CPT

## 2019-07-02 PROCEDURE — 80053 COMPREHEN METABOLIC PANEL: CPT

## 2019-07-02 PROCEDURE — 36415 COLL VENOUS BLD VENIPUNCTURE: CPT

## 2019-07-02 PROCEDURE — 71045 X-RAY EXAM CHEST 1 VIEW: CPT

## 2019-07-02 PROCEDURE — 93005 ELECTROCARDIOGRAM TRACING: CPT | Performed by: NURSE PRACTITIONER

## 2019-07-02 PROCEDURE — 85025 COMPLETE CBC W/AUTO DIFF WBC: CPT

## 2019-07-02 RX ORDER — LORAZEPAM 1 MG/1
1 TABLET ORAL ONCE
Status: DISCONTINUED | OUTPATIENT
Start: 2019-07-02 | End: 2019-07-03 | Stop reason: HOSPADM

## 2019-07-02 ASSESSMENT — ENCOUNTER SYMPTOMS
SHORTNESS OF BREATH: 0
SORE THROAT: 0
VOMITING: 0
NAUSEA: 0
ABDOMINAL PAIN: 0
EYE PAIN: 0
COUGH: 0
PHOTOPHOBIA: 0
BACK PAIN: 0
RHINORRHEA: 0
DIARRHEA: 0

## 2019-07-02 NOTE — ED PROVIDER NOTES
light-headedness and headaches. Psychiatric/Behavioral: Negative. All other systems reviewed and are negative. Except as noted above the remainder of the review of systems was reviewed and negative. PAST MEDICAL HISTORY     Past Medical History:   Diagnosis Date    Chronic back pain     Hyperlipidemia     Hypertension      Past Surgical History:   Procedure Laterality Date    CARDIAC CATHETERIZATION      3 stents    CORONARY ARTERY BYPASS GRAFT      CRANIOTOMY Bilateral 5/25/2019    CRANIOTOMY HEMATOMA EVACUATION performed by Howie Montanez MD at [address] [place] [address] History     Socioeconomic History    Marital status:      Spouse name: None    Number of children: None    Years of education: None    Highest education level: None   Occupational History    Occupation: Retired    Social Needs    Financial resource strain: None    Food insecurity:     Worry: None     Inability: None    Transportation needs:     Medical: None     Non-medical: None   Tobacco Use    Smoking status: Former Smoker    Smokeless tobacco: Never Used   Substance and Sexual Activity    Alcohol use:  Yes     Alcohol/week: 0.0 oz     Comment: social    Drug use: No    Sexual activity: Not Currently   Lifestyle    Physical activity:     Days per week: None     Minutes per session: None    Stress: None   Relationships    Social connections:     Talks on phone: More than three times a week     Gets together: Never     Attends Rastafarian service: Never     Active member of club or organization: No     Attends meetings of clubs or organizations: Never     Relationship status:     Intimate partner violence:     Fear of current or ex partner: No     Emotionally abused: No     Physically abused: No     Forced sexual activity: No   Other Topics Concern    None   Social History Narrative    Social/Functional History     girlfriend Kamilah    Multiple prior marriages-multiple prior children but All other components within normal limits   PROTIME-INR - Abnormal; Notable for the following components:    Protime 11.7 (*)     All other components within normal limits   MICROSCOPIC URINALYSIS - Abnormal; Notable for the following components:    RBC, UA 5-10 (*)     All other components within normal limits   URINE CULTURE   ETHANOL   CK   APTT   TSH WITH REFLEX   AMMONIA   AMIODARONE LEVEL       All other labs were within normal range or not returned as of this dictation. EMERGENCY DEPARTMENT COURSE and DIFFERENTIAL DIAGNOSIS/MDM:   Vitals:    Vitals:    07/02/19 1307 07/02/19 1439 07/02/19 1500 07/02/19 1600   BP: 115/61 (!) 101/55 120/64 112/64   Pulse: 58 (!) 49 56 62   Resp: 16 16 16 16   Temp: 98 °F (36.7 °C)      TempSrc: Oral      SpO2:  100% 100% 98%   Weight: 165 lb (74.8 kg)      Height: 5' 6\" (1.676 m)               MDM on evaluation pt is nontoxic and in no distress. He denies current complaints. Labs/rads ordered for evaluation ofacute pathology and to provide clearance. No acute process identified. CT head without change. Pt is medically cleared for transfer to facility. We discussed patient transition to [facility] with the facility itself. They report that due to the patient's medical complexity he was denied admission at 10:53 AM this morning. [facility] facility reports that this was discussed with Dex Crook. Patient has been cooperative, not agitated here while in the emergency department. He has not been sexually inappropriate to any staff here. As he currently resides at [facility] this is a facility that he will be discharged back to. If the facility is wishing the patient to seek other place of residence I feel that they can follow appropriate channels through the court system if they wish to evict him. CRITICAL CARE TIME       CONSULTS:  None    PROCEDURES:  Unless otherwise noted below, none     Procedures    FINAL IMPRESSION      1.  Chronic intracranial subdural

## 2019-07-03 PROCEDURE — 93010 ELECTROCARDIOGRAM REPORT: CPT | Performed by: INTERNAL MEDICINE

## 2019-07-03 NOTE — ED NOTES
Received call back from [facility].  They now will not transport until 0830 am.      Irwin Amezcua RN  07/03/19 2429

## 2019-07-03 NOTE — ED NOTES
Spoke with admissions at [facility]. EKG faxed as requested.       Mk Carpenter RN  07/02/19 2062

## 2019-07-03 NOTE — ED NOTES
Patient is accepted to [facility]. [place]quel slip faxed to [facility] as requested.       Tamika Tavera RN  07/03/19 5554

## 2019-07-03 NOTE — ED NOTES
Incontinence care provided to pt at this time with pt's brief changed. Pt resting comfortably in ED cot at this time.      Peter Patel RN  07/03/19 4625

## 2019-07-03 NOTE — ED NOTES
Patient arrived at [facility]. Patient assisted to bed by staff. Patient immediately attempting to climb out of bed. Patient is known fall risk.       Simeon Malik RN  07/02/19 2641

## 2019-07-04 LAB — URINE CULTURE, ROUTINE: NORMAL

## 2019-07-05 LAB
AMIODARONE LEVEL: 0.5 UG/ML (ref 0.5–2)
DES-AMIOD: <0.3 UG/ML

## 2019-07-08 ENCOUNTER — TELEPHONE (OUTPATIENT)
Dept: PHARMACY | Age: 84
End: 2019-07-08

## 2019-07-08 DIAGNOSIS — I48.91 ATRIAL FIBRILLATION, UNSPECIFIED TYPE (HCC): ICD-10-CM

## 2019-07-11 VITALS
RESPIRATION RATE: 13 BRPM | TEMPERATURE: 97.8 F | DIASTOLIC BLOOD PRESSURE: 82 MMHG | OXYGEN SATURATION: 94 % | HEART RATE: 72 BPM | SYSTOLIC BLOOD PRESSURE: 124 MMHG

## 2019-07-11 RX ORDER — LORAZEPAM 0.5 MG/1
0.5 TABLET ORAL EVERY 6 HOURS PRN
COMMUNITY

## 2019-07-11 RX ORDER — BUSPIRONE HYDROCHLORIDE 5 MG/1
5 TABLET ORAL 3 TIMES DAILY
COMMUNITY

## 2019-07-11 RX ORDER — TRAZODONE HYDROCHLORIDE 50 MG/1
50 TABLET ORAL NIGHTLY
COMMUNITY

## 2019-07-11 RX ORDER — LORAZEPAM 2 MG/ML
0.5 INJECTION INTRAMUSCULAR EVERY 6 HOURS PRN
Status: ON HOLD | COMMUNITY
End: 2019-07-19 | Stop reason: HOSPADM

## 2019-07-11 RX ORDER — DIVALPROEX SODIUM 125 MG/1
125 TABLET, DELAYED RELEASE ORAL 2 TIMES DAILY
COMMUNITY

## 2019-07-11 NOTE — PROGRESS NOTES
PATIENT: Collin Shedd : 1934 DOS: 2019     [facility]    CHIEF COMPLAINT: Fall. HISTORY OF PRESENT ILLNESS: This is an 77-year-old male which medical history quite significant for atrial fibrillation (on Coumadin), hypertension, CAD, SHAYY on CPAP, and BPH who was said to have had a fall while in a shower on May 31, 2019, and was admitted to the hospital for the management of injuries he sustained from it. He apparently had another fall and the Head CT scan obtained on 2019, demonstrated bilateral extra-axial subdural heterogeneous collections, most likely consistent with a subacute or chronic hematoma. He was said to be confused due to the subdural hematoma and also had significant bradycardia as low as the 40s that was initially said to be asymptomatic and it was managed by cardiology. The patient eventually had evacuation of the hematoma done and stayed in the hospital until 2019, when he was discharged only for him to sustain another fall necessitating transference back to the emergency room for further evaluation and management. He was seen again in the Emergency Department and was treated appropriately before he was discharged back to the SNF at [facility] for rehab. Today, he admits to body weakness but denies having any new complaints at this time and admits to sleeping very well and that his appetite remains the same. No cough or shortness of breath at this time. He denies having nausea, vomiting, diarrhea, constipation or abdominal pain. No gross hematuria or dysuria. No hallucinations or depression at this time. PAST MEDICAL HISTORY: A-fib, hypertension, SHAYY on CPAP, CAD, right bundle branch block, closed compression fracture of L1 vertebra, prostate cancer, hyperlipidemia, memory loss, prediabetes, vitamin D deficiency, chronic back pain, dysphagia, and abnormality of gait and mobility. FAMILY HISTORY: Not contributory.     SOCIAL HISTORY: Denies use of illicit drugs or alcoholic beverages. ALLERGIES: Honey bee venom and penicillins. MEDICATIONS: Amiodarone 200 mg tablet once daily; amlodipine 2.5 mg tablet once daily; Norco 5/325 mg tablet q.4 hours p.r.n. for pain; lactulose 10 mg/15 mL solution take 30 mL by mouth once daily; magnesium oxide 400 mg tablet daily; OxyContin 10 mg tablet q.12 hours; Ambien 5 mg tablet q.h.s.; Toprol-XL 25 mg tablet once daily; Nitrostat 0.4 mg, 1 tablet sublingual q.5 minutes as director for chest pain; Pravachol 40 mg tablets once daily at bedtime, and vitamin D3 once daily. REVIEW OF SYSTEMS: As in HPI above. OBJECTIVE: His vital signs include blood pressure 124/82 mmHg, heart rate is 72 beats per minute, respiration rate is 13 per minute, temperature is 97.8 and he is saturating at 94% on room air. The patient is a chronically ill-appearing, elderly gentleman, who is alert, but mildly pale; he is afebrile to the touch. Head is normocephalic and atraumatic with pupils equal, round, and reactive to light and accommodation. Neck is supple without JVD or palpable thyromegaly. Lungs are clear to auscultation bilaterally and he has audible heart tones, S1 and S2, without murmur. Abdomen is benign with normoactive bowel sounds. He has trace pedal edema bilaterally, but no calf tenderness. Skin is warm and dry. He is alert, but only oriented to person and place with significant impaired time perception. Muscle strength is about 3/5 in the lower extremities and 4/5 in the upper extremities; hence, patient is using an assistive device (manual wheelchair) to assist with ambulation. He has appropriate affect. ASSESSMENT AND PLAN:  1. Chronic subdural hematoma, bilateral: Most recent imaging study demonstrated significant improvement at this time, so he will follow up with his neurosurgeon and neurologist.   2.  Generalized muscle weakness: PT, OT, and ST to evaluate and treat accordingly.    3.  Chronic midline

## 2019-07-12 ENCOUNTER — OFFICE VISIT (OUTPATIENT)
Dept: GERIATRIC MEDICINE | Age: 84
End: 2019-07-12
Payer: MEDICARE

## 2019-07-12 DIAGNOSIS — M62.81 GENERALIZED MUSCLE WEAKNESS: ICD-10-CM

## 2019-07-12 DIAGNOSIS — R29.6 RECURRENT FALLS WHILE WALKING: ICD-10-CM

## 2019-07-12 DIAGNOSIS — F60.3 IMPULSIVE PERSONALITY DISORDER (HCC): ICD-10-CM

## 2019-07-12 DIAGNOSIS — G47.00 INSOMNIA, UNSPECIFIED TYPE: Primary | ICD-10-CM

## 2019-07-12 PROCEDURE — 1123F ACP DISCUSS/DSCN MKR DOCD: CPT | Performed by: FAMILY MEDICINE

## 2019-07-12 PROCEDURE — 99306 1ST NF CARE HIGH MDM 50: CPT | Performed by: FAMILY MEDICINE

## 2019-07-13 ENCOUNTER — HOSPITAL ENCOUNTER (EMERGENCY)
Age: 84
Discharge: HOME | DRG: 083 | End: 2019-07-13
Attending: EMERGENCY MEDICINE
Payer: MEDICARE

## 2019-07-13 ENCOUNTER — APPOINTMENT (OUTPATIENT)
Dept: CT IMAGING | Age: 84
DRG: 083 | End: 2019-07-13
Payer: MEDICARE

## 2019-07-13 ENCOUNTER — APPOINTMENT (OUTPATIENT)
Dept: GENERAL RADIOLOGY | Age: 84
DRG: 083 | End: 2019-07-13
Payer: MEDICARE

## 2019-07-13 VITALS
SYSTOLIC BLOOD PRESSURE: 130 MMHG | DIASTOLIC BLOOD PRESSURE: 76 MMHG | RESPIRATION RATE: 16 BRPM | TEMPERATURE: 98.7 F | WEIGHT: 165 LBS | HEIGHT: 67 IN | OXYGEN SATURATION: 100 % | HEART RATE: 59 BPM | BODY MASS INDEX: 25.9 KG/M2

## 2019-07-13 DIAGNOSIS — R07.9 CHEST PAIN, UNSPECIFIED TYPE: Primary | ICD-10-CM

## 2019-07-13 LAB
ALBUMIN SERPL-MCNC: 4.1 G/DL (ref 3.5–4.6)
ALP BLD-CCNC: 80 U/L (ref 35–104)
ALT SERPL-CCNC: 18 U/L (ref 0–41)
ANION GAP SERPL CALCULATED.3IONS-SCNC: 15 MEQ/L (ref 9–15)
AST SERPL-CCNC: 18 U/L (ref 0–40)
BACTERIA: NEGATIVE /HPF
BASOPHILS ABSOLUTE: 0 K/UL (ref 0–0.2)
BASOPHILS RELATIVE PERCENT: 0.4 %
BILIRUB SERPL-MCNC: 0.3 MG/DL (ref 0.2–0.7)
BILIRUBIN URINE: NEGATIVE
BLOOD, URINE: ABNORMAL
BUN BLDV-MCNC: 23 MG/DL (ref 8–23)
CALCIUM SERPL-MCNC: 9.7 MG/DL (ref 8.5–9.9)
CHLORIDE BLD-SCNC: 102 MEQ/L (ref 95–107)
CLARITY: CLEAR
CO2: 26 MEQ/L (ref 20–31)
COLOR: YELLOW
CREAT SERPL-MCNC: 1 MG/DL (ref 0.7–1.2)
EKG ATRIAL RATE: 67 BPM
EKG P AXIS: 42 DEGREES
EKG P-R INTERVAL: 164 MS
EKG Q-T INTERVAL: 448 MS
EKG QRS DURATION: 150 MS
EKG QTC CALCULATION (BAZETT): 473 MS
EKG R AXIS: -39 DEGREES
EKG T AXIS: -12 DEGREES
EKG VENTRICULAR RATE: 67 BPM
EOSINOPHILS ABSOLUTE: 0.1 K/UL (ref 0–0.7)
EOSINOPHILS RELATIVE PERCENT: 1.4 %
EPITHELIAL CELLS, UA: ABNORMAL /HPF (ref 0–5)
GFR AFRICAN AMERICAN: >60
GFR NON-AFRICAN AMERICAN: >60
GLOBULIN: 3.5 G/DL (ref 2.3–3.5)
GLUCOSE BLD-MCNC: 137 MG/DL (ref 70–99)
GLUCOSE URINE: NEGATIVE MG/DL
HCT VFR BLD CALC: 41.1 % (ref 42–52)
HEMOGLOBIN: 13.8 G/DL (ref 14–18)
HYALINE CASTS: ABNORMAL /HPF (ref 0–5)
KETONES, URINE: NEGATIVE MG/DL
LEUKOCYTE ESTERASE, URINE: NEGATIVE
LYMPHOCYTES ABSOLUTE: 1.3 K/UL (ref 1–4.8)
LYMPHOCYTES RELATIVE PERCENT: 14.7 %
MAGNESIUM: 2.2 MG/DL (ref 1.7–2.4)
MCH RBC QN AUTO: 30.2 PG (ref 27–31.3)
MCHC RBC AUTO-ENTMCNC: 33.6 % (ref 33–37)
MCV RBC AUTO: 89.9 FL (ref 80–100)
MONOCYTES ABSOLUTE: 0.9 K/UL (ref 0.2–0.8)
MONOCYTES RELATIVE PERCENT: 10.8 %
NEUTROPHILS ABSOLUTE: 6.4 K/UL (ref 1.4–6.5)
NEUTROPHILS RELATIVE PERCENT: 72.7 %
NITRITE, URINE: NEGATIVE
PDW BLD-RTO: 14.4 % (ref 11.5–14.5)
PH UA: 5.5 (ref 5–9)
PLATELET # BLD: 248 K/UL (ref 130–400)
POTASSIUM SERPL-SCNC: 4.4 MEQ/L (ref 3.4–4.9)
PRO-BNP: 451 PG/ML
PROTEIN UA: ABNORMAL MG/DL
RBC # BLD: 4.57 M/UL (ref 4.7–6.1)
RBC UA: ABNORMAL /HPF (ref 0–5)
SODIUM BLD-SCNC: 143 MEQ/L (ref 135–144)
SPECIFIC GRAVITY UA: 1.02 (ref 1–1.03)
TOTAL PROTEIN: 7.6 G/DL (ref 6.3–8)
TROPONIN: <0.01 NG/ML (ref 0–0.01)
TROPONIN: <0.01 NG/ML (ref 0–0.01)
UROBILINOGEN, URINE: 0.2 E.U./DL
WBC # BLD: 8.7 K/UL (ref 4.8–10.8)
WBC UA: ABNORMAL /HPF (ref 0–5)

## 2019-07-13 PROCEDURE — 83735 ASSAY OF MAGNESIUM: CPT

## 2019-07-13 PROCEDURE — 96374 THER/PROPH/DIAG INJ IV PUSH: CPT

## 2019-07-13 PROCEDURE — 81001 URINALYSIS AUTO W/SCOPE: CPT

## 2019-07-13 PROCEDURE — 93005 ELECTROCARDIOGRAM TRACING: CPT | Performed by: EMERGENCY MEDICINE

## 2019-07-13 PROCEDURE — 70450 CT HEAD/BRAIN W/O DYE: CPT

## 2019-07-13 PROCEDURE — 6830039000 HC L3 TRAUMA ALERT

## 2019-07-13 PROCEDURE — 85025 COMPLETE CBC W/AUTO DIFF WBC: CPT

## 2019-07-13 PROCEDURE — 71046 X-RAY EXAM CHEST 2 VIEWS: CPT

## 2019-07-13 PROCEDURE — 36415 COLL VENOUS BLD VENIPUNCTURE: CPT

## 2019-07-13 PROCEDURE — 6360000002 HC RX W HCPCS: Performed by: EMERGENCY MEDICINE

## 2019-07-13 PROCEDURE — 84484 ASSAY OF TROPONIN QUANT: CPT

## 2019-07-13 PROCEDURE — 99285 EMERGENCY DEPT VISIT HI MDM: CPT

## 2019-07-13 PROCEDURE — 2580000003 HC RX 258: Performed by: EMERGENCY MEDICINE

## 2019-07-13 PROCEDURE — 83880 ASSAY OF NATRIURETIC PEPTIDE: CPT

## 2019-07-13 PROCEDURE — 93005 ELECTROCARDIOGRAM TRACING: CPT

## 2019-07-13 PROCEDURE — 80053 COMPREHEN METABOLIC PANEL: CPT

## 2019-07-13 RX ORDER — 0.9 % SODIUM CHLORIDE 0.9 %
500 INTRAVENOUS SOLUTION INTRAVENOUS ONCE
Status: COMPLETED | OUTPATIENT
Start: 2019-07-13 | End: 2019-07-13

## 2019-07-13 RX ORDER — FENTANYL CITRATE 50 UG/ML
50 INJECTION, SOLUTION INTRAMUSCULAR; INTRAVENOUS ONCE
Status: COMPLETED | OUTPATIENT
Start: 2019-07-13 | End: 2019-07-13

## 2019-07-13 RX ADMIN — FENTANYL CITRATE 50 MCG: 50 INJECTION, SOLUTION INTRAMUSCULAR; INTRAVENOUS at 15:19

## 2019-07-13 RX ADMIN — SODIUM CHLORIDE 500 ML: 9 INJECTION, SOLUTION INTRAVENOUS at 15:19

## 2019-07-13 ASSESSMENT — PAIN SCALES - GENERAL
PAINLEVEL_OUTOF10: 10
PAINLEVEL_OUTOF10: 0
PAINLEVEL_OUTOF10: 0

## 2019-07-13 ASSESSMENT — ENCOUNTER SYMPTOMS
DIARRHEA: 0
SHORTNESS OF BREATH: 0
SORE THROAT: 0
COUGH: 0
VOMITING: 0
BACK PAIN: 0
NAUSEA: 0
ABDOMINAL PAIN: 0

## 2019-07-15 ENCOUNTER — HOSPITAL ENCOUNTER (INPATIENT)
Age: 84
LOS: 4 days | Discharge: HOSPICE | DRG: 083 | End: 2019-07-19
Attending: EMERGENCY MEDICINE | Admitting: SURGERY
Payer: MEDICARE

## 2019-07-15 ENCOUNTER — APPOINTMENT (OUTPATIENT)
Dept: CT IMAGING | Age: 84
DRG: 083 | End: 2019-07-15
Payer: MEDICARE

## 2019-07-15 ENCOUNTER — APPOINTMENT (OUTPATIENT)
Dept: GENERAL RADIOLOGY | Age: 84
DRG: 083 | End: 2019-07-15
Payer: MEDICARE

## 2019-07-15 DIAGNOSIS — I60.9 SUBARACHNOID HEMORRHAGE (HCC): ICD-10-CM

## 2019-07-15 DIAGNOSIS — S01.111A EYEBROW LACERATION, RIGHT, INITIAL ENCOUNTER: ICD-10-CM

## 2019-07-15 DIAGNOSIS — I62.03 ACUTE ON CHRONIC INTRACRANIAL SUBDURAL HEMATOMA (HCC): Primary | ICD-10-CM

## 2019-07-15 DIAGNOSIS — I62.01 ACUTE ON CHRONIC INTRACRANIAL SUBDURAL HEMATOMA (HCC): Primary | ICD-10-CM

## 2019-07-15 LAB
ANION GAP SERPL CALCULATED.3IONS-SCNC: 12 MEQ/L (ref 9–15)
BASOPHILS ABSOLUTE: 0 K/UL (ref 0–0.2)
BASOPHILS RELATIVE PERCENT: 0.2 %
BUN BLDV-MCNC: 28 MG/DL (ref 8–23)
CALCIUM SERPL-MCNC: 9.2 MG/DL (ref 8.5–9.9)
CHLORIDE BLD-SCNC: 105 MEQ/L (ref 95–107)
CO2: 25 MEQ/L (ref 20–31)
CREAT SERPL-MCNC: 1.01 MG/DL (ref 0.7–1.2)
EKG ATRIAL RATE: 62 BPM
EKG P AXIS: 27 DEGREES
EKG P-R INTERVAL: 160 MS
EKG Q-T INTERVAL: 490 MS
EKG QRS DURATION: 156 MS
EKG QTC CALCULATION (BAZETT): 497 MS
EKG R AXIS: 110 DEGREES
EKG T AXIS: 61 DEGREES
EKG VENTRICULAR RATE: 62 BPM
EOSINOPHILS ABSOLUTE: 0.1 K/UL (ref 0–0.7)
EOSINOPHILS RELATIVE PERCENT: 1.5 %
GFR AFRICAN AMERICAN: >60
GFR NON-AFRICAN AMERICAN: >60
GLUCOSE BLD-MCNC: 130 MG/DL (ref 70–99)
HCT VFR BLD CALC: 35.9 % (ref 42–52)
HEMOGLOBIN: 12.3 G/DL (ref 14–18)
LYMPHOCYTES ABSOLUTE: 2.5 K/UL (ref 1–4.8)
LYMPHOCYTES RELATIVE PERCENT: 29.4 %
MCH RBC QN AUTO: 30.9 PG (ref 27–31.3)
MCHC RBC AUTO-ENTMCNC: 34.3 % (ref 33–37)
MCV RBC AUTO: 89.9 FL (ref 80–100)
MONOCYTES ABSOLUTE: 1.1 K/UL (ref 0.2–0.8)
MONOCYTES RELATIVE PERCENT: 12.9 %
NEUTROPHILS ABSOLUTE: 4.7 K/UL (ref 1.4–6.5)
NEUTROPHILS RELATIVE PERCENT: 56 %
PDW BLD-RTO: 14.8 % (ref 11.5–14.5)
PLATELET # BLD: 224 K/UL (ref 130–400)
POTASSIUM SERPL-SCNC: 3.8 MEQ/L (ref 3.4–4.9)
RBC # BLD: 4 M/UL (ref 4.7–6.1)
SODIUM BLD-SCNC: 142 MEQ/L (ref 135–144)
WBC # BLD: 8.3 K/UL (ref 4.8–10.8)

## 2019-07-15 PROCEDURE — 93005 ELECTROCARDIOGRAM TRACING: CPT | Performed by: EMERGENCY MEDICINE

## 2019-07-15 PROCEDURE — 2580000003 HC RX 258: Performed by: SURGERY

## 2019-07-15 PROCEDURE — 1210000000 HC MED SURG R&B

## 2019-07-15 PROCEDURE — 70450 CT HEAD/BRAIN W/O DYE: CPT

## 2019-07-15 PROCEDURE — 2580000003 HC RX 258: Performed by: EMERGENCY MEDICINE

## 2019-07-15 PROCEDURE — 72128 CT CHEST SPINE W/O DYE: CPT

## 2019-07-15 PROCEDURE — 80048 BASIC METABOLIC PNL TOTAL CA: CPT

## 2019-07-15 PROCEDURE — 6830039000 HC L3 TRAUMA ALERT

## 2019-07-15 PROCEDURE — 72125 CT NECK SPINE W/O DYE: CPT

## 2019-07-15 PROCEDURE — 85025 COMPLETE CBC W/AUTO DIFF WBC: CPT

## 2019-07-15 PROCEDURE — 36415 COLL VENOUS BLD VENIPUNCTURE: CPT

## 2019-07-15 PROCEDURE — 72131 CT LUMBAR SPINE W/O DYE: CPT

## 2019-07-15 PROCEDURE — 71045 X-RAY EXAM CHEST 1 VIEW: CPT

## 2019-07-15 PROCEDURE — 83735 ASSAY OF MAGNESIUM: CPT

## 2019-07-15 PROCEDURE — 99285 EMERGENCY DEPT VISIT HI MDM: CPT

## 2019-07-15 RX ORDER — SODIUM CHLORIDE 0.9 % (FLUSH) 0.9 %
10 SYRINGE (ML) INJECTION PRN
Status: DISCONTINUED | OUTPATIENT
Start: 2019-07-15 | End: 2019-07-16 | Stop reason: SDUPTHER

## 2019-07-15 RX ORDER — OXYCODONE HYDROCHLORIDE 5 MG/1
10 TABLET ORAL EVERY 4 HOURS PRN
Status: DISCONTINUED | OUTPATIENT
Start: 2019-07-15 | End: 2019-07-16 | Stop reason: SDUPTHER

## 2019-07-15 RX ORDER — POLYETHYLENE GLYCOL 3350 17 G/17G
17 POWDER, FOR SOLUTION ORAL DAILY PRN
Status: DISCONTINUED | OUTPATIENT
Start: 2019-07-15 | End: 2019-07-16 | Stop reason: SDUPTHER

## 2019-07-15 RX ORDER — BISACODYL 10 MG
10 SUPPOSITORY, RECTAL RECTAL DAILY PRN
Status: DISCONTINUED | OUTPATIENT
Start: 2019-07-15 | End: 2019-07-16 | Stop reason: SDUPTHER

## 2019-07-15 RX ORDER — BUSPIRONE HYDROCHLORIDE 5 MG/1
5 TABLET ORAL 3 TIMES DAILY
Status: DISCONTINUED | OUTPATIENT
Start: 2019-07-15 | End: 2019-07-19 | Stop reason: HOSPADM

## 2019-07-15 RX ORDER — DIVALPROEX SODIUM 125 MG/1
125 TABLET, DELAYED RELEASE ORAL 2 TIMES DAILY
Status: DISCONTINUED | OUTPATIENT
Start: 2019-07-15 | End: 2019-07-19 | Stop reason: HOSPADM

## 2019-07-15 RX ORDER — PRAVASTATIN SODIUM 40 MG
40 TABLET ORAL NIGHTLY
Status: DISCONTINUED | OUTPATIENT
Start: 2019-07-15 | End: 2019-07-19 | Stop reason: HOSPADM

## 2019-07-15 RX ORDER — METOPROLOL SUCCINATE 25 MG/1
25 TABLET, EXTENDED RELEASE ORAL 2 TIMES DAILY
Status: DISCONTINUED | OUTPATIENT
Start: 2019-07-15 | End: 2019-07-19 | Stop reason: HOSPADM

## 2019-07-15 RX ORDER — SODIUM CHLORIDE 0.9 % (FLUSH) 0.9 %
3 SYRINGE (ML) INJECTION EVERY 8 HOURS
Status: DISCONTINUED | OUTPATIENT
Start: 2019-07-15 | End: 2019-07-15 | Stop reason: ALTCHOICE

## 2019-07-15 RX ORDER — OXYCODONE HYDROCHLORIDE 5 MG/1
5 TABLET ORAL EVERY 4 HOURS PRN
Status: DISCONTINUED | OUTPATIENT
Start: 2019-07-15 | End: 2019-07-16 | Stop reason: SDUPTHER

## 2019-07-15 RX ORDER — SODIUM CHLORIDE 9 MG/ML
INJECTION, SOLUTION INTRAVENOUS CONTINUOUS
Status: DISCONTINUED | OUTPATIENT
Start: 2019-07-15 | End: 2019-07-17

## 2019-07-15 RX ORDER — NITROGLYCERIN 0.4 MG/1
0.4 TABLET SUBLINGUAL EVERY 5 MIN PRN
Status: DISCONTINUED | OUTPATIENT
Start: 2019-07-15 | End: 2019-07-16

## 2019-07-15 RX ORDER — SODIUM CHLORIDE 0.9 % (FLUSH) 0.9 %
10 SYRINGE (ML) INJECTION EVERY 12 HOURS SCHEDULED
Status: DISCONTINUED | OUTPATIENT
Start: 2019-07-15 | End: 2019-07-16 | Stop reason: SDUPTHER

## 2019-07-15 RX ORDER — AMIODARONE HYDROCHLORIDE 200 MG/1
200 TABLET ORAL DAILY
Status: DISCONTINUED | OUTPATIENT
Start: 2019-07-16 | End: 2019-07-19 | Stop reason: HOSPADM

## 2019-07-15 RX ORDER — ACETAMINOPHEN 325 MG/1
650 TABLET ORAL EVERY 4 HOURS PRN
Status: DISCONTINUED | OUTPATIENT
Start: 2019-07-15 | End: 2019-07-16 | Stop reason: SDUPTHER

## 2019-07-15 RX ORDER — MAGNESIUM SULFATE 1 G/100ML
1 INJECTION INTRAVENOUS PRN
Status: DISCONTINUED | OUTPATIENT
Start: 2019-07-15 | End: 2019-07-16 | Stop reason: SDUPTHER

## 2019-07-15 RX ORDER — POTASSIUM CHLORIDE 7.45 MG/ML
10 INJECTION INTRAVENOUS PRN
Status: DISCONTINUED | OUTPATIENT
Start: 2019-07-15 | End: 2019-07-16 | Stop reason: SDUPTHER

## 2019-07-15 RX ORDER — IPRATROPIUM BROMIDE AND ALBUTEROL SULFATE 2.5; .5 MG/3ML; MG/3ML
1 SOLUTION RESPIRATORY (INHALATION) EVERY 4 HOURS PRN
Status: DISCONTINUED | OUTPATIENT
Start: 2019-07-15 | End: 2019-07-16

## 2019-07-15 RX ORDER — TRAZODONE HYDROCHLORIDE 50 MG/1
50 TABLET ORAL NIGHTLY
Status: DISCONTINUED | OUTPATIENT
Start: 2019-07-15 | End: 2019-07-19 | Stop reason: HOSPADM

## 2019-07-15 RX ORDER — ONDANSETRON 2 MG/ML
4 INJECTION INTRAMUSCULAR; INTRAVENOUS EVERY 6 HOURS PRN
Status: DISCONTINUED | OUTPATIENT
Start: 2019-07-15 | End: 2019-07-16 | Stop reason: SDUPTHER

## 2019-07-15 RX ADMIN — SODIUM CHLORIDE: 9 INJECTION, SOLUTION INTRAVENOUS at 22:44

## 2019-07-15 RX ADMIN — Medication 3 ML: at 19:47

## 2019-07-15 ASSESSMENT — ENCOUNTER SYMPTOMS
ABDOMINAL PAIN: 0
SINUS PAIN: 0
RESPIRATORY NEGATIVE: 1
SORE THROAT: 0
DIARRHEA: 0
WHEEZING: 0
SHORTNESS OF BREATH: 0
GASTROINTESTINAL NEGATIVE: 1
NAUSEA: 0
COUGH: 0
EYE PAIN: 0
VOMITING: 0
EYE DISCHARGE: 0
BACK PAIN: 1
BLOOD IN STOOL: 0
ABDOMINAL DISTENTION: 0
CHEST TIGHTNESS: 0
EYES NEGATIVE: 1

## 2019-07-15 ASSESSMENT — PAIN SCALES - GENERAL: PAINLEVEL_OUTOF10: 8

## 2019-07-15 ASSESSMENT — PAIN DESCRIPTION - LOCATION: LOCATION: HEAD

## 2019-07-15 ASSESSMENT — PAIN DESCRIPTION - DESCRIPTORS: DESCRIPTORS: ACHING

## 2019-07-15 ASSESSMENT — PAIN DESCRIPTION - PAIN TYPE: TYPE: ACUTE PAIN

## 2019-07-16 PROBLEM — I62.01 ACUTE ON CHRONIC INTRACRANIAL SUBDURAL HEMATOMA (HCC): Status: ACTIVE | Noted: 2019-07-16

## 2019-07-16 PROBLEM — I62.03 ACUTE ON CHRONIC INTRACRANIAL SUBDURAL HEMATOMA (HCC): Status: ACTIVE | Noted: 2019-07-16
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
## 2019-07-16 LAB
ANION GAP SERPL CALCULATED.3IONS-SCNC: 12 MEQ/L (ref 9–15)
APTT: 24.7 SEC (ref 24.4–36.8)
BASOPHILS ABSOLUTE: 0.1 K/UL (ref 0–0.2)
BASOPHILS RELATIVE PERCENT: 0.6 %
BUN BLDV-MCNC: 20 MG/DL (ref 8–23)
CALCIUM SERPL-MCNC: 8.7 MG/DL (ref 8.5–9.9)
CHLORIDE BLD-SCNC: 109 MEQ/L (ref 95–107)
CO2: 21 MEQ/L (ref 20–31)
CREAT SERPL-MCNC: 0.74 MG/DL (ref 0.7–1.2)
EOSINOPHILS ABSOLUTE: 0.1 K/UL (ref 0–0.7)
EOSINOPHILS RELATIVE PERCENT: 1.3 %
GFR AFRICAN AMERICAN: >60
GFR NON-AFRICAN AMERICAN: >60
GLUCOSE BLD-MCNC: 92 MG/DL (ref 70–99)
HCT VFR BLD CALC: 34.4 % (ref 42–52)
HEMOGLOBIN: 11.5 G/DL (ref 14–18)
INR BLD: 1
LYMPHOCYTES ABSOLUTE: 1.5 K/UL (ref 1–4.8)
LYMPHOCYTES RELATIVE PERCENT: 17 %
MAGNESIUM: 2.1 MG/DL (ref 1.7–2.4)
MCH RBC QN AUTO: 30.1 PG (ref 27–31.3)
MCHC RBC AUTO-ENTMCNC: 33.4 % (ref 33–37)
MCV RBC AUTO: 90.1 FL (ref 80–100)
MONOCYTES ABSOLUTE: 1 K/UL (ref 0.2–0.8)
MONOCYTES RELATIVE PERCENT: 11.6 %
NEUTROPHILS ABSOLUTE: 6 K/UL (ref 1.4–6.5)
NEUTROPHILS RELATIVE PERCENT: 69.5 %
PDW BLD-RTO: 14.2 % (ref 11.5–14.5)
PLATELET # BLD: 203 K/UL (ref 130–400)
POTASSIUM REFLEX MAGNESIUM: 4 MEQ/L (ref 3.4–4.9)
PROTHROMBIN TIME: 14 SEC (ref 12.3–14.9)
RBC # BLD: 3.81 M/UL (ref 4.7–6.1)
SODIUM BLD-SCNC: 142 MEQ/L (ref 135–144)
WBC # BLD: 8.6 K/UL (ref 4.8–10.8)

## 2019-07-16 PROCEDURE — 36415 COLL VENOUS BLD VENIPUNCTURE: CPT

## 2019-07-16 PROCEDURE — 85610 PROTHROMBIN TIME: CPT

## 2019-07-16 PROCEDURE — 85025 COMPLETE CBC W/AUTO DIFF WBC: CPT

## 2019-07-16 PROCEDURE — 6370000000 HC RX 637 (ALT 250 FOR IP): Performed by: SURGERY

## 2019-07-16 PROCEDURE — 93010 ELECTROCARDIOGRAM REPORT: CPT | Performed by: INTERNAL MEDICINE

## 2019-07-16 PROCEDURE — 1210000000 HC MED SURG R&B

## 2019-07-16 PROCEDURE — 2580000003 HC RX 258: Performed by: PHYSICIAN ASSISTANT

## 2019-07-16 PROCEDURE — 92523 SPEECH SOUND LANG COMPREHEN: CPT

## 2019-07-16 PROCEDURE — 85730 THROMBOPLASTIN TIME PARTIAL: CPT

## 2019-07-16 PROCEDURE — 2580000003 HC RX 258: Performed by: SURGERY

## 2019-07-16 PROCEDURE — 99233 SBSQ HOSP IP/OBS HIGH 50: CPT | Performed by: SURGERY

## 2019-07-16 PROCEDURE — 80048 BASIC METABOLIC PNL TOTAL CA: CPT

## 2019-07-16 RX ORDER — SODIUM CHLORIDE 0.9 % (FLUSH) 0.9 %
10 SYRINGE (ML) INJECTION EVERY 12 HOURS SCHEDULED
Status: DISCONTINUED | OUTPATIENT
Start: 2019-07-16 | End: 2019-07-19 | Stop reason: HOSPADM

## 2019-07-16 RX ORDER — OXYCODONE HYDROCHLORIDE 5 MG/1
10 TABLET ORAL EVERY 4 HOURS PRN
Status: DISCONTINUED | OUTPATIENT
Start: 2019-07-16 | End: 2019-07-17

## 2019-07-16 RX ORDER — FENTANYL CITRATE 50 UG/ML
25 INJECTION, SOLUTION INTRAMUSCULAR; INTRAVENOUS
Status: DISCONTINUED | OUTPATIENT
Start: 2019-07-16 | End: 2019-07-16

## 2019-07-16 RX ORDER — ONDANSETRON 2 MG/ML
4 INJECTION INTRAMUSCULAR; INTRAVENOUS EVERY 6 HOURS PRN
Status: DISCONTINUED | OUTPATIENT
Start: 2019-07-16 | End: 2019-07-19 | Stop reason: HOSPADM

## 2019-07-16 RX ORDER — SODIUM CHLORIDE 0.9 % (FLUSH) 0.9 %
10 SYRINGE (ML) INJECTION PRN
Status: DISCONTINUED | OUTPATIENT
Start: 2019-07-16 | End: 2019-07-19 | Stop reason: HOSPADM

## 2019-07-16 RX ORDER — BISACODYL 10 MG
10 SUPPOSITORY, RECTAL RECTAL DAILY PRN
Status: DISCONTINUED | OUTPATIENT
Start: 2019-07-16 | End: 2019-07-19 | Stop reason: HOSPADM

## 2019-07-16 RX ORDER — ACETAMINOPHEN 325 MG/1
650 TABLET ORAL EVERY 4 HOURS PRN
Status: DISCONTINUED | OUTPATIENT
Start: 2019-07-16 | End: 2019-07-17

## 2019-07-16 RX ORDER — FENTANYL CITRATE 50 UG/ML
50 INJECTION, SOLUTION INTRAMUSCULAR; INTRAVENOUS
Status: DISCONTINUED | OUTPATIENT
Start: 2019-07-16 | End: 2019-07-16

## 2019-07-16 RX ORDER — MAGNESIUM SULFATE 1 G/100ML
1 INJECTION INTRAVENOUS PRN
Status: DISCONTINUED | OUTPATIENT
Start: 2019-07-16 | End: 2019-07-19 | Stop reason: HOSPADM

## 2019-07-16 RX ORDER — POTASSIUM CHLORIDE 7.45 MG/ML
10 INJECTION INTRAVENOUS PRN
Status: DISCONTINUED | OUTPATIENT
Start: 2019-07-16 | End: 2019-07-19 | Stop reason: HOSPADM

## 2019-07-16 RX ORDER — OXYCODONE HYDROCHLORIDE 5 MG/1
5 TABLET ORAL EVERY 4 HOURS PRN
Status: DISCONTINUED | OUTPATIENT
Start: 2019-07-16 | End: 2019-07-17

## 2019-07-16 RX ORDER — POLYETHYLENE GLYCOL 3350 17 G/17G
17 POWDER, FOR SOLUTION ORAL DAILY PRN
Status: DISCONTINUED | OUTPATIENT
Start: 2019-07-16 | End: 2019-07-19 | Stop reason: HOSPADM

## 2019-07-16 RX ADMIN — BUSPIRONE HYDROCHLORIDE 5 MG: 5 TABLET ORAL at 20:57

## 2019-07-16 RX ADMIN — BUSPIRONE HYDROCHLORIDE 5 MG: 5 TABLET ORAL at 14:33

## 2019-07-16 RX ADMIN — Medication 10 ML: at 08:58

## 2019-07-16 RX ADMIN — PRAVASTATIN SODIUM 40 MG: 40 TABLET ORAL at 20:57

## 2019-07-16 RX ADMIN — DIVALPROEX SODIUM 125 MG: 125 TABLET, DELAYED RELEASE ORAL at 08:58

## 2019-07-16 RX ADMIN — SODIUM CHLORIDE: 9 INJECTION, SOLUTION INTRAVENOUS at 20:57

## 2019-07-16 RX ADMIN — METOPROLOL SUCCINATE 25 MG: 25 TABLET, FILM COATED, EXTENDED RELEASE ORAL at 08:58

## 2019-07-16 RX ADMIN — BUSPIRONE HYDROCHLORIDE 5 MG: 5 TABLET ORAL at 08:58

## 2019-07-16 RX ADMIN — SODIUM CHLORIDE: 9 INJECTION, SOLUTION INTRAVENOUS at 09:00

## 2019-07-16 RX ADMIN — AMIODARONE HYDROCHLORIDE 200 MG: 200 TABLET ORAL at 14:33

## 2019-07-16 RX ADMIN — DIVALPROEX SODIUM 125 MG: 125 TABLET, DELAYED RELEASE ORAL at 20:57

## 2019-07-16 RX ADMIN — TRAZODONE HYDROCHLORIDE 50 MG: 50 TABLET ORAL at 20:57

## 2019-07-16 RX ADMIN — METOPROLOL SUCCINATE 25 MG: 25 TABLET, FILM COATED, EXTENDED RELEASE ORAL at 20:57

## 2019-07-16 ASSESSMENT — PAIN SCALES - GENERAL
PAINLEVEL_OUTOF10: 0
PAINLEVEL_OUTOF10: 0

## 2019-07-16 NOTE — CARE COORDINATION
Phone call made to son Miko Roach. He is the only known closest next of kin. Reviewed IMM with him and he voiced understanding. Miko Roach had not yet received a call from the physician as noted by C3 that he may. He did share that, although he has basically been estranged from his father for quite a few years, he became re-involved after his head injury and basically knows what has transpired recently. LSW discussed with him the possibility of comfort care and hospice for his dad. Miko Roach shared that this would likely be a good plan. However, he wanted to make it clear that he would not be financially responsible for his dad. He said his dad has been with his girlfriend, Merlinda Clarke, for some years and she may have financial POA. He states at the very least she is aware of patients financial information and Miko Roach is not. Miko Roach does not have a working relationship with Kamilah. Miko Roach is open to discussing POC with both the physician and [place] [id number]. He requested that if he does not answer then a message should be left. Message left for [facility] in [place] to see if Medicaid application can be submitted to [facility] since he is now residing in [facility] and report received that [facility] application was not valid. Pt would need an authorized Rep.

## 2019-07-16 NOTE — PROGRESS NOTES
Speech Language Pathology    NAME:  Luisana Benson  ROOM: [id number]  :  1934  DATE: 2019      Speech Therapy attempted to see Luisana Benson on this date for a/an:    Clinical Bedside Swallow Evaluation    Pt was unable to be seen due to:    Other: Flat bedrest order    Electronically signed by CIERRA Perez on 19 at 9:54 AM

## 2019-07-16 NOTE — ED NOTES
Pt is awake and aware of where he is. Pt asked to be able to take C collar off, but was cooperative with leaving it on. Pt was given swallow screening and passed.      Raj Alonzo RN  07/15/19 8510

## 2019-07-16 NOTE — PROGRESS NOTES
Respiratory:   - Continue to maintain O2 sat >98% and pulmonary toilet     GI/Diet:   - Continue regular dysphagia III diet  - Continue prn bowel regimen (prn dulocoax, prn Glycolax)     Renal/Electrolytes:   - NS @50cc/hr until adequate PO intake    ID:   - No indications for ABx at this time     Heme:   - No indications for transfusions at this time     Endocrine:   - No glycemic issues     MSK:   - C-spine cleared  - Discussed L1 and L2 spine fractures with Dr. Jairo Miner of neurosurgery - recommended TLSO brace and upright films; if XRs stable then can f/u outpt  - Maintain T and L precautions until TLSO brace and upright lumbar films obtained  - Activity: bed rest until T and L spine precautions removed  - PT/OT when able  - Continue SLP eval     Prophylaxis:   - SCD's. No chemoPPx given recent SDH and SAH  - No GI PPx indicated     Dispo: Remain in TCU for dispo planning and until able to clear lumbar spine. Awaiting TLSO and upright plain films. Prognosis guarded given severity and sequelae of bilateral SDH and acute SAH. Patient with multiple falls prior to admission and decreasing ability to perform ADLs and participate in therapies in inpatient rehab setting. Overall functional status seemed poor prior to his bilateral crani's and remains poor. Patient has limited to no interaction with his family for several years but his son still participates in medical decision making, Alhaji Morillo, son and POA, is open to discussion for hospice care. Goals of care discussions ongoing.  Cell phone [phone]       --  [facility]  [phone]     This patient's plan of care was discussed with Trauma attending physician, Dr. Ninfa Echavarria MD.

## 2019-07-16 NOTE — ED NOTES
Pt is resting comfortably in bed at this time, no signs of distress, pt was asked if he needed anything at this time and stated \"no\". Pt was left on bedside monitor.       Ashley Issa RN  07/15/19 7503

## 2019-07-16 NOTE — ED PROVIDER NOTES
No erythema. No pallor. Psychiatric: He has a normal mood and affect. His behavior is normal.   Nursing note and vitals reviewed. Numbness to the cervical thoracic and lumbar spine to palpation on exam after rolling off the board. Wound site of laceration right eyebrow 2 cm laceration. Patient also has an older skin tear on the right upper extremity    DIAGNOSTIC RESULTS     EKG: All EKG's are interpreted by the Emergency Department Physician who either signs or Co-signsthis chart in the absence of a cardiologist.    Normal sinus rhythm with a ventricular rate of 62 a right bundle branch block with a left posterior fascicular block. QTc was 497 ms. Range from all    RADIOLOGY:   Non-plain filmimages such as CT, Ultrasound and MRI are read by the radiologist. Alber Aguirre radiographic images are visualized and preliminarily interpreted by the emergency physician with the below findings:    Patient has subdural acute on chronic hematoma and also a new right parietal subarachnoid hemorrhage and a new trace right parafalcine subdural hematoma vertical spine does not show any fracture or traumatic malalignment. CT scan of the lumbar spine shows chronic L1-L2 of unknown age but appear to be old and thoracic spine shows no acute fracture read by the radiologist all of these films except for the chest x-ray by myself which shows a normal cardiopulmonary shadow.     Interpretation per the Radiologist below, if available at the time ofthis note:    XR CHEST PORTABLE    (Results Pending)   CT Head WO Contrast    (Results Pending)   CT CERVICAL SPINE WO CONTRAST    (Results Pending)   CT THORACIC SPINE WO CONTRAST    (Results Pending)   [place]    (Results Pending)         ED BEDSIDE ULTRASOUND:   Performed by ED Physician - none    LABS:  Labs Reviewed   BASIC METABOLIC PANEL - Abnormal; Notable for the following components:       Result Value    Glucose 130 (*)     BUN 28 (*)     All other components within normal limits   CBC WITH AUTO DIFFERENTIAL - Abnormal; Notable for the following components:    RBC 4.00 (*)     Hemoglobin 12.3 (*)     Hematocrit 35.9 (*)     RDW 14.8 (*)     Monocytes # 1.1 (*)     All other components within normal limits   BASIC METABOLIC PANEL W/ REFLEX TO MG FOR LOW K   CBC WITH AUTO DIFFERENTIAL   PROTIME-INR   APTT       All other labs were within normal range or not returned as of this dictation. EMERGENCY DEPARTMENT COURSE and DIFFERENTIAL DIAGNOSIS/MDM:   Vitals:    Vitals:    07/15/19 2052 07/15/19 2100 07/15/19 2115 07/15/19 2130   BP: (!) 146/69 (!) 147/65 133/78 (!) 150/78   Pulse: 92 67 68 73   Resp: 20 17 20 18   Temp:       TempSrc:       SpO2: 96% 97% 97% 97%   Weight:       Height:           Since x-rays as documented above with you acute on chronic subdural hematoma new parafalcine subdural hematoma and a subarachnoid hemorrhage. The patient has old L1-L2 fractures. The patient's laceration was repaired good approximation hemostasis. The patient was seen by the trauma surgeonDr Kyle Cortes who consulted the neurosurgeon. It was agreed upon admitting the patient to the hospital and he is written orders and seen the patient here in our department. Jaimee Fragoso show a BUN of 28 and glucose of 130 as the only abnormalities hemoglobin is 12.3 and near normal.    MDM    CRITICAL CARE TIME   Total Critical Care time was 30 minutes, excluding separately reportableprocedures. There was a high probability of clinicallysignificant/life threatening deterioration in the patient's condition which required my urgent intervention. Exam consult x-rays interpretations previous values old records all reviewed accounting for time.   Also not included in the time is repair of laceration below    CONSULTS:  None    PROCEDURES:  Unless otherwise noted below, none  Patient was sterilely prolapse anesthetized with 2% lidocaine 3 cc were used good anesthesia the wound was sutured using 4-0 Prolene 4 sutures were placed good approximation hemostasis simple interrupted sutures were used. Pressure bandage was applied. Procedures    FINAL IMPRESSION      1. Acute on chronic intracranial subdural hematoma (HCC)    2. Subarachnoid hemorrhage (HCC)    3. Eyebrow laceration, right, initial encounter          DISPOSITION/PLAN   DISPOSITION Decision To Admit 07/15/2019 09:44:16 PM      PATIENT REFERRED TO:  No follow-up provider specified.     DISCHARGE MEDICATIONS:  New Prescriptions    No medications on file          (Please note that portions of this note were completed with a voice recognition program.  Efforts were made to edit the dictations but occasionally words are mis-transcribed.)    Migel Beal DO (electronically signed)  Attending Emergency Physician          Migel Beal DO  07/15/19 3884

## 2019-07-16 NOTE — PROGRESS NOTES
Commands: Moderate(2/4)  Two Step Basic Commands: (dnt)  Multistep Basic Commands: (dnt)  Complex/Abstract Commands: (dnt)  Common Objects: Severe(1/4)  Pictures: (dnt)  Conversation: Severe  Effective Techniques: Extra processing time;Repetition;Stressing words    Reading Comprehension  Reading Status: (dnt)    Expression  Primary Mode of Expression: Verbal    Verbal Expression  Verbal Expression: Exceptions to functional limits  Initiation: Severe  Repetition: Severe  Automatic Speech: Moderate(required SLP initiation for counting 1-5)  Confrontation: Severe  Convergent: Severe  Divergent: Severe  Responsive: Severe  Conversation: Severe  Interfering Components: Paraphasia; Perseverations  Effective Techniques: Provide extra time    Written Expression  Written Expression: (dnt)    Motor Speech  Motor Speech:  Within Functional Limits    Pragmatics/Social Functioning  Pragmatics: Unable to assess    Cognition:      Orientation  Overall Orientation Status: (unable to assess)  Attention  Attention: Unable to assess  Memory  Memory: Unable to assess  Problem Solving  Problem Solving: Unable to assess  Numeric Reasoning  Numeric Reasoning: Unable to assess  Abstract Reasoning  Abstract Reasoning: Unable to assess  Safety/Judgement  Safety/Judgement: Unable to assess    Prognosis:  Speech Therapy Prognosis  Prognosis: Guarded  Prognosis Considerations: Previous Level of Function  Individuals consulted  Consulted and agree with results and recommendations: Patient;RN(RODOLFO Preciado)    Education:  Patient Education: Results of SLE  Patient Education Response: No evidence of learning  Safety Devices in place: Yes  Type of devices: Bed alarm in place;Call light within reach    Pain:  Pain Assessment  Patient Currently in Pain: Denies  Pain Assessment: 0-10  Pain Level: 0  Pain Type: Acute pain  Pain Location: Head  Pain Descriptors: Aching  Response to Pain Intervention: Patient Satisfied  RASS Score (Ventilated): Alert and

## 2019-07-17 LAB
ALBUMIN SERPL-MCNC: 3.4 G/DL (ref 3.5–4.6)
ALP BLD-CCNC: 66 U/L (ref 35–104)
ALT SERPL-CCNC: 12 U/L (ref 0–41)
ANION GAP SERPL CALCULATED.3IONS-SCNC: 10 MEQ/L (ref 9–15)
APTT: 25 SEC (ref 24.4–36.8)
AST SERPL-CCNC: 14 U/L (ref 0–40)
BILIRUB SERPL-MCNC: 0.5 MG/DL (ref 0.2–0.7)
BILIRUBIN DIRECT: <0.2 MG/DL (ref 0–0.4)
BILIRUBIN, INDIRECT: ABNORMAL MG/DL (ref 0–0.6)
BUN BLDV-MCNC: 11 MG/DL (ref 8–23)
CALCIUM SERPL-MCNC: 8.9 MG/DL (ref 8.5–9.9)
CHLORIDE BLD-SCNC: 107 MEQ/L (ref 95–107)
CO2: 24 MEQ/L (ref 20–31)
CREAT SERPL-MCNC: 0.66 MG/DL (ref 0.7–1.2)
GFR AFRICAN AMERICAN: >60
GFR NON-AFRICAN AMERICAN: >60
GLUCOSE BLD-MCNC: 97 MG/DL (ref 70–99)
HCT VFR BLD CALC: 34.4 % (ref 42–52)
HEMOGLOBIN: 11.7 G/DL (ref 14–18)
INR BLD: 1.1
MCH RBC QN AUTO: 30 PG (ref 27–31.3)
MCHC RBC AUTO-ENTMCNC: 34.1 % (ref 33–37)
MCV RBC AUTO: 88 FL (ref 80–100)
PDW BLD-RTO: 14.2 % (ref 11.5–14.5)
PLATELET # BLD: 200 K/UL (ref 130–400)
POTASSIUM REFLEX MAGNESIUM: 4 MEQ/L (ref 3.4–4.9)
PROTHROMBIN TIME: 14.3 SEC (ref 12.3–14.9)
RBC # BLD: 3.91 M/UL (ref 4.7–6.1)
SODIUM BLD-SCNC: 141 MEQ/L (ref 135–144)
TOTAL PROTEIN: 6.3 G/DL (ref 6.3–8)
WBC # BLD: 8.4 K/UL (ref 4.8–10.8)

## 2019-07-17 PROCEDURE — 6370000000 HC RX 637 (ALT 250 FOR IP): Performed by: SURGERY

## 2019-07-17 PROCEDURE — 1210000000 HC MED SURG R&B

## 2019-07-17 PROCEDURE — 80076 HEPATIC FUNCTION PANEL: CPT

## 2019-07-17 PROCEDURE — 99232 SBSQ HOSP IP/OBS MODERATE 35: CPT | Performed by: PHYSICIAN ASSISTANT

## 2019-07-17 PROCEDURE — 85027 COMPLETE CBC AUTOMATED: CPT

## 2019-07-17 PROCEDURE — 92507 TX SP LANG VOICE COMM INDIV: CPT

## 2019-07-17 PROCEDURE — 85610 PROTHROMBIN TIME: CPT

## 2019-07-17 PROCEDURE — 2580000003 HC RX 258: Performed by: SURGERY

## 2019-07-17 PROCEDURE — 85730 THROMBOPLASTIN TIME PARTIAL: CPT

## 2019-07-17 PROCEDURE — 92610 EVALUATE SWALLOWING FUNCTION: CPT

## 2019-07-17 PROCEDURE — 80048 BASIC METABOLIC PNL TOTAL CA: CPT

## 2019-07-17 PROCEDURE — 36415 COLL VENOUS BLD VENIPUNCTURE: CPT

## 2019-07-17 RX ORDER — ACETAMINOPHEN 325 MG/1
650 TABLET ORAL EVERY 4 HOURS PRN
Status: DISCONTINUED | OUTPATIENT
Start: 2019-07-17 | End: 2019-07-19 | Stop reason: HOSPADM

## 2019-07-17 RX ADMIN — BUSPIRONE HYDROCHLORIDE 5 MG: 5 TABLET ORAL at 21:09

## 2019-07-17 RX ADMIN — BUSPIRONE HYDROCHLORIDE 5 MG: 5 TABLET ORAL at 13:24

## 2019-07-17 RX ADMIN — METOPROLOL SUCCINATE 25 MG: 25 TABLET, FILM COATED, EXTENDED RELEASE ORAL at 21:09

## 2019-07-17 RX ADMIN — AMIODARONE HYDROCHLORIDE 200 MG: 200 TABLET ORAL at 08:27

## 2019-07-17 RX ADMIN — DIVALPROEX SODIUM 125 MG: 125 TABLET, DELAYED RELEASE ORAL at 08:27

## 2019-07-17 RX ADMIN — METOPROLOL SUCCINATE 25 MG: 25 TABLET, FILM COATED, EXTENDED RELEASE ORAL at 08:27

## 2019-07-17 RX ADMIN — Medication 10 ML: at 21:14

## 2019-07-17 RX ADMIN — DIVALPROEX SODIUM 125 MG: 125 TABLET, DELAYED RELEASE ORAL at 21:09

## 2019-07-17 RX ADMIN — PRAVASTATIN SODIUM 40 MG: 40 TABLET ORAL at 21:09

## 2019-07-17 RX ADMIN — TRAZODONE HYDROCHLORIDE 50 MG: 50 TABLET ORAL at 21:09

## 2019-07-17 RX ADMIN — BUSPIRONE HYDROCHLORIDE 5 MG: 5 TABLET ORAL at 08:27

## 2019-07-17 ASSESSMENT — PAIN SCALES - GENERAL
PAINLEVEL_OUTOF10: 0

## 2019-07-17 NOTE — PROGRESS NOTES
Spoke with geovanni from [facility], purchase order was received, but I was again informed medicare will not cover TLSO brace.

## 2019-07-17 NOTE — FLOWSHEET NOTE
Pt had an uneventful shift. No complaints of pain or signs of distress. He remains a PCA 1:1 because he is impulsive and tries to get out of bed and is very weak. At 2000 assessment pt able to answer name and that he is in the hospital. Further assessments he gave name and [facility]. No orientation to time or situation. TLSO brace did not arrive this shift. No further events this shift.

## 2019-07-17 NOTE — PROGRESS NOTES
effective compensatory strategies, and safe eating environment. General  Chart Reviewed: Yes  Comments: Silvia Canas had participated in [address] intervention on the Rehab floor at [facility]) in June, 2019. He had progressed from a diet of pureed consistencies to a diet of mechanical soft (NDD level II) with thin liquids. Upon discharge, Mr. Fadumo Sosa continued to require full one on one assistance for all PO intake secondary to the severity of his cognitive deficits negatively impacting his oral prep phase of swallow (e.g.- sequencing of feeding task, trying to eat garnishes, etc). Subjective  Subjective: 1:1 present in room. Reported no coughing at breakfast. RN, Ginna Atkins, allowed SLP and 1:1 to elevate bed for feeding. Bed was returned to flat position following evaluation and treatment. Behavior/Cognition: Cooperative;Pleasant mood;Confused; Impulsive;Distractible;Requires cueing; Alert  Respiratory Status: O2 via nasual cannula  O2 Device: Nasal cannula  Communication Observation: Aphasia  Follows Directions: Simple(50% acc with visual model provided)  Dentition: Some missing teeth  Patient Positioning: Partially reclined  Baseline Vocal Quality: Weak  Prior Dysphagia History: Silvia Canas had participated in [address] intervention on the Rehab floor at [facility]) in June, 2019. He had progressed from a diet of pureed consistencies to a diet of mechanical soft (NDD level II) with thin liquids. Upon discharge, Mr. Fadumo Sosa continued to require full one on one assistance for all PO intake secondary to the severity of his cognitive deficits negatively impacting his oral prep phase of swallow (e.g.- sequencing of feeding task, trying to eat garnishes, etc). Consistencies Administered: Reg solid;Puree; Thin - cup; Thin - straw    Current Diet level:  Current Diet : Soft   Current Liquid Diet : Thin    Oral Motor Deficits  Oral/Motor  Oral Motor: Exceptions to WFL(Pt exhibited difficulty following directions for comprehensive oral motor without any observed clinical s/s of aspiration. Prognosis  Prognosis  Prognosis for safe diet advancement: good  Barriers to reach goals: cognitive deficits;language deficits  Individuals consulted  Consulted and agree with results and recommendations: Patient;RN(RODOLFO Novoa)    Education  Patient Education: Results of BSE  Patient Education Response: No evidence of learning(RNLisa educated and verbalized good understanding of results)  Safety Devices in place: Yes  Type of devices: All fall risk precautions in place; Other (comment)(1:1 present)    Pain:  Pain Assessment  Patient Currently in Pain: Denies         Therapy Time  SLP Individual Minutes  Time In: 0940  Time Out: 1770  Minutes: [facility], [address] [place] [address], Date: 7/17/2019, Time: 10:47 AM

## 2019-07-17 NOTE — PROGRESS NOTES
Speech & Language Pathology Discharge Report  Date: 2019  Patient: Dayanna Headley  : 1934  Late entered into the system by Lois Hidalgo M.S., CCC-SLP on 19 @ Electronically signed by Lois Hidalgo SLP on 19 at 10:25 AM for rehab discharge summary from pt's rehab stay in . STATUS AT INITIATION OF THERAPY: Initial speech-language assessment complete on 19 revealed:  \"Diagnosis: Pt presents with severe receptive and expressive aphasia characterized by deficits in answering yes/no questions, following simple 1 step direction, and answering simple questions. Also demonstrated deficits in automatic speech, word naming, and initiation. Pt requires repetition of instructions and extra processing time. Clinician noted pt demonstrated perseverations and paraphaias during evaluation. \"  DIET: Initial bedside swallow evaluation complete on 19 rec: a diet of pureed consistencies with nectar thick liquids    Comprehension:  2- Maximal assist    Expression:  1- Total assist    Problem Solvin- Total assist    Memory:  1- Total assist        Treatment Area(s):  Communication, Cognition, Motor Speech and Swallow    Progress made:  Sherryle Favors had progressed to a diet of mechanical soft (NDD level II) with thin liquids. Mr. Reuben Bright continues to require full one on one assistance for all PO intake secondary to the severity of his cognitive deficits negatively impacting his oral prep phase of swallow (e.g.- sequencing of feeding task, trying to eat garnishes, etc). He participated nicely throughout all treatment sessions, although he continued to exhibit severe cognitive-communicative deficits negatively impacting his level of safety and independence and placing pt at high risk for recurrent falls. ST rec: ongoing intervention at the next level of care. Pt's last day of speech therapy was 19 at [facility].     Short-term Goals  Timeframe for Short-term Goals: 3-4 weeks  Goal 1: Pt overt s/s of aspiration. Dysphagia Goals: The patient will tolerate recommended diet without observed clinical signs of aspiration  Compensatory Swallowing Strategies: Small bites/sips, Total feed, Upright as possible for all oral intake, Swallow 2 times per bite/sip, Eat/Feed slowly     STATUS AT DISCHARGE:  DIET: mechanical soft (NDD level II) with thin liquids      Comprehension:  2- Maximal assist    Expression:  2- Maximal assist 3 Mod Assist    Problem Solvin- Total assist    Memory:  1- Total assist       Functional Status at time of Discharge:    · Cognition: Patient demonstrates maximal cognitive deficits. · Communication: Patient demonstrates maximal communication deficits. · Language: Patient demonstrates maximal language deficits. · Motor Speech: Patient demonstrates moderate motor speech deficits. · Swallow: Patient demonstrates minimal dysphagia.                                  Patient is discharged to Skilled nursing facility               [x] Recommend continued speech therapy   [] Speech Therapy is no longer warranted      Therapist:  Parag Warnre CCC-SLP, Date: 2019, Time: 10:23 AM

## 2019-07-17 NOTE — PROGRESS NOTES
[facility] Occupational Therapy      Date: 2019  Patient Name: Rohan Keller        MRN: [id number]  Account: [de-identified]   : 1934  (80 y.o.)  Room: [place] [address]    Chart reviewed, attempted OT at 1320 for eval. Patient not seen 2° to:    [x] Hold need TLSO before can be moved from strict flat bedrest     [] Pt. off floor for test/procedure. Spoke to RODOLFO Quiroz aware. Will attempt again when able.     Electronically signed by GAVIN Moise on 2019 at 1:20 PM

## 2019-07-18 ENCOUNTER — APPOINTMENT (OUTPATIENT)
Dept: GENERAL RADIOLOGY | Age: 84
DRG: 083 | End: 2019-07-18
Payer: MEDICARE

## 2019-07-18 PROCEDURE — 1210000000 HC MED SURG R&B

## 2019-07-18 PROCEDURE — 2700000000 HC OXYGEN THERAPY PER DAY

## 2019-07-18 PROCEDURE — 6360000002 HC RX W HCPCS: Performed by: PHYSICIAN ASSISTANT

## 2019-07-18 PROCEDURE — 99232 SBSQ HOSP IP/OBS MODERATE 35: CPT | Performed by: PHYSICIAN ASSISTANT

## 2019-07-18 PROCEDURE — 6370000000 HC RX 637 (ALT 250 FOR IP): Performed by: PHYSICIAN ASSISTANT

## 2019-07-18 PROCEDURE — 72100 X-RAY EXAM L-S SPINE 2/3 VWS: CPT

## 2019-07-18 PROCEDURE — 2580000003 HC RX 258: Performed by: SURGERY

## 2019-07-18 PROCEDURE — 6370000000 HC RX 637 (ALT 250 FOR IP): Performed by: SURGERY

## 2019-07-18 PROCEDURE — 2580000003 HC RX 258: Performed by: PHYSICIAN ASSISTANT

## 2019-07-18 RX ORDER — SODIUM CHLORIDE, SODIUM LACTATE, POTASSIUM CHLORIDE, AND CALCIUM CHLORIDE .6; .31; .03; .02 G/100ML; G/100ML; G/100ML; G/100ML
500 INJECTION, SOLUTION INTRAVENOUS ONCE
Status: COMPLETED | OUTPATIENT
Start: 2019-07-18 | End: 2019-07-18

## 2019-07-18 RX ORDER — SODIUM CHLORIDE, SODIUM LACTATE, POTASSIUM CHLORIDE, AND CALCIUM CHLORIDE .6; .31; .03; .02 G/100ML; G/100ML; G/100ML; G/100ML
500 INJECTION, SOLUTION INTRAVENOUS ONCE
Status: COMPLETED | OUTPATIENT
Start: 2019-07-18 | End: 2019-07-19

## 2019-07-18 RX ORDER — MAGNESIUM SULFATE IN WATER 40 MG/ML
2 INJECTION, SOLUTION INTRAVENOUS ONCE
Status: COMPLETED | OUTPATIENT
Start: 2019-07-18 | End: 2019-07-18

## 2019-07-18 RX ADMIN — PRAVASTATIN SODIUM 40 MG: 40 TABLET ORAL at 21:34

## 2019-07-18 RX ADMIN — Medication 10 ML: at 08:15

## 2019-07-18 RX ADMIN — METOPROLOL SUCCINATE 25 MG: 25 TABLET, FILM COATED, EXTENDED RELEASE ORAL at 21:34

## 2019-07-18 RX ADMIN — DIVALPROEX SODIUM 125 MG: 125 TABLET, DELAYED RELEASE ORAL at 21:34

## 2019-07-18 RX ADMIN — BUSPIRONE HYDROCHLORIDE 5 MG: 5 TABLET ORAL at 21:34

## 2019-07-18 RX ADMIN — TRAZODONE HYDROCHLORIDE 50 MG: 50 TABLET ORAL at 21:34

## 2019-07-18 RX ADMIN — METOPROLOL SUCCINATE 25 MG: 25 TABLET, FILM COATED, EXTENDED RELEASE ORAL at 08:15

## 2019-07-18 RX ADMIN — ACETAMINOPHEN 650 MG: 325 TABLET ORAL at 15:45

## 2019-07-18 RX ADMIN — AMIODARONE HYDROCHLORIDE 200 MG: 200 TABLET ORAL at 08:15

## 2019-07-18 RX ADMIN — SODIUM CHLORIDE, SODIUM LACTATE, POTASSIUM CHLORIDE, AND CALCIUM CHLORIDE 500 ML: .6; .31; .03; .02 INJECTION, SOLUTION INTRAVENOUS at 23:20

## 2019-07-18 RX ADMIN — MAGNESIUM SULFATE HEPTAHYDRATE 2 G: 40 INJECTION, SOLUTION INTRAVENOUS at 09:24

## 2019-07-18 RX ADMIN — SODIUM CHLORIDE, POTASSIUM CHLORIDE, SODIUM LACTATE AND CALCIUM CHLORIDE 500 ML: 600; 310; 30; 20 INJECTION, SOLUTION INTRAVENOUS at 18:53

## 2019-07-18 RX ADMIN — DIVALPROEX SODIUM 125 MG: 125 TABLET, DELAYED RELEASE ORAL at 08:15

## 2019-07-18 RX ADMIN — BUSPIRONE HYDROCHLORIDE 5 MG: 5 TABLET ORAL at 13:52

## 2019-07-18 RX ADMIN — ACETAMINOPHEN 650 MG: 325 TABLET ORAL at 21:34

## 2019-07-18 RX ADMIN — BUSPIRONE HYDROCHLORIDE 5 MG: 5 TABLET ORAL at 08:15

## 2019-07-18 ASSESSMENT — PAIN SCALES - GENERAL
PAINLEVEL_OUTOF10: 0
PAINLEVEL_OUTOF10: 3

## 2019-07-18 NOTE — PROGRESS NOTES
(Dulocoax PRN and Glycolax PRN)     Renal/Electrolytes:   - Give 500 mL IV LR bolus now d/t low pressures.  - Okay to HLIV after bolus complete.  - No further labs indicated. ID:   - No indications for Abx at this time     Heme:   - No indications for transfusions at this time     Endocrine:   - No glycemic issues     MSK:   - C-spine cleared  - Discussed L1 and L2 spine fractures with Dr. Sophia Foreman of neurosurgery - recommended TLSO brace and upright films; if XRs stable then can f/u outpt  - Maintain T and L precautions until TLSO brace and upright lumbar films obtained  - Activity: Bed rest until T and L spine precautions removed  - PT/OT after TLSO brace in place and uprights completed. - Continue ST therapies. Prophylaxis:   - SCD's. Hold chemoPPx given recent SDH and SAH  - No GI PPx indicated     Dispo: Remain in TCU for dispo planning and until able to clear lumbar spine. Awaiting TLSO and upright plain films. Prognosis guarded given severity and sequelae of bilateral SDH and acute SAH. Patient with multiple falls prior to admission and decreasing ability to perform ADLs and participate in therapies in inpatient rehab setting. Overall functional status seemed poor prior to his bilateral crani's and remains poor. Patient has limited to no interaction with his family for several years but his son still participates in medical decision making. Miko Roach, son and POA, have selected Hospice care to for patient once he is back at [facility].   Cell phone [phone].       --  [address] [facility]  [phone]     This patient's plan of care was discussed with Trauma attending physician, Dr. Lyndsay Cano MD.

## 2019-07-18 NOTE — DISCHARGE INSTR - COC
Continuity of Care Form    Patient Name: Cathy Gunn   :  1934  MRN:  [id number]    Admit date:  7/15/2019  Discharge date:  2019      Code Status Order: DNR-CCA   Advance Directives:     Admitting Physician:  Manuel Gutierrez MD  PCP: Bereket Christopher MD    Discharging Nurse: Christina Gallardo Unit/Room#: Y724/R298-08  Discharging Unit Phone Number: [phone]    Emergency Contact:   Extended Emergency Contact Information  Primary Emergency Contact: Shekhar Rosado [address] Phone: [phone]  Relation: Other  Secondary Emergency Contact: Ruth Martinez  [place] Phone: [phone]  Relation: Child   needed? No    Past Surgical History:  Past Surgical History:   Procedure Laterality Date    CARDIAC CATHETERIZATION      3 stents    CORONARY ARTERY BYPASS GRAFT      CRANIOTOMY Bilateral 2019    CRANIOTOMY HEMATOMA EVACUATION performed by Laura Quinones MD at [facility]       Immunization History: There is no immunization history on file for this patient.     Active Problems:  Patient Active Problem List   Diagnosis Code    Afib ([facility] I48.91    Subdural hematoma (HCC) S06.5X9A    Anatomical narrow angle, bilateral H40.033    Angina pectoris (HCC) I20.9    Benign prostatic hyperplasia with urinary frequency N40.1, R35.0    Closed compression fracture of L1 lumbar vertebra S32.010A    Coronary atherosclerosis I25.10    Essential hypertension I10    Facet arthropathy, lumbosacral M47.817    Gross hematuria R31.0    Hyperlipidemia E78.5    Long term current use of anticoagulant Z79.01    Long-term use of aspirin therapy Z79.82    Memory loss R41.3    SHAYY on CPAP G47.33, Z99.89    Prediabetes R73.03    Prostate cancer ([facility] C61    S/P CABG (coronary artery bypass graft) Z95.1    S/P PTCA (percutaneous transluminal coronary angioplasty) Z98.61    Spondylolisthesis of lumbar region M43.16    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

## 2019-07-19 ENCOUNTER — OFFICE VISIT (OUTPATIENT)
Dept: GERIATRIC MEDICINE | Age: 84
End: 2019-07-19
Payer: MEDICARE

## 2019-07-19 VITALS
OXYGEN SATURATION: 98 % | HEART RATE: 83 BPM | RESPIRATION RATE: 25 BRPM | SYSTOLIC BLOOD PRESSURE: 87 MMHG | BODY MASS INDEX: 26.66 KG/M2 | TEMPERATURE: 99.7 F | HEIGHT: 68 IN | WEIGHT: 175.9 LBS | DIASTOLIC BLOOD PRESSURE: 70 MMHG

## 2019-07-19 DIAGNOSIS — S06.6X0D SUBARACHNOID HEMATOMA, WITHOUT LOSS OF CONSCIOUSNESS, SUBSEQUENT ENCOUNTER: ICD-10-CM

## 2019-07-19 DIAGNOSIS — R26.81 UNSTEADINESS: Primary | ICD-10-CM

## 2019-07-19 PROBLEM — S32.020A CLOSED COMPRESSION FRACTURE OF L2 LUMBAR VERTEBRA, INITIAL ENCOUNTER (HCC): Status: ACTIVE | Noted: 2019-07-19

## 2019-07-19 PROCEDURE — 92507 TX SP LANG VOICE COMM INDIV: CPT

## 2019-07-19 PROCEDURE — 99232 SBSQ HOSP IP/OBS MODERATE 35: CPT | Performed by: PHYSICIAN ASSISTANT

## 2019-07-19 PROCEDURE — 2580000003 HC RX 258: Performed by: SURGERY

## 2019-07-19 PROCEDURE — 92526 ORAL FUNCTION THERAPY: CPT

## 2019-07-19 PROCEDURE — 6370000000 HC RX 637 (ALT 250 FOR IP): Performed by: SURGERY

## 2019-07-19 PROCEDURE — 99304 1ST NF CARE SF/LOW MDM 25: CPT | Performed by: INTERNAL MEDICINE

## 2019-07-19 PROCEDURE — 6360000002 HC RX W HCPCS: Performed by: PHYSICIAN ASSISTANT

## 2019-07-19 PROCEDURE — 2700000000 HC OXYGEN THERAPY PER DAY

## 2019-07-19 PROCEDURE — 2500000003 HC RX 250 WO HCPCS: Performed by: PHYSICIAN ASSISTANT

## 2019-07-19 PROCEDURE — 1123F ACP DISCUSS/DSCN MKR DOCD: CPT | Performed by: INTERNAL MEDICINE

## 2019-07-19 RX ORDER — AMLODIPINE BESYLATE 2.5 MG/1
2.5 TABLET ORAL DAILY
COMMUNITY

## 2019-07-19 RX ORDER — METOPROLOL TARTRATE 5 MG/5ML
5 INJECTION INTRAVENOUS ONCE
Status: DISCONTINUED | OUTPATIENT
Start: 2019-07-19 | End: 2019-07-19

## 2019-07-19 RX ORDER — LACTULOSE 10 G/15ML
20 SOLUTION ORAL DAILY PRN
COMMUNITY

## 2019-07-19 RX ORDER — ACETAMINOPHEN 325 MG/1
650 TABLET ORAL EVERY 4 HOURS PRN
Qty: 120 TABLET | Refills: 0 | Status: SHIPPED | OUTPATIENT
Start: 2019-07-19

## 2019-07-19 RX ORDER — MAGNESIUM SULFATE IN WATER 40 MG/ML
2 INJECTION, SOLUTION INTRAVENOUS ONCE
Status: COMPLETED | OUTPATIENT
Start: 2019-07-19 | End: 2019-07-19

## 2019-07-19 RX ADMIN — DIVALPROEX SODIUM 125 MG: 125 TABLET, DELAYED RELEASE ORAL at 09:22

## 2019-07-19 RX ADMIN — METOPROLOL SUCCINATE 25 MG: 25 TABLET, FILM COATED, EXTENDED RELEASE ORAL at 09:22

## 2019-07-19 RX ADMIN — AMIODARONE HYDROCHLORIDE 200 MG: 200 TABLET ORAL at 09:22

## 2019-07-19 RX ADMIN — Medication 10 ML: at 09:22

## 2019-07-19 RX ADMIN — BUSPIRONE HYDROCHLORIDE 5 MG: 5 TABLET ORAL at 09:22

## 2019-07-19 RX ADMIN — MAGNESIUM SULFATE HEPTAHYDRATE 2 G: 40 INJECTION, SOLUTION INTRAVENOUS at 10:38

## 2019-07-19 ASSESSMENT — PAIN SCALES - GENERAL: PAINLEVEL_OUTOF10: 0

## 2019-07-19 NOTE — DISCHARGE SUMMARY
[address]  [address]  [facility], [address]  MRN: [id number]  YOB: 1934  80 y. o.male      Attending  Artur August MD ?   Date of Admission  7/15/2019 Date of Discharge   07/19/19      ? DIAGNOSES:  Principal Problem:    Fall  Active Problems:    Subarachnoid bleed (HCC)    Acute on chronic intracranial subdural hematoma (HCC)    Closed compression fracture of L2 lumbar vertebra, initial encounter ([facility]         PROCEDURES:  None  ? DISCHARGE MEDICATIONS:   Justina Hidalgo   Home Medication Instructions [id number]    Printed on:07/19/19 1021   Medication Information                      acetaminophen (TYLENOL) 325 MG tablet  Take 2 tablets by mouth every 4 hours as needed for Pain             amiodarone (CORDARONE) 200 MG tablet  Take 1 tablet by mouth daily             busPIRone (BUSPAR) 5 MG tablet  Take 5 mg by mouth 3 times daily             Cholecalciferol (VITAMIN D-3 PO)  Take 2,000 Units by mouth daily              divalproex (DEPAKOTE) 125 MG DR tablet  Take 125 mg by mouth 2 times daily             ipratropium-albuterol (DUONEB) 0.5-2.5 (3) MG/3ML SOLN nebulizer solution  Inhale 1 vial into the lungs every 4 hours as needed for Shortness of Breath             LORazepam (ATIVAN) 0.5 MG tablet  Take 0.5 mg by mouth every 6 hours as needed for Anxiety.              metoprolol (TOPROL-XL) 25 MG XL tablet  Take 25 mg by mouth 2 times daily              nitroGLYCERIN (NITROSTAT) 0.4 MG SL tablet  Place 0.4 mg under the tongue every 5 minutes as needed for Chest pain             pravastatin (PRAVACHOL) 40 MG tablet  Take 1 tablet by mouth nightly             traZODone (DESYREL) 50 MG tablet  Take 50 mg by mouth nightly                  REASON FOR HOSPITALIZATION:  Brittney Grace an 86yo male with a PMHx of multiple falls, HTN, a-fib on Coumadin and hx of bilateral craniotomies on 5/25/19 by Dr. Joe Valenzuela for bilateral Alejandrina Greet was discharged 6/28/19 to a

## 2019-07-19 NOTE — PROGRESS NOTES
and efficient swallow of all presented consistencies in all given opportunities. Pt required tactile cue to implement small sip, one at a time, in all opportunities. Goal 2: Pt will tolerate trials of regular consistencies in 5/5 given opportunities with 100% accuracy without any overt s/s of aspiration to assess for possible diet upgrade. Pt tolerated 2/2 trials of regular solids with significantly increased mastication time with mod residuals observed on the superior tongue post swallow. Pt did not sense these residuals and required cued use of liquid wash to clear. Compensatory Swallowing Strategies: Small bites/sips, Assist feed, Eat/Feed slowly, Upright as possible for all oral intake      Treatment/Activity Tolerance:  Patient tolerated treatment well    Plan:  Continue per POC    Pain:  Patient demonstrated no s/s of pain. Patient/Caregiver Education:  Patient educated on session and progression towards goals. and Education needs reinforcement. Safety Devices:   All fall risk precautions in place and Other:       Signature: Electronically signed by CIERRA Perez on 7/19/2019 at 1:25 PM

## 2019-07-19 NOTE — CARE COORDINATION
Pt is stable to be DC to [place] with [facility]. LSW notified hospice and son. [facility]  at 1030. Geovanna Schumacher Electronically signed by JAQUAN Schaefer on 7/19/2019 at 9:18 AM

## 2019-07-19 NOTE — PROGRESS NOTES
Depakote 125 mg BID, Buspar 5 mg TID, and Trazodone 50 mg QHS  - Continue Neuro checks q 4hrs  - No need to repeat CT head unless acute change in neuro status    Cardiovascular:   - Continue home Amiodarone 200 mg daily for a-fib  - Continue to hold anticoagulation indefinitely for intracranial hemorrhage  - Continue home Pravastatin 40 mg QHS  - Continue home Metoprolol succinate 25 mg BID. - If pt's HR still >100 bpm 1 hour after PO meds given this AM, give 5 mg IV Metoprolol. Respiratory:   - Work to wean off of supplemental O2, maintaining O2 sats >92%. - Continue pulmonary toilet, encourage IS. GI/Diet:   - Continue regular Dysphagia III diet per ST recs. - Continue Boost supplement w/ meals and snack.  - Continue PRN bowel regimen (Dulocoax PRN and Glycolax PRN)     Renal/Electrolytes:   - HLIV  - No further labs indicated. - Give 2g IV Mg now. ID:   - No indications for Abx at this time     Heme:   - No indications for transfusions at this time     Endocrine:   - No glycemic issues     MSK:   - C-spine cleared. - Wear TLSO brace when sitting up and when OOB. - Activity: OOB as tolerated with TLSO brace in place  - Continue PT/OT/ST therapies. Prophylaxis:   - SCD's. Hold chemoPPx given recent SDH and SAH  - No GI PPx indicated    Consults:  Neurosurgery: TLSO brace for lumbar fxs. Uprights completed, radiology read stable. Awaiting final recs from [address] [place] [address]. Dispo: Pending HRs and BPs stable, patient may d/c to SNF this morning. Prognosis guarded given severity and sequelae of bilateral SDH and acute SAH. Patient with multiple falls prior to admission and decreasing ability to perform ADLs and participate in therapies in inpatient rehab setting. Overall functional status seemed poor prior to his bilateral crani's and remains poor. Patient has limited to no interaction with his family for several years but his son still participates in medical decision making.     Pete Reilly, son and

## 2019-07-22 ENCOUNTER — TELEPHONE (OUTPATIENT)
Dept: PHARMACY | Age: 84
End: 2019-07-22

## 2019-07-22 DIAGNOSIS — I48.91 ATRIAL FIBRILLATION, UNSPECIFIED TYPE (HCC): ICD-10-CM

## 2019-07-23 ENCOUNTER — ANTI-COAG VISIT (OUTPATIENT)
Dept: PHARMACY | Age: 84
End: 2019-07-23

## 2019-07-23 DIAGNOSIS — I48.91 ATRIAL FIBRILLATION, UNSPECIFIED TYPE (HCC): ICD-10-CM

## 2019-08-02 VITALS
DIASTOLIC BLOOD PRESSURE: 70 MMHG | TEMPERATURE: 98.2 F | OXYGEN SATURATION: 96 % | HEIGHT: 72 IN | BODY MASS INDEX: 23.57 KG/M2 | SYSTOLIC BLOOD PRESSURE: 115 MMHG | RESPIRATION RATE: 18 BRPM | WEIGHT: 174 LBS | HEART RATE: 80 BPM

## 2019-08-02 NOTE — PROGRESS NOTES
PATIENTArcjin Aleman : 1934 DOS: 2019     [facility]    CHIEF COMPLAINT: Aggressive behavior. HISTORY OF PRESENT ILLNESS: The patient is an 80-year-old male with medical history pertinent for atrial fibrillation, coronary artery disease, hypertension, and history of multiple falls, who was said to have been aggressive as well as sexually inappropriate with female staff on the unit on multiple occasions before he was taken to the emergency room at [facility] on 2019 from where he was discharged and transferred to another facility [facility]) on 2019. He was admitted for unspecified impulsive disorder, insomnia, and major neurocognitive disorder. However, during his hospital admission, he was said to have developed tachycardia and was therefore moved from the floor to the ICU for further treatment. The patient was seen by both Cardiology and Neurosurgery because there was a consideration that he could be having recurrence of his subdural hematomas. He was subsequently placed on appropriate medications including trazodone to help him with insomnia and then a low dosage of Depakote to address his impulsive disorder. It was gathered from the documents sent over from the hospital that he mostly had a tendency to try to ambulate unaided and this has resulted into the episode of falls that he has had everywhere he has been: Home, [facility] and [facility]. The patient was eventually transferred from [facility] on 07/10/2019 back to [facility] for him to continue with his treatment. Today, he denies any new complaints, but he has already had a couple of falls between the time he got back to the facility and today when he is being seen. So far, there is no new injuries as a result of the most recent fall and any other episode since getting back to this facility.      PAST MEDICAL HISTORY: Prostate cancer, AFIB, insomnia and inability to walk due to leg weakness. PHYSICAL EXAMINATION: His vital signs include blood pressure 115/70 mmHg, heart rate is 80 per minute, respiratory rate is 18 per minute, temperature is 98.2 and he is saturating at 96% on room air. He is 72 inches tall and weighs 174 pounds. The patient is a chronically ill-appearing, elderly male, who is alert and febrile to the touch. Head is normocephalic and atraumatic with pupils equal, round and reactive to light and accommodation. Neck is supple and lungs are clear to auscultations bilaterally. He has audible heart tones, S1 and S2, with irregular rhythm. Abdomen is round soft, nontender, and has normoactive bowel sounds. He does not have pedal edema and no calf tenderness. His skin is warm and dry and he is alert and oriented x2/3. Muscle strength is about approximately 4/5 globally. He is currently using a manual wheelchair for ambulation. ASSESSMENT AND PLAN:  1. Insomnia, unspecified: Sleep hygiene counseled and he will continue to take trazodone 50 mg as directed. 2.  Impulsive personality disorder, unspecified: He has already been commenced on Depakote 125 mg tablet twice daily and we will obtain the serum level while staff would do everything to redirect him as indicated. 3.  Generalized muscle weakness: PT, OT, ST to continue as indicated. 4.  Recurrent falls while walking: The patient was counseled to use the assistive device for now while he is being treated for generalized weakness. 5.  Chronic subdural hematoma:  The patient will follow up with his neurosurgeon as directed.      Vero Garcia MD  Attending Family Physician & Geriatrician        Electronically Signed By: Naveen Aly MD on 07/19/2019 13:29:00  ______________________________  Naveen Aly MD  /[id number]  D: 07/14/2019 21:47:07  T: 07/15/2019 08:28:11    cc: Koby M[address]ica [address].
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
## 2019-09-30 NOTE — PROGRESS NOTES
CC: Fall with injury    HPI:  This 80 yr old gentleman presents after fall with injury from wc. Patient had been anticoagulated for his Afib and was down with an unspecified amount of time. He was brought by family to ER where he underwent imaging to confirm a subarachnoid bleed. He has a history of prior SDH requiring craniotomy. At this visit no craniotomy required. He was seen by trauma and stabilized. He did undergo initial PT OT and was transferred here for ongoing care. Past Medical History:   Diagnosis Date    Atrial fibrillation ([address]     CAD (coronary artery disease)     Chronic back pain     H/O subdural hemorrhage     Hyperlipidemia     Hypertension      No family history on file. Social History     Socioeconomic History    Marital status:      Spouse name: Not on file    Number of children: Not on file    Years of education: Not on file    Highest education level: Not on file   Occupational History    Occupation: Retired    Social Needs    Financial resource strain: Not on file    Food insecurity:     Worry: Not on file     Inability: Not on file   [place] needs:     Medical: Not on file     Non-medical: Not on file   Tobacco Use    Smoking status: Former Smoker    Smokeless tobacco: Never Used   Substance and Sexual Activity    Alcohol use:  Yes     Alcohol/week: 0.0 standard drinks     Comment: social    Drug use: No    Sexual activity: Not Currently   Lifestyle    Physical activity:     Days per week: Not on file     Minutes per session: Not on file    Stress: Not on file   Relationships    Social connections:     Talks on phone: More than three times a week     Gets together: Never     Attends Buddhism service: Never     Active member of club or organization: No     Attends meetings of clubs or organizations: Never     Relationship status:     Intimate partner violence:     Fear of current or ex partner: No     Emotionally abused:

## 2021-06-04 NOTE — PLAN OF CARE
Caller: Alicia Saunders    Relationship: Self    Best call back number: *[phone]  What is the best time to reach you: anytime     What was the call regarding: SHE IS HAVING DENTAL SURGERY NEEDS TO BE TAKEN OFF alendronate (FOSAMAX) 70 MG tablet FOR 9 WEEKS   PATIENT HAS ALREADY TAKEN HERSELF OFF MEDIATION FOR 2 WEEKS SO FAR     Do you require a callback:   PLEASE CALL [facility] [place] [facility] [phone]       DE   Therapy evaluation completed. Please see daily notes and/or progress notes for details related to planned treatment interventions, goals and functional abilities.

## 2022-05-08 NOTE — PROGRESS NOTES
short distance gait trials with focus on maneuverability and safety in environment - variable performance and cueing needed     Stairs?: Yes  Stairs  # Steps : 4  Stairs Height: 6\"  Rails: Bilateral  Assistance: Minimal assistance  Comment: physical and verbal cues required for task completion                Pt/ family education/training: No family have been present for training. Pt has been educated on safety and techniques. He is unable to carry over instructions consistently and safety is a concern as a result    Assessment: Pt has made good gains physically. He continues with learning and cognitive deficits. Pt requires assistance with all mobility.  He has not been able to consistently meet goals established at this time      LTG established:  Long term goal 1: Pt to complete bed mobility with SBA  Long term goal 2: Pt to complete functional transfers (STS and bed<>chair) with SBA  Long term goal 3: Pt to ambulate 150ft with rollator and CGA  Long term goal 4: CGA 4 stairs with rails  Long term goal 5: Pt able to stand 2 min with CGA    Discharge Plan: d/c to snf level of care        Electronically signed by Rylan Pina PT on 6/28/2019 at 5:14 PM covid cough

## 2022-08-24 NOTE — CARE COORDINATION
PT/OT held 2/2 cognition. D/C plans pending evals and final decision by girlfriend. Pleural effusion

## 2022-09-09 NOTE — PROGRESS NOTES
Orders faxed.   Pt resting quietly, assessment complete, medicated Pt with one Norco for 7/10 pain LBM 6/5, call light within reach, bed alarm on will continue to monitor.  Electronically signed by Noelle Slaughter LPN on 0/4/1977 at 8:54 AM

## 2022-11-16 NOTE — ED PROVIDER NOTES
FINDINGS. All CT scans at this facility use dose modulation, iterative reconstruction, and/or weight based dosing when appropriate to reduce radiation dose to as low as reasonably achievable. XR CERVICAL SPINE (4-5 VIEWS)    (Results Pending)         ED BEDSIDE ULTRASOUND:   Performed by ED Physician - none    LABS:  Labs Reviewed - No data to display    All other labs were within normal range or not returned as of this dictation. EMERGENCY DEPARTMENT COURSE and DIFFERENTIAL DIAGNOSIS/MDM:   Vitals:    Vitals:    06/29/19 1513 06/29/19 1555 06/29/19 1556 06/29/19 1651   BP: 93/69 (!) 118/94  127/76   Pulse: 66  105 59   Resp: 18   16   Temp: 98 °F (36.7 °C)      SpO2:  96%     Weight:       Height:              MDM  Number of Diagnoses or Management Options  Closed head injury, initial encounter:   Facial abrasion, initial encounter:   Diagnosis management comments: Patient presents from [facility] secondary to a fall which occurred. Per staff patient had fallen out of his wheelchair earlier today he had no loss of consciousness he remains alert and oriented to his normal self at this time, he is a very poor historian secondary to his dementia. Patient has a small abrasion noted above his right eye. There is no pain on palpation no crepitus or deformity. CT scan of the brain shows existing areas of subdural hematoma which is previous from injury which occurred on 5/25/2019. There is no acute bleeding noted. X-ray of the cervical spine shows chronic degenerative changes but no acute fracture subluxations. At this time patient will be discharged to return back to [facility] with a diagnosis of closed head injury facial abrasion. They are advised to watch him closely with follow-up with his family doctor within the next 24 hours. Was advised to return to the emergency department if there is any change in mental status.     In preparation for discharge of patient back to nursing diagnostic evaluation on this patient. I have reviewed the mid-levels findings and agree.   History and Exam by me shows head injury      Layla Stockton DO  Attending Emergency Physician         Layla Stockton DO  06/29/19 0580 ICU

## (undated) DEVICE — CORD,CAUTERY,BIPOLAR,STERILE: Brand: MEDLINE

## (undated) DEVICE — SUTURE VCRL SZ 2-0 L27IN ABSRB VLT L26MM CT-2 1/2 CIR J333H

## (undated) DEVICE — PAD N ADH W3XL4IN POLY COT SFT PERF FLM EASILY CUT ABSRB

## (undated) DEVICE — GOWN,AURORA,NONREINFORCED,LARGE: Brand: MEDLINE

## (undated) DEVICE — GLOVE ORANGE PI 7 1/2   MSG9075

## (undated) DEVICE — ACCUDRAIN® EXTERNAL CSF DRAINAGE SYSTEM WITH ANTI-REFLUX VALVE: Brand: ACCUDRAIN®

## (undated) DEVICE — CRANIOTOMY DRAPE, STERILE: Brand: MEDLINE

## (undated) DEVICE — KIT CATH 16FR 5ML URIN M INDWL INDWL STR TIP INF CTRL

## (undated) DEVICE — Z DISCONTINUED APPLICATOR SURG PREP 0.35OZ 2% CHG 70% ISO ALC W/ HI LT

## (undated) DEVICE — GUN CG8900 RANEY CLIP KIT 1PK: Brand: CLIP GUN

## (undated) DEVICE — CODMAN® SURGICAL PATTIES 1/2" X 3" (1.27CM X 7.62CM): Brand: CODMAN®

## (undated) DEVICE — 2.3MM TAPERED ROUTER

## (undated) DEVICE — OIL CARTRIDGE: Brand: CORE, MAESTRO

## (undated) DEVICE — Device

## (undated) DEVICE — SYRINGE IRRIG 60ML SFT PLIABLE BLB EZ TO GRP 1 HND USE W/

## (undated) DEVICE — TUBING, SUCTION, 1/4" X 20', STRAIGHT: Brand: MEDLINE INDUSTRIES, INC.

## (undated) DEVICE — GAUZE,SPONGE,4"X4",16PLY,XRAY,STRL,LF: Brand: MEDLINE

## (undated) DEVICE — GLOVE SURG SZ 8 L12IN FNGR THK94MIL TRNSLUC YEL LTX HYDRGEL

## (undated) DEVICE — PACK,SET UP,DRAPE: Brand: MEDLINE

## (undated) DEVICE — COTTON BALL ST

## (undated) DEVICE — DIFFUSER: Brand: CORE, MAESTRO

## (undated) DEVICE — WAX SURG 2.5GM HEMSTAT BNE BEESWAX PARAFFIN ISO PALMITATE

## (undated) DEVICE — SPONGE,NEURO,1"X3",XR,STRL,LF,10/PK: Brand: MEDLINE

## (undated) DEVICE — SYRINGE MED 10ML LUERLOCK TIP W/O SFTY DISP

## (undated) DEVICE — ELECTROSURGICAL PENCIL BUTTON SWITCH E-Z CLEAN COATED BLADE ELECTRODE 10 FT (3 M) CORD HOLSTER: Brand: MEGADYNE

## (undated) DEVICE — STOCKINETTE ORTH W6XL48IN OFF WHT SGL PLY UNBLEACHED COT RIB

## (undated) DEVICE — GAUZE,SPONGE,4"X4",12PLY,STERILE,LF,2'S: Brand: MEDLINE

## (undated) DEVICE — MARKER SURG SKIN GENTIAN VLT REG TIP W/ 6IN RUL

## (undated) DEVICE — PAD,NON-ADHERENT,3X8,STERILE,LF,1/PK: Brand: MEDLINE

## (undated) DEVICE — Z DISCONTINUED PATTY SURG 1X1 IN COTTONOID

## (undated) DEVICE — TRAUMACATH™ VENTRICULAR CATHETER SET: Brand: TRAUMACATH™

## (undated) DEVICE — INTENDED FOR TISSUE SEPARATION, AND OTHER PROCEDURES THAT REQUIRE A SHARP SURGICAL BLADE TO PUNCTURE OR CUT.: Brand: BARD-PARKER ® CARBON RIB-BACK BLADES

## (undated) DEVICE — CODMAN® SURGICAL PATTIES 1/2" X 1/2" (1.27CM X 1.27CM): Brand: CODMAN®

## (undated) DEVICE — SHEET,DRAPE,53X77,STERILE: Brand: MEDLINE

## (undated) DEVICE — TOWEL,OR,DSP,ST,BLUE,STD,4/PK,20PK/CS: Brand: MEDLINE

## (undated) DEVICE — CODMAN® ISOCOOL® AHT® BIPOLAR FORCEPS TIPS 8 1/2" (22CM) TOTAL LENGTH 1MM TIP DIAMETER 3 1/2" (8.9CM) WORKING LENGTH: Brand: CODMAN® ISOCOOL® AHT®

## (undated) DEVICE — SPONGE GZ W4XL4IN COT 12 PLY TYP VII WVN C FLD DSGN

## (undated) DEVICE — ELECTRODE PT RET AD L9FT HI MOIST COND ADH HYDRGEL CORDED

## (undated) DEVICE — LABEL MED MINI W/ MARKER

## (undated) DEVICE — COUNTER NDL 40 COUNT HLD 70 FOAM BLK ADH W/ MAG

## (undated) DEVICE — SUTURE NRLN 2-0 L18IN NONABSORBABLE BLK TF L13MM 1/2 CIR N104T